# Patient Record
Sex: MALE | Race: WHITE | NOT HISPANIC OR LATINO | Employment: OTHER | URBAN - METROPOLITAN AREA
[De-identification: names, ages, dates, MRNs, and addresses within clinical notes are randomized per-mention and may not be internally consistent; named-entity substitution may affect disease eponyms.]

---

## 2017-01-09 ENCOUNTER — HOSPITAL ENCOUNTER (OUTPATIENT)
Dept: RESPIRATORY THERAPY | Facility: HOSPITAL | Age: 82
Discharge: HOME | End: 2017-01-09
Attending: INTERNAL MEDICINE | Admitting: INTERNAL MEDICINE

## 2017-03-28 ENCOUNTER — HOSPITAL ENCOUNTER (OUTPATIENT)
Dept: SLEEP MEDICINE | Facility: HOSPITAL | Age: 82
Discharge: HOME | End: 2017-03-28
Attending: INTERNAL MEDICINE | Admitting: INTERNAL MEDICINE

## 2017-07-27 ENCOUNTER — ON CAMPUS - OUTPATIENT (AMBULATORY)
Dept: URBAN - METROPOLITAN AREA HOSPITAL 2 | Facility: HOSPITAL | Age: 82
End: 2017-07-27
Payer: MEDICARE

## 2017-07-27 VITALS
HEART RATE: 62 BPM | DIASTOLIC BLOOD PRESSURE: 61 MMHG | SYSTOLIC BLOOD PRESSURE: 76 MMHG | RESPIRATION RATE: 16 BRPM | HEART RATE: 75 BPM | OXYGEN SATURATION: 98 % | SYSTOLIC BLOOD PRESSURE: 103 MMHG | SYSTOLIC BLOOD PRESSURE: 71 MMHG | DIASTOLIC BLOOD PRESSURE: 80 MMHG | TEMPERATURE: 97.4 F | OXYGEN SATURATION: 96 % | RESPIRATION RATE: 17 BRPM | DIASTOLIC BLOOD PRESSURE: 48 MMHG | SYSTOLIC BLOOD PRESSURE: 114 MMHG | WEIGHT: 130 LBS | HEART RATE: 67 BPM | SYSTOLIC BLOOD PRESSURE: 99 MMHG | HEART RATE: 76 BPM | HEIGHT: 67 IN | DIASTOLIC BLOOD PRESSURE: 39 MMHG | SYSTOLIC BLOOD PRESSURE: 98 MMHG | DIASTOLIC BLOOD PRESSURE: 117 MMHG | RESPIRATION RATE: 15 BRPM | DIASTOLIC BLOOD PRESSURE: 42 MMHG | DIASTOLIC BLOOD PRESSURE: 50 MMHG | SYSTOLIC BLOOD PRESSURE: 161 MMHG | HEART RATE: 60 BPM | OXYGEN SATURATION: 100 % | SYSTOLIC BLOOD PRESSURE: 118 MMHG | HEART RATE: 69 BPM | OXYGEN SATURATION: 99 % | DIASTOLIC BLOOD PRESSURE: 67 MMHG | HEART RATE: 72 BPM

## 2017-07-27 DIAGNOSIS — K57.30 DIVERTICULOSIS OF LARGE INTESTINE WITHOUT PERFORATION OR ABS: ICD-10-CM

## 2017-07-27 DIAGNOSIS — Z12.11 ENCOUNTER FOR SCREENING FOR MALIGNANT NEOPLASM OF COLON: ICD-10-CM

## 2017-07-27 PROCEDURE — G0121 COLON CA SCRN NOT HI RSK IND: HCPCS

## 2017-07-27 RX ADMIN — PROPOFOL: 10 INJECTION, EMULSION INTRAVENOUS at 08:47

## 2019-08-06 ENCOUNTER — OFFICE VISIT (OUTPATIENT)
Dept: CARDIOLOGY | Facility: CLINIC | Age: 84
End: 2019-08-06

## 2019-08-06 VITALS
DIASTOLIC BLOOD PRESSURE: 74 MMHG | HEART RATE: 64 BPM | BODY MASS INDEX: 20.08 KG/M2 | HEIGHT: 71 IN | OXYGEN SATURATION: 99 % | SYSTOLIC BLOOD PRESSURE: 132 MMHG | WEIGHT: 143.4 LBS

## 2019-08-06 DIAGNOSIS — I10 ESSENTIAL HYPERTENSION: ICD-10-CM

## 2019-08-06 DIAGNOSIS — I63.10 CEREBROVASCULAR ACCIDENT (CVA) DUE TO EMBOLISM OF PRECEREBRAL ARTERY (HCC): ICD-10-CM

## 2019-08-06 DIAGNOSIS — I25.10 CORONARY ARTERY DISEASE INVOLVING NATIVE CORONARY ARTERY OF NATIVE HEART WITHOUT ANGINA PECTORIS: Primary | ICD-10-CM

## 2019-08-06 DIAGNOSIS — E78.2 HYPERLIPIDEMIA, MIXED: ICD-10-CM

## 2019-08-06 DIAGNOSIS — I49.5 SICK SINUS SYNDROME (HCC): ICD-10-CM

## 2019-08-06 DIAGNOSIS — Z95.0 PRESENCE OF CARDIAC PACEMAKER: ICD-10-CM

## 2019-08-06 PROBLEM — I63.9 CEREBROVASCULAR ACCIDENT (CVA) (HCC): Status: ACTIVE | Noted: 2019-08-06

## 2019-08-06 PROCEDURE — 93000 ELECTROCARDIOGRAM COMPLETE: CPT | Performed by: INTERNAL MEDICINE

## 2019-08-06 PROCEDURE — 99213 OFFICE O/P EST LOW 20 MIN: CPT | Performed by: INTERNAL MEDICINE

## 2019-08-06 RX ORDER — DUTASTERIDE 0.5 MG/1
0.5 CAPSULE, LIQUID FILLED ORAL NIGHTLY
COMMUNITY
Start: 2019-05-23

## 2019-08-06 RX ORDER — PRAVASTATIN SODIUM 40 MG
40 TABLET ORAL WEEKLY
COMMUNITY
Start: 2012-10-11 | End: 2022-01-01 | Stop reason: HOSPADM

## 2019-08-06 RX ORDER — ATENOLOL 25 MG/1
TABLET ORAL DAILY
Refills: 3 | COMMUNITY
Start: 2019-07-29 | End: 2020-02-06 | Stop reason: SDUPTHER

## 2019-08-06 RX ORDER — LISINOPRIL AND HYDROCHLOROTHIAZIDE 25; 20 MG/1; MG/1
1 TABLET ORAL DAILY
Refills: 3 | COMMUNITY
Start: 2019-05-01 | End: 2019-08-06 | Stop reason: ALTCHOICE

## 2019-08-06 RX ORDER — AMLODIPINE BESYLATE 2.5 MG/1
2.5 TABLET ORAL
Refills: 3 | COMMUNITY
Start: 2019-07-29 | End: 2021-01-01

## 2019-08-06 NOTE — PROGRESS NOTES
Cardiology Office Visit Note      Referring physician: Lulu    Reason For Followup: 6 month    HPI:  Shon Syed is a 88 y.o. male presents FU history of advanced ischemic coronary disease as well as sick sinus syndrome with dual chamber PM implant 2/2009 and hypertension.      3/2016  stress myoview, echocardiogram and Holter Monitor which were unremarkable.   Echocardiogram showed preserved LV function with mild TR RVSP 52mm hg    Device interrogation formed 6 months ago with satisfactory device functioning and still 2 years of battery life estimated.  Mr. Syed has a home monitor device.  He presents back today with no specific cardiac complaints.  He denies chest pain, dyspnea, PND, orthopnea, palpitations, near syncope, lower extremity edema or feelings of his heart racing.    Instructed to bring medication bottles to OV.           Past Medical History:   Diagnosis Date   • Coronary artery disease    • Hyperlipidemia    • Hypertension    • Stroke (CMS/HCC)        Past Surgical History:   Procedure Laterality Date   • CORONARY ARTERY BYPASS GRAFT  1993   • INSERT / REPLACE / REMOVE PACEMAKER  2010    BS insertion         Current Outpatient Medications:   •  amLODIPine (NORVASC) 2.5 MG tablet, Take 2.5 mg by mouth Daily., Disp: , Rfl: 3  •  aspirin 81 MG tablet, Daily., Disp: , Rfl:   •  atenolol (TENORMIN) 25 MG tablet, Daily., Disp: , Rfl: 3  •  dutasteride (AVODART) 0.5 MG capsule, Daily., Disp: , Rfl:   •  Multiple Vitamins-Minerals (MULTI VITAMIN/MINERALS) tablet, Daily., Disp: , Rfl:   •  pravastatin (PRAVACHOL) 40 MG tablet, Daily., Disp: , Rfl:     Social History     Socioeconomic History   • Marital status:      Spouse name: Not on file   • Number of children: Not on file   • Years of education: Not on file   • Highest education level: Not on file   Tobacco Use   • Smoking status: Former Smoker       History reviewed. No pertinent family history.      Review of Systems   General: denies  "fever, chills, anorexia, weight loss  Eyes: denies blurring, diplopia  Ear/Nose/Throat: denies ear pain, nosebleeds, hoarseness  Cardiovascular: See HPI  Respiratory: denies excessive sputum, hemoptysis, wheezing  Gastrointestinal: denies nausea, vomiting, change in bowel habits, abdominal pain  Genitourinary: denies dysuria and hematuria  Musculoskeletal: denies back pain, joint pain, joint swelling, muscle cramps, weakness  Skin: denies rashes, itching, suspicious lesions  Neurologic: denies focal neuro deficits  Psychiatric: denies depression, anxiety  Endocrine: denies cold intolerance, heat intolerance  Hematologic/Lymphatic: denies abnormal bruising, bleeding  Allergic/Immunologic: denies urticaria or persistent infections      Objective     Visit Vitals  /74   Pulse 64   Ht 180.3 cm (71\")   Wt 65 kg (143 lb 6.4 oz)   SpO2 99% Comment: room air   BMI 20.00 kg/m²           Physical Exam  General:     Obese, well developed,, in no acute distress.    Head:     normocephalic and atraumatic.    Eyes:    PERRL/EOM intact, conjunctiva and sclera clear with out nystagmus.    Neck:    no jvd or bruits  Chest Wall:    no deformities   Lungs:    clear bilaterally to auscultation with adequate global airflow   Heart:    non-displaced PMI; regular rate and rhythm, normal S1, S2 without murmurs, rubs, or gallops  Abdomen:  Soft, nontender without HSM  Msk:    no deformity; adequate R OM  Pulses:    pulses normal in all 4 extremities.    Extremities:    no clubbing, cyanosis, edema   Neurologic:    no focal sensory or motor deficits  Skin:    intact without lesions or rashes.    Psych:    alert and cooperative; normal mood and affect; normal attention span and concentration.            ECG 12 Lead  Date/Time: 8/6/2019 2:46 PM  Performed by: DEL Oleary MD  Authorized by: DEL Oleary MD   Comparison: compared with previous ECG from 2/5/2019  Similar to previous ECG  Comments: Abnormal EKG shows probably atrial " pacing with ventricular sensing.  There are inferolateral ST-T wave changes which are not change from previous tracing.  One isolated PAC is new              Assessment:   1. Coronary artery disease involving native coronary artery of native heart without angina pectoris  Multivessel CABG 1993; remains clinically stable and angina free    2. Sick sinus syndrome (CMS/HCC)  Satisfactory control with beta-blocker and permanent dual-chamber pacemaker    3. Presence of cardiac pacemaker  Satisfactory device site and functioning; originally implanted 2009-still has 2 years of battery life based on last interrogation 6 months ago    4. Essential hypertension  -Remains well-regulated current medications.    5. Hyperlipidemia, mixed  Maintained on statin therapy    6. Cerebrovascular accident (CVA) due to embolism of precerebral artery (CMS/HCC)  Minimal residual neuropathy with no recurrent signs or symptoms of TIA or stroke.  -Continue aspirin and statin therapy and will control blood pressure.        Plan:  Mr. Syed continue to be remarkable for advanced age and substantial medical problem list.  He is encouraged to continue with current medications as listed and reviewed today in detail.  Continue satisfactory functional activity.  Return to clinic 6 months or sooner if needed.  DEL Oleary MD  8/6/2019 2:46 PM

## 2020-02-06 ENCOUNTER — OFFICE VISIT (OUTPATIENT)
Dept: CARDIOLOGY | Facility: CLINIC | Age: 85
End: 2020-02-06

## 2020-02-06 VITALS
OXYGEN SATURATION: 99 % | HEART RATE: 82 BPM | SYSTOLIC BLOOD PRESSURE: 126 MMHG | BODY MASS INDEX: 23.76 KG/M2 | DIASTOLIC BLOOD PRESSURE: 70 MMHG | WEIGHT: 142.6 LBS | HEIGHT: 65 IN

## 2020-02-06 DIAGNOSIS — I10 ESSENTIAL HYPERTENSION: ICD-10-CM

## 2020-02-06 DIAGNOSIS — Z95.0 PRESENCE OF CARDIAC PACEMAKER: ICD-10-CM

## 2020-02-06 DIAGNOSIS — I63.10 CEREBROVASCULAR ACCIDENT (CVA) DUE TO EMBOLISM OF PRECEREBRAL ARTERY (HCC): ICD-10-CM

## 2020-02-06 DIAGNOSIS — E78.2 HYPERLIPIDEMIA, MIXED: ICD-10-CM

## 2020-02-06 DIAGNOSIS — I25.10 CORONARY ARTERY DISEASE INVOLVING NATIVE CORONARY ARTERY OF NATIVE HEART WITHOUT ANGINA PECTORIS: Primary | ICD-10-CM

## 2020-02-06 DIAGNOSIS — I49.5 SICK SINUS SYNDROME (HCC): ICD-10-CM

## 2020-02-06 PROCEDURE — 93000 ELECTROCARDIOGRAM COMPLETE: CPT | Performed by: INTERNAL MEDICINE

## 2020-02-06 PROCEDURE — 99213 OFFICE O/P EST LOW 20 MIN: CPT | Performed by: INTERNAL MEDICINE

## 2020-02-06 RX ORDER — ATENOLOL 25 MG/1
25 TABLET ORAL DAILY
Qty: 90 TABLET | Refills: 3 | Status: SHIPPED | OUTPATIENT
Start: 2020-02-06 | End: 2021-05-17

## 2020-02-06 RX ORDER — LISINOPRIL 2.5 MG/1
TABLET ORAL
COMMUNITY
Start: 2020-01-30 | End: 2020-02-06 | Stop reason: DRUGHIGH

## 2020-02-06 RX ORDER — LISINOPRIL AND HYDROCHLOROTHIAZIDE 12.5; 1 MG/1; MG/1
TABLET ORAL
COMMUNITY
End: 2020-02-06

## 2020-02-06 NOTE — PROGRESS NOTES
Cardiology Office Visit Note      Referring physician:  MD Lulu    Reason For Followup: 6 month/CAD/SSS    HPI:  Shon ZAYAS Kunal is a 89 y.o. male presents FU history of advanced ischemic coronary disease for which he underwent multivessel CABG in 1993.  His initial bypass operation was complicated by perioperative stroke for which he has recovered remarkably well.  In addition, he has sick sinus syndrome with dual chamber PM implant 2/2009 and hypertension.      3/2016  stress myoview, echocardiogram and Holter Monitor which were unremarkable.   Echocardiogram showed preserved LV function with mild TR RVSP 52mm hg    Reports remaining fairly functionally active for age; however,; denies regular exercise regimen.  Shon admits he is generally less active to the colder winter months.    He presents back today with no specific cardiac complaints.  He denies chest pain, dyspnea, PND, orthopnea, palpitations, near syncope, lower extremity edema or feelings of his heart racing.  His wife sees to it that he remains fairly compliant with medications listed and reviewed in detail.           Past Medical History:   Diagnosis Date   • Coronary artery disease    • Coronary artery disease involving native coronary artery of native heart 8/6/2019    Status post multivessel CABG 1993   • Essential hypertension 8/6/2019   • Hyperlipidemia    • Hyperlipidemia, mixed 8/6/2019   • Hypertension    • Presence of cardiac pacemaker 8/6/2019    S/P dual-chamber pacemaker implanted 2009   • Sick sinus syndrome (CMS/HCC) 8/6/2019   • Stroke (CMS/[facility])        Past Surgical History:   Procedure Laterality Date   • CORONARY ARTERY BYPASS GRAFT  1993   • INSERT / REPLACE / REMOVE PACEMAKER  2010    BS insertion         Current Outpatient Medications:   •  amLODIPine (NORVASC) 2.5 MG tablet, Take 2.5 mg by mouth Daily., Disp: , Rfl: 3  •  aspirin 81 MG tablet, Daily., Disp: , Rfl:   •  dutasteride (AVODART) 0.5 MG capsule, Daily., Disp: ,  "Rfl:   •  Multiple Vitamins-Minerals (MULTI VITAMIN/MINERALS) tablet, Daily., Disp: , Rfl:   •  pravastatin (PRAVACHOL) 40 MG tablet, Daily., Disp: , Rfl:   •  atenolol (TENORMIN) 25 MG tablet, Take 1 tablet by mouth Daily., Disp: 90 tablet, Rfl: 3  •  rivaroxaban (XARELTO) 15 MG tablet, Take 1 tablet by mouth., Disp: , Rfl:     Social History     Socioeconomic History   • Marital status:      Spouse name: Not on file   • Number of children: Not on file   • Years of education: Not on file   • Highest education level: Not on file   Tobacco Use   • Smoking status: Former Smoker       No family history on file.      Review of Systems   General: denies fever, chills, anorexia, weight loss  Eyes: denies blurring, diplopia  Ear/Nose/Throat: denies ear pain, nosebleeds, hoarseness; bilat hearing aides  Cardiovascular: See HPI  Respiratory: denies excessive sputum, hemoptysis, wheezing  Gastrointestinal: denies nausea, vomiting, change in bowel habits, abdominal pain  Genitourinary: denies dysuria and hematuria  Musculoskeletal: denies back pain, joint pain, joint swelling, muscle cramps, weakness  Skin: denies rashes, itching, suspicious lesions  Neurologic: denies focal neuro deficits  Psychiatric: denies depression, anxiety  Endocrine: denies cold intolerance, heat intolerance  Hematologic/Lymphatic: denies abnormal bruising, bleeding  Allergic/Immunologic: denies urticaria or persistent infections      Objective     Visit Vitals  /70   Pulse 82   Ht 165.1 cm (65\")   Wt 64.7 kg (142 lb 9.6 oz)   SpO2 99% Comment: room air   BMI 23.73 kg/m²           Physical Exam  General:    well developed,, in no acute distress.    Head:     normocephalic and atraumatic.    Eyes:    PERRL/EOM intact, conjunctiva and sclera clear with out nystagmus.    Neck:    no jvd or bruits  Chest Wall:    no deformities   Lungs:    clear bilaterally to auscultation with adequate global airflow   Heart:    non-displaced PMI; regular " rate and rhythm, normal S1, S2 without murmurs, rubs, or gallops  Abdomen:  Soft, nontender without HSM  Msk:    no deformity; adequate R OM  Pulses:    pulses normal in all 4 extremities.    Extremities:    no clubbing, cyanosis, edema   Neurologic:    no focal sensory or motor deficits  Skin:    intact without lesions or rashes.    Psych:    alert and cooperative; normal mood and affect; normal attention span and concentration.            ECG 12 Lead  Date/Time: 2/6/2020 11:00 AM  Performed by: DEL Oleary MD  Authorized by: DEL Oleary MD   Comparison: compared with previous ECG from 8/6/2019  Similar to previous ECG    Clinical impression: abnormal EKG  Comments: Sinus rhythm with demand atrial pacing.  LVH with secondary repolarization changes              Assessment:   1. Coronary artery disease involving native coronary artery of native heart without angina pectoris; status post multivessel CABG 1993  -Remains hemodynamically stable well compensated and angina free    2. Cerebrovascular accident (CVA) due to embolism of precerebral artery (CMS/HCC) remote stroke occurred perioperatively when he underwent bypass in 93  Very modest residual neuropathy    3. Essential hypertension  Generally well regulated on low-dose atenolol (12.5 mg daily) and very low-dose (virtually homeopathic) of lisinopril 2.5 mg daily    4. Hyperlipidemia, mixed  Maintained on statin therapy    5. Presence of cardiac pacemaker  Suspected device site and functioning with adequate battery status    6. Sick sinus syndrome (CMS/HCC)  Marginally well regulated on very low-dose atenolol with primary demand AV sequential pacing        Plan:  Patient would benefit from increased beta-blockade.  Consequently, I advised him to increase his atenolol to 1 full tablet or 25 mg p.o. daily and discontinue his very low nearly homeopathic dose of lisinopril at only 2.5 mg daily.  Shon will keep a home blood pressure and heart rate log and  notify our office if he does not stay within recommended guidelines.  Return to clinic 6 months or sooner if needed.    DEL Oleary MD  2/16/2020 12:10 AM    This report was generated using the Dragon voice recognition system.

## 2020-03-27 ENCOUNTER — HOSPITAL ENCOUNTER (EMERGENCY)
Facility: HOSPITAL | Age: 85
Discharge: HOME | End: 2020-03-27
Admitting: EMERGENCY MEDICINE

## 2020-03-27 VITALS
OXYGEN SATURATION: 97 % | WEIGHT: 139.33 LBS | HEIGHT: 69 IN | HEART RATE: 76 BPM | SYSTOLIC BLOOD PRESSURE: 173 MMHG | TEMPERATURE: 97.9 F | DIASTOLIC BLOOD PRESSURE: 77 MMHG | BODY MASS INDEX: 20.64 KG/M2 | RESPIRATION RATE: 19 BRPM

## 2020-03-27 DIAGNOSIS — T25.132A SUPERFICIAL BURN OF TOE OF LEFT FOOT, INITIAL ENCOUNTER: Primary | ICD-10-CM

## 2020-03-27 PROCEDURE — 99283 EMERGENCY DEPT VISIT LOW MDM: CPT

## 2020-03-27 RX ADMIN — SILVER SULFADIAZINE 1 APPLICATION: 10 CREAM TOPICAL at 14:14

## 2020-08-06 ENCOUNTER — OFFICE VISIT (OUTPATIENT)
Dept: CARDIOLOGY | Facility: CLINIC | Age: 85
End: 2020-08-06

## 2020-08-06 VITALS
DIASTOLIC BLOOD PRESSURE: 72 MMHG | HEART RATE: 69 BPM | HEIGHT: 69 IN | WEIGHT: 139 LBS | SYSTOLIC BLOOD PRESSURE: 126 MMHG | BODY MASS INDEX: 20.59 KG/M2

## 2020-08-06 DIAGNOSIS — I10 ESSENTIAL HYPERTENSION: ICD-10-CM

## 2020-08-06 DIAGNOSIS — I49.5 SICK SINUS SYNDROME (HCC): ICD-10-CM

## 2020-08-06 DIAGNOSIS — I25.10 CORONARY ARTERY DISEASE INVOLVING NATIVE CORONARY ARTERY OF NATIVE HEART WITHOUT ANGINA PECTORIS: Primary | ICD-10-CM

## 2020-08-06 PROCEDURE — 93000 ELECTROCARDIOGRAM COMPLETE: CPT | Performed by: INTERNAL MEDICINE

## 2020-08-06 PROCEDURE — 99213 OFFICE O/P EST LOW 20 MIN: CPT | Performed by: INTERNAL MEDICINE

## 2020-08-06 RX ORDER — LISINOPRIL 5 MG/1
5 TABLET ORAL DAILY
COMMUNITY
End: 2021-01-01 | Stop reason: ALTCHOICE

## 2020-08-06 NOTE — PROGRESS NOTES
Cardiology Office Visit Note      Referring physician: Antony Lawson MD    Reason For Followup: follow up      HPI:  Shon ZAYAS Kunal is a 89 y.o. male. Presents today for a follow up visit.  Has advanced ischemic coronary disease for which he underwent multivessel CABG in 1993.  His initial bypass operation was complicated by perioperative stroke.   In addition, he has sick sinus syndrome with dual chamber PM implant 2/2009 and hypertension.      3/2016  stress myoview, echocardiogram and Holter Monitor which were unremarkable.   Echocardiogram showed preserved LV function with mild TR RVSP 52mm hg    Reports remaining fairly functionally active for age; however,; denies regular exercise regimen.  Shon admits he is generally less active to the colder winter months.    He presents back today with no specific cardiac complaints.  He denies chest pain, dyspnea, PND, orthopnea, palpitations, near syncope, lower extremity edema or feelings of his heart racing.  His wife sees to it that he remains fairly compliant with medications listed and reviewed in detail.             Past Medical History:   Diagnosis Date   • Coronary artery disease    • Coronary artery disease involving native coronary artery of native heart 8/6/2019    Status post multivessel CABG 1993   • Essential hypertension 8/6/2019   • Hyperlipidemia    • Hyperlipidemia, mixed 8/6/2019   • Hypertension    • Presence of cardiac pacemaker 8/6/2019    S/P dual-chamber pacemaker implanted 2009   • Sick sinus syndrome (CMS/HCC) 8/6/2019   • Stroke (CMS/HCC)        Past Surgical History:   Procedure Laterality Date   • CORONARY ARTERY BYPASS GRAFT  1993   • INSERT / REPLACE / REMOVE PACEMAKER  2010    BS insertion         Current Outpatient Medications:   •  amLODIPine (NORVASC) 2.5 MG tablet, Take 2.5 mg by mouth Daily., Disp: , Rfl: 3  •  aspirin 81 MG tablet, Daily., Disp: , Rfl:   •  atenolol (TENORMIN) 25 MG tablet, Take 1 tablet by mouth Daily., Disp:  "90 tablet, Rfl: 3  •  dutasteride (AVODART) 0.5 MG capsule, Daily., Disp: , Rfl:   •  lisinopril (PRINIVIL,ZESTRIL) 5 MG tablet, Take 5 mg by mouth Daily., Disp: , Rfl:   •  Multiple Vitamins-Minerals (MULTI VITAMIN/MINERALS) tablet, Daily., Disp: , Rfl:   •  pravastatin (PRAVACHOL) 40 MG tablet, Daily., Disp: , Rfl:     Social History     Socioeconomic History   • Marital status:      Spouse name: Not on file   • Number of children: Not on file   • Years of education: Not on file   • Highest education level: Not on file   Tobacco Use   • Smoking status: Former Smoker   • Smokeless tobacco: Former User   Substance and Sexual Activity   • Alcohol use: Never     Frequency: Never   • Drug use: Never   • Sexual activity: Defer       Family History   Problem Relation Age of Onset   • Leukemia Mother    • Heart disease Father    • Heart attack Father 56         age at 56         Review of Systems   General: denies fever, chills, anorexia, weight loss  Eyes: denies blurring, diplopia  Ear/Nose/Throat: denies ear pain, nosebleeds, hoarseness  Cardiovascular: See HPI  Respiratory: denies excessive sputum, hemoptysis, wheezing  Gastrointestinal: denies nausea, vomiting, change in bowel habits, abdominal pain  Genitourinary: Nocturia 1 or 2 times per night  Musculoskeletal: Mild to moderate weightbearing arthralgias  Skin: denies rashes, itching, suspicious lesions  Neurologic: denies focal neuro deficits  Psychiatric: denies depression, anxiety  Endocrine: denies cold intolerance, heat intolerance  Hematologic/Lymphatic: denies abnormal bruising, bleeding  Allergic/Immunologic: denies urticaria or persistent infections      Objective     Visit Vitals  /72   Pulse 69   Ht 175.3 cm (69.02\")   Wt 63 kg (139 lb)   BMI 20.52 kg/m²           Physical Exam  General:     Obese, well developed,, in no acute distress.    Head:     normocephalic and atraumatic.    Eyes:    PERRL/EOM intact, conjunctiva and sclera clear " with out nystagmus.    Neck:    no jvd or bruits  Chest Wall:    no deformities   Lungs:    clear bilaterally to auscultation with adequate global airflow   Heart:    non-displaced PMI; regular rate and rhythm, normal S1, S2 without murmurs, rubs, or gallops  Abdomen:  Soft, nontender without HSM  Msk:    no deformity; adequate R OM  Pulses:    pulses normal in all 4 extremities.    Extremities:    no clubbing, cyanosis, edema   Neurologic:    no focal sensory or motor deficits  Skin:    intact without lesions or rashes.    Psych:    alert and cooperative; normal mood and affect; normal attention span and concentration.            ECG 12 Lead  Date/Time: 8/6/2020 8:10 AM  Performed by: DEL Oleary MD  Authorized by: DEL Oleary MD   Comparison: compared with previous ECG from 2/6/2020  Similar to previous ECG  Rhythm: paced  Ectopy: atrial premature contractions    Clinical impression: abnormal EKG  Comments: Dominant atrial paced rhythm with ventricular sensing and rare isolated premature atrial contractions.  No overall change in previous tracing              Assessment:   Problems Addressed this Visit        Cardiovascular and Mediastinum    Sick sinus syndrome (CMS/HCC)    Fairly well-regulated on current medical regimen plus AV sequential pacemaker        Essential hypertension    Well-regulated current medication listed reviewed in detail                Coronary artery disease involving native coronary artery of native heart - Primary    Remains clinically stable and angina free despite remote CABG (1993)                Plan:  Continue current medications as listed reviewed in detail  Return to clinic 6 months for device check and clinical follow-up.  Encouraged regular progressive physical activity though strongly cautioned in his risk for falls.    DEL Oleary MD  8/23/2020 21:38    This report was generated using the Dragon voice recognition system.

## 2021-01-01 ENCOUNTER — LAB (OUTPATIENT)
Dept: LAB | Facility: HOSPITAL | Age: 86
End: 2021-01-01

## 2021-01-01 ENCOUNTER — READMISSION MANAGEMENT (OUTPATIENT)
Dept: CALL CENTER | Facility: HOSPITAL | Age: 86
End: 2021-01-01

## 2021-01-01 ENCOUNTER — PREP FOR SURGERY (OUTPATIENT)
Dept: OTHER | Facility: HOSPITAL | Age: 86
End: 2021-01-01

## 2021-01-01 ENCOUNTER — OFFICE VISIT (OUTPATIENT)
Dept: CARDIOLOGY | Facility: CLINIC | Age: 86
End: 2021-01-01

## 2021-01-01 ENCOUNTER — TRANSCRIBE ORDERS (OUTPATIENT)
Dept: ADMINISTRATIVE | Facility: HOSPITAL | Age: 86
End: 2021-01-01

## 2021-01-01 ENCOUNTER — TRANSCRIBE ORDERS (OUTPATIENT)
Dept: LAB | Facility: HOSPITAL | Age: 86
End: 2021-01-01

## 2021-01-01 ENCOUNTER — ANESTHESIA EVENT (OUTPATIENT)
Dept: CARDIOLOGY | Facility: HOSPITAL | Age: 86
End: 2021-01-01

## 2021-01-01 VITALS
BODY MASS INDEX: 20.72 KG/M2 | HEIGHT: 67 IN | DIASTOLIC BLOOD PRESSURE: 70 MMHG | WEIGHT: 132 LBS | SYSTOLIC BLOOD PRESSURE: 122 MMHG | OXYGEN SATURATION: 98 % | HEART RATE: 77 BPM

## 2021-01-01 VITALS
SYSTOLIC BLOOD PRESSURE: 151 MMHG | WEIGHT: 139.5 LBS | DIASTOLIC BLOOD PRESSURE: 77 MMHG | HEART RATE: 61 BPM | OXYGEN SATURATION: 99 % | HEIGHT: 67 IN | BODY MASS INDEX: 21.9 KG/M2

## 2021-01-01 VITALS
HEART RATE: 80 BPM | HEIGHT: 67 IN | WEIGHT: 137.4 LBS | SYSTOLIC BLOOD PRESSURE: 130 MMHG | DIASTOLIC BLOOD PRESSURE: 82 MMHG | RESPIRATION RATE: 18 BRPM | BODY MASS INDEX: 21.56 KG/M2

## 2021-01-01 DIAGNOSIS — I50.21 ACUTE SYSTOLIC CHF (CONGESTIVE HEART FAILURE) (HCC): ICD-10-CM

## 2021-01-01 DIAGNOSIS — I10 ESSENTIAL HYPERTENSION: Primary | ICD-10-CM

## 2021-01-01 DIAGNOSIS — I25.10 CORONARY ARTERY DISEASE INVOLVING NATIVE CORONARY ARTERY OF NATIVE HEART WITHOUT ANGINA PECTORIS: Primary | ICD-10-CM

## 2021-01-01 DIAGNOSIS — N18.32 CHRONIC KIDNEY DISEASE (CKD) STAGE G3B/A1, MODERATELY DECREASED GLOMERULAR FILTRATION RATE (GFR) BETWEEN 30-44 ML/MIN/1.73 SQUARE METER AND ALBUMINURIA CREATININE RATIO LESS THAN 30 MG/G (CMS/H* (HCC): Primary | ICD-10-CM

## 2021-01-01 DIAGNOSIS — Z45.010 PACEMAKER BATTERY DEPLETION: ICD-10-CM

## 2021-01-01 DIAGNOSIS — I49.5 SICK SINUS SYNDROME (HCC): Primary | ICD-10-CM

## 2021-01-01 DIAGNOSIS — N18.30 MALIGNANT HYPERTENSIVE HEART AND CHRONIC KIDNEY DISEASE STAGE III (HCC): Primary | ICD-10-CM

## 2021-01-01 DIAGNOSIS — N18.30 MALIGNANT HYPERTENSIVE HEART AND CHRONIC KIDNEY DISEASE STAGE III (HCC): ICD-10-CM

## 2021-01-01 DIAGNOSIS — I49.5 SICK SINUS SYNDROME (HCC): ICD-10-CM

## 2021-01-01 DIAGNOSIS — I10 HYPERTENSION, ESSENTIAL: ICD-10-CM

## 2021-01-01 DIAGNOSIS — E78.2 HYPERLIPIDEMIA, MIXED: ICD-10-CM

## 2021-01-01 DIAGNOSIS — I13.10 MALIGNANT HYPERTENSIVE HEART AND CHRONIC KIDNEY DISEASE STAGE III (HCC): Primary | ICD-10-CM

## 2021-01-01 DIAGNOSIS — I49.5 SICK SINUS SYNDROME: ICD-10-CM

## 2021-01-01 DIAGNOSIS — I25.10 CORONARY ARTERY DISEASE INVOLVING NATIVE CORONARY ARTERY OF NATIVE HEART WITHOUT ANGINA PECTORIS: ICD-10-CM

## 2021-01-01 DIAGNOSIS — Z95.0 PRESENCE OF CARDIAC PACEMAKER: ICD-10-CM

## 2021-01-01 DIAGNOSIS — R06.09 DYSPNEA ON EXERTION: ICD-10-CM

## 2021-01-01 DIAGNOSIS — I13.10 MALIGNANT HYPERTENSIVE HEART AND CHRONIC KIDNEY DISEASE STAGE III (HCC): ICD-10-CM

## 2021-01-01 LAB
25(OH)D3 SERPL-MCNC: 64.9 NG/ML
ALBUMIN SERPL-MCNC: 4 G/DL (ref 3.5–5.2)
ALBUMIN SERPL-MCNC: 4.3 G/DL (ref 3.5–5.2)
ALBUMIN/GLOB SERPL: 1.6 G/DL
ALBUMIN/GLOB SERPL: 1.8 G/DL
ALP SERPL-CCNC: 64 U/L (ref 39–117)
ALP SERPL-CCNC: 93 U/L (ref 39–117)
ALT SERPL W P-5'-P-CCNC: 17 U/L (ref 1–41)
ALT SERPL W P-5'-P-CCNC: 25 U/L (ref 1–41)
ANION GAP SERPL CALCULATED.3IONS-SCNC: 3.3 MMOL/L (ref 5–15)
ANION GAP SERPL CALCULATED.3IONS-SCNC: 9 MMOL/L (ref 5–15)
ANION GAP SERPL CALCULATED.3IONS-SCNC: 9.7 MMOL/L (ref 5–15)
AST SERPL-CCNC: 20 U/L (ref 1–40)
AST SERPL-CCNC: 27 U/L (ref 1–40)
BASOPHILS # BLD AUTO: 0.05 10*3/MM3 (ref 0–0.2)
BASOPHILS # BLD AUTO: 0.05 10*3/MM3 (ref 0–0.2)
BASOPHILS NFR BLD AUTO: 0.8 % (ref 0–1.5)
BASOPHILS NFR BLD AUTO: 0.8 % (ref 0–1.5)
BILIRUB SERPL-MCNC: 0.4 MG/DL (ref 0–1.2)
BILIRUB SERPL-MCNC: 0.5 MG/DL (ref 0–1.2)
BILIRUB UR QL STRIP: NEGATIVE
BUN SERPL-MCNC: 29 MG/DL (ref 8–23)
BUN SERPL-MCNC: 38 MG/DL (ref 8–23)
BUN SERPL-MCNC: 42 MG/DL (ref 8–23)
BUN/CREAT SERPL: 21 (ref 7–25)
BUN/CREAT SERPL: 21.8 (ref 7–25)
BUN/CREAT SERPL: 29.2 (ref 7–25)
CALCIUM SPEC-SCNC: 8.8 MG/DL (ref 8.2–9.6)
CALCIUM SPEC-SCNC: 9.4 MG/DL (ref 8.2–9.6)
CALCIUM SPEC-SCNC: 9.5 MG/DL (ref 8.2–9.6)
CHLORIDE SERPL-SCNC: 103 MMOL/L (ref 98–107)
CHLORIDE SERPL-SCNC: 105 MMOL/L (ref 98–107)
CHLORIDE SERPL-SCNC: 108 MMOL/L (ref 98–107)
CK SERPL-CCNC: 99 U/L (ref 20–200)
CLARITY UR: CLEAR
CO2 SERPL-SCNC: 26.3 MMOL/L (ref 22–29)
CO2 SERPL-SCNC: 27.7 MMOL/L (ref 22–29)
CO2 SERPL-SCNC: 28 MMOL/L (ref 22–29)
COLOR UR: YELLOW
CREAT SERPL-MCNC: 1.3 MG/DL (ref 0.76–1.27)
CREAT SERPL-MCNC: 1.33 MG/DL (ref 0.76–1.27)
CREAT SERPL-MCNC: 2 MG/DL (ref 0.76–1.27)
DEPRECATED RDW RBC AUTO: 45.2 FL (ref 37–54)
DEPRECATED RDW RBC AUTO: 46.5 FL (ref 37–54)
EOSINOPHIL # BLD AUTO: 0.15 10*3/MM3 (ref 0–0.4)
EOSINOPHIL # BLD AUTO: 0.24 10*3/MM3 (ref 0–0.4)
EOSINOPHIL NFR BLD AUTO: 2.3 % (ref 0.3–6.2)
EOSINOPHIL NFR BLD AUTO: 3.7 % (ref 0.3–6.2)
ERYTHROCYTE [DISTWIDTH] IN BLOOD BY AUTOMATED COUNT: 12.5 % (ref 12.3–15.4)
ERYTHROCYTE [DISTWIDTH] IN BLOOD BY AUTOMATED COUNT: 12.6 % (ref 12.3–15.4)
GFR SERPL CREATININE-BSD FRML MDRD: 32 ML/MIN/1.73
GFR SERPL CREATININE-BSD FRML MDRD: 50 ML/MIN/1.73
GFR SERPL CREATININE-BSD FRML MDRD: 52 ML/MIN/1.73
GLOBULIN UR ELPH-MCNC: 2.4 GM/DL
GLOBULIN UR ELPH-MCNC: 2.5 GM/DL
GLUCOSE SERPL-MCNC: 103 MG/DL (ref 65–99)
GLUCOSE SERPL-MCNC: 86 MG/DL (ref 65–99)
GLUCOSE SERPL-MCNC: 90 MG/DL (ref 65–99)
GLUCOSE UR STRIP-MCNC: NEGATIVE MG/DL
HCT VFR BLD AUTO: 42.6 % (ref 37.5–51)
HCT VFR BLD AUTO: 42.6 % (ref 37.5–51)
HGB BLD-MCNC: 13.7 G/DL (ref 13–17.7)
HGB BLD-MCNC: 14.1 G/DL (ref 13–17.7)
HGB UR QL STRIP.AUTO: NEGATIVE
IMM GRANULOCYTES # BLD AUTO: 0.02 10*3/MM3 (ref 0–0.05)
IMM GRANULOCYTES # BLD AUTO: 0.02 10*3/MM3 (ref 0–0.05)
IMM GRANULOCYTES NFR BLD AUTO: 0.3 % (ref 0–0.5)
IMM GRANULOCYTES NFR BLD AUTO: 0.3 % (ref 0–0.5)
INR PPP: 1.01 (ref 0.93–1.1)
KETONES UR QL STRIP: NEGATIVE
LEUKOCYTE ESTERASE UR QL STRIP.AUTO: NEGATIVE
LYMPHOCYTES # BLD AUTO: 1.63 10*3/MM3 (ref 0.7–3.1)
LYMPHOCYTES # BLD AUTO: 1.81 10*3/MM3 (ref 0.7–3.1)
LYMPHOCYTES NFR BLD AUTO: 24.8 % (ref 19.6–45.3)
LYMPHOCYTES NFR BLD AUTO: 28.2 % (ref 19.6–45.3)
MAGNESIUM SERPL-MCNC: 2.3 MG/DL (ref 1.6–2.4)
MAGNESIUM SERPL-MCNC: 2.4 MG/DL (ref 1.7–2.3)
MCH RBC QN AUTO: 32.1 PG (ref 26.6–33)
MCH RBC QN AUTO: 32.4 PG (ref 26.6–33)
MCHC RBC AUTO-ENTMCNC: 32.2 G/DL (ref 31.5–35.7)
MCHC RBC AUTO-ENTMCNC: 33.1 G/DL (ref 31.5–35.7)
MCV RBC AUTO: 97.9 FL (ref 79–97)
MCV RBC AUTO: 99.8 FL (ref 79–97)
MONOCYTES # BLD AUTO: 0.75 10*3/MM3 (ref 0.1–0.9)
MONOCYTES # BLD AUTO: 1.04 10*3/MM3 (ref 0.1–0.9)
MONOCYTES NFR BLD AUTO: 11.7 % (ref 5–12)
MONOCYTES NFR BLD AUTO: 15.8 % (ref 5–12)
NEUTROPHILS NFR BLD AUTO: 3.54 10*3/MM3 (ref 1.7–7)
NEUTROPHILS NFR BLD AUTO: 3.69 10*3/MM3 (ref 1.7–7)
NEUTROPHILS NFR BLD AUTO: 55.3 % (ref 42.7–76)
NEUTROPHILS NFR BLD AUTO: 56 % (ref 42.7–76)
NITRITE UR QL STRIP: NEGATIVE
NRBC BLD AUTO-RTO: 0 /100 WBC (ref 0–0.2)
NRBC BLD AUTO-RTO: 0 /100 WBC (ref 0–0.2)
PH UR STRIP.AUTO: 6 [PH] (ref 5–8)
PHOSPHATE SERPL-MCNC: 3.4 MG/DL (ref 2.5–4.5)
PLATELET # BLD AUTO: 228 10*3/MM3 (ref 140–450)
PLATELET # BLD AUTO: 231 10*3/MM3 (ref 140–450)
PMV BLD AUTO: 10.8 FL (ref 6–12)
PMV BLD AUTO: 11.8 FL (ref 6–12)
POTASSIUM SERPL-SCNC: 4.4 MMOL/L (ref 3.5–5.2)
POTASSIUM SERPL-SCNC: 4.6 MMOL/L (ref 3.5–5.2)
POTASSIUM SERPL-SCNC: 5 MMOL/L (ref 3.5–5.2)
PROT SERPL-MCNC: 6.5 G/DL (ref 6–8.5)
PROT SERPL-MCNC: 6.7 G/DL (ref 6–8.5)
PROT UR QL STRIP: NEGATIVE
PROTHROMBIN TIME: 11.2 SECONDS (ref 9.6–11.7)
PTH-INTACT SERPL-MCNC: 43.5 PG/ML (ref 15–65)
RBC # BLD AUTO: 4.27 10*6/MM3 (ref 4.14–5.8)
RBC # BLD AUTO: 4.35 10*6/MM3 (ref 4.14–5.8)
SARS-COV-2 ORF1AB RESP QL NAA+PROBE: NOT DETECTED
SODIUM SERPL-SCNC: 139 MMOL/L (ref 136–145)
SODIUM SERPL-SCNC: 139 MMOL/L (ref 136–145)
SODIUM SERPL-SCNC: 142 MMOL/L (ref 136–145)
SP GR UR STRIP: 1.02 (ref 1–1.03)
URATE SERPL-MCNC: 8.6 MG/DL (ref 3.4–7)
UROBILINOGEN UR QL STRIP: NORMAL
WBC # BLD AUTO: 6.41 10*3/MM3 (ref 3.4–10.8)
WBC NRBC COR # BLD: 6.58 10*3/MM3 (ref 3.4–10.8)

## 2021-01-01 PROCEDURE — U0005 INFEC AGEN DETEC AMPLI PROBE: HCPCS

## 2021-01-01 PROCEDURE — 85025 COMPLETE CBC W/AUTO DIFF WBC: CPT

## 2021-01-01 PROCEDURE — 82550 ASSAY OF CK (CPK): CPT

## 2021-01-01 PROCEDURE — C9803 HOPD COVID-19 SPEC COLLECT: HCPCS

## 2021-01-01 PROCEDURE — 80048 BASIC METABOLIC PNL TOTAL CA: CPT

## 2021-01-01 PROCEDURE — 82306 VITAMIN D 25 HYDROXY: CPT

## 2021-01-01 PROCEDURE — 84550 ASSAY OF BLOOD/URIC ACID: CPT

## 2021-01-01 PROCEDURE — 83735 ASSAY OF MAGNESIUM: CPT

## 2021-01-01 PROCEDURE — 99204 OFFICE O/P NEW MOD 45 MIN: CPT | Performed by: INTERNAL MEDICINE

## 2021-01-01 PROCEDURE — 93000 ELECTROCARDIOGRAM COMPLETE: CPT | Performed by: INTERNAL MEDICINE

## 2021-01-01 PROCEDURE — 36415 COLL VENOUS BLD VENIPUNCTURE: CPT

## 2021-01-01 PROCEDURE — 80053 COMPREHEN METABOLIC PANEL: CPT

## 2021-01-01 PROCEDURE — 83970 ASSAY OF PARATHORMONE: CPT

## 2021-01-01 PROCEDURE — U0004 COV-19 TEST NON-CDC HGH THRU: HCPCS

## 2021-01-01 PROCEDURE — 99213 OFFICE O/P EST LOW 20 MIN: CPT | Performed by: INTERNAL MEDICINE

## 2021-01-01 PROCEDURE — 84100 ASSAY OF PHOSPHORUS: CPT

## 2021-01-01 PROCEDURE — 99214 OFFICE O/P EST MOD 30 MIN: CPT | Performed by: INTERNAL MEDICINE

## 2021-01-01 PROCEDURE — 81003 URINALYSIS AUTO W/O SCOPE: CPT

## 2021-01-01 PROCEDURE — 85610 PROTHROMBIN TIME: CPT

## 2021-01-01 RX ORDER — MULTIPLE VITAMINS W/ MINERALS TAB 9MG-400MCG
1 TAB ORAL DAILY
COMMUNITY
End: 2022-01-01 | Stop reason: HOSPADM

## 2021-01-01 RX ORDER — POTASSIUM CHLORIDE 750 MG/1
10 TABLET, FILM COATED, EXTENDED RELEASE ORAL 3 TIMES WEEKLY
Status: ON HOLD | COMMUNITY
Start: 2021-01-01 | End: 2022-01-01

## 2021-01-01 RX ORDER — ASCORBIC ACID 500 MG
1000 TABLET ORAL DAILY
Status: ON HOLD | COMMUNITY
End: 2022-01-01

## 2021-01-01 RX ORDER — ZINC OXIDE 13 %
1 CREAM (GRAM) TOPICAL DAILY
COMMUNITY
End: 2022-01-01 | Stop reason: HOSPADM

## 2021-01-01 RX ORDER — ACETAMINOPHEN 160 MG
2000 TABLET,DISINTEGRATING ORAL 3 TIMES WEEKLY
COMMUNITY
End: 2022-01-01 | Stop reason: HOSPADM

## 2021-01-01 RX ORDER — ALLOPURINOL 100 MG/1
300 TABLET ORAL DAILY
COMMUNITY
Start: 2021-01-01

## 2021-01-01 RX ORDER — SODIUM CHLORIDE 0.9 % (FLUSH) 0.9 %
3 SYRINGE (ML) INJECTION EVERY 12 HOURS SCHEDULED
Status: CANCELLED | OUTPATIENT
Start: 2021-01-01

## 2021-01-01 RX ORDER — SODIUM CHLORIDE 0.9 % (FLUSH) 0.9 %
3-10 SYRINGE (ML) INJECTION AS NEEDED
Status: CANCELLED | OUTPATIENT
Start: 2021-01-01

## 2021-02-23 ENCOUNTER — OFFICE VISIT (OUTPATIENT)
Dept: CARDIOLOGY | Facility: CLINIC | Age: 86
End: 2021-02-23

## 2021-02-23 ENCOUNTER — CLINICAL SUPPORT NO REQUIREMENTS (OUTPATIENT)
Dept: CARDIOLOGY | Facility: CLINIC | Age: 86
End: 2021-02-23

## 2021-02-23 VITALS
HEART RATE: 74 BPM | BODY MASS INDEX: 21.5 KG/M2 | DIASTOLIC BLOOD PRESSURE: 72 MMHG | HEIGHT: 67 IN | WEIGHT: 137 LBS | SYSTOLIC BLOOD PRESSURE: 128 MMHG | OXYGEN SATURATION: 100 %

## 2021-02-23 DIAGNOSIS — I10 ESSENTIAL HYPERTENSION: ICD-10-CM

## 2021-02-23 DIAGNOSIS — I49.5 SICK SINUS SYNDROME (HCC): Primary | ICD-10-CM

## 2021-02-23 DIAGNOSIS — I25.10 CORONARY ARTERY DISEASE INVOLVING NATIVE CORONARY ARTERY OF NATIVE HEART WITHOUT ANGINA PECTORIS: ICD-10-CM

## 2021-02-23 DIAGNOSIS — Z95.0 PRESENCE OF CARDIAC PACEMAKER: ICD-10-CM

## 2021-02-23 PROCEDURE — 99213 OFFICE O/P EST LOW 20 MIN: CPT | Performed by: INTERNAL MEDICINE

## 2021-02-23 PROCEDURE — 93000 ELECTROCARDIOGRAM COMPLETE: CPT | Performed by: INTERNAL MEDICINE

## 2021-05-17 ENCOUNTER — CLINICAL SUPPORT NO REQUIREMENTS (OUTPATIENT)
Dept: CARDIOLOGY | Facility: CLINIC | Age: 86
End: 2021-05-17

## 2021-05-17 ENCOUNTER — OFFICE VISIT (OUTPATIENT)
Dept: CARDIOLOGY | Facility: CLINIC | Age: 86
End: 2021-05-17

## 2021-05-17 VITALS
SYSTOLIC BLOOD PRESSURE: 120 MMHG | HEART RATE: 72 BPM | BODY MASS INDEX: 21.66 KG/M2 | HEIGHT: 67 IN | OXYGEN SATURATION: 98 % | WEIGHT: 138 LBS | DIASTOLIC BLOOD PRESSURE: 80 MMHG

## 2021-05-17 DIAGNOSIS — I49.5 SICK SINUS SYNDROME (HCC): Primary | ICD-10-CM

## 2021-05-17 DIAGNOSIS — Z95.0 PRESENCE OF CARDIAC PACEMAKER: ICD-10-CM

## 2021-05-17 DIAGNOSIS — I25.10 CORONARY ARTERY DISEASE INVOLVING NATIVE CORONARY ARTERY OF NATIVE HEART WITHOUT ANGINA PECTORIS: ICD-10-CM

## 2021-05-17 PROCEDURE — 93280 PM DEVICE PROGR EVAL DUAL: CPT | Performed by: NURSE PRACTITIONER

## 2021-05-17 PROCEDURE — 99214 OFFICE O/P EST MOD 30 MIN: CPT | Performed by: INTERNAL MEDICINE

## 2021-05-17 PROCEDURE — 93000 ELECTROCARDIOGRAM COMPLETE: CPT | Performed by: INTERNAL MEDICINE

## 2021-05-17 RX ORDER — PREDNISOLONE SODIUM PHOSPHATE
POWDER (GRAM) MISCELLANEOUS
COMMUNITY
Start: 2021-03-24 | End: 2021-05-17

## 2021-05-17 NOTE — PROGRESS NOTES
Cardiology Office Visit Note      Referring physician:  Antony Lawson MD    Reason For Followup: Shortness of air    HPI:  Shon ZAYAS Kunal is a 89 y.o. male who presents to the office with concerns of shortness of air. He has advanced ischemic coronary disease for which he underwent multivessel CABG in 1993.  His initial bypass operation was complicated by perioperative stroke. In addition, he has sick sinus syndrome with dual chamber PM implant 2/2009 and hypertension.      He reports shortness of air since March and he has no endurance to carry things very far- especially up stairs. He denies chest pain, dizziness or syncope edeama or palpitations.  Most of these episodes of SOA occur at rest and are transient, spontaneous events that are short-lived and spontaneously resolve.    On specific questioning, he denies any particular PND or orthopnea and has no significant lower extremity edema though both ankles appeared puffy on clinical exam today.    Shon denies any recent fever, productive cough or other constitutional complaints.  He and his wife Christine-who accompanies him on his office visit today, have both completed Covid vaccination.    His medications were reviewed during his visit.      Past Medical History:   Diagnosis Date   • Coronary artery disease    • Coronary artery disease involving native coronary artery of native heart 8/6/2019    Status post multivessel CABG 1993   • Essential hypertension 8/6/2019   • Hyperlipidemia    • Hyperlipidemia, mixed 8/6/2019   • Hypertension    • Presence of cardiac pacemaker 8/6/2019    S/P dual-chamber pacemaker implanted 2009   • Sick sinus syndrome (CMS/HCC) 8/6/2019   • Stroke (CMS/HCC)        Past Surgical History:   Procedure Laterality Date   • CORONARY ARTERY BYPASS GRAFT  1993   • INSERT / REPLACE / REMOVE PACEMAKER  2010    BS insertion         Current Outpatient Medications:   •  amLODIPine (NORVASC) 2.5 MG tablet, Take 2.5 mg by mouth Daily., Disp: ,  "Rfl: 3  •  aspirin 81 MG tablet, Daily., Disp: , Rfl:   •  dutasteride (AVODART) 0.5 MG capsule, Take 0.5 mg by mouth Daily., Disp: , Rfl:   •  lisinopril (PRINIVIL,ZESTRIL) 5 MG tablet, Take 5 mg by mouth Daily., Disp: , Rfl:   •  metoprolol tartrate (LOPRESSOR) 25 MG tablet, Take 25 mg by mouth 2 (Two) Times a Day., Disp: , Rfl:   •  pravastatin (PRAVACHOL) 40 MG tablet, Take 40 mg by mouth 1 (One) Time Per Week., Disp: , Rfl:     Social History     Socioeconomic History   • Marital status:      Spouse name: Not on file   • Number of children: Not on file   • Years of education: Not on file   • Highest education level: Not on file   Tobacco Use   • Smoking status: Former Smoker   • Smokeless tobacco: Never Used   Vaping Use   • Vaping Use: Never used   Substance and Sexual Activity   • Alcohol use: Never   • Drug use: Never   • Sexual activity: Defer       Family History   Problem Relation Age of Onset   • Leukemia Mother    • Heart disease Father    • Heart attack Father 56         age at 56         Review of Systems   General: denies fever, chills, anorexia, weight loss  Eyes: denies blurring, diplopia  Ear/Nose/Throat: denies ear pain, nosebleeds, hoarseness  Cardiovascular: See HPI  Respiratory: denies excessive sputum, hemoptysis, wheezing  Gastrointestinal: denies nausea, vomiting, change in bowel habits, abdominal pain  Genitourinary: Mild nocturia no hematuria or dysuria  Musculoskeletal: Minor weightbearing joint arthralgias though still fairly functionally active for his advanced age  Skin: denies rashes, itching, suspicious lesions  Neurologic: denies focal neuro deficits  Psychiatric: denies depression, anxiety  Endocrine: denies cold intolerance, heat intolerance  Hematologic/Lymphatic: denies abnormal bruising, bleeding  Allergic/Immunologic: denies urticaria or persistent infections      Objective     Visit Vitals  /80   Pulse 72   Ht 170.2 cm (67\")   Wt 62.6 kg (138 lb)   SpO2 98% "   BMI 21.61 kg/m²           Physical Exam  General:     Pleasant, well-nourished, well developed,, in no acute distress.    Head:     normocephalic and atraumatic.    Eyes:    PERRL/EOM intact, conjunctiva and sclera clear with out nystagmus.    Neck:    no jvd or bruits  Chest Wall:    no deformities aside from mild thoracic kyphosis  Lungs:    clear bilaterally to auscultation with adequate global airflow   Heart:    non-displaced PMI; regular rate and rhythm, normal S1, S2 without murmurs, rubs, or gallops  Abdomen:  Soft, nontender without HSM  Msk:    no deformity; adequate R OM  Pulses:    pulses normal in all 4 extremities.    Extremities:    no clubbing, cyanosis, edema   Neurologic:    no focal sensory or motor deficits  Skin:    intact without lesions or rashes.    Psych:    alert and cooperative; normal mood and affect; normal attention span and concentration.            ECG 12 Lead    Date/Time: 2021 10:18 AM  Performed by: DEL Oleary MD  Authorized by: DEL Oleary MD   Comparison: compared with previous ECG from 2021  Similar to previous ECG  Rhythm: paced  Other findings: left ventricular hypertrophy with strain  Pacin% capture  Clinical impression: abnormal EKG  Comments: Atrial paced, ventricular sensed rhythm.              Assessment:   Problems Addressed this Visit        Other    Sick sinus syndrome (CMS/HCC) - Primary    -Regulated beta-blocker and pacemaker        Presence of cardiac pacemaker  -Satisfactory device site and functioning.  -Interrogation today showed 2 brief NSVT episodes and 1 slightly longer episode of atrial flutter.      Coronary artery disease involving native coronary artery of native heart    Generally well-regulated on current medication listed reviewed in detail with no reports of angina pectoris at rest or with activities    New complaints of SOA/dyspnea-not related to exertional activities per se.          Diagnoses       Codes Comments     Sick sinus syndrome (CMS/HCC)    -  Primary ICD-10-CM: I49.5  ICD-9-CM: 427.81     Presence of cardiac pacemaker     ICD-10-CM: Z95.0  ICD-9-CM: V45.01     Coronary artery disease involving native coronary artery of native heart without angina pectoris     ICD-10-CM: I25.10  ICD-9-CM: 414.01             Plan:  Mr. Azevedo will be further evaluated with chest x-ray, echocardiogram and EMTALA to assist overnight SaO2.  Otherwise, continue current medication was reviewed in detail today.  Return to clinic 1 month for follow-up.    DEL Oleary MD  5/17/2021 16:12 EDT    This report was generated using the Dragon voice recognition system.

## 2021-05-20 NOTE — PROGRESS NOTES
DEVICE INTERROGATION:  IN OFFICE    DEVICE TYPE:   Dual-chamber pacemaker    :   Molecular Imprints    BATTERY:  Stable    TIME TO ELECTIVE REPLACEMENT INDICATORS:   1 year    CHARGE TIME:   Not applicable        LEAD DATA:       DEVICE REPROGRAMMED FOR TESTING      Atrial:   2.4 mV, 440 ohms, 0.6 V@0.4 ms    Ventricular:     6.8 mV, 440 ohms, 0.6 V@0.4 ms    LV:      Atrial pacing percentage: 38%    Ventricular pacing percentage: 3%      Arrhythmia Logbook Reviewed: No sustained A. fib.  Patient had 2 nonsustained ventricular tachycardia episodes that were short.  They were reviewed with Dr. Oleary        Summary:    Stable Device Function    No significant arhythmia burden.     Battery status is stable.    Reviewed with: Dr. Oleary      NEXT IN OFFICE DEVICE CHECK DUE: 6 months    REMOTE DEVICE INTERROGATIONS: Not applicable

## 2021-05-27 ENCOUNTER — HOSPITAL ENCOUNTER (OUTPATIENT)
Dept: GENERAL RADIOLOGY | Facility: HOSPITAL | Age: 86
Discharge: HOME | End: 2021-05-27

## 2021-05-27 ENCOUNTER — HOSPITAL ENCOUNTER (OUTPATIENT)
Dept: CARDIOLOGY | Facility: HOSPITAL | Age: 86
Discharge: HOME | End: 2021-05-27

## 2021-05-27 DIAGNOSIS — Z95.0 PRESENCE OF CARDIAC PACEMAKER: ICD-10-CM

## 2021-05-27 DIAGNOSIS — I49.5 SICK SINUS SYNDROME (HCC): ICD-10-CM

## 2021-05-27 DIAGNOSIS — I25.10 CORONARY ARTERY DISEASE INVOLVING NATIVE CORONARY ARTERY OF NATIVE HEART WITHOUT ANGINA PECTORIS: ICD-10-CM

## 2021-05-27 PROCEDURE — 93306 TTE W/DOPPLER COMPLETE: CPT | Performed by: INTERNAL MEDICINE

## 2021-05-27 PROCEDURE — 71046 X-RAY EXAM CHEST 2 VIEWS: CPT

## 2021-05-27 PROCEDURE — 93306 TTE W/DOPPLER COMPLETE: CPT

## 2021-05-29 ENCOUNTER — HOSPITAL ENCOUNTER (INPATIENT)
Facility: HOSPITAL | Age: 86
LOS: 2 days | Discharge: HOME | End: 2021-05-31
Attending: EMERGENCY MEDICINE | Admitting: FAMILY MEDICINE

## 2021-05-29 ENCOUNTER — APPOINTMENT (OUTPATIENT)
Dept: GENERAL RADIOLOGY | Facility: HOSPITAL | Age: 86
End: 2021-05-29

## 2021-05-29 DIAGNOSIS — Z95.0 PRESENCE OF CARDIAC PACEMAKER: ICD-10-CM

## 2021-05-29 DIAGNOSIS — R94.39 ABNORMAL NUCLEAR STRESS TEST: Primary | ICD-10-CM

## 2021-05-29 DIAGNOSIS — I50.9 ACUTE CONGESTIVE HEART FAILURE, UNSPECIFIED HEART FAILURE TYPE (HCC): ICD-10-CM

## 2021-05-29 DIAGNOSIS — I50.21 ACUTE SYSTOLIC CHF (CONGESTIVE HEART FAILURE) (HCC): ICD-10-CM

## 2021-05-29 DIAGNOSIS — I25.10 CORONARY ARTERY DISEASE INVOLVING NATIVE CORONARY ARTERY OF NATIVE HEART WITHOUT ANGINA PECTORIS: ICD-10-CM

## 2021-05-29 DIAGNOSIS — E78.2 HYPERLIPIDEMIA, MIXED: ICD-10-CM

## 2021-05-29 DIAGNOSIS — I10 ESSENTIAL HYPERTENSION: ICD-10-CM

## 2021-05-29 DIAGNOSIS — R06.00 DYSPNEA, UNSPECIFIED TYPE: ICD-10-CM

## 2021-05-29 LAB
ALBUMIN SERPL-MCNC: 4.2 G/DL (ref 3.5–5.2)
ALBUMIN/GLOB SERPL: 1.4 G/DL
ALP SERPL-CCNC: 91 U/L (ref 39–117)
ALT SERPL W P-5'-P-CCNC: 42 U/L (ref 1–41)
ANION GAP SERPL CALCULATED.3IONS-SCNC: 12 MMOL/L (ref 5–15)
AST SERPL-CCNC: 36 U/L (ref 1–40)
BASOPHILS # BLD AUTO: 0.1 10*3/MM3 (ref 0–0.2)
BASOPHILS NFR BLD AUTO: 0.6 % (ref 0–1.5)
BH CV ECHO MEAS - ACS: 1.5 CM
BH CV ECHO MEAS - AI DEC SLOPE: 259.8 CM/SEC^2
BH CV ECHO MEAS - AI DEC TIME: 1.5 SEC
BH CV ECHO MEAS - AI MAX PG: 57.1 MMHG
BH CV ECHO MEAS - AI MAX VEL: 377.8 CM/SEC
BH CV ECHO MEAS - AI P1/2T: 426 MSEC
BH CV ECHO MEAS - AO MAX PG (FULL): 15.5 MMHG
BH CV ECHO MEAS - AO MAX PG: 16.8 MMHG
BH CV ECHO MEAS - AO MEAN PG (FULL): 8.4 MMHG
BH CV ECHO MEAS - AO MEAN PG: 9 MMHG
BH CV ECHO MEAS - AO ROOT AREA (BSA CORRECTED): 2
BH CV ECHO MEAS - AO ROOT AREA: 9.5 CM^2
BH CV ECHO MEAS - AO ROOT DIAM: 3.5 CM
BH CV ECHO MEAS - AO V2 MAX: 205 CM/SEC
BH CV ECHO MEAS - AO V2 MEAN: 141.3 CM/SEC
BH CV ECHO MEAS - AO V2 VTI: 42.4 CM
BH CV ECHO MEAS - AVA(I,A): 1.3 CM^2
BH CV ECHO MEAS - AVA(I,D): 1.3 CM^2
BH CV ECHO MEAS - AVA(V,A): 1.1 CM^2
BH CV ECHO MEAS - AVA(V,D): 1.1 CM^2
BH CV ECHO MEAS - BSA(HAYCOCK): 1.7 M^2
BH CV ECHO MEAS - BSA: 1.7 M^2
BH CV ECHO MEAS - BZI_BMI: 21.5 KILOGRAMS/M^2
BH CV ECHO MEAS - BZI_METRIC_HEIGHT: 170.2 CM
BH CV ECHO MEAS - BZI_METRIC_WEIGHT: 62.1 KG
BH CV ECHO MEAS - EDV(CUBED): 260 ML
BH CV ECHO MEAS - EDV(MOD-SP4): 134.2 ML
BH CV ECHO MEAS - EDV(TEICH): 207.2 ML
BH CV ECHO MEAS - EF(CUBED): 48.1 %
BH CV ECHO MEAS - EF(MOD-BP): 40 %
BH CV ECHO MEAS - EF(MOD-SP4): 39.5 %
BH CV ECHO MEAS - EF(TEICH): 39.4 %
BH CV ECHO MEAS - ESV(CUBED): 135 ML
BH CV ECHO MEAS - ESV(MOD-SP4): 81.2 ML
BH CV ECHO MEAS - ESV(TEICH): 125.5 ML
BH CV ECHO MEAS - FS: 19.6 %
BH CV ECHO MEAS - IVS/LVPW: 0.99
BH CV ECHO MEAS - IVSD: 1.1 CM
BH CV ECHO MEAS - LA DIMENSION(2D): 4.2 CM
BH CV ECHO MEAS - LV DIASTOLIC VOL/BSA (35-75): 77.9 ML/M^2
BH CV ECHO MEAS - LV MASS(C)D: 323.5 GRAMS
BH CV ECHO MEAS - LV MASS(C)DI: 187.9 GRAMS/M^2
BH CV ECHO MEAS - LV MAX PG: 1.3 MMHG
BH CV ECHO MEAS - LV MEAN PG: 0.69 MMHG
BH CV ECHO MEAS - LV SYSTOLIC VOL/BSA (12-30): 47.1 ML/M^2
BH CV ECHO MEAS - LV V1 MAX: 57.3 CM/SEC
BH CV ECHO MEAS - LV V1 MEAN: 38.9 CM/SEC
BH CV ECHO MEAS - LV V1 VTI: 13.4 CM
BH CV ECHO MEAS - LVIDD: 6.4 CM
BH CV ECHO MEAS - LVIDS: 5.1 CM
BH CV ECHO MEAS - LVOT AREA: 4 CM^2
BH CV ECHO MEAS - LVOT DIAM: 2.3 CM
BH CV ECHO MEAS - LVPWD: 1.1 CM
BH CV ECHO MEAS - MR MAX PG: 118.4 MMHG
BH CV ECHO MEAS - MR MAX VEL: 544.1 CM/SEC
BH CV ECHO MEAS - MR MEAN PG: 72.3 MMHG
BH CV ECHO MEAS - MR MEAN VEL: 393.2 CM/SEC
BH CV ECHO MEAS - MR VTI: 188.7 CM
BH CV ECHO MEAS - MV A MAX VEL: 39.7 CM/SEC
BH CV ECHO MEAS - MV DEC SLOPE: 435.5 CM/SEC^2
BH CV ECHO MEAS - MV DEC TIME: 0.18 SEC
BH CV ECHO MEAS - MV E MAX VEL: 76.7 CM/SEC
BH CV ECHO MEAS - MV E/A: 1.9
BH CV ECHO MEAS - PA MAX PG (FULL): 0.67 MMHG
BH CV ECHO MEAS - PA MAX PG: 1.5 MMHG
BH CV ECHO MEAS - PA V2 MAX: 60.8 CM/SEC
BH CV ECHO MEAS - RAP SYSTOLE: 3 MMHG
BH CV ECHO MEAS - RV MAX PG: 0.83 MMHG
BH CV ECHO MEAS - RV MEAN PG: 0.41 MMHG
BH CV ECHO MEAS - RV V1 MAX: 45.6 CM/SEC
BH CV ECHO MEAS - RV V1 MEAN: 29.2 CM/SEC
BH CV ECHO MEAS - RV V1 VTI: 12.5 CM
BH CV ECHO MEAS - RVSP: 35 MMHG
BH CV ECHO MEAS - SI(AO): 232.7 ML/M^2
BH CV ECHO MEAS - SI(CUBED): 72.6 ML/M^2
BH CV ECHO MEAS - SI(LVOT): 31.3 ML/M^2
BH CV ECHO MEAS - SI(MOD-SP4): 30.8 ML/M^2
BH CV ECHO MEAS - SI(TEICH): 47.5 ML/M^2
BH CV ECHO MEAS - SV(AO): 400.6 ML
BH CV ECHO MEAS - SV(CUBED): 125 ML
BH CV ECHO MEAS - SV(LVOT): 53.8 ML
BH CV ECHO MEAS - SV(MOD-SP4): 53 ML
BH CV ECHO MEAS - SV(TEICH): 81.8 ML
BH CV ECHO MEAS - TR MAX VEL: 282 CM/SEC
BILIRUB SERPL-MCNC: 0.6 MG/DL (ref 0–1.2)
BUN SERPL-MCNC: 34 MG/DL (ref 8–23)
BUN/CREAT SERPL: 27.9 (ref 7–25)
CALCIUM SPEC-SCNC: 9.1 MG/DL (ref 8.2–9.6)
CHLORIDE SERPL-SCNC: 106 MMOL/L (ref 98–107)
CO2 SERPL-SCNC: 23 MMOL/L (ref 22–29)
CREAT SERPL-MCNC: 1.22 MG/DL (ref 0.76–1.27)
DEPRECATED RDW RBC AUTO: 45.9 FL (ref 37–54)
EOSINOPHIL # BLD AUTO: 0 10*3/MM3 (ref 0–0.4)
EOSINOPHIL NFR BLD AUTO: 0.5 % (ref 0.3–6.2)
ERYTHROCYTE [DISTWIDTH] IN BLOOD BY AUTOMATED COUNT: 13.6 % (ref 12.3–15.4)
GFR SERPL CREATININE-BSD FRML MDRD: 56 ML/MIN/1.73
GLOBULIN UR ELPH-MCNC: 2.9 GM/DL
GLUCOSE BLDC GLUCOMTR-MCNC: 88 MG/DL (ref 70–105)
GLUCOSE SERPL-MCNC: 113 MG/DL (ref 65–99)
HCT VFR BLD AUTO: 42.7 % (ref 37.5–51)
HGB BLD-MCNC: 14.5 G/DL (ref 13–17.7)
LYMPHOCYTES # BLD AUTO: 1.5 10*3/MM3 (ref 0.7–3.1)
LYMPHOCYTES NFR BLD AUTO: 16.5 % (ref 19.6–45.3)
MAGNESIUM SERPL-MCNC: 2.1 MG/DL (ref 1.6–2.4)
MCH RBC QN AUTO: 32.6 PG (ref 26.6–33)
MCHC RBC AUTO-ENTMCNC: 33.9 G/DL (ref 31.5–35.7)
MCV RBC AUTO: 96.2 FL (ref 79–97)
MONOCYTES # BLD AUTO: 1.1 10*3/MM3 (ref 0.1–0.9)
MONOCYTES NFR BLD AUTO: 12.1 % (ref 5–12)
NEUTROPHILS NFR BLD AUTO: 6.6 10*3/MM3 (ref 1.7–7)
NEUTROPHILS NFR BLD AUTO: 70.3 % (ref 42.7–76)
NRBC BLD AUTO-RTO: 0.2 /100 WBC (ref 0–0.2)
NT-PROBNP SERPL-MCNC: ABNORMAL PG/ML (ref 0–1800)
PLATELET # BLD AUTO: 213 10*3/MM3 (ref 140–450)
PMV BLD AUTO: 9.1 FL (ref 6–12)
POTASSIUM SERPL-SCNC: 4.5 MMOL/L (ref 3.5–5.2)
PROT SERPL-MCNC: 7.1 G/DL (ref 6–8.5)
QT INTERVAL: 391 MS
RBC # BLD AUTO: 4.43 10*6/MM3 (ref 4.14–5.8)
SARS-COV-2 RNA PNL SPEC NAA+PROBE: NORMAL
SODIUM SERPL-SCNC: 141 MMOL/L (ref 136–145)
TROPONIN T SERPL-MCNC: 0.01 NG/ML (ref 0–0.03)
TROPONIN T SERPL-MCNC: 0.02 NG/ML (ref 0–0.03)
TSH SERPL DL<=0.05 MIU/L-ACNC: 2.3 UIU/ML (ref 0.27–4.2)
WBC # BLD AUTO: 9.4 10*3/MM3 (ref 3.4–10.8)

## 2021-05-29 PROCEDURE — 82962 GLUCOSE BLOOD TEST: CPT

## 2021-05-29 PROCEDURE — 87635 SARS-COV-2 COVID-19 AMP PRB: CPT | Performed by: EMERGENCY MEDICINE

## 2021-05-29 PROCEDURE — 25010000002 FUROSEMIDE PER 20 MG: Performed by: EMERGENCY MEDICINE

## 2021-05-29 PROCEDURE — 84484 ASSAY OF TROPONIN QUANT: CPT | Performed by: EMERGENCY MEDICINE

## 2021-05-29 PROCEDURE — 84443 ASSAY THYROID STIM HORMONE: CPT | Performed by: EMERGENCY MEDICINE

## 2021-05-29 PROCEDURE — 99223 1ST HOSP IP/OBS HIGH 75: CPT | Performed by: INTERNAL MEDICINE

## 2021-05-29 PROCEDURE — 83880 ASSAY OF NATRIURETIC PEPTIDE: CPT | Performed by: EMERGENCY MEDICINE

## 2021-05-29 PROCEDURE — 93005 ELECTROCARDIOGRAM TRACING: CPT | Performed by: EMERGENCY MEDICINE

## 2021-05-29 PROCEDURE — 84484 ASSAY OF TROPONIN QUANT: CPT | Performed by: INTERNAL MEDICINE

## 2021-05-29 PROCEDURE — 83735 ASSAY OF MAGNESIUM: CPT | Performed by: EMERGENCY MEDICINE

## 2021-05-29 PROCEDURE — 80053 COMPREHEN METABOLIC PANEL: CPT | Performed by: EMERGENCY MEDICINE

## 2021-05-29 PROCEDURE — 99283 EMERGENCY DEPT VISIT LOW MDM: CPT

## 2021-05-29 PROCEDURE — 71045 X-RAY EXAM CHEST 1 VIEW: CPT

## 2021-05-29 PROCEDURE — 85025 COMPLETE CBC W/AUTO DIFF WBC: CPT | Performed by: EMERGENCY MEDICINE

## 2021-05-29 PROCEDURE — 85025 COMPLETE CBC W/AUTO DIFF WBC: CPT | Performed by: FAMILY MEDICINE

## 2021-05-29 RX ORDER — FINASTERIDE 5 MG/1
5 TABLET, FILM COATED ORAL DAILY
Status: DISCONTINUED | OUTPATIENT
Start: 2021-05-30 | End: 2021-05-31 | Stop reason: HOSPADM

## 2021-05-29 RX ORDER — AMLODIPINE BESYLATE 2.5 MG/1
2.5 TABLET ORAL
Status: DISCONTINUED | OUTPATIENT
Start: 2021-05-29 | End: 2021-05-31 | Stop reason: HOSPADM

## 2021-05-29 RX ORDER — SODIUM CHLORIDE 0.9 % (FLUSH) 0.9 %
10 SYRINGE (ML) INJECTION AS NEEDED
Status: DISCONTINUED | OUTPATIENT
Start: 2021-05-29 | End: 2021-05-31 | Stop reason: HOSPADM

## 2021-05-29 RX ORDER — ATORVASTATIN CALCIUM 10 MG/1
10 TABLET, FILM COATED ORAL NIGHTLY
Status: DISCONTINUED | OUTPATIENT
Start: 2021-05-29 | End: 2021-05-31 | Stop reason: HOSPADM

## 2021-05-29 RX ORDER — AMLODIPINE BESYLATE 2.5 MG/1
2.5 TABLET ORAL
Status: DISCONTINUED | OUTPATIENT
Start: 2021-05-29 | End: 2021-05-29

## 2021-05-29 RX ORDER — BUMETANIDE 0.25 MG/ML
1 INJECTION INTRAMUSCULAR; INTRAVENOUS EVERY 12 HOURS
Status: DISCONTINUED | OUTPATIENT
Start: 2021-05-29 | End: 2021-05-30

## 2021-05-29 RX ORDER — ASPIRIN 81 MG/1
81 TABLET ORAL DAILY
Status: DISCONTINUED | OUTPATIENT
Start: 2021-05-29 | End: 2021-05-31 | Stop reason: HOSPADM

## 2021-05-29 RX ORDER — FUROSEMIDE 10 MG/ML
40 INJECTION INTRAMUSCULAR; INTRAVENOUS ONCE
Status: COMPLETED | OUTPATIENT
Start: 2021-05-29 | End: 2021-05-29

## 2021-05-29 RX ORDER — LISINOPRIL 5 MG/1
5 TABLET ORAL DAILY
Status: DISCONTINUED | OUTPATIENT
Start: 2021-05-30 | End: 2021-05-30

## 2021-05-29 RX ORDER — ATORVASTATIN CALCIUM 10 MG/1
10 TABLET, FILM COATED ORAL DAILY
Status: DISCONTINUED | OUTPATIENT
Start: 2021-05-29 | End: 2021-05-29

## 2021-05-29 RX ADMIN — BUMETANIDE 1 MG: 0.25 INJECTION, SOLUTION INTRAMUSCULAR; INTRAVENOUS at 16:24

## 2021-05-29 RX ADMIN — Medication 10 ML: at 16:25

## 2021-05-29 RX ADMIN — METOPROLOL TARTRATE 25 MG: 25 TABLET, FILM COATED ORAL at 20:30

## 2021-05-29 RX ADMIN — AMLODIPINE BESYLATE 2.5 MG: 2.5 TABLET ORAL at 20:29

## 2021-05-29 RX ADMIN — FUROSEMIDE 40 MG: 10 INJECTION, SOLUTION INTRAMUSCULAR; INTRAVENOUS at 11:07

## 2021-05-30 ENCOUNTER — APPOINTMENT (OUTPATIENT)
Dept: NUCLEAR MEDICINE | Facility: HOSPITAL | Age: 86
End: 2021-05-30

## 2021-05-30 LAB
ANION GAP SERPL CALCULATED.3IONS-SCNC: 13 MMOL/L (ref 5–15)
BASOPHILS # BLD AUTO: 0 10*3/MM3 (ref 0–0.2)
BASOPHILS NFR BLD AUTO: 0.5 % (ref 0–1.5)
BH CV REST NUCLEAR ISOTOPE DOSE: 7.9 MCI
BH CV STRESS BP STAGE 1: NORMAL
BH CV STRESS COMMENTS STAGE 1: NORMAL
BH CV STRESS DOSE REGADENOSON STAGE 1: 0.4
BH CV STRESS DURATION MIN STAGE 1: 0
BH CV STRESS DURATION SEC STAGE 1: 10
BH CV STRESS HR STAGE 1: 89
BH CV STRESS NUCLEAR ISOTOPE DOSE: 21.9 MCI
BH CV STRESS PROTOCOL 1: NORMAL
BH CV STRESS RECOVERY BP: NORMAL MMHG
BH CV STRESS RECOVERY HR: 78 BPM
BH CV STRESS STAGE 1: 1
BUN SERPL-MCNC: 40 MG/DL (ref 8–23)
BUN/CREAT SERPL: 24.8 (ref 7–25)
CALCIUM SPEC-SCNC: 8.6 MG/DL (ref 8.2–9.6)
CHLORIDE SERPL-SCNC: 103 MMOL/L (ref 98–107)
CO2 SERPL-SCNC: 25 MMOL/L (ref 22–29)
CREAT SERPL-MCNC: 1.61 MG/DL (ref 0.76–1.27)
DEPRECATED RDW RBC AUTO: 45.1 FL (ref 37–54)
EOSINOPHIL # BLD AUTO: 0.2 10*3/MM3 (ref 0–0.4)
EOSINOPHIL NFR BLD AUTO: 2.3 % (ref 0.3–6.2)
ERYTHROCYTE [DISTWIDTH] IN BLOOD BY AUTOMATED COUNT: 13.5 % (ref 12.3–15.4)
GFR SERPL CREATININE-BSD FRML MDRD: 41 ML/MIN/1.73
GLUCOSE SERPL-MCNC: 179 MG/DL (ref 65–99)
HCT VFR BLD AUTO: 40.2 % (ref 37.5–51)
HGB BLD-MCNC: 13.4 G/DL (ref 13–17.7)
LYMPHOCYTES # BLD AUTO: 1.4 10*3/MM3 (ref 0.7–3.1)
LYMPHOCYTES NFR BLD AUTO: 20 % (ref 19.6–45.3)
MAXIMAL PREDICTED HEART RATE: 130 BPM
MCH RBC QN AUTO: 32.4 PG (ref 26.6–33)
MCHC RBC AUTO-ENTMCNC: 33.4 G/DL (ref 31.5–35.7)
MCV RBC AUTO: 97.1 FL (ref 79–97)
MONOCYTES # BLD AUTO: 1.1 10*3/MM3 (ref 0.1–0.9)
MONOCYTES NFR BLD AUTO: 15.1 % (ref 5–12)
NEUTROPHILS NFR BLD AUTO: 4.5 10*3/MM3 (ref 1.7–7)
NEUTROPHILS NFR BLD AUTO: 62.1 % (ref 42.7–76)
NRBC BLD AUTO-RTO: 0.1 /100 WBC (ref 0–0.2)
PERCENT MAX PREDICTED HR: 68.46 %
PLATELET # BLD AUTO: 198 10*3/MM3 (ref 140–450)
PMV BLD AUTO: 9.4 FL (ref 6–12)
POTASSIUM SERPL-SCNC: 4.1 MMOL/L (ref 3.5–5.2)
RBC # BLD AUTO: 4.14 10*6/MM3 (ref 4.14–5.8)
SODIUM SERPL-SCNC: 141 MMOL/L (ref 136–145)
STRESS BASELINE BP: NORMAL MMHG
STRESS BASELINE HR: 65 BPM
STRESS PERCENT HR: 81 %
STRESS POST PEAK BP: NORMAL MMHG
STRESS POST PEAK HR: 89 BPM
STRESS TARGET HR: 111 BPM
TROPONIN T SERPL-MCNC: <0.01 NG/ML (ref 0–0.03)
TROPONIN T SERPL-MCNC: <0.01 NG/ML (ref 0–0.03)
WBC # BLD AUTO: 7.2 10*3/MM3 (ref 3.4–10.8)
WHOLE BLOOD HOLD SPECIMEN: NORMAL

## 2021-05-30 PROCEDURE — 0 TECHNETIUM SESTAMIBI: Performed by: FAMILY MEDICINE

## 2021-05-30 PROCEDURE — 99232 SBSQ HOSP IP/OBS MODERATE 35: CPT | Performed by: INTERNAL MEDICINE

## 2021-05-30 PROCEDURE — A9500 TC99M SESTAMIBI: HCPCS | Performed by: FAMILY MEDICINE

## 2021-05-30 PROCEDURE — 84484 ASSAY OF TROPONIN QUANT: CPT | Performed by: INTERNAL MEDICINE

## 2021-05-30 PROCEDURE — 93017 CV STRESS TEST TRACING ONLY: CPT

## 2021-05-30 PROCEDURE — 93018 CV STRESS TEST I&R ONLY: CPT | Performed by: INTERNAL MEDICINE

## 2021-05-30 PROCEDURE — 25010000002 REGADENOSON 0.4 MG/5ML SOLUTION: Performed by: FAMILY MEDICINE

## 2021-05-30 PROCEDURE — 78452 HT MUSCLE IMAGE SPECT MULT: CPT

## 2021-05-30 PROCEDURE — 80048 BASIC METABOLIC PNL TOTAL CA: CPT | Performed by: FAMILY MEDICINE

## 2021-05-30 PROCEDURE — 78452 HT MUSCLE IMAGE SPECT MULT: CPT | Performed by: INTERNAL MEDICINE

## 2021-05-30 PROCEDURE — 93016 CV STRESS TEST SUPVJ ONLY: CPT | Performed by: NURSE PRACTITIONER

## 2021-05-30 RX ORDER — ACETYLCYSTEINE 200 MG/ML
1200 SOLUTION ORAL; RESPIRATORY (INHALATION) EVERY 12 HOURS SCHEDULED
Status: DISCONTINUED | OUTPATIENT
Start: 2021-05-30 | End: 2021-05-31

## 2021-05-30 RX ADMIN — FINASTERIDE 5 MG: 5 TABLET, FILM COATED ORAL at 11:07

## 2021-05-30 RX ADMIN — ACETYLCYSTEINE 1200 MG: 200 SOLUTION ORAL; RESPIRATORY (INHALATION) at 21:15

## 2021-05-30 RX ADMIN — ATORVASTATIN CALCIUM 10 MG: 10 TABLET, FILM COATED ORAL at 21:14

## 2021-05-30 RX ADMIN — LISINOPRIL 5 MG: 5 TABLET ORAL at 11:06

## 2021-05-30 RX ADMIN — BUMETANIDE 1 MG: 0.25 INJECTION, SOLUTION INTRAMUSCULAR; INTRAVENOUS at 04:41

## 2021-05-30 RX ADMIN — REGADENOSON 0.4 MG: 0.08 INJECTION, SOLUTION INTRAVENOUS at 09:50

## 2021-05-30 RX ADMIN — AMLODIPINE BESYLATE 2.5 MG: 2.5 TABLET ORAL at 21:00

## 2021-05-30 RX ADMIN — METOPROLOL TARTRATE 25 MG: 25 TABLET, FILM COATED ORAL at 11:05

## 2021-05-30 RX ADMIN — TECHNETIUM TC 99M SESTAMIBI 1 DOSE: 1 INJECTION INTRAVENOUS at 07:36

## 2021-05-30 RX ADMIN — METOPROLOL TARTRATE 25 MG: 25 TABLET, FILM COATED ORAL at 21:14

## 2021-05-30 RX ADMIN — ASPIRIN 81 MG: 81 TABLET, COATED ORAL at 11:06

## 2021-05-30 RX ADMIN — TECHNETIUM TC 99M SESTAMIBI 1 DOSE: 1 INJECTION INTRAVENOUS at 09:50

## 2021-05-31 ENCOUNTER — APPOINTMENT (OUTPATIENT)
Dept: ULTRASOUND IMAGING | Facility: HOSPITAL | Age: 86
End: 2021-05-31

## 2021-05-31 VITALS
RESPIRATION RATE: 15 BRPM | HEART RATE: 78 BPM | WEIGHT: 130.29 LBS | TEMPERATURE: 97.7 F | SYSTOLIC BLOOD PRESSURE: 138 MMHG | BODY MASS INDEX: 19.75 KG/M2 | DIASTOLIC BLOOD PRESSURE: 86 MMHG | OXYGEN SATURATION: 97 % | HEIGHT: 68 IN

## 2021-05-31 PROBLEM — I25.10 CORONARY ARTERY DISEASE INVOLVING NATIVE CORONARY ARTERY OF NATIVE HEART WITHOUT ANGINA PECTORIS: Status: ACTIVE | Noted: 2021-05-29

## 2021-05-31 PROBLEM — R94.39 ABNORMAL NUCLEAR STRESS TEST: Status: ACTIVE | Noted: 2021-05-29

## 2021-05-31 PROBLEM — I50.21 ACUTE SYSTOLIC CHF (CONGESTIVE HEART FAILURE): Status: ACTIVE | Noted: 2021-05-29

## 2021-05-31 LAB
ANION GAP SERPL CALCULATED.3IONS-SCNC: 11 MMOL/L (ref 5–15)
BUN SERPL-MCNC: 45 MG/DL (ref 8–23)
BUN/CREAT SERPL: 32.8 (ref 7–25)
CALCIUM SPEC-SCNC: 8.5 MG/DL (ref 8.2–9.6)
CHLORIDE SERPL-SCNC: 102 MMOL/L (ref 98–107)
CO2 SERPL-SCNC: 27 MMOL/L (ref 22–29)
CREAT SERPL-MCNC: 1.37 MG/DL (ref 0.76–1.27)
GFR SERPL CREATININE-BSD FRML MDRD: 49 ML/MIN/1.73
GLUCOSE SERPL-MCNC: 149 MG/DL (ref 65–99)
POTASSIUM SERPL-SCNC: 4.1 MMOL/L (ref 3.5–5.2)
SODIUM SERPL-SCNC: 140 MMOL/L (ref 136–145)

## 2021-05-31 PROCEDURE — 76775 US EXAM ABDO BACK WALL LIM: CPT

## 2021-05-31 PROCEDURE — 99232 SBSQ HOSP IP/OBS MODERATE 35: CPT | Performed by: INTERNAL MEDICINE

## 2021-05-31 RX ORDER — FUROSEMIDE 40 MG/1
40 TABLET ORAL DAILY
Status: DISCONTINUED | OUTPATIENT
Start: 2021-05-31 | End: 2021-05-31 | Stop reason: HOSPADM

## 2021-05-31 RX ORDER — FUROSEMIDE 40 MG/1
40 TABLET ORAL DAILY
Qty: 30 TABLET | Refills: 3 | Status: ON HOLD | OUTPATIENT
Start: 2021-06-01 | End: 2022-01-01

## 2021-05-31 RX ORDER — SODIUM CHLORIDE 9 MG/ML
100 INJECTION, SOLUTION INTRAVENOUS CONTINUOUS
Status: DISCONTINUED | OUTPATIENT
Start: 2021-05-31 | End: 2021-05-31 | Stop reason: HOSPADM

## 2021-05-31 RX ADMIN — Medication 10 ML: at 09:40

## 2021-05-31 RX ADMIN — METOPROLOL TARTRATE 25 MG: 25 TABLET, FILM COATED ORAL at 09:40

## 2021-05-31 RX ADMIN — FUROSEMIDE 40 MG: 40 TABLET ORAL at 12:24

## 2021-05-31 RX ADMIN — FINASTERIDE 5 MG: 5 TABLET, FILM COATED ORAL at 09:43

## 2021-05-31 RX ADMIN — Medication 1200 MG: at 09:40

## 2021-05-31 RX ADMIN — ASPIRIN 81 MG: 81 TABLET, COATED ORAL at 09:40

## 2021-06-01 ENCOUNTER — TELEPHONE (OUTPATIENT)
Dept: CARDIOLOGY | Facility: CLINIC | Age: 86
End: 2021-06-01

## 2021-06-01 ENCOUNTER — READMISSION MANAGEMENT (OUTPATIENT)
Dept: CALL CENTER | Facility: HOSPITAL | Age: 86
End: 2021-06-01

## 2021-06-01 NOTE — TELEPHONE ENCOUNTER
Patient called and stated that he was admitted over the weekend and Dr. Middleton saw him while in the hospital he was told he needed a heart cath and before they get that scheduled they wanted to make sure that Dr. Oleary was on board with that.

## 2021-06-01 NOTE — OUTREACH NOTE
Prep Survey      Responses   [facility]   Is LACE score < 7 ?  No   Emergency Room discharge w/ pulse ox?  No   Eligibility  Readm Mgmt   Discharge diagnosis  Acute exacerbation of chronic systolic congestive heart failure   Does the patient have one of the following disease processes/diagnoses(primary or secondary)?  CHF   Does the patient have Home health ordered?  No   Is there a DME ordered?  No   Prep survey completed?  Yes          Tere Trevino RN

## 2021-06-07 NOTE — PROGRESS NOTES
Cardiology Office Visit Note      Referring physician:  Antony Lawson MD    Reason For Followup: Hospital follow up    HPI:  Shon Syed is a 89 y.o. male who returns to the office for a hospital follow up. He was admitted to [facility] on 5/29/2021 for CHF, dyspnea and HTN. A stress test done on 5/30/2021 revealed Severe LV dysfunction. LVEF of 34%. He has a known history of advanced ischemic coronary disease for which he underwent multivessel CABG in 1993. His initial bypass operation was complicated by perioperative stroke. In addition, he has sick sinus syndrome with dual chamber PM implant 2/2009 and hypertension.      Today, he is complaining of some shortness of air and yesterday was having some pressure in his chest. He is still feeling tired, but denies Edema, dizziness or palpitations.     His medications were reviewed during his visit today. Bottles were provided for reconciliation      Past Medical History:   Diagnosis Date   • Atrial flutter (CMS/HCC)    • CHF (congestive heart failure) (CMS/[facility])    • Coronary artery disease    • Coronary artery disease involving native coronary artery of native heart 8/6/2019    Status post multivessel CABG 1993   • Diabetes mellitus (CMS/[facility])    • Essential hypertension 8/6/2019   • HFrEF (heart failure with reduced ejection fraction) (CMS/[facility])    • Hyperlipidemia    • Hyperlipidemia, mixed 8/6/2019   • Hypertension    • Presence of cardiac pacemaker 8/6/2019    S/P dual-chamber pacemaker implanted 2009   • Sick sinus syndrome (CMS/HCC) 8/6/2019   • Stroke (CMS/[facility])        Past Surgical History:   Procedure Laterality Date   • CORONARY ARTERY BYPASS GRAFT  1993   • INSERT / REPLACE / REMOVE PACEMAKER  2010 BS insertion   • VENA CAVA FILTER PLACEMENT  04/2016         Current Outpatient Medications:   •  amLODIPine (NORVASC) 2.5 MG tablet, Take 2.5 mg by mouth every night at bedtime., Disp: , Rfl: 3  •  dutasteride (AVODART) 0.5 MG capsule, Take 0.5 mg by mouth  Daily., Disp: , Rfl:   •  furosemide (LASIX) 40 MG tablet, Take 1 tablet by mouth Daily for 30 days., Disp: 30 tablet, Rfl: 3  •  lisinopril (PRINIVIL,ZESTRIL) 5 MG tablet, Take 5 mg by mouth Daily., Disp: , Rfl:   •  metoprolol tartrate (LOPRESSOR) 25 MG tablet, Take 25 mg by mouth 2 (Two) Times a Day., Disp: , Rfl:   •  pravastatin (PRAVACHOL) 40 MG tablet, Take 40 mg by mouth 1 (One) Time Per Week. , Disp: , Rfl:   •  aspirin 81 MG tablet, Take 81 mg by mouth every night at bedtime., Disp: , Rfl:     Social History     Socioeconomic History   • Marital status:      Spouse name: Not on file   • Number of children: Not on file   • Years of education: Not on file   • Highest education level: Not on file   Tobacco Use   • Smoking status: Former Smoker     Quit date: 1970     Years since quittin.1   • Smokeless tobacco: Never Used   Vaping Use   • Vaping Use: Never used   Substance and Sexual Activity   • Alcohol use: Never   • Drug use: Never   • Sexual activity: Defer       Family History   Problem Relation Age of Onset   • Leukemia Mother    • Heart disease Father    • Heart attack Father 56         age at 56   • Asthma Paternal Aunt          Review of Systems   General: denies fever, chills, anorexia, weight loss  Eyes: denies blurring, diplopia  Ear/Nose/Throat: denies ear pain, nosebleeds, hoarseness  Cardiovascular: See HPI  Respiratory: denies excessive sputum, hemoptysis, wheezing  Gastrointestinal: denies nausea, vomiting, change in bowel habits, abdominal pain  Genitourinary: Reports obstructive uropathy symptoms and occasional nocturia  Musculoskeletal: Generalized arthralgias mostly involving lumbar spine and hips  Skin: denies rashes, itching, suspicious lesions  Neurologic: denies focal neuro deficits  Psychiatric: denies depression, anxiety  Endocrine: denies cold intolerance, heat intolerance  Hematologic/Lymphatic: denies abnormal bruising, bleeding  Allergic/Immunologic:  "denies urticaria or persistent infections      Objective     Visit Vitals  /72   Pulse 68   Ht 170.2 cm (67\")   Wt 59 kg (130 lb)   SpO2 97%   BMI 20.36 kg/m²           Physical Exam  General:      Elderly, mildly chronically ill and declining, though fairly well developed,, in no acute distress.    Head:     normocephalic and atraumatic.    Eyes:    PERRL/EOM intact, conjunctiva and sclera clear with out nystagmus.    Neck:    no jvd or bruits  Chest Wall:    no deformities   Lungs:    clear bilaterally to auscultation with adequate global airflow   Heart:    non-displaced PMI; regular rate and rhythm, normal S1, S2 with grade 2/6 systolic ejection murmur  Abdomen:  Soft, nontender without HSM  Msk:    no deformity; adequate R OM  Pulses:    pulses normal in all 4 extremities.    Extremities:    no clubbing, cyanosis, with 1-2+ lower leg edema   Neurologic:    no focal sensory or motor deficits  Skin:    intact without lesions or rashes.    Psych:    alert and cooperative; normal mood and affect; normal attention span and concentration.            ECG 12 Lead    Date/Time: 6/8/2021 9:44 AM  Performed by: DEL Oleary MD  Authorized by: DEL Oleary MD   Comparison: compared with previous ECG from 5/17/2021  Similar to previous ECG  Rhythm: sinus rhythm  Ectopy: atrial premature contractions  Other findings: non-specific ST-T wave changes and left ventricular hypertrophy with strain    Clinical impression: abnormal EKG  Comments: Predominant atrial paced rhythm with ventricular sensing              Assessment:   Problems Addressed this Visit        Other    Essential hypertension  -Well-regulated on current medications      Coronary artery disease involving native coronary artery of native heart - Primary  -Known advanced CAD requiring multivessel CABG 1993; now having some breakthrough angina pectoris episodes of mostly stable, effort related      Acute systolic CHF (congestive heart failure) " (CMS/[facility])  -Does not appear to have clinical decompensation signs or symptoms on today's evaluation      Diagnoses       Codes Comments    Coronary artery disease involving native coronary artery of native heart without angina pectoris    -  Primary ICD-10-CM: I25.10  ICD-9-CM: 414.01     Acute systolic CHF (congestive heart failure) (CMS/[facility])     ICD-10-CM: I50.21  ICD-9-CM: 428.21, 428.0     Essential hypertension     ICD-10-CM: I10  ICD-9-CM: 401.9             Plan:  In view of Mr. Syed's advanced age and comorbidities, the patient and his wife would prefer a more conservative medical management approach of his ischemic coronary symptoms at this time.  I am adding isosorbide mononitrate 30 mg 1 p.o. nightly to his current medical regimen.  He is also advised to see urology for obstructive uropathy complaints and rare to occasional isolated reports of hematuria.  Return to clinic 1 month to reassess cardiac status, and notify us if he has further unstable angina pectoris symptoms occurring at rest or awakening from sleep or not amenable to sublingual nitroglycerin therapy.    DEL Oleary MD  6/27/2021 20:28 EDT    This report was generated using the Dragon voice recognition system.

## 2021-06-08 ENCOUNTER — OFFICE VISIT (OUTPATIENT)
Dept: CARDIOLOGY | Facility: CLINIC | Age: 86
End: 2021-06-08

## 2021-06-08 VITALS
WEIGHT: 130 LBS | HEIGHT: 67 IN | SYSTOLIC BLOOD PRESSURE: 148 MMHG | HEART RATE: 68 BPM | BODY MASS INDEX: 20.4 KG/M2 | OXYGEN SATURATION: 97 % | DIASTOLIC BLOOD PRESSURE: 72 MMHG

## 2021-06-08 DIAGNOSIS — I10 ESSENTIAL HYPERTENSION: ICD-10-CM

## 2021-06-08 DIAGNOSIS — I25.10 CORONARY ARTERY DISEASE INVOLVING NATIVE CORONARY ARTERY OF NATIVE HEART WITHOUT ANGINA PECTORIS: Primary | ICD-10-CM

## 2021-06-08 DIAGNOSIS — I50.21 ACUTE SYSTOLIC CHF (CONGESTIVE HEART FAILURE) (HCC): ICD-10-CM

## 2021-06-08 PROCEDURE — 93000 ELECTROCARDIOGRAM COMPLETE: CPT | Performed by: INTERNAL MEDICINE

## 2021-06-08 PROCEDURE — 99214 OFFICE O/P EST MOD 30 MIN: CPT | Performed by: INTERNAL MEDICINE

## 2021-06-10 ENCOUNTER — READMISSION MANAGEMENT (OUTPATIENT)
Dept: CALL CENTER | Facility: HOSPITAL | Age: 86
End: 2021-06-10

## 2021-06-10 NOTE — OUTREACH NOTE
CHF Week 2 Survey      Responses   [facility] patient discharged from?  [facility]   Does the patient have one of the following disease processes/diagnoses(primary or secondary)?  CHF   Week 2 attempt successful?  No   Unsuccessful attempts  Attempt 1          Kay Polanco RN

## 2021-06-15 NOTE — OUTREACH NOTE
CHF Week 2 Survey      Responses   [facility] patient discharged from?  [facility]   Does the patient have one of the following disease processes/diagnoses(primary or secondary)?  CHF   Week 2 attempt successful?  No   Unsuccessful attempts  Attempt 2          Berkley Sharma RN

## 2021-06-24 NOTE — OUTREACH NOTE
CHF Week 3 Survey      Responses   [facility] [place] patient discharged from?  [facility]   Does the patient have one of the following disease processes/diagnoses(primary or secondary)?  CHF   Week 3 attempt successful?  No   Unsuccessful attempts  Attempt 2          Bernadette Armando RN

## 2021-07-07 NOTE — PROGRESS NOTES
Cardiology Office Visit Note      Referring physician:  Antony Lawson MD    Reason For Followup: Chest pain, SOA and Medication changes    HPI:  Shon ZAYAS Kunal is a 89 y.o. male who returns to the office for a recheck of his chest pain and shortness of air. During his visit last month, I added isosorbide mononitrate 30 mg 1 p.o. nightly to his current medical regiment. He is feeling pretty good, shortness of air seems to be better and he denies chest pain, dizziness or syncope    His medical history includes a known history of advanced ischemic coronary disease for which he underwent multivessel CABG in 1993. His initial bypass operation was complicated by perioperative stroke. In addition, he has sick sinus syndrome with dual chamber PM implant 2/2009 and hypertension.  A stress test done on 5/30/2021 revealed Severe LV dysfunction. LVEF of 34%.    Shon is accompanied by his wife, Christine per usual who concurs with patient history.  He denies other constitutional symptoms and has completed Covid vaccination.    His medication were reviewed during his visit.    Past Medical History:   Diagnosis Date   • Atrial flutter (CMS/[facility])    • CHF (congestive heart failure) (CMS/[facility])    • Coronary artery disease    • Coronary artery disease involving native coronary artery of native heart 8/6/2019    Status post multivessel CABG 1993   • Diabetes mellitus (CMS/[facility])    • Essential hypertension 8/6/2019   • HFrEF (heart failure with reduced ejection fraction) (CMS/[facility])    • Hyperlipidemia    • Hyperlipidemia, mixed 8/6/2019   • Hypertension    • Presence of cardiac pacemaker 8/6/2019    S/P dual-chamber pacemaker implanted 2009   • Sick sinus syndrome (CMS/HCC) 8/6/2019   • Stroke (CMS/[facility])        Past Surgical History:   Procedure Laterality Date   • CORONARY ARTERY BYPASS GRAFT  1993   • CYSTOSCOPY     • INSERT / REPLACE / REMOVE PACEMAKER  2010    BS insertion   • VENA CAVA FILTER PLACEMENT  04/2016         Current  Outpatient Medications:   •  amLODIPine (NORVASC) 2.5 MG tablet, Take 2.5 mg by mouth every night at bedtime., Disp: , Rfl: 3  •  aspirin 81 MG tablet, Take 81 mg by mouth every night at bedtime., Disp: , Rfl:   •  dutasteride (AVODART) 0.5 MG capsule, Take 0.5 mg by mouth Daily., Disp: , Rfl:   •  furosemide (LASIX) 40 MG tablet, Take 1 tablet by mouth Daily for 30 days., Disp: 30 tablet, Rfl: 3  •  metoprolol tartrate (LOPRESSOR) 25 MG tablet, Take 25 mg by mouth 2 (Two) Times a Day., Disp: , Rfl:   •  potassium chloride 10 MEQ CR tablet, Take 10 mEq by mouth Daily. with food, Disp: , Rfl:   •  pravastatin (PRAVACHOL) 40 MG tablet, Take 40 mg by mouth 1 (One) Time Per Week. , Disp: , Rfl:     Social History     Socioeconomic History   • Marital status:      Spouse name: Not on file   • Number of children: Not on file   • Years of education: Not on file   • Highest education level: Not on file   Tobacco Use   • Smoking status: Former Smoker     Quit date: 1970     Years since quittin.1   • Smokeless tobacco: Never Used   Vaping Use   • Vaping Use: Never used   Substance and Sexual Activity   • Alcohol use: Never   • Drug use: Never   • Sexual activity: Defer       Family History   Problem Relation Age of Onset   • Leukemia Mother    • Heart disease Father    • Heart attack Father 56         age at 56   • Asthma Paternal Aunt          Review of Systems   General: denies fever, chills, anorexia, weight loss  Eyes: denies blurring, diplopia  Ear/Nose/Throat: denies ear pain, nosebleeds, hoarseness, though has moderately severe sensorineural hearing loss and has bilateral hearing aids  Cardiovascular: See HPI  Respiratory: denies excessive sputum, hemoptysis, wheezing  Gastrointestinal: denies nausea, vomiting, change in bowel habits, abdominal pain  Genitourinary: Modest nocturia no significant hematuria dysuria  Musculoskeletal: Modest generalized arthralgias, yet remains functionally  "active for age  Skin: denies rashes, itching, suspicious lesions  Neurologic: denies focal neuro deficits  Psychiatric: denies depression, anxiety  Endocrine: denies cold intolerance, heat intolerance  Hematologic/Lymphatic: denies abnormal bruising, bleeding  Allergic/Immunologic: denies urticaria or persistent infections      Objective     Visit Vitals  /70   Pulse 77   Ht 170.2 cm (67\")   Wt 59.9 kg (132 lb)   SpO2 98%   BMI 20.67 kg/m²           Physical Exam  General:     Very pleasant elderly, well developed,, in no acute distress.    Head:     normocephalic and atraumatic.    Has bilateral hearing aids  Eyes:    PERRL/EOM intact, conjunctiva and sclera clear with out nystagmus.    Neck:    no jvd or bruits  Chest Wall:    no deformities   Lungs:    clear bilaterally to auscultation with adequate global airflow   Heart:    non-displaced PMI; regular rate and rhythm, normal S1, S2 without murmurs, rubs, or gallops  Abdomen:  Soft, nontender without HSM  Msk:    no deformity; adequate R OM  Pulses:    pulses normal in all 4 extremities.    Extremities:    no clubbing, cyanosis, edema   Neurologic:    no focal sensory or motor deficits  Skin:    intact without lesions or rashes.    Psych:    alert and cooperative; normal mood and affect; normal attention span and concentration.            ECG 12 Lead    Date/Time: 7/9/2021 10:47 AM  Performed by: DEL Oleary MD  Authorized by: DEL Oleary MD   Comparison: compared with previous ECG from 6/8/2021  Similar to previous ECG  Rhythm: sinus rhythm  Ectopy: atrial premature contractions  Rate: normal  T inversion: V4, V5 and V6  QRS axis: left    Clinical impression: abnormal EKG              Assessment:   Problems Addressed this Visit        Other    Essential hypertension - Primary  -Currently well-regulated on medication listed reviewed in detail today      Dyspnea-improved as are chest pain episodes with addition of isosorbide mononitrate      Acute " systolic CHF (congestive heart failure) (CMS/[facility])  -Currently hemodynamically stable well compensated with no CHF signs or symptoms on current medications      Diagnoses       Codes Comments    Essential hypertension    -  Primary ICD-10-CM: I10  ICD-9-CM: 401.9     Acute systolic CHF (congestive heart failure) (CMS/HCC)     ICD-10-CM: I50.21  ICD-9-CM: 428.21, 428.0     Dyspnea on exertion     ICD-10-CM: R06.00  ICD-9-CM: 786.09             Plan:  Continue current medications as listed reviewed in detail as patient.  He medically stable well compensated.  Shon is encouraged to remain functionally active is feasible though strongly cautioned against risk for falling.  Return to clinic 6 months or sooner if needed.    DEL Oleary MD  7/9/2021 17:44 EDT    This report was generated using the Dragon voice recognition system.

## 2021-11-24 NOTE — PROGRESS NOTES
Cardiology clinic note  Chris Romero MD, PhD  Subjective:     Encounter Date:11/24/2021      Patient ID: Shon ZAYAS Kunal is a 89 y.o. male.    Chief Complaint:  Chief Complaint   Patient presents with   • Follow-up     New patient encounter  HPI:  History of Present Illness  I the pleasure to see this 89-year-old gentleman today as a new patient.  He has cardiac history of coronary artery disease, hypertension hyperlipidemia, sick sinus syndrome with presence of a pacemaker, most recently had a stress test in May of this year with findings of predominantly fixed inferior and septal defect consistent with MI with mild shaila-infarct ischemia, EF was 34% at that time, apical akinesis and severe global hypokinesis was documented.  His EKG from that time revealed sinus rhythm with PACs but repolarization abnormalities in the inferolateral leads.  2D echo revealed an EF of 35 to 40% with severe left atrial enlargement, valvular function abnormalities with mild aortic and moderate to severe mitral regurgitation seen with no effusions or masses.  With fixed defects without significant reversibility he was managed medically at this time.  Likely with ischemic cardiomyopathy, multivessel bypass 1993 complicated by perioperative stroke, he does not have an ICD in place as this is a dual-chamber pacemaker specifically.  Device interrogation performed by me today demonstrates only 6 months of battery life remaining.  We did discuss device exchange which would be needed over the next 6 months and given his EF is 30 to 35%, 34% by gated imaging and biplane that we would likely upgrade him to ICD at that time with additional lead placement at the time of generator change.  He is amenable for this approach.    Return to clinic in 3 months, referral to electrophysiology for scheduling of device exchange and ICD upgrade given EF 30 to 35%, high functioning 89-year-old no reason he would not live longer than 2 to 3 years, he has  stable ischemic heart disease chronically and no reversibility on stress testing recently performed      Review of systems otherwise negative x14 point review of systems except as mentioned above    Historical data copied forward from previous encounters in EMR is unchanged    Assessment plan per my encounter  Ischemic cardiomyopathy  Advanced CAD  Angina free    Device end-of-life  Upgrade to ICD when able, refer to electrophysiology    Continue aspirin beta-blocker statin    No heart failure signs or symptoms actually, continue loop diuretic for chronic ischemic cardiomyopathy and chronic systolic heart failure well compensated today    Back to clinic in 3 months    Chris Romero MD, PhD    The following portions of the patient's history were reviewed and updated as appropriate: allergies, current medications, past family history, past medical history, past social history, past surgical history and problem list.    Problem List:  Patient Active Problem List   Diagnosis   • Sick sinus syndrome (HCC)   • Presence of cardiac pacemaker   • Essential hypertension   • Hyperlipidemia, mixed   • Coronary artery disease involving native coronary artery of native heart   • Cerebrovascular accident (CVA) ([facility])   • Puckering of macula, left eye   • Presbyopia   • Combined form of senile cataract   • Type 2 or unspecified type diabetes mellitus   • Vitreous degeneration   • Dyspnea   • Abnormal nuclear stress test   • Coronary artery disease involving native coronary artery of native heart without angina pectoris   • Acute systolic CHF (congestive heart failure) ([facility])       Past Medical History:  Past Medical History:   Diagnosis Date   • Atrial flutter ([facility])    • CHF (congestive heart failure) ([facility])    • Coronary artery disease    • Coronary artery disease involving native coronary artery of native heart 8/6/2019    Status post multivessel CABG 1993   • Diabetes mellitus ([facility])    • Essential hypertension 8/6/2019   • HFrEF  "(heart failure with reduced ejection fraction) ([facility])    • Hyperlipidemia    • Hyperlipidemia, mixed 2019   • Hypertension    • Presence of cardiac pacemaker 2019    S/P dual-chamber pacemaker implanted    • Sick sinus syndrome (HCC) 2019   • Stroke ([facility])        Past Surgical History:  Past Surgical History:   Procedure Laterality Date   • CARDIAC CATHETERIZATION     • CORONARY ARTERY BYPASS GRAFT     • CYSTOSCOPY     • INSERT / REPLACE / REMOVE PACEMAKER      BS insertion   • VENA CAVA FILTER PLACEMENT  2016       Social History:  Social History     Socioeconomic History   • Marital status:    Tobacco Use   • Smoking status: Former Smoker     Quit date: 1970     Years since quittin.5   • Smokeless tobacco: Never Used   Vaping Use   • Vaping Use: Never used   Substance and Sexual Activity   • Alcohol use: Never   • Drug use: Never   • Sexual activity: Defer       Allergies:  Allergies   Allergen Reactions   • Penicillin G Rash   • Vancomycin Rash       Immunizations:    There is no immunization history on file for this patient.    ROS:  ROS       Objective:         /82 (BP Location: Left arm, Patient Position: Sitting)   Pulse 80   Resp 18   Ht 170.2 cm (67\")   Wt 62.3 kg (137 lb 6.4 oz)   BMI 21.52 kg/m²     Physical Exam    In-Office Procedure(s):  Procedures    ASCVD RIsk Score::  The ASCVD Risk score ([place] BECCA Jr., et al., 2013) failed to calculate for the following reasons:    The 2013 ASCVD risk score is only valid for ages 40 to 79    The patient has a prior MI or stroke diagnosis    Recent Radiology:  Imaging Results (Most Recent)     None          Lab Review:   Lab on 2021   Component Date Value   • Creatine Kinase 2021 99    • Glucose 2021 86    • BUN 2021 38*   • Creatinine 2021 1.30*   • Sodium 2021 142    • Potassium 2021 4.4    • Chloride 2021 105    • CO2 2021 28.0    • Calcium 2021 9.4  "   • Total Protein 07/14/2021 6.5    • Albumin 07/14/2021 4.00    • ALT (SGPT) 07/14/2021 17    • AST (SGOT) 07/14/2021 20    • Alkaline Phosphatase 07/14/2021 64    • Total Bilirubin 07/14/2021 0.4    • eGFR Non African Amer 07/14/2021 52*   • Globulin 07/14/2021 2.5    • A/G Ratio 07/14/2021 1.6    • BUN/Creatinine Ratio 07/14/2021 29.2*   • Anion Gap 07/14/2021 9.0    • Magnesium 07/14/2021 2.3    • PTH, Intact 07/14/2021 43.5    • Uric Acid 07/14/2021 8.6*   • 25 Hydroxy, Vitamin D 07/14/2021 64.9    • Color, UA 07/14/2021 Yellow    • Appearance, UA 07/14/2021 Clear    • pH, UA 07/14/2021 6.0    • Specific Gravity, UA 07/14/2021 1.020    • Glucose, UA 07/14/2021 Negative    • Ketones, UA 07/14/2021 Negative    • Bilirubin, UA 07/14/2021 Negative    • Blood, UA 07/14/2021 Negative    • Protein, UA 07/14/2021 Negative    • Leuk Esterase, UA 07/14/2021 Negative    • Nitrite, UA 07/14/2021 Negative    • Urobilinogen, UA 07/14/2021 0.2 E.U./dL    • Phosphorus 07/14/2021 3.4    • WBC 07/14/2021 6.41    • RBC 07/14/2021 4.27    • Hemoglobin 07/14/2021 13.7    • Hematocrit 07/14/2021 42.6    • MCV 07/14/2021 99.8*   • MCH 07/14/2021 32.1    • MCHC 07/14/2021 32.2    • RDW 07/14/2021 12.5    • RDW-SD 07/14/2021 46.5    • MPV 07/14/2021 10.8    • Platelets 07/14/2021 228    • Neutrophil % 07/14/2021 55.3    • Lymphocyte % 07/14/2021 28.2    • Monocyte % 07/14/2021 11.7    • Eosinophil % 07/14/2021 3.7    • Basophil % 07/14/2021 0.8    • Immature Grans % 07/14/2021 0.3    • Neutrophils, Absolute 07/14/2021 3.54    • Lymphocytes, Absolute 07/14/2021 1.81    • Monocytes, Absolute 07/14/2021 0.75    • Eosinophils, Absolute 07/14/2021 0.24    • Basophils, Absolute 07/14/2021 0.05    • Immature Grans, Absolute 07/14/2021 0.02    • nRBC 07/14/2021 0.0    Lab on 06/24/2021   Component Date Value   • Glucose 06/24/2021 103*   • BUN 06/24/2021 42*   • Creatinine 06/24/2021 2.00*   • Sodium 06/24/2021 139    • Potassium 06/24/2021  4.6    • Chloride 06/24/2021 103    • CO2 06/24/2021 26.3    • Calcium 06/24/2021 8.8    • eGFR Non  Amer 06/24/2021 32*   • BUN/Creatinine Ratio 06/24/2021 21.0    • Anion Gap 06/24/2021 9.7    Admission on 05/29/2021, Discharged on 05/31/2021   Component Date Value   • QT Interval 05/29/2021 391    • Glucose 05/29/2021 113*   • BUN 05/29/2021 34*   • Creatinine 05/29/2021 1.22    • Sodium 05/29/2021 141    • Potassium 05/29/2021 4.5    • Chloride 05/29/2021 106    • CO2 05/29/2021 23.0    • Calcium 05/29/2021 9.1    • Total Protein 05/29/2021 7.1    • Albumin 05/29/2021 4.20    • ALT (SGPT) 05/29/2021 42*   • AST (SGOT) 05/29/2021 36    • Alkaline Phosphatase 05/29/2021 91    • Total Bilirubin 05/29/2021 0.6    • eGFR Non African Amer 05/29/2021 56*   • Globulin 05/29/2021 2.9    • A/G Ratio 05/29/2021 1.4    • BUN/Creatinine Ratio 05/29/2021 27.9*   • Anion Gap 05/29/2021 12.0    • proBNP 05/29/2021 17,645.0*   • Troponin T 05/29/2021 0.010    • TSH 05/29/2021 2.300    • Magnesium 05/29/2021 2.1    • WBC 05/29/2021 9.40    • RBC 05/29/2021 4.43    • Hemoglobin 05/29/2021 14.5    • Hematocrit 05/29/2021 42.7    • MCV 05/29/2021 96.2    • MCH 05/29/2021 32.6    • MCHC 05/29/2021 33.9    • RDW 05/29/2021 13.6    • RDW-SD 05/29/2021 45.9    • MPV 05/29/2021 9.1    • Platelets 05/29/2021 213    • Neutrophil % 05/29/2021 70.3    • Lymphocyte % 05/29/2021 16.5*   • Monocyte % 05/29/2021 12.1*   • Eosinophil % 05/29/2021 0.5    • Basophil % 05/29/2021 0.6    • Neutrophils, Absolute 05/29/2021 6.60    • Lymphocytes, Absolute 05/29/2021 1.50    • Monocytes, Absolute 05/29/2021 1.10*   • Eosinophils, Absolute 05/29/2021 0.00    • Basophils, Absolute 05/29/2021 0.10    • nRBC 05/29/2021 0.2    • COVID19 05/29/2021 Presumptive Negative    • Glucose 05/29/2021 88    • Troponin T 05/29/2021 0.017    • Target HR (85%) 05/30/2021 111    • Max. Pred. HR (100%) 05/30/2021 130    •  CV STRESS PROTOCOL 1 05/30/2021  Pharmacologic    • Stage 1 05/30/2021 1    • HR Stage 1 05/30/2021 89    • BP Stage 1 05/30/2021 114/50    • Duration Min Stage 1 05/30/2021 0    • Duration Sec Stage 1 05/30/2021 10    • Stress Dose Regadenoson * 05/30/2021 0.4    • Stress Comments Stage 1 05/30/2021 10 sec bolus injection    • Baseline HR 05/30/2021 65    • Baseline BP 05/30/2021 161/63    • Peak HR 05/30/2021 89    • Percent Max Pred HR 05/30/2021 68.46    • Percent Target HR 05/30/2021 81    • Peak BP 05/30/2021 114/50    • Recovery HR 05/30/2021 78    • Recovery BP 05/30/2021 118/52    • BH CV REST NUCLEAR ISOTO* 05/30/2021 7.9    • BH CV STRESS NUCLEAR ISO* 05/30/2021 21.9    • Glucose 05/29/2021 179*   • BUN 05/29/2021 40*   • Creatinine 05/29/2021 1.61*   • Sodium 05/29/2021 141    • Potassium 05/29/2021 4.1    • Chloride 05/29/2021 103    • CO2 05/29/2021 25.0    • Calcium 05/29/2021 8.6    • eGFR Non African Amer 05/29/2021 41*   • BUN/Creatinine Ratio 05/29/2021 24.8    • Anion Gap 05/29/2021 13.0    • Troponin T 05/29/2021 <0.010    • WBC 05/29/2021 7.20    • RBC 05/29/2021 4.14    • Hemoglobin 05/29/2021 13.4    • Hematocrit 05/29/2021 40.2    • MCV 05/29/2021 97.1*   • MCH 05/29/2021 32.4    • MCHC 05/29/2021 33.4    • RDW 05/29/2021 13.5    • RDW-SD 05/29/2021 45.1    • MPV 05/29/2021 9.4    • Platelets 05/29/2021 198    • Neutrophil % 05/29/2021 62.1    • Lymphocyte % 05/29/2021 20.0    • Monocyte % 05/29/2021 15.1*   • Eosinophil % 05/29/2021 2.3    • Basophil % 05/29/2021 0.5    • Neutrophils, Absolute 05/29/2021 4.50    • Lymphocytes, Absolute 05/29/2021 1.40    • Monocytes, Absolute 05/29/2021 1.10*   • Eosinophils, Absolute 05/29/2021 0.20    • Basophils, Absolute 05/29/2021 0.00    • nRBC 05/29/2021 0.1    • Troponin T 05/30/2021 <0.010    • Extra Tube 05/30/2021 hold for add-on    • Glucose 05/30/2021 149*   • BUN 05/30/2021 45*   • Creatinine 05/30/2021 1.37*   • Sodium 05/30/2021 140    • Potassium 05/30/2021 4.1    •  Chloride 05/30/2021 102    • CO2 05/30/2021 27.0    • Calcium 05/30/2021 8.5    • eGFR Non  Amer 05/30/2021 49*   • BUN/Creatinine Ratio 05/30/2021 32.8*   • Anion Gap 05/30/2021 11.0    Hospital Outpatient Visit on 05/27/2021   Component Date Value   • BSA 05/27/2021 1.7    • IVSd 05/27/2021 1.1    • LVIDd 05/27/2021 6.4    • LVIDs 05/27/2021 5.1    • LVPWd 05/27/2021 1.1    • IVS/LVPW 05/27/2021 0.99    • FS 05/27/2021 19.6    • EDV(Teich) 05/27/2021 207.2    • ESV(Teich) 05/27/2021 125.5    • EF(Teich) 05/27/2021 39.4    • EDV(cubed) 05/27/2021 260.0    • ESV(cubed) 05/27/2021 135.0    • EF(cubed) 05/27/2021 48.1    • LV mass(C)d 05/27/2021 323.5    • LV mass(C)dI 05/27/2021 187.9    • SV(Teich) 05/27/2021 81.8    • SI(Teich) 05/27/2021 47.5    • SV(cubed) 05/27/2021 125.0    • SI(cubed) 05/27/2021 72.6    • Ao root diam 05/27/2021 3.5    • Ao root area 05/27/2021 9.5    • ACS 05/27/2021 1.5    • LVOT diam 05/27/2021 2.3    • LVOT area 05/27/2021 4.0    • EDV(MOD-sp4) 05/27/2021 134.2    • ESV(MOD-sp4) 05/27/2021 81.2    • EF(MOD-sp4) 05/27/2021 39.5    • SV(MOD-sp4) 05/27/2021 53.0    • SI(MOD-sp4) 05/27/2021 30.8    • Ao root area (BSA correc* 05/27/2021 2.0    • LV West Vol (BSA correct* 05/27/2021 77.9    • LV Sys Vol (BSA correcte* 05/27/2021 47.1    • MV E max rosalia 05/27/2021 76.7    • MV A max rosalia 05/27/2021 39.7    • MV E/A 05/27/2021 1.9    • MV dec slope 05/27/2021 435.5    • MV dec time 05/27/2021 0.18    • Ao pk rosalia 05/27/2021 205.0    • Ao max PG 05/27/2021 16.8    • Ao max PG (full) 05/27/2021 15.5    • Ao V2 mean 05/27/2021 141.3    • Ao mean PG 05/27/2021 9.0    • Ao mean PG (full) 05/27/2021 8.4    • Ao V2 VTI 05/27/2021 42.4    • WHITNEY(I,A) 05/27/2021 1.3    • WHITNEY(I,D) 05/27/2021 1.3    • WHITNEY(V,A) 05/27/2021 1.1    • WHITNEY(V,D) 05/27/2021 1.1    • AI max rosalia 05/27/2021 377.8    • AI max PG 05/27/2021 57.1    • AI dec slope 05/27/2021 259.8    • AI dec time 05/27/2021 1.5    • AI P1/2t  05/27/2021 426.0    • LV V1 max PG 05/27/2021 1.3    • LV V1 mean PG 05/27/2021 0.69    • LV V1 max 05/27/2021 57.3    • LV V1 mean 05/27/2021 38.9    • LV V1 VTI 05/27/2021 13.4    • MR max rosalia 05/27/2021 544.1    • MR max PG 05/27/2021 118.4    • MR mean rosalia 05/27/2021 393.2    • MR mean PG 05/27/2021 72.3    • MR VTI 05/27/2021 188.7    • SV(Ao) 05/27/2021 400.6    • SI(Ao) 05/27/2021 232.7    • SV(LVOT) 05/27/2021 53.8    • SI(LVOT) 05/27/2021 31.3    • PA V2 max 05/27/2021 60.8    • PA max PG 05/27/2021 1.5    • PA max PG (full) 05/27/2021 0.67    • RV V1 max PG 05/27/2021 0.83    • RV V1 mean PG 05/27/2021 0.41    • RV V1 max 05/27/2021 45.6    • RV V1 mean 05/27/2021 29.2    • RV V1 VTI 05/27/2021 12.5    • TR max rosalia 05/27/2021 282.0    • RVSP(TR) 05/27/2021 35.0    • RAP systole 05/27/2021 3.0    •  CV ECHO ANGELES - BZI_BMI 05/27/2021 21.5    •  CV ECHO ANGELES - BSA(HA* 05/27/2021 1.7    •  CV ECHO ANGELES - BZI_ME* 05/27/2021 62.1    •  CV ECHO ANGELES - BZI_ME* 05/27/2021 170.2    • EF(MOD-bp) 05/27/2021 40.0    • LA dimension(2D) 05/27/2021 4.2                 Assessment:         No diagnosis found.       Plan:            Level of Care:                 Chris Romero MD  11/24/21  .

## 2021-12-21 NOTE — PROGRESS NOTES
HP      Name: Shon Syed ADMIT: (Not on file)   : 1930  PCP: Antony Lawson MD    MRN: [id number] LOS: 0 days   AGE/SEX: 89 y.o. male  ROOM: Room/bed info not found     Chief Complaint   Patient presents with   • Consult     Sick Sinus syndrome       Subjective         History of present illness  Shon Syed is a 89-year-old male patient who has history of coronary artery disease, ischemic cardiomyopathy, multivessel bypass in  complicated by perioperative stroke, hypertension, paroxysmal atrial fibrillation, dyslipidemia, sick sinus syndrome with a dual-chamber pacemaker, cardiomyopathy with ejection fraction of 35%, is here because pacemaker battery is nearing ROBI and also to consider upgrade to an ICD due to his low ejection fraction.  Patient denies any active chest pain or shortness of breath, no lower extremity edema, he seems to be fairly active with no significant limitations.    Past Medical History:   Diagnosis Date   • Atrial flutter ([facility])    • CHF (congestive heart failure) ([facility])    • Coronary artery disease    • Coronary artery disease involving native coronary artery of native heart 2019    Status post multivessel CABG    • Diabetes mellitus ([facility])    • Essential hypertension 2019   • HFrEF (heart failure with reduced ejection fraction) ([facility])    • Hyperlipidemia    • Hyperlipidemia, mixed 2019   • Hypertension    • Presence of cardiac pacemaker 2019    S/P dual-chamber pacemaker implanted    • Sick sinus syndrome (HCC) 2019   • Stroke ([facility])      Past Surgical History:   Procedure Laterality Date   • CARDIAC CATHETERIZATION     • CORONARY ARTERY BYPASS GRAFT     • CYSTOSCOPY     • INSERT / REPLACE / REMOVE PACEMAKER      BS insertion   • VENA CAVA FILTER PLACEMENT  2016     Family History   Problem Relation Age of Onset   • Leukemia Mother    • Heart disease Father    • Heart attack Father 56         age at 56   • Asthma Paternal Aunt       Social History     Tobacco Use   • Smoking status: Former Smoker     Quit date: 1970     Years since quittin.6   • Smokeless tobacco: Never Used   Vaping Use   • Vaping Use: Never used   Substance Use Topics   • Alcohol use: Never   • Drug use: Never     (Not in a hospital admission)    Allergies:  Penicillin g and Vancomycin    Review of systems    Constitutional: Negative.    Respiratory and cardiovascular: As detailed in HPI section.  Gastrointestinal: Negative for constipation, nausea and vomiting negative for abdominal distention, abdominal pain and diarrhea.   Genitourinary: Negative for difficulty urinating and flank pain.   Musculoskeletal: Negative for arthralgias, joint swelling and myalgias.   Skin: Negative for color change, rash and wound.   Neurological: Negative for dizziness, syncope, weakness and headaches.   Hematological: Negative for adenopathy.   Psychiatric/Behavioral: Negative for confusion.   All other systems reviewed and are negative.    Physical Exam  VITALS REVIEWED    General:      well developed, in no acute distress.    Head:      normocephalic and atraumatic.    Eyes:      PERRL/EOM intact, conjunctiva and sclera clear with out nystagmus.    Neck:      no masses, thyromegaly,  trachea central with normal respiratory effort and PMI displaced laterally  Lungs:      Clear to auscultation bilaterally  Heart:       Regular rate and rhythm  Msk:      no deformity or scoliosis noted of thoracic or lumbar spine.    Pulses:      pulses normal in all 4 extremities.    Extremities:       No lower extremity edema  Neurologic:      no focal deficits.   alert oriented x3  Skin:      intact without lesions or rashes.    Psych:      alert and cooperative; normal mood and affect; normal attention span and concentration.      Result Review :                Pertinent cardiac workup    1. Device interrogation 2021, nearing [place].  Episodes of atrial tachycardia/atrial  fibrillation noted.  2. Stress test on 5/30/2021 ejection fraction 35%.  Fixed defects noted.        ECG 12 Lead    Date/Time: 12/21/2021 1:17 PM  Performed by: Minh Kendrick MD  Authorized by: Minh Kendrick MD   Comparison: compared with previous ECG   Comparison to previous ECG: Previous EKG had shown sinus with PACs.  Rhythm: atrial fibrillation  Rate: normal  BPM: 92  Conduction: conduction normal  ST Segments: ST segments normal  QRS axis: normal  Other findings: non-specific ST-T wave changes  Pacing: ventricular pacingComments: Atrial fibrillation with intermittent ventricular pacing.                Assessment and Plan      Shon ZAYAS Kunal is a 89-year-old male patient who has coronary artery disease, cardiomyopathy, sick sinus syndrome, atrial fibrillation, possibly persistent at this time, is here for consideration of ICD upgrade.  He does have a dual-chamber pacemaker and he is not pacemaker dependent.  The battery is nearing ROBI.  The patient could potentially be a candidate for an ICD upgrade.  He is however 89 years old and at this age life expectancy is an unknown.  The other main concern I have is that his skin is very thin and even the pacemaker is barely covered and I am concerned that if we perform an ICD upgrade the incision may not heal properly which would be very dangerous because it can cause device erosion and ultimately infection.  Furthermore device interrogations did not reveal any ventricular tachycardia.  At this time I think the patient will be best served with a simple pacemaker generator change out which is a far less risky procedure.  Patient seems to be agreeable with this so we will go ahead and schedule it for him for January 7.        Diagnoses and all orders for this visit:    1. Coronary artery disease involving native coronary artery of native heart without angina pectoris (Primary)  Overview:  Status post multivessel CABG 1993      2. Sick sinus syndrome  ([facility])    3. Presence of cardiac pacemaker  Overview:  S/P dual-chamber pacemaker implanted 2009      4. Pacemaker battery depletion         No follow-ups on file.  Patient was given instructions and counseling regarding his condition or for health maintenance advice. Please see specific information pulled into the AVS if appropriate.

## 2022-01-01 ENCOUNTER — APPOINTMENT (OUTPATIENT)
Dept: GENERAL RADIOLOGY | Facility: HOSPITAL | Age: 87
End: 2022-01-01

## 2022-01-01 ENCOUNTER — HOME CARE VISIT (OUTPATIENT)
Dept: HOME HEALTH SERVICES | Facility: HOME HEALTHCARE | Age: 87
End: 2022-01-01

## 2022-01-01 ENCOUNTER — APPOINTMENT (OUTPATIENT)
Dept: CARDIOLOGY | Facility: HOSPITAL | Age: 87
End: 2022-01-01

## 2022-01-01 ENCOUNTER — READMISSION MANAGEMENT (OUTPATIENT)
Dept: CALL CENTER | Facility: HOSPITAL | Age: 87
End: 2022-01-01

## 2022-01-01 ENCOUNTER — HOSPITAL ENCOUNTER (INPATIENT)
Facility: HOSPITAL | Age: 87
LOS: 14 days | Discharge: HOME HEALTH | End: 2022-05-18
Attending: EMERGENCY MEDICINE | Admitting: INTERNAL MEDICINE

## 2022-01-01 ENCOUNTER — APPOINTMENT (OUTPATIENT)
Dept: ULTRASOUND IMAGING | Facility: HOSPITAL | Age: 87
End: 2022-01-01

## 2022-01-01 ENCOUNTER — CLINICAL SUPPORT NO REQUIREMENTS (OUTPATIENT)
Dept: CARDIOLOGY | Facility: CLINIC | Age: 87
End: 2022-01-01

## 2022-01-01 ENCOUNTER — ANESTHESIA (OUTPATIENT)
Dept: CARDIOLOGY | Facility: HOSPITAL | Age: 87
End: 2022-01-01

## 2022-01-01 ENCOUNTER — APPOINTMENT (OUTPATIENT)
Dept: CT IMAGING | Facility: HOSPITAL | Age: 87
End: 2022-01-01

## 2022-01-01 ENCOUNTER — OFFICE VISIT (OUTPATIENT)
Dept: CARDIOLOGY | Facility: CLINIC | Age: 87
End: 2022-01-01

## 2022-01-01 ENCOUNTER — HOME HEALTH ADMISSION (OUTPATIENT)
Dept: HOME HEALTH SERVICES | Facility: HOME HEALTHCARE | Age: 87
End: 2022-01-01

## 2022-01-01 ENCOUNTER — HOSPITAL ENCOUNTER (INPATIENT)
Facility: HOSPITAL | Age: 87
LOS: 2 days | Discharge: HOME | End: 2022-03-26
Attending: EMERGENCY MEDICINE | Admitting: FAMILY MEDICINE

## 2022-01-01 ENCOUNTER — HOSPITAL ENCOUNTER (OUTPATIENT)
Facility: HOSPITAL | Age: 87
Setting detail: OBSERVATION
Discharge: HOME | End: 2022-05-29
Attending: EMERGENCY MEDICINE | Admitting: INTERNAL MEDICINE

## 2022-01-01 ENCOUNTER — LAB (OUTPATIENT)
Dept: LAB | Facility: HOSPITAL | Age: 87
End: 2022-01-01

## 2022-01-01 ENCOUNTER — HOSPITAL ENCOUNTER (OUTPATIENT)
Facility: HOSPITAL | Age: 87
Setting detail: HOSPITAL OUTPATIENT SURGERY
Discharge: HOME | End: 2022-01-17
Attending: INTERNAL MEDICINE | Admitting: INTERNAL MEDICINE

## 2022-01-01 VITALS
WEIGHT: 112.2 LBS | BODY MASS INDEX: 17 KG/M2 | HEART RATE: 88 BPM | SYSTOLIC BLOOD PRESSURE: 108 MMHG | DIASTOLIC BLOOD PRESSURE: 76 MMHG | HEIGHT: 68 IN | RESPIRATION RATE: 18 BRPM

## 2022-01-01 VITALS
BODY MASS INDEX: 16.73 KG/M2 | WEIGHT: 110 LBS | HEART RATE: 61 BPM | DIASTOLIC BLOOD PRESSURE: 56 MMHG | TEMPERATURE: 98.4 F | OXYGEN SATURATION: 93 % | SYSTOLIC BLOOD PRESSURE: 112 MMHG | RESPIRATION RATE: 17 BRPM

## 2022-01-01 VITALS
RESPIRATION RATE: 28 BRPM | DIASTOLIC BLOOD PRESSURE: 51 MMHG | TEMPERATURE: 97.7 F | SYSTOLIC BLOOD PRESSURE: 129 MMHG | HEIGHT: 68 IN | OXYGEN SATURATION: 99 % | BODY MASS INDEX: 16.67 KG/M2 | HEART RATE: 87 BPM | WEIGHT: 110 LBS

## 2022-01-01 VITALS
BODY MASS INDEX: 16.49 KG/M2 | WEIGHT: 108.44 LBS | DIASTOLIC BLOOD PRESSURE: 60 MMHG | RESPIRATION RATE: 20 BRPM | SYSTOLIC BLOOD PRESSURE: 110 MMHG | TEMPERATURE: 97.5 F | HEART RATE: 80 BPM | OXYGEN SATURATION: 96 %

## 2022-01-01 VITALS
SYSTOLIC BLOOD PRESSURE: 131 MMHG | WEIGHT: 129 LBS | HEART RATE: 93 BPM | DIASTOLIC BLOOD PRESSURE: 50 MMHG | HEIGHT: 66 IN | BODY MASS INDEX: 20.73 KG/M2 | OXYGEN SATURATION: 99 % | RESPIRATION RATE: 18 BRPM | TEMPERATURE: 98 F

## 2022-01-01 VITALS
OXYGEN SATURATION: 100 % | TEMPERATURE: 97 F | HEART RATE: 72 BPM | BODY MASS INDEX: 21.93 KG/M2 | WEIGHT: 131.61 LBS | RESPIRATION RATE: 18 BRPM | DIASTOLIC BLOOD PRESSURE: 55 MMHG | SYSTOLIC BLOOD PRESSURE: 141 MMHG | HEIGHT: 65 IN

## 2022-01-01 VITALS
HEART RATE: 80 BPM | SYSTOLIC BLOOD PRESSURE: 110 MMHG | TEMPERATURE: 97.6 F | WEIGHT: 109.5 LBS | OXYGEN SATURATION: 95 % | RESPIRATION RATE: 20 BRPM | HEIGHT: 68 IN | BODY MASS INDEX: 16.6 KG/M2 | DIASTOLIC BLOOD PRESSURE: 52 MMHG

## 2022-01-01 VITALS
BODY MASS INDEX: 17.17 KG/M2 | WEIGHT: 109.4 LBS | SYSTOLIC BLOOD PRESSURE: 135 MMHG | HEIGHT: 67 IN | OXYGEN SATURATION: 100 % | DIASTOLIC BLOOD PRESSURE: 75 MMHG | RESPIRATION RATE: 16 BRPM | TEMPERATURE: 97.4 F | HEART RATE: 76 BPM

## 2022-01-01 VITALS
HEART RATE: 66 BPM | OXYGEN SATURATION: 95 % | TEMPERATURE: 97 F | HEIGHT: 68 IN | WEIGHT: 109 LBS | BODY MASS INDEX: 16.52 KG/M2 | DIASTOLIC BLOOD PRESSURE: 64 MMHG | SYSTOLIC BLOOD PRESSURE: 118 MMHG | RESPIRATION RATE: 18 BRPM

## 2022-01-01 DIAGNOSIS — M79.89 SWELLING OF BOTH LOWER EXTREMITIES: ICD-10-CM

## 2022-01-01 DIAGNOSIS — N17.9 AKI (ACUTE KIDNEY INJURY): ICD-10-CM

## 2022-01-01 DIAGNOSIS — R06.00 DYSPNEA, UNSPECIFIED TYPE: Primary | ICD-10-CM

## 2022-01-01 DIAGNOSIS — I49.5 SICK SINUS SYNDROME: Primary | ICD-10-CM

## 2022-01-01 DIAGNOSIS — J81.0 ACUTE PULMONARY EDEMA: ICD-10-CM

## 2022-01-01 DIAGNOSIS — R79.89 ELEVATED LFTS: ICD-10-CM

## 2022-01-01 DIAGNOSIS — Z95.0 PRESENCE OF CARDIAC PACEMAKER: ICD-10-CM

## 2022-01-01 DIAGNOSIS — E83.41 HYPERMAGNESEMIA: ICD-10-CM

## 2022-01-01 DIAGNOSIS — I50.9 ACUTE ON CHRONIC CONGESTIVE HEART FAILURE, UNSPECIFIED HEART FAILURE TYPE: ICD-10-CM

## 2022-01-01 DIAGNOSIS — R06.09 DYSPNEA ON EXERTION: ICD-10-CM

## 2022-01-01 DIAGNOSIS — I50.21 ACUTE SYSTOLIC CHF (CONGESTIVE HEART FAILURE): ICD-10-CM

## 2022-01-01 DIAGNOSIS — Z01.818 PRE-OP EXAM: Primary | ICD-10-CM

## 2022-01-01 DIAGNOSIS — R53.1 WEAKNESS: ICD-10-CM

## 2022-01-01 DIAGNOSIS — I50.23 ACUTE ON CHRONIC SYSTOLIC HEART FAILURE: ICD-10-CM

## 2022-01-01 DIAGNOSIS — I49.5 SICK SINUS SYNDROME: ICD-10-CM

## 2022-01-01 DIAGNOSIS — R06.09 EXERTIONAL DYSPNEA: Primary | ICD-10-CM

## 2022-01-01 DIAGNOSIS — I10 ESSENTIAL HYPERTENSION: ICD-10-CM

## 2022-01-01 LAB
A PHAGOCYTOPH IGG TITR SER IF: NEGATIVE {TITER}
A PHAGOCYTOPH IGM TITR SER IF: NEGATIVE {TITER}
ALBUMIN SERPL-MCNC: 3.2 G/DL (ref 3.5–5.2)
ALBUMIN SERPL-MCNC: 3.4 G/DL (ref 3.5–5.2)
ALBUMIN SERPL-MCNC: 3.4 G/DL (ref 3.5–5.2)
ALBUMIN SERPL-MCNC: 3.5 G/DL (ref 3.5–5.2)
ALBUMIN SERPL-MCNC: 3.6 G/DL (ref 3.5–5.2)
ALBUMIN SERPL-MCNC: 3.6 G/DL (ref 3.5–5.2)
ALBUMIN SERPL-MCNC: 3.7 G/DL (ref 3.5–5.2)
ALBUMIN SERPL-MCNC: 3.8 G/DL (ref 3.5–5.2)
ALBUMIN SERPL-MCNC: 4.2 G/DL (ref 3.5–5.2)
ALBUMIN/GLOB SERPL: 1.1 G/DL
ALBUMIN/GLOB SERPL: 1.1 G/DL
ALBUMIN/GLOB SERPL: 1.2 G/DL
ALBUMIN/GLOB SERPL: 1.3 G/DL
ALBUMIN/GLOB SERPL: 1.4 G/DL
ALBUMIN/GLOB SERPL: 1.5 G/DL
ALP SERPL-CCNC: 120 U/L (ref 39–117)
ALP SERPL-CCNC: 223 U/L (ref 39–117)
ALP SERPL-CCNC: 230 U/L (ref 39–117)
ALP SERPL-CCNC: 284 U/L (ref 39–117)
ALP SERPL-CCNC: 285 U/L (ref 39–117)
ALP SERPL-CCNC: 309 U/L (ref 39–117)
ALP SERPL-CCNC: 321 U/L (ref 39–117)
ALP SERPL-CCNC: 340 U/L (ref 39–117)
ALP SERPL-CCNC: 407 U/L (ref 39–117)
ALPHA-FETOPROTEIN: 1.82 NG/ML (ref 0–8.3)
ALT SERPL W P-5'-P-CCNC: 308 U/L (ref 1–41)
ALT SERPL W P-5'-P-CCNC: 386 U/L (ref 1–41)
ALT SERPL W P-5'-P-CCNC: 471 U/L (ref 1–41)
ALT SERPL W P-5'-P-CCNC: 513 U/L (ref 1–41)
ALT SERPL W P-5'-P-CCNC: 521 U/L (ref 1–41)
ALT SERPL W P-5'-P-CCNC: 614 U/L (ref 1–41)
ALT SERPL W P-5'-P-CCNC: 616 U/L (ref 1–41)
ALT SERPL W P-5'-P-CCNC: 756 U/L (ref 1–41)
ALT SERPL W P-5'-P-CCNC: 968 U/L (ref 1–41)
ANA SER QL: NEGATIVE
ANION GAP SERPL CALCULATED.3IONS-SCNC: 13 MMOL/L (ref 5–15)
ANION GAP SERPL CALCULATED.3IONS-SCNC: 14 MMOL/L (ref 5–15)
ANION GAP SERPL CALCULATED.3IONS-SCNC: 15 MMOL/L (ref 5–15)
ANION GAP SERPL CALCULATED.3IONS-SCNC: 16 MMOL/L (ref 5–15)
ANION GAP SERPL CALCULATED.3IONS-SCNC: 17 MMOL/L (ref 5–15)
ANION GAP SERPL CALCULATED.3IONS-SCNC: 17 MMOL/L (ref 5–15)
ANION GAP SERPL CALCULATED.3IONS-SCNC: 18 MMOL/L (ref 5–15)
ANION GAP SERPL CALCULATED.3IONS-SCNC: 20 MMOL/L (ref 5–15)
ANION GAP SERPL CALCULATED.3IONS-SCNC: 20 MMOL/L (ref 5–15)
ANION GAP SERPL CALCULATED.3IONS-SCNC: 21 MMOL/L (ref 5–15)
ANION GAP SERPL CALCULATED.3IONS-SCNC: 23 MMOL/L (ref 5–15)
ANION GAP SERPL CALCULATED.3IONS-SCNC: 23 MMOL/L (ref 5–15)
APTT PPP: 25.9 SECONDS (ref 61–76.5)
APTT PPP: 26.2 SECONDS (ref 61–76.5)
ARTERIAL PATENCY WRIST A: POSITIVE
AST SERPL-CCNC: 128 U/L (ref 1–40)
AST SERPL-CCNC: 214 U/L (ref 1–40)
AST SERPL-CCNC: 269 U/L (ref 1–40)
AST SERPL-CCNC: 276 U/L (ref 1–40)
AST SERPL-CCNC: 347 U/L (ref 1–40)
AST SERPL-CCNC: 353 U/L (ref 1–40)
AST SERPL-CCNC: 377 U/L (ref 1–40)
AST SERPL-CCNC: 459 U/L (ref 1–40)
AST SERPL-CCNC: 81 U/L (ref 1–40)
ATMOSPHERIC PRESS: ABNORMAL MM[HG]
B BURGDOR IGG SER QL: NEGATIVE
B PARAPERT DNA SPEC QL NAA+PROBE: NOT DETECTED
B PERT DNA SPEC QL NAA+PROBE: NOT DETECTED
BACTERIA SPEC AEROBE CULT: NORMAL
BACTERIA SPEC AEROBE CULT: NORMAL
BACTERIA UR QL AUTO: ABNORMAL /HPF
BASE EXCESS BLDA CALC-SCNC: -3.6 MMOL/L (ref 0–3)
BASOPHILS # BLD AUTO: 0 10*3/MM3 (ref 0–0.2)
BASOPHILS # BLD AUTO: 0.1 10*3/MM3 (ref 0–0.2)
BASOPHILS NFR BLD AUTO: 0.1 % (ref 0–1.5)
BASOPHILS NFR BLD AUTO: 0.2 % (ref 0–1.5)
BASOPHILS NFR BLD AUTO: 0.3 % (ref 0–1.5)
BASOPHILS NFR BLD AUTO: 0.4 % (ref 0–1.5)
BASOPHILS NFR BLD AUTO: 0.5 % (ref 0–1.5)
BASOPHILS NFR BLD AUTO: 0.6 % (ref 0–1.5)
BASOPHILS NFR BLD AUTO: 0.7 % (ref 0–1.5)
BASOPHILS NFR BLD AUTO: 0.7 % (ref 0–1.5)
BASOPHILS NFR BLD AUTO: 0.8 % (ref 0–1.5)
BASOPHILS NFR BLD AUTO: 0.8 % (ref 0–1.5)
BASOPHILS NFR BLD AUTO: 0.9 % (ref 0–1.5)
BASOPHILS NFR BLD AUTO: 1 % (ref 0–1.5)
BDY SITE: ABNORMAL
BH CV ECHO MEAS - ACS: 1.27 CM
BH CV ECHO MEAS - AI P1/2T: 283.1 MSEC
BH CV ECHO MEAS - AO MAX PG: 21.5 MMHG
BH CV ECHO MEAS - AO MAX PG: 27.9 MMHG
BH CV ECHO MEAS - AO MEAN PG: 11.4 MMHG
BH CV ECHO MEAS - AO MEAN PG: 16.9 MMHG
BH CV ECHO MEAS - AO ROOT DIAM: 2.6 CM
BH CV ECHO MEAS - AO V2 MAX: 232.1 CM/SEC
BH CV ECHO MEAS - AO V2 MAX: 264.1 CM/SEC
BH CV ECHO MEAS - AO V2 VTI: 41 CM
BH CV ECHO MEAS - AO V2 VTI: 55.9 CM
BH CV ECHO MEAS - AVA(I,D): 0.84 CM2
BH CV ECHO MEAS - AVA(I,D): 0.85 CM2
BH CV ECHO MEAS - EDV(CUBED): 63.8 ML
BH CV ECHO MEAS - EDV(CUBED): 96.5 ML
BH CV ECHO MEAS - EDV(MOD-SP4): 110.9 ML
BH CV ECHO MEAS - EDV(MOD-SP4): 129.8 ML
BH CV ECHO MEAS - EF(MOD-BP): 26 %
BH CV ECHO MEAS - EF(MOD-BP): 37 %
BH CV ECHO MEAS - EF(MOD-SP4): 25.9 %
BH CV ECHO MEAS - EF(MOD-SP4): 37.4 %
BH CV ECHO MEAS - ESV(CUBED): 54.4 ML
BH CV ECHO MEAS - ESV(CUBED): 58.7 ML
BH CV ECHO MEAS - ESV(MOD-SP4): 81.2 ML
BH CV ECHO MEAS - ESV(MOD-SP4): 82.2 ML
BH CV ECHO MEAS - FS: 15.3 %
BH CV ECHO MEAS - FS: 5.1 %
BH CV ECHO MEAS - IVS/LVPW: 1.02 CM
BH CV ECHO MEAS - IVS/LVPW: 1.09 CM
BH CV ECHO MEAS - IVSD: 1.31 CM
BH CV ECHO MEAS - IVSD: 1.48 CM
BH CV ECHO MEAS - LA DIMENSION(2D): 6 CM
BH CV ECHO MEAS - LA DIMENSION(2D): 6.6 CM
BH CV ECHO MEAS - LV DIASTOLIC VOL/BSA (35-75): 66 CM2
BH CV ECHO MEAS - LV DIASTOLIC VOL/BSA (35-75): 78.2 CM2
BH CV ECHO MEAS - LV MASS(C)D: 217.6 GRAMS
BH CV ECHO MEAS - LV MASS(C)D: 223.2 GRAMS
BH CV ECHO MEAS - LV MAX PG: 0.83 MMHG
BH CV ECHO MEAS - LV MAX PG: 3.6 MMHG
BH CV ECHO MEAS - LV MEAN PG: 0.45 MMHG
BH CV ECHO MEAS - LV MEAN PG: 2.38 MMHG
BH CV ECHO MEAS - LV SYSTOLIC VOL/BSA (12-30): 48.9 CM2
BH CV ECHO MEAS - LV SYSTOLIC VOL/BSA (12-30): 48.9 CM2
BH CV ECHO MEAS - LV V1 MAX: 45.6 CM/SEC
BH CV ECHO MEAS - LV V1 MAX: 94.3 CM/SEC
BH CV ECHO MEAS - LV V1 VTI: 11.3 CM
BH CV ECHO MEAS - LV V1 VTI: 20.5 CM
BH CV ECHO MEAS - LVIDD: 4 CM
BH CV ECHO MEAS - LVIDD: 4.6 CM
BH CV ECHO MEAS - LVIDS: 3.8 CM
BH CV ECHO MEAS - LVIDS: 3.9 CM
BH CV ECHO MEAS - LVOT AREA: 2.32 CM2
BH CV ECHO MEAS - LVOT AREA: 3 CM2
BH CV ECHO MEAS - LVOT DIAM: 1.72 CM
BH CV ECHO MEAS - LVOT DIAM: 1.97 CM
BH CV ECHO MEAS - LVPWD: 1.2 CM
BH CV ECHO MEAS - LVPWD: 1.45 CM
BH CV ECHO MEAS - MR MAX PG: 109.2 MMHG
BH CV ECHO MEAS - MR MAX PG: 124.4 MMHG
BH CV ECHO MEAS - MR MAX VEL: 520.2 CM/SEC
BH CV ECHO MEAS - MR MAX VEL: 557.6 CM/SEC
BH CV ECHO MEAS - MR MEAN PG: 86.3 MMHG
BH CV ECHO MEAS - MR MEAN VEL: 446.5 CM/SEC
BH CV ECHO MEAS - MR VTI: 178.4 CM
BH CV ECHO MEAS - MV MAX PG: 4.1 MMHG
BH CV ECHO MEAS - MV MAX PG: 4.7 MMHG
BH CV ECHO MEAS - MV MEAN PG: 1.17 MMHG
BH CV ECHO MEAS - MV MEAN PG: 1.99 MMHG
BH CV ECHO MEAS - MV V2 VTI: 24.5 CM
BH CV ECHO MEAS - MV V2 VTI: 44.7 CM
BH CV ECHO MEAS - MVA(VTI): 0.77 CM2
BH CV ECHO MEAS - MVA(VTI): 1.94 CM2
BH CV ECHO MEAS - PA ACC TIME: 0.05 SEC
BH CV ECHO MEAS - PA PR(ACCEL): 58.4 MMHG
BH CV ECHO MEAS - PA V2 MAX: 68.3 CM/SEC
BH CV ECHO MEAS - PA V2 MAX: 83.7 CM/SEC
BH CV ECHO MEAS - PI END-D VEL: 136.1 CM/SEC
BH CV ECHO MEAS - RAP SYSTOLE: 15 MMHG
BH CV ECHO MEAS - RAP SYSTOLE: 3 MMHG
BH CV ECHO MEAS - RV MAX PG: 0.36 MMHG
BH CV ECHO MEAS - RV V1 MAX: 30 CM/SEC
BH CV ECHO MEAS - RV V1 VTI: 3.2 CM
BH CV ECHO MEAS - RVDD: 4.8 CM
BH CV ECHO MEAS - RVSP: 58.3 MMHG
BH CV ECHO MEAS - RVSP: 72.6 MMHG
BH CV ECHO MEAS - SI(MOD-SP4): 17.1 ML/M2
BH CV ECHO MEAS - SI(MOD-SP4): 29.3 ML/M2
BH CV ECHO MEAS - SV(LVOT): 34.3 ML
BH CV ECHO MEAS - SV(LVOT): 47.5 ML
BH CV ECHO MEAS - SV(MOD-SP4): 28.7 ML
BH CV ECHO MEAS - SV(MOD-SP4): 48.6 ML
BH CV ECHO MEAS - TR MAX PG: 43.3 MMHG
BH CV ECHO MEAS - TR MAX PG: 69.6 MMHG
BH CV ECHO MEAS - TR MAX VEL: 328.4 CM/SEC
BH CV ECHO MEAS - TR MAX VEL: 414.5 CM/SEC
BH CV XLRA MEAS LEFT DIST CCA EDV: -17.2 CM/SEC
BH CV XLRA MEAS LEFT DIST CCA PSV: -108 CM/SEC
BH CV XLRA MEAS LEFT DIST ICA EDV: -23 CM/SEC
BH CV XLRA MEAS LEFT DIST ICA PSV: -128 CM/SEC
BH CV XLRA MEAS LEFT ICA/CCA RATIO: 1.81
BH CV XLRA MEAS LEFT PROX CCA EDV: -7.1 CM/SEC
BH CV XLRA MEAS LEFT PROX CCA PSV: -102 CM/SEC
BH CV XLRA MEAS LEFT PROX ECA PSV: -247 CM/SEC
BH CV XLRA MEAS LEFT PROX ICA EDV: -24.7 CM/SEC
BH CV XLRA MEAS LEFT PROX ICA PSV: -196 CM/SEC
BH CV XLRA MEAS LEFT PROX SCLA PSV: 191 CM/SEC
BH CV XLRA MEAS LEFT VERTEBRAL A PSV: 79.6 CM/SEC
BH CV XLRA MEAS RIGHT DIST CCA EDV: 6.2 CM/SEC
BH CV XLRA MEAS RIGHT DIST CCA PSV: 84.5 CM/SEC
BH CV XLRA MEAS RIGHT DIST ICA EDV: -18 CM/SEC
BH CV XLRA MEAS RIGHT DIST ICA PSV: -129 CM/SEC
BH CV XLRA MEAS RIGHT ICA/CCA RATIO: 3.4
BH CV XLRA MEAS RIGHT PROX CCA EDV: -5 CM/SEC
BH CV XLRA MEAS RIGHT PROX CCA PSV: -89.5 CM/SEC
BH CV XLRA MEAS RIGHT PROX ECA PSV: -178 CM/SEC
BH CV XLRA MEAS RIGHT PROX ICA EDV: -46.6 CM/SEC
BH CV XLRA MEAS RIGHT PROX ICA PSV: -306 CM/SEC
BH CV XLRA MEAS RIGHT PROX SCLA PSV: 237 CM/SEC
BILIRUB CONJ SERPL-MCNC: 0.4 MG/DL (ref 0–0.3)
BILIRUB SERPL-MCNC: 0.7 MG/DL (ref 0–1.2)
BILIRUB SERPL-MCNC: 0.8 MG/DL (ref 0–1.2)
BILIRUB SERPL-MCNC: 1.2 MG/DL (ref 0–1.2)
BILIRUB SERPL-MCNC: 1.2 MG/DL (ref 0–1.2)
BILIRUB SERPL-MCNC: 1.3 MG/DL (ref 0–1.2)
BILIRUB SERPL-MCNC: 1.6 MG/DL (ref 0–1.2)
BILIRUB SERPL-MCNC: 1.7 MG/DL (ref 0–1.2)
BILIRUB UR QL STRIP: NEGATIVE
BILIRUB UR QL STRIP: NEGATIVE
BRUCELLA IGG SER QL IA: NEGATIVE
BRUCELLA IGM SER QL IA: NEGATIVE
BUN SERPL-MCNC: 100 MG/DL (ref 8–23)
BUN SERPL-MCNC: 117 MG/DL (ref 8–23)
BUN SERPL-MCNC: 117 MG/DL (ref 8–23)
BUN SERPL-MCNC: 120 MG/DL (ref 8–23)
BUN SERPL-MCNC: 122 MG/DL (ref 8–23)
BUN SERPL-MCNC: 123 MG/DL (ref 8–23)
BUN SERPL-MCNC: 34 MG/DL (ref 8–23)
BUN SERPL-MCNC: 35 MG/DL (ref 8–23)
BUN SERPL-MCNC: 36 MG/DL (ref 8–23)
BUN SERPL-MCNC: 56 MG/DL (ref 8–23)
BUN SERPL-MCNC: 58 MG/DL (ref 8–23)
BUN SERPL-MCNC: 61 MG/DL (ref 8–23)
BUN SERPL-MCNC: 61 MG/DL (ref 8–23)
BUN SERPL-MCNC: 62 MG/DL (ref 8–23)
BUN SERPL-MCNC: 63 MG/DL (ref 8–23)
BUN SERPL-MCNC: 63 MG/DL (ref 8–23)
BUN SERPL-MCNC: 64 MG/DL (ref 8–23)
BUN SERPL-MCNC: 75 MG/DL (ref 8–23)
BUN SERPL-MCNC: 76 MG/DL (ref 8–23)
BUN SERPL-MCNC: 78 MG/DL (ref 8–23)
BUN SERPL-MCNC: 85 MG/DL (ref 8–23)
BUN SERPL-MCNC: 91 MG/DL (ref 8–23)
BUN SERPL-MCNC: 91 MG/DL (ref 8–23)
BUN SERPL-MCNC: 99 MG/DL (ref 8–23)
BUN/CREAT SERPL: 23.1 (ref 7–25)
BUN/CREAT SERPL: 24 (ref 7–25)
BUN/CREAT SERPL: 26.7 (ref 7–25)
BUN/CREAT SERPL: 35.6 (ref 7–25)
BUN/CREAT SERPL: 36.4 (ref 7–25)
BUN/CREAT SERPL: 36.8 (ref 7–25)
BUN/CREAT SERPL: 37.3 (ref 7–25)
BUN/CREAT SERPL: 37.7 (ref 7–25)
BUN/CREAT SERPL: 38.6 (ref 7–25)
BUN/CREAT SERPL: 38.9 (ref 7–25)
BUN/CREAT SERPL: 39.1 (ref 7–25)
BUN/CREAT SERPL: 39.5 (ref 7–25)
BUN/CREAT SERPL: 40.2 (ref 7–25)
BUN/CREAT SERPL: 40.5 (ref 7–25)
BUN/CREAT SERPL: 41.3 (ref 7–25)
BUN/CREAT SERPL: 42.1 (ref 7–25)
BUN/CREAT SERPL: 42.4 (ref 7–25)
BUN/CREAT SERPL: 47.2 (ref 7–25)
BUN/CREAT SERPL: 48.5 (ref 7–25)
BUN/CREAT SERPL: 52.8 (ref 7–25)
BUN/CREAT SERPL: 52.9 (ref 7–25)
BUN/CREAT SERPL: 53.9 (ref 7–25)
BUN/CREAT SERPL: 54.8 (ref 7–25)
BUN/CREAT SERPL: 56.5 (ref 7–25)
C PNEUM DNA NPH QL NAA+NON-PROBE: NOT DETECTED
CA-I SERPL ISE-MCNC: 1.15 MMOL/L (ref 1.2–1.3)
CALCIUM SPEC-SCNC: 8.5 MG/DL (ref 8.2–9.6)
CALCIUM SPEC-SCNC: 8.6 MG/DL (ref 8.2–9.6)
CALCIUM SPEC-SCNC: 8.6 MG/DL (ref 8.2–9.6)
CALCIUM SPEC-SCNC: 8.7 MG/DL (ref 8.2–9.6)
CALCIUM SPEC-SCNC: 8.8 MG/DL (ref 8.2–9.6)
CALCIUM SPEC-SCNC: 9 MG/DL (ref 8.2–9.6)
CALCIUM SPEC-SCNC: 9 MG/DL (ref 8.2–9.6)
CALCIUM SPEC-SCNC: 9.1 MG/DL (ref 8.2–9.6)
CALCIUM SPEC-SCNC: 9.2 MG/DL (ref 8.2–9.6)
CALCIUM SPEC-SCNC: 9.3 MG/DL (ref 8.2–9.6)
CALCIUM SPEC-SCNC: 9.4 MG/DL (ref 8.2–9.6)
CALCIUM SPEC-SCNC: 9.5 MG/DL (ref 8.2–9.6)
CALCIUM SPEC-SCNC: 9.5 MG/DL (ref 8.2–9.6)
CALCIUM SPEC-SCNC: 9.6 MG/DL (ref 8.2–9.6)
CALCIUM SPEC-SCNC: 9.7 MG/DL (ref 8.2–9.6)
CEA SERPL-MCNC: 9.22 NG/ML
CHLORIDE SERPL-SCNC: 100 MMOL/L (ref 98–107)
CHLORIDE SERPL-SCNC: 100 MMOL/L (ref 98–107)
CHLORIDE SERPL-SCNC: 102 MMOL/L (ref 98–107)
CHLORIDE SERPL-SCNC: 103 MMOL/L (ref 98–107)
CHLORIDE SERPL-SCNC: 104 MMOL/L (ref 98–107)
CHLORIDE SERPL-SCNC: 105 MMOL/L (ref 98–107)
CHLORIDE SERPL-SCNC: 90 MMOL/L (ref 98–107)
CHLORIDE SERPL-SCNC: 92 MMOL/L (ref 98–107)
CHLORIDE SERPL-SCNC: 93 MMOL/L (ref 98–107)
CHLORIDE SERPL-SCNC: 94 MMOL/L (ref 98–107)
CHLORIDE SERPL-SCNC: 94 MMOL/L (ref 98–107)
CHLORIDE SERPL-SCNC: 95 MMOL/L (ref 98–107)
CHLORIDE SERPL-SCNC: 95 MMOL/L (ref 98–107)
CHLORIDE SERPL-SCNC: 96 MMOL/L (ref 98–107)
CHLORIDE SERPL-SCNC: 97 MMOL/L (ref 98–107)
CHLORIDE SERPL-SCNC: 97 MMOL/L (ref 98–107)
CHLORIDE SERPL-SCNC: 98 MMOL/L (ref 98–107)
CHLORIDE SERPL-SCNC: 98 MMOL/L (ref 98–107)
CHLORIDE SERPL-SCNC: 99 MMOL/L (ref 98–107)
CK SERPL-CCNC: 95 U/L (ref 20–200)
CLARITY UR: CLEAR
CLARITY UR: CLEAR
CO2 BLDA-SCNC: 17 MMOL/L (ref 22–29)
CO2 SERPL-SCNC: 16 MMOL/L (ref 22–29)
CO2 SERPL-SCNC: 16 MMOL/L (ref 22–29)
CO2 SERPL-SCNC: 18 MMOL/L (ref 22–29)
CO2 SERPL-SCNC: 20 MMOL/L (ref 22–29)
CO2 SERPL-SCNC: 20 MMOL/L (ref 22–29)
CO2 SERPL-SCNC: 21 MMOL/L (ref 22–29)
CO2 SERPL-SCNC: 24 MMOL/L (ref 22–29)
CO2 SERPL-SCNC: 25 MMOL/L (ref 22–29)
CO2 SERPL-SCNC: 26 MMOL/L (ref 22–29)
CO2 SERPL-SCNC: 26 MMOL/L (ref 22–29)
CO2 SERPL-SCNC: 27 MMOL/L (ref 22–29)
CO2 SERPL-SCNC: 28 MMOL/L (ref 22–29)
CO2 SERPL-SCNC: 30 MMOL/L (ref 22–29)
CO2 SERPL-SCNC: 30 MMOL/L (ref 22–29)
CO2 SERPL-SCNC: 31 MMOL/L (ref 22–29)
COLOR UR: ABNORMAL
COLOR UR: YELLOW
CREAT BLDA-MCNC: 1.4 MG/DL (ref 0.6–1.3)
CREAT SERPL-MCNC: 1.35 MG/DL (ref 0.76–1.27)
CREAT SERPL-MCNC: 1.44 MG/DL (ref 0.76–1.27)
CREAT SERPL-MCNC: 1.46 MG/DL (ref 0.76–1.27)
CREAT SERPL-MCNC: 1.47 MG/DL (ref 0.76–1.27)
CREAT SERPL-MCNC: 1.49 MG/DL (ref 0.76–1.27)
CREAT SERPL-MCNC: 1.54 MG/DL (ref 0.76–1.27)
CREAT SERPL-MCNC: 1.61 MG/DL (ref 0.76–1.27)
CREAT SERPL-MCNC: 1.62 MG/DL (ref 0.76–1.27)
CREAT SERPL-MCNC: 1.66 MG/DL (ref 0.76–1.27)
CREAT SERPL-MCNC: 1.74 MG/DL (ref 0.76–1.27)
CREAT SERPL-MCNC: 1.77 MG/DL (ref 0.76–1.27)
CREAT SERPL-MCNC: 1.8 MG/DL (ref 0.76–1.27)
CREAT SERPL-MCNC: 1.84 MG/DL (ref 0.76–1.27)
CREAT SERPL-MCNC: 1.85 MG/DL (ref 0.76–1.27)
CREAT SERPL-MCNC: 1.94 MG/DL (ref 0.76–1.27)
CREAT SERPL-MCNC: 2.04 MG/DL (ref 0.76–1.27)
CREAT SERPL-MCNC: 2.07 MG/DL (ref 0.76–1.27)
CREAT SERPL-MCNC: 2.16 MG/DL (ref 0.76–1.27)
CREAT SERPL-MCNC: 2.19 MG/DL (ref 0.76–1.27)
CREAT SERPL-MCNC: 2.21 MG/DL (ref 0.76–1.27)
CREAT SERPL-MCNC: 2.28 MG/DL (ref 0.76–1.27)
CREAT SERPL-MCNC: 2.31 MG/DL (ref 0.76–1.27)
CREAT SERPL-MCNC: 2.36 MG/DL (ref 0.76–1.27)
CREAT SERPL-MCNC: 2.53 MG/DL (ref 0.76–1.27)
D DIMER PPP FEU-MCNC: 2.08 MG/L (FEU) (ref 0–0.59)
D DIMER PPP FEU-MCNC: 2.89 MG/L (FEU) (ref 0–0.59)
DEPRECATED RDW RBC AUTO: 45.5 FL (ref 37–54)
DEPRECATED RDW RBC AUTO: 46.8 FL (ref 37–54)
DEPRECATED RDW RBC AUTO: 48.1 FL (ref 37–54)
DEPRECATED RDW RBC AUTO: 48.6 FL (ref 37–54)
DEPRECATED RDW RBC AUTO: 49 FL (ref 37–54)
DEPRECATED RDW RBC AUTO: 49.4 FL (ref 37–54)
DEPRECATED RDW RBC AUTO: 49.4 FL (ref 37–54)
DEPRECATED RDW RBC AUTO: 49.9 FL (ref 37–54)
DEPRECATED RDW RBC AUTO: 50.8 FL (ref 37–54)
DEPRECATED RDW RBC AUTO: 50.8 FL (ref 37–54)
DEPRECATED RDW RBC AUTO: 51.6 FL (ref 37–54)
DEPRECATED RDW RBC AUTO: 52.1 FL (ref 37–54)
DEPRECATED RDW RBC AUTO: 52.1 FL (ref 37–54)
DEPRECATED RDW RBC AUTO: 52.5 FL (ref 37–54)
DEPRECATED RDW RBC AUTO: 52.9 FL (ref 37–54)
DEPRECATED RDW RBC AUTO: 54.3 FL (ref 37–54)
DEPRECATED RDW RBC AUTO: 54.3 FL (ref 37–54)
DEPRECATED RDW RBC AUTO: 54.7 FL (ref 37–54)
DEPRECATED RDW RBC AUTO: 56.4 FL (ref 37–54)
DEPRECATED RDW RBC AUTO: 59.5 FL (ref 37–54)
DIGOXIN SERPL-MCNC: 0.8 NG/ML (ref 0.6–1.2)
DIGOXIN SERPL-MCNC: 0.9 NG/ML (ref 0.6–1.2)
DIGOXIN SERPL-MCNC: 1.1 NG/ML (ref 0.6–1.2)
DIGOXIN SERPL-MCNC: 1.2 NG/ML (ref 0.6–1.2)
DIGOXIN SERPL-MCNC: 1.3 NG/ML (ref 0.6–1.2)
E CHAFFEENSIS IGG TITR SER IF: NEGATIVE {TITER}
E CHAFFEENSIS IGM TITR SER IF: NEGATIVE {TITER}
EGFRCR SERPLBLD CKD-EPI 2021: 23.3 ML/MIN/1.73
EGFRCR SERPLBLD CKD-EPI 2021: 25.4 ML/MIN/1.73
EGFRCR SERPLBLD CKD-EPI 2021: 26 ML/MIN/1.73
EGFRCR SERPLBLD CKD-EPI 2021: 26.4 ML/MIN/1.73
EGFRCR SERPLBLD CKD-EPI 2021: 27.4 ML/MIN/1.73
EGFRCR SERPLBLD CKD-EPI 2021: 27.7 ML/MIN/1.73
EGFRCR SERPLBLD CKD-EPI 2021: 28.2 ML/MIN/1.73
EGFRCR SERPLBLD CKD-EPI 2021: 29.7 ML/MIN/1.73
EGFRCR SERPLBLD CKD-EPI 2021: 30.2 ML/MIN/1.73
EGFRCR SERPLBLD CKD-EPI 2021: 32.1 ML/MIN/1.73
EGFRCR SERPLBLD CKD-EPI 2021: 34 ML/MIN/1.73
EGFRCR SERPLBLD CKD-EPI 2021: 34.2 ML/MIN/1.73
EGFRCR SERPLBLD CKD-EPI 2021: 35.1 ML/MIN/1.73
EGFRCR SERPLBLD CKD-EPI 2021: 35.8 ML/MIN/1.73
EGFRCR SERPLBLD CKD-EPI 2021: 36.6 ML/MIN/1.73
EGFRCR SERPLBLD CKD-EPI 2021: 38.7 ML/MIN/1.73
EGFRCR SERPLBLD CKD-EPI 2021: 39.8 ML/MIN/1.73
EGFRCR SERPLBLD CKD-EPI 2021: 40.1 ML/MIN/1.73
EGFRCR SERPLBLD CKD-EPI 2021: 42.3 ML/MIN/1.73
EGFRCR SERPLBLD CKD-EPI 2021: 44 ML/MIN/1.73
EGFRCR SERPLBLD CKD-EPI 2021: 44.8 ML/MIN/1.73
EGFRCR SERPLBLD CKD-EPI 2021: 45.1 ML/MIN/1.73
EGFRCR SERPLBLD CKD-EPI 2021: 45.9 ML/MIN/1.73
EGFRCR SERPLBLD CKD-EPI 2021: 47.4 ML/MIN/1.73
EGFRCR SERPLBLD CKD-EPI 2021: 49.6 ML/MIN/1.73
EOSINOPHIL # BLD AUTO: 0 10*3/MM3 (ref 0–0.4)
EOSINOPHIL # BLD AUTO: 0.1 10*3/MM3 (ref 0–0.4)
EOSINOPHIL # BLD AUTO: 0.2 10*3/MM3 (ref 0–0.4)
EOSINOPHIL # BLD AUTO: 0.3 10*3/MM3 (ref 0–0.4)
EOSINOPHIL # BLD AUTO: 0.3 10*3/MM3 (ref 0–0.4)
EOSINOPHIL NFR BLD AUTO: 0.1 % (ref 0.3–6.2)
EOSINOPHIL NFR BLD AUTO: 0.3 % (ref 0.3–6.2)
EOSINOPHIL NFR BLD AUTO: 0.4 % (ref 0.3–6.2)
EOSINOPHIL NFR BLD AUTO: 0.4 % (ref 0.3–6.2)
EOSINOPHIL NFR BLD AUTO: 0.5 % (ref 0.3–6.2)
EOSINOPHIL NFR BLD AUTO: 0.6 % (ref 0.3–6.2)
EOSINOPHIL NFR BLD AUTO: 0.8 % (ref 0.3–6.2)
EOSINOPHIL NFR BLD AUTO: 0.8 % (ref 0.3–6.2)
EOSINOPHIL NFR BLD AUTO: 1 % (ref 0.3–6.2)
EOSINOPHIL NFR BLD AUTO: 1.1 % (ref 0.3–6.2)
EOSINOPHIL NFR BLD AUTO: 1.2 % (ref 0.3–6.2)
EOSINOPHIL NFR BLD AUTO: 1.3 % (ref 0.3–6.2)
EOSINOPHIL NFR BLD AUTO: 1.6 % (ref 0.3–6.2)
EOSINOPHIL NFR BLD AUTO: 1.6 % (ref 0.3–6.2)
EOSINOPHIL NFR BLD AUTO: 2.1 % (ref 0.3–6.2)
EOSINOPHIL NFR BLD AUTO: 2.2 % (ref 0.3–6.2)
EOSINOPHIL NFR BLD AUTO: 2.4 % (ref 0.3–6.2)
EOSINOPHIL NFR BLD AUTO: 2.4 % (ref 0.3–6.2)
EOSINOPHIL NFR BLD AUTO: 2.5 % (ref 0.3–6.2)
EOSINOPHIL NFR BLD AUTO: 2.8 % (ref 0.3–6.2)
ERYTHROCYTE [DISTWIDTH] IN BLOOD BY AUTOMATED COUNT: 13.7 % (ref 12.3–15.4)
ERYTHROCYTE [DISTWIDTH] IN BLOOD BY AUTOMATED COUNT: 13.8 % (ref 12.3–15.4)
ERYTHROCYTE [DISTWIDTH] IN BLOOD BY AUTOMATED COUNT: 14.1 % (ref 12.3–15.4)
ERYTHROCYTE [DISTWIDTH] IN BLOOD BY AUTOMATED COUNT: 14.2 % (ref 12.3–15.4)
ERYTHROCYTE [DISTWIDTH] IN BLOOD BY AUTOMATED COUNT: 14.3 % (ref 12.3–15.4)
ERYTHROCYTE [DISTWIDTH] IN BLOOD BY AUTOMATED COUNT: 14.3 % (ref 12.3–15.4)
ERYTHROCYTE [DISTWIDTH] IN BLOOD BY AUTOMATED COUNT: 14.5 % (ref 12.3–15.4)
ERYTHROCYTE [DISTWIDTH] IN BLOOD BY AUTOMATED COUNT: 14.6 % (ref 12.3–15.4)
ERYTHROCYTE [DISTWIDTH] IN BLOOD BY AUTOMATED COUNT: 14.6 % (ref 12.3–15.4)
ERYTHROCYTE [DISTWIDTH] IN BLOOD BY AUTOMATED COUNT: 14.7 % (ref 12.3–15.4)
ERYTHROCYTE [DISTWIDTH] IN BLOOD BY AUTOMATED COUNT: 14.8 % (ref 12.3–15.4)
ERYTHROCYTE [DISTWIDTH] IN BLOOD BY AUTOMATED COUNT: 14.9 % (ref 12.3–15.4)
ERYTHROCYTE [DISTWIDTH] IN BLOOD BY AUTOMATED COUNT: 15 % (ref 12.3–15.4)
ERYTHROCYTE [DISTWIDTH] IN BLOOD BY AUTOMATED COUNT: 15.1 % (ref 12.3–15.4)
ERYTHROCYTE [DISTWIDTH] IN BLOOD BY AUTOMATED COUNT: 15.2 % (ref 12.3–15.4)
ERYTHROCYTE [DISTWIDTH] IN BLOOD BY AUTOMATED COUNT: 15.2 % (ref 12.3–15.4)
ERYTHROCYTE [DISTWIDTH] IN BLOOD BY AUTOMATED COUNT: 15.4 % (ref 12.3–15.4)
ERYTHROCYTE [DISTWIDTH] IN BLOOD BY AUTOMATED COUNT: 15.5 % (ref 12.3–15.4)
ERYTHROCYTE [DISTWIDTH] IN BLOOD BY AUTOMATED COUNT: 15.9 % (ref 12.3–15.4)
ERYTHROCYTE [DISTWIDTH] IN BLOOD BY AUTOMATED COUNT: 16.3 % (ref 12.3–15.4)
FLUAV SUBTYP SPEC NAA+PROBE: NOT DETECTED
FLUBV RNA ISLT QL NAA+PROBE: NOT DETECTED
GGT SERPL-CCNC: 142 U/L (ref 8–61)
GLOBULIN UR ELPH-MCNC: 2.4 GM/DL
GLOBULIN UR ELPH-MCNC: 2.6 GM/DL
GLOBULIN UR ELPH-MCNC: 2.7 GM/DL
GLOBULIN UR ELPH-MCNC: 2.9 GM/DL
GLOBULIN UR ELPH-MCNC: 2.9 GM/DL
GLOBULIN UR ELPH-MCNC: 3.1 GM/DL
GLOBULIN UR ELPH-MCNC: 3.2 GM/DL
GLUCOSE BLDC GLUCOMTR-MCNC: 103 MG/DL (ref 70–105)
GLUCOSE BLDC GLUCOMTR-MCNC: 104 MG/DL (ref 70–105)
GLUCOSE BLDC GLUCOMTR-MCNC: 104 MG/DL (ref 70–105)
GLUCOSE BLDC GLUCOMTR-MCNC: 105 MG/DL (ref 70–105)
GLUCOSE BLDC GLUCOMTR-MCNC: 107 MG/DL (ref 70–105)
GLUCOSE BLDC GLUCOMTR-MCNC: 108 MG/DL (ref 70–105)
GLUCOSE BLDC GLUCOMTR-MCNC: 108 MG/DL (ref 70–105)
GLUCOSE BLDC GLUCOMTR-MCNC: 110 MG/DL (ref 70–105)
GLUCOSE BLDC GLUCOMTR-MCNC: 116 MG/DL (ref 70–105)
GLUCOSE BLDC GLUCOMTR-MCNC: 118 MG/DL (ref 70–105)
GLUCOSE BLDC GLUCOMTR-MCNC: 119 MG/DL (ref 70–105)
GLUCOSE BLDC GLUCOMTR-MCNC: 119 MG/DL (ref 70–105)
GLUCOSE BLDC GLUCOMTR-MCNC: 124 MG/DL (ref 70–105)
GLUCOSE BLDC GLUCOMTR-MCNC: 125 MG/DL (ref 70–105)
GLUCOSE BLDC GLUCOMTR-MCNC: 126 MG/DL (ref 70–105)
GLUCOSE BLDC GLUCOMTR-MCNC: 128 MG/DL (ref 70–105)
GLUCOSE BLDC GLUCOMTR-MCNC: 130 MG/DL (ref 70–105)
GLUCOSE BLDC GLUCOMTR-MCNC: 131 MG/DL (ref 70–105)
GLUCOSE BLDC GLUCOMTR-MCNC: 133 MG/DL (ref 70–105)
GLUCOSE BLDC GLUCOMTR-MCNC: 136 MG/DL (ref 70–105)
GLUCOSE BLDC GLUCOMTR-MCNC: 137 MG/DL (ref 70–105)
GLUCOSE BLDC GLUCOMTR-MCNC: 143 MG/DL (ref 70–105)
GLUCOSE BLDC GLUCOMTR-MCNC: 145 MG/DL (ref 70–105)
GLUCOSE BLDC GLUCOMTR-MCNC: 146 MG/DL (ref 70–105)
GLUCOSE BLDC GLUCOMTR-MCNC: 147 MG/DL (ref 70–105)
GLUCOSE BLDC GLUCOMTR-MCNC: 148 MG/DL (ref 70–105)
GLUCOSE BLDC GLUCOMTR-MCNC: 149 MG/DL (ref 70–105)
GLUCOSE BLDC GLUCOMTR-MCNC: 151 MG/DL (ref 70–105)
GLUCOSE BLDC GLUCOMTR-MCNC: 151 MG/DL (ref 70–105)
GLUCOSE BLDC GLUCOMTR-MCNC: 152 MG/DL (ref 70–105)
GLUCOSE BLDC GLUCOMTR-MCNC: 153 MG/DL (ref 70–105)
GLUCOSE BLDC GLUCOMTR-MCNC: 153 MG/DL (ref 70–105)
GLUCOSE BLDC GLUCOMTR-MCNC: 156 MG/DL (ref 70–105)
GLUCOSE BLDC GLUCOMTR-MCNC: 160 MG/DL (ref 70–105)
GLUCOSE BLDC GLUCOMTR-MCNC: 163 MG/DL (ref 70–105)
GLUCOSE BLDC GLUCOMTR-MCNC: 165 MG/DL (ref 70–105)
GLUCOSE BLDC GLUCOMTR-MCNC: 166 MG/DL (ref 70–105)
GLUCOSE BLDC GLUCOMTR-MCNC: 168 MG/DL (ref 70–105)
GLUCOSE BLDC GLUCOMTR-MCNC: 169 MG/DL (ref 70–105)
GLUCOSE BLDC GLUCOMTR-MCNC: 171 MG/DL (ref 70–105)
GLUCOSE BLDC GLUCOMTR-MCNC: 174 MG/DL (ref 70–105)
GLUCOSE BLDC GLUCOMTR-MCNC: 181 MG/DL (ref 70–105)
GLUCOSE BLDC GLUCOMTR-MCNC: 212 MG/DL (ref 70–105)
GLUCOSE BLDC GLUCOMTR-MCNC: 215 MG/DL (ref 70–105)
GLUCOSE BLDC GLUCOMTR-MCNC: 217 MG/DL (ref 70–105)
GLUCOSE BLDC GLUCOMTR-MCNC: 78 MG/DL (ref 70–105)
GLUCOSE BLDC GLUCOMTR-MCNC: 79 MG/DL (ref 70–105)
GLUCOSE BLDC GLUCOMTR-MCNC: 83 MG/DL (ref 70–105)
GLUCOSE BLDC GLUCOMTR-MCNC: 88 MG/DL (ref 70–105)
GLUCOSE BLDC GLUCOMTR-MCNC: 89 MG/DL (ref 70–105)
GLUCOSE BLDC GLUCOMTR-MCNC: 90 MG/DL (ref 70–105)
GLUCOSE BLDC GLUCOMTR-MCNC: 92 MG/DL (ref 70–105)
GLUCOSE BLDC GLUCOMTR-MCNC: 96 MG/DL (ref 70–105)
GLUCOSE BLDC GLUCOMTR-MCNC: 99 MG/DL (ref 70–105)
GLUCOSE SERPL-MCNC: 106 MG/DL (ref 65–99)
GLUCOSE SERPL-MCNC: 106 MG/DL (ref 65–99)
GLUCOSE SERPL-MCNC: 111 MG/DL (ref 65–99)
GLUCOSE SERPL-MCNC: 114 MG/DL (ref 65–99)
GLUCOSE SERPL-MCNC: 114 MG/DL (ref 65–99)
GLUCOSE SERPL-MCNC: 116 MG/DL (ref 65–99)
GLUCOSE SERPL-MCNC: 116 MG/DL (ref 65–99)
GLUCOSE SERPL-MCNC: 117 MG/DL (ref 65–99)
GLUCOSE SERPL-MCNC: 118 MG/DL (ref 65–99)
GLUCOSE SERPL-MCNC: 118 MG/DL (ref 65–99)
GLUCOSE SERPL-MCNC: 120 MG/DL (ref 65–99)
GLUCOSE SERPL-MCNC: 122 MG/DL (ref 65–99)
GLUCOSE SERPL-MCNC: 122 MG/DL (ref 65–99)
GLUCOSE SERPL-MCNC: 125 MG/DL (ref 65–99)
GLUCOSE SERPL-MCNC: 125 MG/DL (ref 65–99)
GLUCOSE SERPL-MCNC: 129 MG/DL (ref 65–99)
GLUCOSE SERPL-MCNC: 141 MG/DL (ref 65–99)
GLUCOSE SERPL-MCNC: 152 MG/DL (ref 65–99)
GLUCOSE SERPL-MCNC: 156 MG/DL (ref 65–99)
GLUCOSE SERPL-MCNC: 161 MG/DL (ref 65–99)
GLUCOSE SERPL-MCNC: 162 MG/DL (ref 65–99)
GLUCOSE SERPL-MCNC: 183 MG/DL (ref 65–99)
GLUCOSE SERPL-MCNC: 94 MG/DL (ref 65–99)
GLUCOSE SERPL-MCNC: 99 MG/DL (ref 65–99)
GLUCOSE UR STRIP-MCNC: NEGATIVE MG/DL
GLUCOSE UR STRIP-MCNC: NEGATIVE MG/DL
HADV DNA SPEC NAA+PROBE: NOT DETECTED
HAV IGM SERPL QL IA: NORMAL
HBV CORE IGM SERPL QL IA: NORMAL
HBV SURFACE AG SERPL QL IA: NORMAL
HCO3 BLDA-SCNC: 16.4 MMOL/L (ref 21–28)
HCOV 229E RNA SPEC QL NAA+PROBE: NOT DETECTED
HCOV HKU1 RNA SPEC QL NAA+PROBE: NOT DETECTED
HCOV NL63 RNA SPEC QL NAA+PROBE: NOT DETECTED
HCOV OC43 RNA SPEC QL NAA+PROBE: NOT DETECTED
HCT VFR BLD AUTO: 38.7 % (ref 37.5–51)
HCT VFR BLD AUTO: 39.6 % (ref 37.5–51)
HCT VFR BLD AUTO: 39.9 % (ref 37.5–51)
HCT VFR BLD AUTO: 40.1 % (ref 37.5–51)
HCT VFR BLD AUTO: 40.6 % (ref 37.5–51)
HCT VFR BLD AUTO: 40.6 % (ref 37.5–51)
HCT VFR BLD AUTO: 41.2 % (ref 37.5–51)
HCT VFR BLD AUTO: 41.4 % (ref 37.5–51)
HCT VFR BLD AUTO: 41.6 % (ref 37.5–51)
HCT VFR BLD AUTO: 42.4 % (ref 37.5–51)
HCT VFR BLD AUTO: 43.2 % (ref 37.5–51)
HCT VFR BLD AUTO: 43.2 % (ref 37.5–51)
HCT VFR BLD AUTO: 43.4 % (ref 37.5–51)
HCT VFR BLD AUTO: 43.6 % (ref 37.5–51)
HCT VFR BLD AUTO: 44.1 % (ref 37.5–51)
HCT VFR BLD AUTO: 45.5 % (ref 37.5–51)
HCT VFR BLD AUTO: 45.9 % (ref 37.5–51)
HCT VFR BLD AUTO: 46.4 % (ref 37.5–51)
HCT VFR BLD AUTO: 47 % (ref 37.5–51)
HCT VFR BLD AUTO: 47.1 % (ref 37.5–51)
HCT VFR BLD AUTO: 47.3 % (ref 37.5–51)
HCT VFR BLD AUTO: 47.7 % (ref 37.5–51)
HCV AB SER DONR QL: NORMAL
HEMODILUTION: NO
HGB BLD-MCNC: 13.1 G/DL (ref 13–17.7)
HGB BLD-MCNC: 13.2 G/DL (ref 13–17.7)
HGB BLD-MCNC: 13.3 G/DL (ref 13–17.7)
HGB BLD-MCNC: 13.4 G/DL (ref 13–17.7)
HGB BLD-MCNC: 13.5 G/DL (ref 13–17.7)
HGB BLD-MCNC: 13.6 G/DL (ref 13–17.7)
HGB BLD-MCNC: 13.8 G/DL (ref 13–17.7)
HGB BLD-MCNC: 13.8 G/DL (ref 13–17.7)
HGB BLD-MCNC: 13.9 G/DL (ref 13–17.7)
HGB BLD-MCNC: 13.9 G/DL (ref 13–17.7)
HGB BLD-MCNC: 14 G/DL (ref 13–17.7)
HGB BLD-MCNC: 14 G/DL (ref 13–17.7)
HGB BLD-MCNC: 14.3 G/DL (ref 13–17.7)
HGB BLD-MCNC: 14.4 G/DL (ref 13–17.7)
HGB BLD-MCNC: 14.4 G/DL (ref 13–17.7)
HGB BLD-MCNC: 14.7 G/DL (ref 13–17.7)
HGB BLD-MCNC: 14.8 G/DL (ref 13–17.7)
HGB BLD-MCNC: 15 G/DL (ref 13–17.7)
HGB BLD-MCNC: 15 G/DL (ref 13–17.7)
HGB BLD-MCNC: 15.2 G/DL (ref 13–17.7)
HGB UR QL STRIP.AUTO: NEGATIVE
HGB UR QL STRIP.AUTO: NEGATIVE
HMPV RNA NPH QL NAA+NON-PROBE: NOT DETECTED
HOLD SPECIMEN: NORMAL
HPIV1 RNA ISLT QL NAA+PROBE: NOT DETECTED
HPIV2 RNA SPEC QL NAA+PROBE: NOT DETECTED
HPIV3 RNA NPH QL NAA+PROBE: NOT DETECTED
HPIV4 P GENE NPH QL NAA+PROBE: NOT DETECTED
HYALINE CASTS UR QL AUTO: ABNORMAL /LPF
INHALED O2 CONCENTRATION: 100 %
INR PPP: 1.04 (ref 0.93–1.1)
INR PPP: 1.28 (ref 0.93–1.1)
INR PPP: 1.31 (ref 0.93–1.1)
KETONES UR QL STRIP: ABNORMAL
KETONES UR QL STRIP: NEGATIVE
LDH SERPL-CCNC: 552 U/L (ref 135–225)
LEUKOCYTE ESTERASE UR QL STRIP.AUTO: NEGATIVE
LEUKOCYTE ESTERASE UR QL STRIP.AUTO: NEGATIVE
LIPASE SERPL-CCNC: 129 U/L (ref 13–60)
LYMPHOCYTES # BLD AUTO: 1.1 10*3/MM3 (ref 0.7–3.1)
LYMPHOCYTES # BLD AUTO: 1.1 10*3/MM3 (ref 0.7–3.1)
LYMPHOCYTES # BLD AUTO: 1.2 10*3/MM3 (ref 0.7–3.1)
LYMPHOCYTES # BLD AUTO: 1.2 10*3/MM3 (ref 0.7–3.1)
LYMPHOCYTES # BLD AUTO: 1.4 10*3/MM3 (ref 0.7–3.1)
LYMPHOCYTES # BLD AUTO: 1.5 10*3/MM3 (ref 0.7–3.1)
LYMPHOCYTES # BLD AUTO: 1.6 10*3/MM3 (ref 0.7–3.1)
LYMPHOCYTES # BLD AUTO: 1.7 10*3/MM3 (ref 0.7–3.1)
LYMPHOCYTES # BLD AUTO: 1.7 10*3/MM3 (ref 0.7–3.1)
LYMPHOCYTES # BLD AUTO: 1.8 10*3/MM3 (ref 0.7–3.1)
LYMPHOCYTES # BLD AUTO: 1.8 10*3/MM3 (ref 0.7–3.1)
LYMPHOCYTES # BLD AUTO: 1.9 10*3/MM3 (ref 0.7–3.1)
LYMPHOCYTES # BLD AUTO: 2 10*3/MM3 (ref 0.7–3.1)
LYMPHOCYTES NFR BLD AUTO: 11.6 % (ref 19.6–45.3)
LYMPHOCYTES NFR BLD AUTO: 12.1 % (ref 19.6–45.3)
LYMPHOCYTES NFR BLD AUTO: 12.7 % (ref 19.6–45.3)
LYMPHOCYTES NFR BLD AUTO: 13.7 % (ref 19.6–45.3)
LYMPHOCYTES NFR BLD AUTO: 14.3 % (ref 19.6–45.3)
LYMPHOCYTES NFR BLD AUTO: 14.4 % (ref 19.6–45.3)
LYMPHOCYTES NFR BLD AUTO: 14.5 % (ref 19.6–45.3)
LYMPHOCYTES NFR BLD AUTO: 14.8 % (ref 19.6–45.3)
LYMPHOCYTES NFR BLD AUTO: 15.1 % (ref 19.6–45.3)
LYMPHOCYTES NFR BLD AUTO: 15.9 % (ref 19.6–45.3)
LYMPHOCYTES NFR BLD AUTO: 16.1 % (ref 19.6–45.3)
LYMPHOCYTES NFR BLD AUTO: 16.3 % (ref 19.6–45.3)
LYMPHOCYTES NFR BLD AUTO: 16.6 % (ref 19.6–45.3)
LYMPHOCYTES NFR BLD AUTO: 17.8 % (ref 19.6–45.3)
LYMPHOCYTES NFR BLD AUTO: 18.2 % (ref 19.6–45.3)
LYMPHOCYTES NFR BLD AUTO: 18.7 % (ref 19.6–45.3)
LYMPHOCYTES NFR BLD AUTO: 18.8 % (ref 19.6–45.3)
LYMPHOCYTES NFR BLD AUTO: 18.9 % (ref 19.6–45.3)
LYMPHOCYTES NFR BLD AUTO: 19.8 % (ref 19.6–45.3)
LYMPHOCYTES NFR BLD AUTO: 20 % (ref 19.6–45.3)
LYMPHOCYTES NFR BLD AUTO: 20.1 % (ref 19.6–45.3)
LYMPHOCYTES NFR BLD AUTO: 20.9 % (ref 19.6–45.3)
M PNEUMO IGG SER IA-ACNC: NOT DETECTED
MAGNESIUM SERPL-MCNC: 2 MG/DL (ref 1.7–2.3)
MAGNESIUM SERPL-MCNC: 2.3 MG/DL (ref 1.7–2.3)
MAGNESIUM SERPL-MCNC: 2.4 MG/DL (ref 1.7–2.3)
MAGNESIUM SERPL-MCNC: 2.4 MG/DL (ref 1.7–2.3)
MAGNESIUM SERPL-MCNC: 2.5 MG/DL (ref 1.7–2.3)
MAGNESIUM SERPL-MCNC: 2.6 MG/DL (ref 1.7–2.3)
MAGNESIUM SERPL-MCNC: 2.7 MG/DL (ref 1.7–2.3)
MAGNESIUM SERPL-MCNC: 2.8 MG/DL (ref 1.7–2.3)
MAGNESIUM SERPL-MCNC: 3 MG/DL (ref 1.7–2.3)
MAXIMAL PREDICTED HEART RATE: 129 BPM
MCH RBC QN AUTO: 31.9 PG (ref 26.6–33)
MCH RBC QN AUTO: 32.1 PG (ref 26.6–33)
MCH RBC QN AUTO: 32.2 PG (ref 26.6–33)
MCH RBC QN AUTO: 32.2 PG (ref 26.6–33)
MCH RBC QN AUTO: 32.3 PG (ref 26.6–33)
MCH RBC QN AUTO: 32.4 PG (ref 26.6–33)
MCH RBC QN AUTO: 32.4 PG (ref 26.6–33)
MCH RBC QN AUTO: 32.5 PG (ref 26.6–33)
MCH RBC QN AUTO: 32.5 PG (ref 26.6–33)
MCH RBC QN AUTO: 32.8 PG (ref 26.6–33)
MCH RBC QN AUTO: 32.9 PG (ref 26.6–33)
MCH RBC QN AUTO: 32.9 PG (ref 26.6–33)
MCH RBC QN AUTO: 33 PG (ref 26.6–33)
MCH RBC QN AUTO: 33.1 PG (ref 26.6–33)
MCH RBC QN AUTO: 33.3 PG (ref 26.6–33)
MCH RBC QN AUTO: 33.4 PG (ref 26.6–33)
MCH RBC QN AUTO: 33.5 PG (ref 26.6–33)
MCH RBC QN AUTO: 33.8 PG (ref 26.6–33)
MCH RBC QN AUTO: 34.2 PG (ref 26.6–33)
MCHC RBC AUTO-ENTMCNC: 31.8 G/DL (ref 31.5–35.7)
MCHC RBC AUTO-ENTMCNC: 31.8 G/DL (ref 31.5–35.7)
MCHC RBC AUTO-ENTMCNC: 31.9 G/DL (ref 31.5–35.7)
MCHC RBC AUTO-ENTMCNC: 32.1 G/DL (ref 31.5–35.7)
MCHC RBC AUTO-ENTMCNC: 32.2 G/DL (ref 31.5–35.7)
MCHC RBC AUTO-ENTMCNC: 32.3 G/DL (ref 31.5–35.7)
MCHC RBC AUTO-ENTMCNC: 32.3 G/DL (ref 31.5–35.7)
MCHC RBC AUTO-ENTMCNC: 32.4 G/DL (ref 31.5–35.7)
MCHC RBC AUTO-ENTMCNC: 32.5 G/DL (ref 31.5–35.7)
MCHC RBC AUTO-ENTMCNC: 32.5 G/DL (ref 31.5–35.7)
MCHC RBC AUTO-ENTMCNC: 32.6 G/DL (ref 31.5–35.7)
MCHC RBC AUTO-ENTMCNC: 32.9 G/DL (ref 31.5–35.7)
MCHC RBC AUTO-ENTMCNC: 33.1 G/DL (ref 31.5–35.7)
MCHC RBC AUTO-ENTMCNC: 33.1 G/DL (ref 31.5–35.7)
MCHC RBC AUTO-ENTMCNC: 33.4 G/DL (ref 31.5–35.7)
MCHC RBC AUTO-ENTMCNC: 33.7 G/DL (ref 31.5–35.7)
MCHC RBC AUTO-ENTMCNC: 33.7 G/DL (ref 31.5–35.7)
MCHC RBC AUTO-ENTMCNC: 34.1 G/DL (ref 31.5–35.7)
MCHC RBC AUTO-ENTMCNC: 34.2 G/DL (ref 31.5–35.7)
MCHC RBC AUTO-ENTMCNC: 35.3 G/DL (ref 31.5–35.7)
MCV RBC AUTO: 100.1 FL (ref 79–97)
MCV RBC AUTO: 100.2 FL (ref 79–97)
MCV RBC AUTO: 100.3 FL (ref 79–97)
MCV RBC AUTO: 100.5 FL (ref 79–97)
MCV RBC AUTO: 100.6 FL (ref 79–97)
MCV RBC AUTO: 100.8 FL (ref 79–97)
MCV RBC AUTO: 101.1 FL (ref 79–97)
MCV RBC AUTO: 102.1 FL (ref 79–97)
MCV RBC AUTO: 102.2 FL (ref 79–97)
MCV RBC AUTO: 102.3 FL (ref 79–97)
MCV RBC AUTO: 104 FL (ref 79–97)
MCV RBC AUTO: 96.8 FL (ref 79–97)
MCV RBC AUTO: 98.1 FL (ref 79–97)
MCV RBC AUTO: 98.3 FL (ref 79–97)
MCV RBC AUTO: 98.7 FL (ref 79–97)
MCV RBC AUTO: 98.9 FL (ref 79–97)
MCV RBC AUTO: 99.2 FL (ref 79–97)
MCV RBC AUTO: 99.3 FL (ref 79–97)
MCV RBC AUTO: 99.3 FL (ref 79–97)
MCV RBC AUTO: 99.6 FL (ref 79–97)
MCV RBC AUTO: 99.7 FL (ref 79–97)
MCV RBC AUTO: 99.9 FL (ref 79–97)
MITOCHONDRIA M2 IGG SER-ACNC: <20 UNITS (ref 0–20)
MODALITY: ABNORMAL
MONOCYTES # BLD AUTO: 0.8 10*3/MM3 (ref 0.1–0.9)
MONOCYTES # BLD AUTO: 0.9 10*3/MM3 (ref 0.1–0.9)
MONOCYTES # BLD AUTO: 1 10*3/MM3 (ref 0.1–0.9)
MONOCYTES # BLD AUTO: 1 10*3/MM3 (ref 0.1–0.9)
MONOCYTES # BLD AUTO: 1.1 10*3/MM3 (ref 0.1–0.9)
MONOCYTES # BLD AUTO: 1.2 10*3/MM3 (ref 0.1–0.9)
MONOCYTES # BLD AUTO: 1.3 10*3/MM3 (ref 0.1–0.9)
MONOCYTES # BLD AUTO: 1.4 10*3/MM3 (ref 0.1–0.9)
MONOCYTES # BLD AUTO: 1.4 10*3/MM3 (ref 0.1–0.9)
MONOCYTES # BLD AUTO: 1.5 10*3/MM3 (ref 0.1–0.9)
MONOCYTES # BLD AUTO: 1.5 10*3/MM3 (ref 0.1–0.9)
MONOCYTES # BLD AUTO: 1.6 10*3/MM3 (ref 0.1–0.9)
MONOCYTES # BLD AUTO: 1.6 10*3/MM3 (ref 0.1–0.9)
MONOCYTES NFR BLD AUTO: 11 % (ref 5–12)
MONOCYTES NFR BLD AUTO: 11.1 % (ref 5–12)
MONOCYTES NFR BLD AUTO: 11.2 % (ref 5–12)
MONOCYTES NFR BLD AUTO: 11.2 % (ref 5–12)
MONOCYTES NFR BLD AUTO: 11.3 % (ref 5–12)
MONOCYTES NFR BLD AUTO: 11.8 % (ref 5–12)
MONOCYTES NFR BLD AUTO: 12 % (ref 5–12)
MONOCYTES NFR BLD AUTO: 12.3 % (ref 5–12)
MONOCYTES NFR BLD AUTO: 12.4 % (ref 5–12)
MONOCYTES NFR BLD AUTO: 12.9 % (ref 5–12)
MONOCYTES NFR BLD AUTO: 13.1 % (ref 5–12)
MONOCYTES NFR BLD AUTO: 13.2 % (ref 5–12)
MONOCYTES NFR BLD AUTO: 13.5 % (ref 5–12)
MONOCYTES NFR BLD AUTO: 13.6 % (ref 5–12)
MONOCYTES NFR BLD AUTO: 13.8 % (ref 5–12)
MONOCYTES NFR BLD AUTO: 13.8 % (ref 5–12)
MONOCYTES NFR BLD AUTO: 13.9 % (ref 5–12)
MONOCYTES NFR BLD AUTO: 13.9 % (ref 5–12)
MONOCYTES NFR BLD AUTO: 14.8 % (ref 5–12)
MONOCYTES NFR BLD AUTO: 15 % (ref 5–12)
MONOCYTES NFR BLD AUTO: 15.3 % (ref 5–12)
MONOCYTES NFR BLD AUTO: 16.5 % (ref 5–12)
NEUTROPHILS NFR BLD AUTO: 4.8 10*3/MM3 (ref 1.7–7)
NEUTROPHILS NFR BLD AUTO: 4.9 10*3/MM3 (ref 1.7–7)
NEUTROPHILS NFR BLD AUTO: 4.9 10*3/MM3 (ref 1.7–7)
NEUTROPHILS NFR BLD AUTO: 5.3 10*3/MM3 (ref 1.7–7)
NEUTROPHILS NFR BLD AUTO: 5.5 10*3/MM3 (ref 1.7–7)
NEUTROPHILS NFR BLD AUTO: 5.6 10*3/MM3 (ref 1.7–7)
NEUTROPHILS NFR BLD AUTO: 5.6 10*3/MM3 (ref 1.7–7)
NEUTROPHILS NFR BLD AUTO: 5.9 10*3/MM3 (ref 1.7–7)
NEUTROPHILS NFR BLD AUTO: 6 10*3/MM3 (ref 1.7–7)
NEUTROPHILS NFR BLD AUTO: 6.1 10*3/MM3 (ref 1.7–7)
NEUTROPHILS NFR BLD AUTO: 6.6 10*3/MM3 (ref 1.7–7)
NEUTROPHILS NFR BLD AUTO: 6.6 10*3/MM3 (ref 1.7–7)
NEUTROPHILS NFR BLD AUTO: 6.7 10*3/MM3 (ref 1.7–7)
NEUTROPHILS NFR BLD AUTO: 6.8 10*3/MM3 (ref 1.7–7)
NEUTROPHILS NFR BLD AUTO: 60 % (ref 42.7–76)
NEUTROPHILS NFR BLD AUTO: 63.3 % (ref 42.7–76)
NEUTROPHILS NFR BLD AUTO: 64.3 % (ref 42.7–76)
NEUTROPHILS NFR BLD AUTO: 65.4 % (ref 42.7–76)
NEUTROPHILS NFR BLD AUTO: 65.7 % (ref 42.7–76)
NEUTROPHILS NFR BLD AUTO: 66 % (ref 42.7–76)
NEUTROPHILS NFR BLD AUTO: 66.7 % (ref 42.7–76)
NEUTROPHILS NFR BLD AUTO: 66.7 % (ref 42.7–76)
NEUTROPHILS NFR BLD AUTO: 66.8 % (ref 42.7–76)
NEUTROPHILS NFR BLD AUTO: 67.5 % (ref 42.7–76)
NEUTROPHILS NFR BLD AUTO: 68.2 % (ref 42.7–76)
NEUTROPHILS NFR BLD AUTO: 68.7 % (ref 42.7–76)
NEUTROPHILS NFR BLD AUTO: 69.8 % (ref 42.7–76)
NEUTROPHILS NFR BLD AUTO: 69.8 % (ref 42.7–76)
NEUTROPHILS NFR BLD AUTO: 7.1 10*3/MM3 (ref 1.7–7)
NEUTROPHILS NFR BLD AUTO: 7.3 10*3/MM3 (ref 1.7–7)
NEUTROPHILS NFR BLD AUTO: 71.2 % (ref 42.7–76)
NEUTROPHILS NFR BLD AUTO: 71.2 % (ref 42.7–76)
NEUTROPHILS NFR BLD AUTO: 72.2 % (ref 42.7–76)
NEUTROPHILS NFR BLD AUTO: 72.7 % (ref 42.7–76)
NEUTROPHILS NFR BLD AUTO: 73.7 % (ref 42.7–76)
NEUTROPHILS NFR BLD AUTO: 73.9 % (ref 42.7–76)
NEUTROPHILS NFR BLD AUTO: 74.5 % (ref 42.7–76)
NEUTROPHILS NFR BLD AUTO: 75.9 % (ref 42.7–76)
NEUTROPHILS NFR BLD AUTO: 8 10*3/MM3 (ref 1.7–7)
NEUTROPHILS NFR BLD AUTO: 8.2 10*3/MM3 (ref 1.7–7)
NEUTROPHILS NFR BLD AUTO: 8.8 10*3/MM3 (ref 1.7–7)
NEUTROPHILS NFR BLD AUTO: 9 10*3/MM3 (ref 1.7–7)
NITRITE UR QL STRIP: NEGATIVE
NITRITE UR QL STRIP: NEGATIVE
NRBC BLD AUTO-RTO: 0 /100 WBC (ref 0–0.2)
NRBC BLD AUTO-RTO: 0.1 /100 WBC (ref 0–0.2)
NRBC BLD AUTO-RTO: 0.2 /100 WBC (ref 0–0.2)
NRBC BLD AUTO-RTO: 0.3 /100 WBC (ref 0–0.2)
NT-PROBNP SERPL-MCNC: ABNORMAL PG/ML (ref 0–1800)
PCO2 BLDA: 19.3 MM HG (ref 35–48)
PH BLDA: 7.54 PH UNITS (ref 7.35–7.45)
PH UR STRIP.AUTO: <=5 [PH] (ref 5–8)
PH UR STRIP.AUTO: <=5 [PH] (ref 5–8)
PHOSPHATE SERPL-MCNC: 3.2 MG/DL (ref 2.5–4.5)
PHOSPHATE SERPL-MCNC: 3.2 MG/DL (ref 2.5–4.5)
PHOSPHATE SERPL-MCNC: 5 MG/DL (ref 2.5–4.5)
PHOSPHATE SERPL-MCNC: 5 MG/DL (ref 2.5–4.5)
PHOSPHATE SERPL-MCNC: 5.1 MG/DL (ref 2.5–4.5)
PHOSPHATE SERPL-MCNC: 5.6 MG/DL (ref 2.5–4.5)
PHOSPHATE SERPL-MCNC: 5.7 MG/DL (ref 2.5–4.5)
PLATELET # BLD AUTO: 175 10*3/MM3 (ref 140–450)
PLATELET # BLD AUTO: 179 10*3/MM3 (ref 140–450)
PLATELET # BLD AUTO: 184 10*3/MM3 (ref 140–450)
PLATELET # BLD AUTO: 192 10*3/MM3 (ref 140–450)
PLATELET # BLD AUTO: 193 10*3/MM3 (ref 140–450)
PLATELET # BLD AUTO: 194 10*3/MM3 (ref 140–450)
PLATELET # BLD AUTO: 198 10*3/MM3 (ref 140–450)
PLATELET # BLD AUTO: 204 10*3/MM3 (ref 140–450)
PLATELET # BLD AUTO: 204 10*3/MM3 (ref 140–450)
PLATELET # BLD AUTO: 208 10*3/MM3 (ref 140–450)
PLATELET # BLD AUTO: 212 10*3/MM3 (ref 140–450)
PLATELET # BLD AUTO: 214 10*3/MM3 (ref 140–450)
PLATELET # BLD AUTO: 220 10*3/MM3 (ref 140–450)
PLATELET # BLD AUTO: 220 10*3/MM3 (ref 140–450)
PLATELET # BLD AUTO: 222 10*3/MM3 (ref 140–450)
PLATELET # BLD AUTO: 227 10*3/MM3 (ref 140–450)
PLATELET # BLD AUTO: 227 10*3/MM3 (ref 140–450)
PLATELET # BLD AUTO: 228 10*3/MM3 (ref 140–450)
PLATELET # BLD AUTO: 232 10*3/MM3 (ref 140–450)
PLATELET # BLD AUTO: 237 10*3/MM3 (ref 140–450)
PMV BLD AUTO: 10.1 FL (ref 6–12)
PMV BLD AUTO: 10.2 FL (ref 6–12)
PMV BLD AUTO: 10.3 FL (ref 6–12)
PMV BLD AUTO: 10.4 FL (ref 6–12)
PMV BLD AUTO: 10.5 FL (ref 6–12)
PMV BLD AUTO: 10.5 FL (ref 6–12)
PMV BLD AUTO: 10.7 FL (ref 6–12)
PMV BLD AUTO: 10.9 FL (ref 6–12)
PMV BLD AUTO: 10.9 FL (ref 6–12)
PMV BLD AUTO: 8.9 FL (ref 6–12)
PMV BLD AUTO: 9.2 FL (ref 6–12)
PMV BLD AUTO: 9.4 FL (ref 6–12)
PMV BLD AUTO: 9.5 FL (ref 6–12)
PMV BLD AUTO: 9.7 FL (ref 6–12)
PMV BLD AUTO: 9.8 FL (ref 6–12)
PMV BLD AUTO: 9.9 FL (ref 6–12)
PMV BLD AUTO: 9.9 FL (ref 6–12)
PO2 BLDA: 328.6 MM HG (ref 83–108)
POTASSIUM SERPL-SCNC: 3.2 MMOL/L (ref 3.5–5.2)
POTASSIUM SERPL-SCNC: 3.4 MMOL/L (ref 3.5–5.2)
POTASSIUM SERPL-SCNC: 3.5 MMOL/L (ref 3.5–5.2)
POTASSIUM SERPL-SCNC: 3.5 MMOL/L (ref 3.5–5.2)
POTASSIUM SERPL-SCNC: 3.7 MMOL/L (ref 3.5–5.2)
POTASSIUM SERPL-SCNC: 3.9 MMOL/L (ref 3.5–5.2)
POTASSIUM SERPL-SCNC: 4 MMOL/L (ref 3.5–5.2)
POTASSIUM SERPL-SCNC: 4 MMOL/L (ref 3.5–5.2)
POTASSIUM SERPL-SCNC: 4.1 MMOL/L (ref 3.5–5.2)
POTASSIUM SERPL-SCNC: 4.2 MMOL/L (ref 3.5–5.2)
POTASSIUM SERPL-SCNC: 4.3 MMOL/L (ref 3.5–5.2)
POTASSIUM SERPL-SCNC: 4.4 MMOL/L (ref 3.5–5.2)
POTASSIUM SERPL-SCNC: 4.5 MMOL/L (ref 3.5–5.2)
POTASSIUM SERPL-SCNC: 4.6 MMOL/L (ref 3.5–5.2)
POTASSIUM SERPL-SCNC: 4.8 MMOL/L (ref 3.5–5.2)
POTASSIUM SERPL-SCNC: 4.9 MMOL/L (ref 3.5–5.2)
POTASSIUM SERPL-SCNC: 4.9 MMOL/L (ref 3.5–5.2)
POTASSIUM SERPL-SCNC: 5.1 MMOL/L (ref 3.5–5.2)
POTASSIUM SERPL-SCNC: 5.3 MMOL/L (ref 3.5–5.2)
POTASSIUM SERPL-SCNC: 5.4 MMOL/L (ref 3.5–5.2)
PROCALCITONIN SERPL-MCNC: 0.05 NG/ML (ref 0–0.25)
PROT SERPL-MCNC: 5.8 G/DL (ref 6–8.5)
PROT SERPL-MCNC: 5.9 G/DL (ref 6–8.5)
PROT SERPL-MCNC: 6.4 G/DL (ref 6–8.5)
PROT SERPL-MCNC: 6.5 G/DL (ref 6–8.5)
PROT SERPL-MCNC: 6.5 G/DL (ref 6–8.5)
PROT SERPL-MCNC: 6.6 G/DL (ref 6–8.5)
PROT SERPL-MCNC: 6.6 G/DL (ref 6–8.5)
PROT SERPL-MCNC: 6.9 G/DL (ref 6–8.5)
PROT SERPL-MCNC: 7.4 G/DL (ref 6–8.5)
PROT UR QL STRIP: ABNORMAL
PROT UR QL STRIP: ABNORMAL
PROTHROMBIN TIME: 11.5 SECONDS (ref 9.6–11.7)
PROTHROMBIN TIME: 13 SECONDS (ref 9.6–11.7)
PROTHROMBIN TIME: 13.3 SECONDS (ref 9.6–11.7)
QT INTERVAL: 387 MS
QT INTERVAL: 397 MS
QT INTERVAL: 416 MS
RBC # BLD AUTO: 3.95 10*6/MM3 (ref 4.14–5.8)
RBC # BLD AUTO: 3.97 10*6/MM3 (ref 4.14–5.8)
RBC # BLD AUTO: 4.04 10*6/MM3 (ref 4.14–5.8)
RBC # BLD AUTO: 4.06 10*6/MM3 (ref 4.14–5.8)
RBC # BLD AUTO: 4.09 10*6/MM3 (ref 4.14–5.8)
RBC # BLD AUTO: 4.11 10*6/MM3 (ref 4.14–5.8)
RBC # BLD AUTO: 4.13 10*6/MM3 (ref 4.14–5.8)
RBC # BLD AUTO: 4.15 10*6/MM3 (ref 4.14–5.8)
RBC # BLD AUTO: 4.16 10*6/MM3 (ref 4.14–5.8)
RBC # BLD AUTO: 4.21 10*6/MM3 (ref 4.14–5.8)
RBC # BLD AUTO: 4.31 10*6/MM3 (ref 4.14–5.8)
RBC # BLD AUTO: 4.36 10*6/MM3 (ref 4.14–5.8)
RBC # BLD AUTO: 4.37 10*6/MM3 (ref 4.14–5.8)
RBC # BLD AUTO: 4.39 10*6/MM3 (ref 4.14–5.8)
RBC # BLD AUTO: 4.4 10*6/MM3 (ref 4.14–5.8)
RBC # BLD AUTO: 4.5 10*6/MM3 (ref 4.14–5.8)
RBC # BLD AUTO: 4.53 10*6/MM3 (ref 4.14–5.8)
RBC # BLD AUTO: 4.55 10*6/MM3 (ref 4.14–5.8)
RBC # BLD AUTO: 4.56 10*6/MM3 (ref 4.14–5.8)
RBC # BLD AUTO: 4.67 10*6/MM3 (ref 4.14–5.8)
RBC # BLD AUTO: 4.71 10*6/MM3 (ref 4.14–5.8)
RBC # BLD AUTO: 4.71 10*6/MM3 (ref 4.14–5.8)
RBC # UR STRIP: ABNORMAL /HPF
REF LAB TEST METHOD: ABNORMAL
RHINOVIRUS RNA SPEC NAA+PROBE: NOT DETECTED
RICK SF IGG TITR SER IF: NORMAL {TITER}
RICK SF IGM TITR SER IF: NORMAL {TITER}
RICK TYPHUS IGG TITR SER IF: NORMAL {TITER}
RICK TYPHUS IGM TITR SER IF: NORMAL {TITER}
RIGHT ARM BP: NORMAL MMHG
RSV RNA NPH QL NAA+NON-PROBE: NOT DETECTED
SALICYLATES SERPL-MCNC: 1.1 MG/DL
SAO2 % BLDCOA: 100 % (ref 94–98)
SARS-COV-2 ORF1AB RESP QL NAA+PROBE: NOT DETECTED
SARS-COV-2 RNA NPH QL NAA+NON-PROBE: NOT DETECTED
SARS-COV-2 RNA PNL SPEC NAA+PROBE: NOT DETECTED
SARS-COV-2 RNA PNL SPEC NAA+PROBE: NOT DETECTED
SARS-COV-2 RNA RESP QL NAA+PROBE: NOT DETECTED
SMA IGG SER-ACNC: 6 UNITS (ref 0–19)
SODIUM SERPL-SCNC: 131 MMOL/L (ref 136–145)
SODIUM SERPL-SCNC: 132 MMOL/L (ref 136–145)
SODIUM SERPL-SCNC: 133 MMOL/L (ref 136–145)
SODIUM SERPL-SCNC: 134 MMOL/L (ref 136–145)
SODIUM SERPL-SCNC: 135 MMOL/L (ref 136–145)
SODIUM SERPL-SCNC: 136 MMOL/L (ref 136–145)
SODIUM SERPL-SCNC: 136 MMOL/L (ref 136–145)
SODIUM SERPL-SCNC: 137 MMOL/L (ref 136–145)
SODIUM SERPL-SCNC: 139 MMOL/L (ref 136–145)
SODIUM SERPL-SCNC: 140 MMOL/L (ref 136–145)
SODIUM SERPL-SCNC: 141 MMOL/L (ref 136–145)
SODIUM SERPL-SCNC: 141 MMOL/L (ref 136–145)
SODIUM SERPL-SCNC: 142 MMOL/L (ref 136–145)
SODIUM SERPL-SCNC: 143 MMOL/L (ref 136–145)
SP GR UR STRIP: 1.01 (ref 1–1.03)
SP GR UR STRIP: 1.02 (ref 1–1.03)
SQUAMOUS #/AREA URNS HPF: ABNORMAL /HPF
STRESS TARGET HR: 110 BPM
TROPONIN T SERPL-MCNC: 0.02 NG/ML (ref 0–0.03)
TROPONIN T SERPL-MCNC: 0.03 NG/ML (ref 0–0.03)
TROPONIN T SERPL-MCNC: 0.12 NG/ML (ref 0–0.03)
TROPONIN T SERPL-MCNC: <0.01 NG/ML (ref 0–0.03)
TROPONIN T SERPL-MCNC: <0.01 NG/ML (ref 0–0.03)
TSH SERPL DL<=0.05 MIU/L-ACNC: 2.6 UIU/ML (ref 0.27–4.2)
UROBILINOGEN UR QL STRIP: ABNORMAL
UROBILINOGEN UR QL STRIP: ABNORMAL
WBC # UR STRIP: ABNORMAL /HPF
WBC NRBC COR # BLD: 10 10*3/MM3 (ref 3.4–10.8)
WBC NRBC COR # BLD: 10.1 10*3/MM3 (ref 3.4–10.8)
WBC NRBC COR # BLD: 10.1 10*3/MM3 (ref 3.4–10.8)
WBC NRBC COR # BLD: 10.2 10*3/MM3 (ref 3.4–10.8)
WBC NRBC COR # BLD: 10.4 10*3/MM3 (ref 3.4–10.8)
WBC NRBC COR # BLD: 10.6 10*3/MM3 (ref 3.4–10.8)
WBC NRBC COR # BLD: 10.7 10*3/MM3 (ref 3.4–10.8)
WBC NRBC COR # BLD: 11.1 10*3/MM3 (ref 3.4–10.8)
WBC NRBC COR # BLD: 11.9 10*3/MM3 (ref 3.4–10.8)
WBC NRBC COR # BLD: 12.4 10*3/MM3 (ref 3.4–10.8)
WBC NRBC COR # BLD: 7.2 10*3/MM3 (ref 3.4–10.8)
WBC NRBC COR # BLD: 7.4 10*3/MM3 (ref 3.4–10.8)
WBC NRBC COR # BLD: 7.7 10*3/MM3 (ref 3.4–10.8)
WBC NRBC COR # BLD: 7.7 10*3/MM3 (ref 3.4–10.8)
WBC NRBC COR # BLD: 8.3 10*3/MM3 (ref 3.4–10.8)
WBC NRBC COR # BLD: 8.5 10*3/MM3 (ref 3.4–10.8)
WBC NRBC COR # BLD: 8.7 10*3/MM3 (ref 3.4–10.8)
WBC NRBC COR # BLD: 9.1 10*3/MM3 (ref 3.4–10.8)
WBC NRBC COR # BLD: 9.3 10*3/MM3 (ref 3.4–10.8)
WBC NRBC COR # BLD: 9.6 10*3/MM3 (ref 3.4–10.8)

## 2022-01-01 PROCEDURE — 80053 COMPREHEN METABOLIC PANEL: CPT | Performed by: INTERNAL MEDICINE

## 2022-01-01 PROCEDURE — G0299 HHS/HOSPICE OF RN EA 15 MIN: HCPCS

## 2022-01-01 PROCEDURE — 94660 CPAP INITIATION&MGMT: CPT

## 2022-01-01 PROCEDURE — 80053 COMPREHEN METABOLIC PANEL: CPT | Performed by: NURSE PRACTITIONER

## 2022-01-01 PROCEDURE — 93280 PM DEVICE PROGR EVAL DUAL: CPT | Performed by: NURSE PRACTITIONER

## 2022-01-01 PROCEDURE — 83735 ASSAY OF MAGNESIUM: CPT | Performed by: INTERNAL MEDICINE

## 2022-01-01 PROCEDURE — 86038 ANTINUCLEAR ANTIBODIES: CPT | Performed by: NURSE PRACTITIONER

## 2022-01-01 PROCEDURE — 80162 ASSAY OF DIGOXIN TOTAL: CPT | Performed by: INTERNAL MEDICINE

## 2022-01-01 PROCEDURE — 82803 BLOOD GASES ANY COMBINATION: CPT

## 2022-01-01 PROCEDURE — 82550 ASSAY OF CK (CPK): CPT | Performed by: INTERNAL MEDICINE

## 2022-01-01 PROCEDURE — 94640 AIRWAY INHALATION TREATMENT: CPT

## 2022-01-01 PROCEDURE — U0004 COV-19 TEST NON-CDC HGH THRU: HCPCS

## 2022-01-01 PROCEDURE — 83880 ASSAY OF NATRIURETIC PEPTIDE: CPT | Performed by: NURSE PRACTITIONER

## 2022-01-01 PROCEDURE — 25010000002 CALCIUM GLUCONATE-NACL 1-0.675 GM/50ML-% SOLUTION: Performed by: INTERNAL MEDICINE

## 2022-01-01 PROCEDURE — 25010000002 ENOXAPARIN PER 10 MG: Performed by: FAMILY MEDICINE

## 2022-01-01 PROCEDURE — 82962 GLUCOSE BLOOD TEST: CPT

## 2022-01-01 PROCEDURE — 93005 ELECTROCARDIOGRAM TRACING: CPT | Performed by: PHYSICIAN ASSISTANT

## 2022-01-01 PROCEDURE — 85025 COMPLETE CBC W/AUTO DIFF WBC: CPT | Performed by: FAMILY MEDICINE

## 2022-01-01 PROCEDURE — 81003 URINALYSIS AUTO W/O SCOPE: CPT | Performed by: NURSE PRACTITIONER

## 2022-01-01 PROCEDURE — 84484 ASSAY OF TROPONIN QUANT: CPT | Performed by: EMERGENCY MEDICINE

## 2022-01-01 PROCEDURE — G0378 HOSPITAL OBSERVATION PER HR: HCPCS

## 2022-01-01 PROCEDURE — 99233 SBSQ HOSP IP/OBS HIGH 50: CPT | Performed by: INTERNAL MEDICINE

## 2022-01-01 PROCEDURE — 99222 1ST HOSP IP/OBS MODERATE 55: CPT | Performed by: INTERNAL MEDICINE

## 2022-01-01 PROCEDURE — 25010000002 HEPARIN (PORCINE) PER 1000 UNITS: Performed by: FAMILY MEDICINE

## 2022-01-01 PROCEDURE — 84484 ASSAY OF TROPONIN QUANT: CPT | Performed by: PHYSICIAN ASSISTANT

## 2022-01-01 PROCEDURE — 84484 ASSAY OF TROPONIN QUANT: CPT | Performed by: NURSE PRACTITIONER

## 2022-01-01 PROCEDURE — 86015 ACTIN ANTIBODY EACH: CPT | Performed by: NURSE PRACTITIONER

## 2022-01-01 PROCEDURE — 80048 BASIC METABOLIC PNL TOTAL CA: CPT | Performed by: FAMILY MEDICINE

## 2022-01-01 PROCEDURE — 85025 COMPLETE CBC W/AUTO DIFF WBC: CPT | Performed by: INTERNAL MEDICINE

## 2022-01-01 PROCEDURE — 25010000002 DOBUTAMINE PER 250 MG: Performed by: INTERNAL MEDICINE

## 2022-01-01 PROCEDURE — 92610 EVALUATE SWALLOWING FUNCTION: CPT

## 2022-01-01 PROCEDURE — 25010000002 PROPOFOL 1000 MG/100ML EMULSION: Performed by: ANESTHESIOLOGIST ASSISTANT

## 2022-01-01 PROCEDURE — 94799 UNLISTED PULMONARY SVC/PX: CPT

## 2022-01-01 PROCEDURE — 86757 RICKETTSIA ANTIBODY: CPT | Performed by: NURSE PRACTITIONER

## 2022-01-01 PROCEDURE — 80069 RENAL FUNCTION PANEL: CPT | Performed by: INTERNAL MEDICINE

## 2022-01-01 PROCEDURE — 85379 FIBRIN DEGRADATION QUANT: CPT | Performed by: NURSE PRACTITIONER

## 2022-01-01 PROCEDURE — 84100 ASSAY OF PHOSPHORUS: CPT | Performed by: INTERNAL MEDICINE

## 2022-01-01 PROCEDURE — 36415 COLL VENOUS BLD VENIPUNCTURE: CPT | Performed by: NURSE PRACTITIONER

## 2022-01-01 PROCEDURE — 93306 TTE W/DOPPLER COMPLETE: CPT | Performed by: INTERNAL MEDICINE

## 2022-01-01 PROCEDURE — 80048 BASIC METABOLIC PNL TOTAL CA: CPT | Performed by: INTERNAL MEDICINE

## 2022-01-01 PROCEDURE — 25010000002 FUROSEMIDE PER 20 MG: Performed by: INTERNAL MEDICINE

## 2022-01-01 PROCEDURE — 84443 ASSAY THYROID STIM HORMONE: CPT | Performed by: INTERNAL MEDICINE

## 2022-01-01 PROCEDURE — 96375 TX/PRO/DX INJ NEW DRUG ADDON: CPT

## 2022-01-01 PROCEDURE — 93010 ELECTROCARDIOGRAM REPORT: CPT | Performed by: INTERNAL MEDICINE

## 2022-01-01 PROCEDURE — 83735 ASSAY OF MAGNESIUM: CPT | Performed by: PHYSICIAN ASSISTANT

## 2022-01-01 PROCEDURE — 71045 X-RAY EXAM CHEST 1 VIEW: CPT

## 2022-01-01 PROCEDURE — 82565 ASSAY OF CREATININE: CPT

## 2022-01-01 PROCEDURE — 25010000002 ONDANSETRON PER 1 MG: Performed by: NURSE PRACTITIONER

## 2022-01-01 PROCEDURE — 93005 ELECTROCARDIOGRAM TRACING: CPT

## 2022-01-01 PROCEDURE — 94618 PULMONARY STRESS TESTING: CPT

## 2022-01-01 PROCEDURE — 82105 ALPHA-FETOPROTEIN SERUM: CPT | Performed by: NURSE PRACTITIONER

## 2022-01-01 PROCEDURE — 25010000002 LORAZEPAM PER 2 MG: Performed by: INTERNAL MEDICINE

## 2022-01-01 PROCEDURE — 25010000002 ENOXAPARIN PER 10 MG: Performed by: INTERNAL MEDICINE

## 2022-01-01 PROCEDURE — 99214 OFFICE O/P EST MOD 30 MIN: CPT | Performed by: INTERNAL MEDICINE

## 2022-01-01 PROCEDURE — 85025 COMPLETE CBC W/AUTO DIFF WBC: CPT | Performed by: NURSE PRACTITIONER

## 2022-01-01 PROCEDURE — 84145 PROCALCITONIN (PCT): CPT | Performed by: FAMILY MEDICINE

## 2022-01-01 PROCEDURE — 85730 THROMBOPLASTIN TIME PARTIAL: CPT | Performed by: PHYSICIAN ASSISTANT

## 2022-01-01 PROCEDURE — 85610 PROTHROMBIN TIME: CPT | Performed by: NURSE PRACTITIONER

## 2022-01-01 PROCEDURE — 97165 OT EVAL LOW COMPLEX 30 MIN: CPT

## 2022-01-01 PROCEDURE — 94761 N-INVAS EAR/PLS OXIMETRY MLT: CPT

## 2022-01-01 PROCEDURE — 94664 DEMO&/EVAL PT USE INHALER: CPT

## 2022-01-01 PROCEDURE — 86666 EHRLICHIA ANTIBODY: CPT | Performed by: NURSE PRACTITIONER

## 2022-01-01 PROCEDURE — 76705 ECHO EXAM OF ABDOMEN: CPT

## 2022-01-01 PROCEDURE — 36415 COLL VENOUS BLD VENIPUNCTURE: CPT

## 2022-01-01 PROCEDURE — C1751 CATH, INF, PER/CENT/MIDLINE: HCPCS

## 2022-01-01 PROCEDURE — U0003 INFECTIOUS AGENT DETECTION BY NUCLEIC ACID (DNA OR RNA); SEVERE ACUTE RESPIRATORY SYNDROME CORONAVIRUS 2 (SARS-COV-2) (CORONAVIRUS DISEASE [COVID-19]), AMPLIFIED PROBE TECHNIQUE, MAKING USE OF HIGH THROUGHPUT TECHNOLOGIES AS DESCRIBED BY CMS-2020-01-R: HCPCS | Performed by: EMERGENCY MEDICINE

## 2022-01-01 PROCEDURE — 93880 EXTRACRANIAL BILAT STUDY: CPT

## 2022-01-01 PROCEDURE — U0005 INFEC AGEN DETEC AMPLI PROBE: HCPCS | Performed by: EMERGENCY MEDICINE

## 2022-01-01 PROCEDURE — G0151 HHCP-SERV OF PT,EA 15 MIN: HCPCS

## 2022-01-01 PROCEDURE — 85025 COMPLETE CBC W/AUTO DIFF WBC: CPT | Performed by: PHYSICIAN ASSISTANT

## 2022-01-01 PROCEDURE — 83880 ASSAY OF NATRIURETIC PEPTIDE: CPT | Performed by: INTERNAL MEDICINE

## 2022-01-01 PROCEDURE — 99284 EMERGENCY DEPT VISIT MOD MDM: CPT

## 2022-01-01 PROCEDURE — 36410 VNPNXR 3YR/> PHY/QHP DX/THER: CPT

## 2022-01-01 PROCEDURE — 33228 REMV&REPLC PM GEN DUAL LEAD: CPT | Performed by: INTERNAL MEDICINE

## 2022-01-01 PROCEDURE — 82378 CARCINOEMBRYONIC ANTIGEN: CPT | Performed by: NURSE PRACTITIONER

## 2022-01-01 PROCEDURE — 36600 WITHDRAWAL OF ARTERIAL BLOOD: CPT

## 2022-01-01 PROCEDURE — 0 IOPAMIDOL PER 1 ML: Performed by: EMERGENCY MEDICINE

## 2022-01-01 PROCEDURE — 83615 LACTATE (LD) (LDH) ENZYME: CPT | Performed by: NURSE PRACTITIONER

## 2022-01-01 PROCEDURE — 96376 TX/PRO/DX INJ SAME DRUG ADON: CPT

## 2022-01-01 PROCEDURE — 74176 CT ABD & PELVIS W/O CONTRAST: CPT

## 2022-01-01 PROCEDURE — 92611 MOTION FLUOROSCOPY/SWALLOW: CPT

## 2022-01-01 PROCEDURE — 87635 SARS-COV-2 COVID-19 AMP PRB: CPT | Performed by: NURSE PRACTITIONER

## 2022-01-01 PROCEDURE — 80074 ACUTE HEPATITIS PANEL: CPT | Performed by: FAMILY MEDICINE

## 2022-01-01 PROCEDURE — 80162 ASSAY OF DIGOXIN TOTAL: CPT | Performed by: PHYSICIAN ASSISTANT

## 2022-01-01 PROCEDURE — 97162 PT EVAL MOD COMPLEX 30 MIN: CPT

## 2022-01-01 PROCEDURE — 36415 COLL VENOUS BLD VENIPUNCTURE: CPT | Performed by: INTERNAL MEDICINE

## 2022-01-01 PROCEDURE — 83690 ASSAY OF LIPASE: CPT | Performed by: PHYSICIAN ASSISTANT

## 2022-01-01 PROCEDURE — 71275 CT ANGIOGRAPHY CHEST: CPT

## 2022-01-01 PROCEDURE — C1785 PMKR, DUAL, RATE-RESP: HCPCS | Performed by: INTERNAL MEDICINE

## 2022-01-01 PROCEDURE — 74230 X-RAY XM SWLNG FUNCJ C+: CPT

## 2022-01-01 PROCEDURE — 25010000002 ENOXAPARIN PER 10 MG: Performed by: NURSE PRACTITIONER

## 2022-01-01 PROCEDURE — 25010000002 PROPOFOL 200 MG/20ML EMULSION: Performed by: ANESTHESIOLOGIST ASSISTANT

## 2022-01-01 PROCEDURE — 83735 ASSAY OF MAGNESIUM: CPT | Performed by: FAMILY MEDICINE

## 2022-01-01 PROCEDURE — 87040 BLOOD CULTURE FOR BACTERIA: CPT | Performed by: NURSE PRACTITIONER

## 2022-01-01 PROCEDURE — 92526 ORAL FUNCTION THERAPY: CPT

## 2022-01-01 PROCEDURE — 99225 PR SBSQ OBSERVATION CARE/DAY 25 MINUTES: CPT | Performed by: NURSE PRACTITIONER

## 2022-01-01 PROCEDURE — 85610 PROTHROMBIN TIME: CPT | Performed by: PHYSICIAN ASSISTANT

## 2022-01-01 PROCEDURE — 5A09357 ASSISTANCE WITH RESPIRATORY VENTILATION, LESS THAN 24 CONSECUTIVE HOURS, CONTINUOUS POSITIVE AIRWAY PRESSURE: ICD-10-PCS | Performed by: FAMILY MEDICINE

## 2022-01-01 PROCEDURE — 99285 EMERGENCY DEPT VISIT HI MDM: CPT

## 2022-01-01 PROCEDURE — 85730 THROMBOPLASTIN TIME PARTIAL: CPT | Performed by: NURSE PRACTITIONER

## 2022-01-01 PROCEDURE — 80053 COMPREHEN METABOLIC PANEL: CPT | Performed by: PHYSICIAN ASSISTANT

## 2022-01-01 PROCEDURE — 85379 FIBRIN DEGRADATION QUANT: CPT | Performed by: FAMILY MEDICINE

## 2022-01-01 PROCEDURE — 83735 ASSAY OF MAGNESIUM: CPT | Performed by: NURSE PRACTITIONER

## 2022-01-01 PROCEDURE — 84132 ASSAY OF SERUM POTASSIUM: CPT | Performed by: FAMILY MEDICINE

## 2022-01-01 PROCEDURE — 93280 PM DEVICE PROGR EVAL DUAL: CPT | Performed by: INTERNAL MEDICINE

## 2022-01-01 PROCEDURE — 83880 ASSAY OF NATRIURETIC PEPTIDE: CPT | Performed by: PHYSICIAN ASSISTANT

## 2022-01-01 PROCEDURE — 99232 SBSQ HOSP IP/OBS MODERATE 35: CPT | Performed by: INTERNAL MEDICINE

## 2022-01-01 PROCEDURE — 85025 COMPLETE CBC W/AUTO DIFF WBC: CPT | Performed by: EMERGENCY MEDICINE

## 2022-01-01 PROCEDURE — 84484 ASSAY OF TROPONIN QUANT: CPT | Performed by: INTERNAL MEDICINE

## 2022-01-01 PROCEDURE — 0202U NFCT DS 22 TRGT SARS-COV-2: CPT | Performed by: NURSE PRACTITIONER

## 2022-01-01 PROCEDURE — 25010000002 DIGOXIN PER 500 MCG: Performed by: INTERNAL MEDICINE

## 2022-01-01 PROCEDURE — 80048 BASIC METABOLIC PNL TOTAL CA: CPT | Performed by: EMERGENCY MEDICINE

## 2022-01-01 PROCEDURE — 80179 DRUG ASSAY SALICYLATE: CPT | Performed by: INTERNAL MEDICINE

## 2022-01-01 PROCEDURE — 93005 ELECTROCARDIOGRAM TRACING: CPT | Performed by: EMERGENCY MEDICINE

## 2022-01-01 PROCEDURE — 25010000002 MORPHINE PER 10 MG: Performed by: NURSE PRACTITIONER

## 2022-01-01 PROCEDURE — C9803 HOPD COVID-19 SPEC COLLECT: HCPCS

## 2022-01-01 PROCEDURE — 82977 ASSAY OF GGT: CPT | Performed by: NURSE PRACTITIONER

## 2022-01-01 PROCEDURE — 86381 MITOCHONDRIAL ANTIBODY EACH: CPT | Performed by: NURSE PRACTITIONER

## 2022-01-01 PROCEDURE — 25010000002 FUROSEMIDE PER 20 MG: Performed by: NURSE PRACTITIONER

## 2022-01-01 PROCEDURE — 86618 LYME DISEASE ANTIBODY: CPT | Performed by: NURSE PRACTITIONER

## 2022-01-01 PROCEDURE — U0005 INFEC AGEN DETEC AMPLI PROBE: HCPCS

## 2022-01-01 PROCEDURE — 93306 TTE W/DOPPLER COMPLETE: CPT

## 2022-01-01 PROCEDURE — 0 LIDOCAINE 1 % SOLUTION: Performed by: INTERNAL MEDICINE

## 2022-01-01 PROCEDURE — 99232 SBSQ HOSP IP/OBS MODERATE 35: CPT | Performed by: NURSE PRACTITIONER

## 2022-01-01 PROCEDURE — 71250 CT THORAX DX C-: CPT

## 2022-01-01 PROCEDURE — 83880 ASSAY OF NATRIURETIC PEPTIDE: CPT | Performed by: EMERGENCY MEDICINE

## 2022-01-01 PROCEDURE — 86622 BRUCELLA ANTIBODY: CPT | Performed by: NURSE PRACTITIONER

## 2022-01-01 PROCEDURE — 82330 ASSAY OF CALCIUM: CPT | Performed by: INTERNAL MEDICINE

## 2022-01-01 PROCEDURE — 82248 BILIRUBIN DIRECT: CPT | Performed by: FAMILY MEDICINE

## 2022-01-01 PROCEDURE — 80048 BASIC METABOLIC PNL TOTAL CA: CPT | Performed by: NURSE PRACTITIONER

## 2022-01-01 PROCEDURE — 96374 THER/PROPH/DIAG INJ IV PUSH: CPT

## 2022-01-01 PROCEDURE — 81001 URINALYSIS AUTO W/SCOPE: CPT | Performed by: PHYSICIAN ASSISTANT

## 2022-01-01 DEVICE — PACEMAKER
Type: IMPLANTABLE DEVICE | Status: FUNCTIONAL
Brand: ACCOLADE™ MRI DR

## 2022-01-01 RX ORDER — SPIRONOLACTONE 25 MG/1
12.5 TABLET ORAL DAILY
Qty: 45 TABLET | Refills: 1 | Status: SHIPPED | OUTPATIENT
Start: 2022-01-01 | End: 2022-01-01 | Stop reason: HOSPADM

## 2022-01-01 RX ORDER — POTASSIUM CHLORIDE 20 MEQ/1
40 TABLET, EXTENDED RELEASE ORAL ONCE
Status: COMPLETED | OUTPATIENT
Start: 2022-01-01 | End: 2022-01-01

## 2022-01-01 RX ORDER — ACETAZOLAMIDE 250 MG/1
250 TABLET ORAL ONCE
Status: COMPLETED | OUTPATIENT
Start: 2022-01-01 | End: 2022-01-01

## 2022-01-01 RX ORDER — SODIUM CHLORIDE 0.9 % (FLUSH) 0.9 %
10 SYRINGE (ML) INJECTION EVERY 12 HOURS SCHEDULED
Status: DISCONTINUED | OUTPATIENT
Start: 2022-01-01 | End: 2022-01-01 | Stop reason: HOSPADM

## 2022-01-01 RX ORDER — MIDODRINE HYDROCHLORIDE 5 MG/1
5 TABLET ORAL
Status: DISCONTINUED | OUTPATIENT
Start: 2022-01-01 | End: 2022-01-01

## 2022-01-01 RX ORDER — FUROSEMIDE 10 MG/ML
40 INJECTION INTRAMUSCULAR; INTRAVENOUS 3 TIMES DAILY
Status: DISCONTINUED | OUTPATIENT
Start: 2022-01-01 | End: 2022-01-01

## 2022-01-01 RX ORDER — AMIODARONE HYDROCHLORIDE 200 MG/1
300 TABLET ORAL EVERY 12 HOURS SCHEDULED
Status: DISCONTINUED | OUTPATIENT
Start: 2022-01-01 | End: 2022-01-01

## 2022-01-01 RX ORDER — SODIUM BICARBONATE 650 MG/1
1300 TABLET ORAL 3 TIMES DAILY
Status: DISCONTINUED | OUTPATIENT
Start: 2022-01-01 | End: 2022-01-01 | Stop reason: HOSPADM

## 2022-01-01 RX ORDER — MIDODRINE HYDROCHLORIDE 5 MG/1
5 TABLET ORAL
Qty: 60 TABLET | Refills: 3 | Status: SHIPPED | OUTPATIENT
Start: 2022-01-01

## 2022-01-01 RX ORDER — SODIUM CHLORIDE 0.9 % (FLUSH) 0.9 %
3-10 SYRINGE (ML) INJECTION AS NEEDED
Status: DISCONTINUED | OUTPATIENT
Start: 2022-01-01 | End: 2022-01-01 | Stop reason: HOSPADM

## 2022-01-01 RX ORDER — ALLOPURINOL 100 MG/1
100 TABLET ORAL DAILY
Status: DISCONTINUED | OUTPATIENT
Start: 2022-01-01 | End: 2022-01-01 | Stop reason: HOSPADM

## 2022-01-01 RX ORDER — FUROSEMIDE 40 MG/1
40 TABLET ORAL EVERY MORNING
Status: DISCONTINUED | OUTPATIENT
Start: 2022-01-01 | End: 2022-01-01

## 2022-01-01 RX ORDER — PROPOFOL 10 MG/ML
INJECTION, EMULSION INTRAVENOUS AS NEEDED
Status: DISCONTINUED | OUTPATIENT
Start: 2022-01-01 | End: 2022-01-01 | Stop reason: SURG

## 2022-01-01 RX ORDER — ENOXAPARIN SODIUM 100 MG/ML
1 INJECTION SUBCUTANEOUS EVERY 24 HOURS
Status: DISCONTINUED | OUTPATIENT
Start: 2022-01-01 | End: 2022-01-01

## 2022-01-01 RX ORDER — SODIUM CHLORIDE 9 MG/ML
9 INJECTION, SOLUTION INTRAVENOUS CONTINUOUS PRN
Status: DISCONTINUED | OUTPATIENT
Start: 2022-01-01 | End: 2022-01-01 | Stop reason: HOSPADM

## 2022-01-01 RX ORDER — DIGOXIN 125 MCG
125 TABLET ORAL
Status: DISCONTINUED | OUTPATIENT
Start: 2022-01-01 | End: 2022-01-01 | Stop reason: HOSPADM

## 2022-01-01 RX ORDER — FUROSEMIDE 10 MG/ML
20 INJECTION INTRAMUSCULAR; INTRAVENOUS
Status: DISCONTINUED | OUTPATIENT
Start: 2022-01-01 | End: 2022-01-01 | Stop reason: HOSPADM

## 2022-01-01 RX ORDER — MORPHINE SULFATE 2 MG/ML
2 INJECTION, SOLUTION INTRAMUSCULAR; INTRAVENOUS ONCE
Status: COMPLETED | OUTPATIENT
Start: 2022-01-01 | End: 2022-01-01

## 2022-01-01 RX ORDER — DOXYCYCLINE 100 MG/1
100 TABLET ORAL EVERY 12 HOURS SCHEDULED
Status: DISCONTINUED | OUTPATIENT
Start: 2022-01-01 | End: 2022-01-01

## 2022-01-01 RX ORDER — MAGNESIUM SULFATE HEPTAHYDRATE 40 MG/ML
2 INJECTION, SOLUTION INTRAVENOUS ONCE
Status: DISCONTINUED | OUTPATIENT
Start: 2022-01-01 | End: 2022-01-01 | Stop reason: HOSPADM

## 2022-01-01 RX ORDER — DIGOXIN 0.25 MG/ML
125 INJECTION INTRAMUSCULAR; INTRAVENOUS ONCE
Status: COMPLETED | OUTPATIENT
Start: 2022-01-01 | End: 2022-01-01

## 2022-01-01 RX ORDER — NITROGLYCERIN 0.4 MG/1
0.4 TABLET SUBLINGUAL
Status: DISCONTINUED | OUTPATIENT
Start: 2022-01-01 | End: 2022-01-01 | Stop reason: HOSPADM

## 2022-01-01 RX ORDER — DOXYCYCLINE HYCLATE 100 MG
100 TABLET ORAL 2 TIMES DAILY
Qty: 10 TABLET | Refills: 0 | Status: SHIPPED | OUTPATIENT
Start: 2022-01-01 | End: 2022-01-01

## 2022-01-01 RX ORDER — HYDRALAZINE HYDROCHLORIDE 20 MG/ML
10 INJECTION INTRAMUSCULAR; INTRAVENOUS EVERY 6 HOURS PRN
Status: DISCONTINUED | OUTPATIENT
Start: 2022-01-01 | End: 2022-01-01 | Stop reason: HOSPADM

## 2022-01-01 RX ORDER — FAMOTIDINE 20 MG/1
40 TABLET, FILM COATED ORAL DAILY
Status: DISCONTINUED | OUTPATIENT
Start: 2022-01-01 | End: 2022-01-01

## 2022-01-01 RX ORDER — ASPIRIN 81 MG/1
81 TABLET ORAL DAILY
Status: DISCONTINUED | OUTPATIENT
Start: 2022-01-01 | End: 2022-01-01 | Stop reason: HOSPADM

## 2022-01-01 RX ORDER — SODIUM CHLORIDE 0.9 % (FLUSH) 0.9 %
3 SYRINGE (ML) INJECTION EVERY 12 HOURS SCHEDULED
Status: DISCONTINUED | OUTPATIENT
Start: 2022-01-01 | End: 2022-01-01 | Stop reason: HOSPADM

## 2022-01-01 RX ORDER — SODIUM BICARBONATE 650 MG/1
650 TABLET ORAL DAILY
Status: DISCONTINUED | OUTPATIENT
Start: 2022-01-01 | End: 2022-01-01

## 2022-01-01 RX ORDER — BUDESONIDE AND FORMOTEROL FUMARATE DIHYDRATE 160; 4.5 UG/1; UG/1
2 AEROSOL RESPIRATORY (INHALATION)
Status: DISCONTINUED | OUTPATIENT
Start: 2022-01-01 | End: 2022-01-01 | Stop reason: HOSPADM

## 2022-01-01 RX ORDER — POTASSIUM CHLORIDE 1.5 G/1.77G
40 POWDER, FOR SOLUTION ORAL AS NEEDED
Status: DISCONTINUED | OUTPATIENT
Start: 2022-01-01 | End: 2022-01-01 | Stop reason: HOSPADM

## 2022-01-01 RX ORDER — CLINDAMYCIN PHOSPHATE 900 MG/50ML
900 INJECTION, SOLUTION INTRAVENOUS
Status: COMPLETED | OUTPATIENT
Start: 2022-01-01 | End: 2022-01-01

## 2022-01-01 RX ORDER — GUAIFENESIN 600 MG/1
600 TABLET, EXTENDED RELEASE ORAL EVERY 12 HOURS SCHEDULED
Status: DISCONTINUED | OUTPATIENT
Start: 2022-01-01 | End: 2022-01-01 | Stop reason: HOSPADM

## 2022-01-01 RX ORDER — ONDANSETRON 2 MG/ML
4 INJECTION INTRAMUSCULAR; INTRAVENOUS EVERY 6 HOURS PRN
Status: DISCONTINUED | OUTPATIENT
Start: 2022-01-01 | End: 2022-01-01 | Stop reason: HOSPADM

## 2022-01-01 RX ORDER — ACETAMINOPHEN 325 MG/1
650 TABLET ORAL EVERY 4 HOURS PRN
Start: 2022-01-01

## 2022-01-01 RX ORDER — DIGOXIN 0.25 MG/ML
500 INJECTION INTRAMUSCULAR; INTRAVENOUS ONCE
Status: COMPLETED | OUTPATIENT
Start: 2022-01-01 | End: 2022-01-01

## 2022-01-01 RX ORDER — BUMETANIDE 1 MG/1
1 TABLET ORAL 2 TIMES DAILY
Qty: 60 TABLET | Refills: 3 | Status: SHIPPED | OUTPATIENT
Start: 2022-01-01 | End: 2022-01-01 | Stop reason: HOSPADM

## 2022-01-01 RX ORDER — BUMETANIDE 2 MG/1
2 TABLET ORAL 2 TIMES DAILY
Qty: 60 TABLET | Refills: 1 | Status: SHIPPED | OUTPATIENT
Start: 2022-01-01

## 2022-01-01 RX ORDER — HEPARIN SODIUM 5000 [USP'U]/ML
5000 INJECTION, SOLUTION INTRAVENOUS; SUBCUTANEOUS EVERY 12 HOURS SCHEDULED
Status: DISCONTINUED | OUTPATIENT
Start: 2022-01-01 | End: 2022-01-01 | Stop reason: HOSPADM

## 2022-01-01 RX ORDER — FINASTERIDE 5 MG/1
5 TABLET, FILM COATED ORAL DAILY
Status: DISCONTINUED | OUTPATIENT
Start: 2022-01-01 | End: 2022-01-01 | Stop reason: HOSPADM

## 2022-01-01 RX ORDER — PANTOPRAZOLE SODIUM 40 MG/1
40 TABLET, DELAYED RELEASE ORAL
Status: DISCONTINUED | OUTPATIENT
Start: 2022-01-01 | End: 2022-01-01 | Stop reason: HOSPADM

## 2022-01-01 RX ORDER — BUMETANIDE 0.25 MG/ML
2 INJECTION INTRAMUSCULAR; INTRAVENOUS EVERY 12 HOURS
Status: DISCONTINUED | OUTPATIENT
Start: 2022-01-01 | End: 2022-01-01

## 2022-01-01 RX ORDER — SODIUM CHLORIDE 0.9 % (FLUSH) 0.9 %
10 SYRINGE (ML) INJECTION AS NEEDED
Status: DISCONTINUED | OUTPATIENT
Start: 2022-01-01 | End: 2022-01-01 | Stop reason: HOSPADM

## 2022-01-01 RX ORDER — FUROSEMIDE 10 MG/ML
20 INJECTION INTRAMUSCULAR; INTRAVENOUS ONCE
Status: DISCONTINUED | OUTPATIENT
Start: 2022-01-01 | End: 2022-01-01

## 2022-01-01 RX ORDER — DIGOXIN 0.25 MG/ML
125 INJECTION INTRAMUSCULAR; INTRAVENOUS ONCE
Status: DISCONTINUED | OUTPATIENT
Start: 2022-01-01 | End: 2022-01-01

## 2022-01-01 RX ORDER — FUROSEMIDE 10 MG/ML
40 INJECTION INTRAMUSCULAR; INTRAVENOUS ONCE
Status: COMPLETED | OUTPATIENT
Start: 2022-01-01 | End: 2022-01-01

## 2022-01-01 RX ORDER — POTASSIUM CHLORIDE 20 MEQ/1
40 TABLET, EXTENDED RELEASE ORAL AS NEEDED
Status: DISCONTINUED | OUTPATIENT
Start: 2022-01-01 | End: 2022-01-01 | Stop reason: HOSPADM

## 2022-01-01 RX ORDER — CHOLECALCIFEROL (VITAMIN D3) 125 MCG
5 CAPSULE ORAL NIGHTLY
Status: DISCONTINUED | OUTPATIENT
Start: 2022-01-01 | End: 2022-01-01

## 2022-01-01 RX ORDER — POTASSIUM CHLORIDE 20 MEQ/1
40 TABLET, EXTENDED RELEASE ORAL ONCE
Status: DISCONTINUED | OUTPATIENT
Start: 2022-01-01 | End: 2022-01-01

## 2022-01-01 RX ORDER — FUROSEMIDE 20 MG/1
20 TABLET ORAL EVERY EVENING
Status: DISCONTINUED | OUTPATIENT
Start: 2022-01-01 | End: 2022-01-01

## 2022-01-01 RX ORDER — FUROSEMIDE 10 MG/ML
80 INJECTION INTRAMUSCULAR; INTRAVENOUS ONCE
Status: COMPLETED | OUTPATIENT
Start: 2022-01-01 | End: 2022-01-01

## 2022-01-01 RX ORDER — ONDANSETRON 4 MG/1
4 TABLET, FILM COATED ORAL EVERY 6 HOURS PRN
Status: DISCONTINUED | OUTPATIENT
Start: 2022-01-01 | End: 2022-01-01 | Stop reason: HOSPADM

## 2022-01-01 RX ORDER — ALLOPURINOL 100 MG/1
100 TABLET ORAL DAILY
Status: DISCONTINUED | OUTPATIENT
Start: 2022-01-01 | End: 2022-01-01

## 2022-01-01 RX ORDER — MIDODRINE HYDROCHLORIDE 2.5 MG/1
2.5 TABLET ORAL
Status: DISCONTINUED | OUTPATIENT
Start: 2022-01-01 | End: 2022-01-01

## 2022-01-01 RX ORDER — ACETAMINOPHEN 325 MG/1
650 TABLET ORAL EVERY 4 HOURS PRN
Status: DISCONTINUED | OUTPATIENT
Start: 2022-01-01 | End: 2022-01-01 | Stop reason: HOSPADM

## 2022-01-01 RX ORDER — SODIUM BICARBONATE 650 MG/1
1300 TABLET ORAL 3 TIMES DAILY
Status: DISCONTINUED | OUTPATIENT
Start: 2022-01-01 | End: 2022-01-01

## 2022-01-01 RX ORDER — POTASSIUM CHLORIDE 20 MEQ/1
20 TABLET, EXTENDED RELEASE ORAL 2 TIMES DAILY WITH MEALS
Status: DISCONTINUED | OUTPATIENT
Start: 2022-01-01 | End: 2022-01-01

## 2022-01-01 RX ORDER — BUDESONIDE AND FORMOTEROL FUMARATE DIHYDRATE 160; 4.5 UG/1; UG/1
2 AEROSOL RESPIRATORY (INHALATION)
Qty: 1 EACH | Refills: 12 | Status: SHIPPED | OUTPATIENT
Start: 2022-01-01

## 2022-01-01 RX ORDER — BUMETANIDE 1 MG/1
2 TABLET ORAL 2 TIMES DAILY
Status: DISCONTINUED | OUTPATIENT
Start: 2022-01-01 | End: 2022-01-01 | Stop reason: HOSPADM

## 2022-01-01 RX ORDER — PANTOPRAZOLE SODIUM 40 MG/1
40 TABLET, DELAYED RELEASE ORAL
Qty: 30 TABLET | Refills: 1 | Status: SHIPPED | OUTPATIENT
Start: 2022-01-01

## 2022-01-01 RX ORDER — METOPROLOL SUCCINATE 25 MG/1
12.5 TABLET, EXTENDED RELEASE ORAL
Status: DISCONTINUED | OUTPATIENT
Start: 2022-01-01 | End: 2022-01-01

## 2022-01-01 RX ORDER — OXYCODONE HYDROCHLORIDE 5 MG/1
5 TABLET ORAL EVERY 4 HOURS PRN
Status: DISPENSED | OUTPATIENT
Start: 2022-01-01 | End: 2022-01-01

## 2022-01-01 RX ORDER — ATORVASTATIN CALCIUM 10 MG/1
10 TABLET, FILM COATED ORAL DAILY
Status: DISCONTINUED | OUTPATIENT
Start: 2022-01-01 | End: 2022-01-01

## 2022-01-01 RX ORDER — LORAZEPAM 2 MG/ML
1 INJECTION INTRAMUSCULAR ONCE
Status: COMPLETED | OUTPATIENT
Start: 2022-01-01 | End: 2022-01-01

## 2022-01-01 RX ORDER — SODIUM BICARBONATE 650 MG/1
1300 TABLET ORAL 3 TIMES DAILY
Qty: 120 TABLET | Refills: 1 | Status: SHIPPED | OUTPATIENT
Start: 2022-01-01

## 2022-01-01 RX ORDER — POTASSIUM CHLORIDE 20 MEQ/1
40 TABLET, EXTENDED RELEASE ORAL ONCE
Status: DISCONTINUED | OUTPATIENT
Start: 2022-01-01 | End: 2022-01-01 | Stop reason: SDUPTHER

## 2022-01-01 RX ORDER — ASPIRIN 81 MG/1
81 TABLET, CHEWABLE ORAL DAILY
Status: DISCONTINUED | OUTPATIENT
Start: 2022-01-01 | End: 2022-01-01 | Stop reason: HOSPADM

## 2022-01-01 RX ORDER — DIGOXIN 125 MCG
125 TABLET ORAL
Qty: 15 TABLET | Refills: 3 | Status: SHIPPED | OUTPATIENT
Start: 2022-01-01

## 2022-01-01 RX ORDER — POLYETHYLENE GLYCOL 3350 17 G/17G
17 POWDER, FOR SOLUTION ORAL 2 TIMES DAILY
Qty: 60 EACH | Refills: 1 | Status: SHIPPED | OUTPATIENT
Start: 2022-01-01

## 2022-01-01 RX ORDER — LIDOCAINE HYDROCHLORIDE 10 MG/ML
0.5 INJECTION, SOLUTION EPIDURAL; INFILTRATION; INTRACAUDAL; PERINEURAL ONCE AS NEEDED
Status: DISCONTINUED | OUTPATIENT
Start: 2022-01-01 | End: 2022-01-01 | Stop reason: HOSPADM

## 2022-01-01 RX ORDER — FUROSEMIDE 40 MG/1
40 TABLET ORAL 2 TIMES DAILY
Qty: 180 TABLET | Refills: 1 | Status: SHIPPED | OUTPATIENT
Start: 2022-01-01 | End: 2022-01-01 | Stop reason: HOSPADM

## 2022-01-01 RX ORDER — GUAIFENESIN 600 MG/1
1200 TABLET, EXTENDED RELEASE ORAL 2 TIMES DAILY
Status: DISCONTINUED | OUTPATIENT
Start: 2022-01-01 | End: 2022-01-01 | Stop reason: HOSPADM

## 2022-01-01 RX ORDER — BUMETANIDE 0.25 MG/ML
0.5 INJECTION INTRAMUSCULAR; INTRAVENOUS ONCE
Status: COMPLETED | OUTPATIENT
Start: 2022-01-01 | End: 2022-01-01

## 2022-01-01 RX ORDER — FUROSEMIDE 40 MG/1
40 TABLET ORAL DAILY
Status: ON HOLD | COMMUNITY
End: 2022-01-01 | Stop reason: SDUPTHER

## 2022-01-01 RX ORDER — DOBUTAMINE HYDROCHLORIDE 100 MG/100ML
2.5 INJECTION INTRAVENOUS
Status: DISCONTINUED | OUTPATIENT
Start: 2022-01-01 | End: 2022-01-01 | Stop reason: HOSPADM

## 2022-01-01 RX ORDER — FUROSEMIDE 40 MG/1
40 TABLET ORAL
Status: DISCONTINUED | OUTPATIENT
Start: 2022-01-01 | End: 2022-01-01

## 2022-01-01 RX ORDER — SODIUM BICARBONATE 650 MG/1
650 TABLET ORAL 2 TIMES DAILY
Status: DISCONTINUED | OUTPATIENT
Start: 2022-01-01 | End: 2022-01-01

## 2022-01-01 RX ORDER — ENOXAPARIN SODIUM 100 MG/ML
30 INJECTION SUBCUTANEOUS DAILY
Status: DISCONTINUED | OUTPATIENT
Start: 2022-01-01 | End: 2022-01-01

## 2022-01-01 RX ORDER — ALLOPURINOL 300 MG/1
300 TABLET ORAL DAILY
Status: DISCONTINUED | OUTPATIENT
Start: 2022-01-01 | End: 2022-01-01 | Stop reason: HOSPADM

## 2022-01-01 RX ORDER — MIDODRINE HYDROCHLORIDE 5 MG/1
5 TABLET ORAL
Status: DISCONTINUED | OUTPATIENT
Start: 2022-01-01 | End: 2022-01-01 | Stop reason: HOSPADM

## 2022-01-01 RX ORDER — LIDOCAINE HYDROCHLORIDE 10 MG/ML
INJECTION, SOLUTION INFILTRATION; PERINEURAL AS NEEDED
Status: DISCONTINUED | OUTPATIENT
Start: 2022-01-01 | End: 2022-01-01 | Stop reason: HOSPADM

## 2022-01-01 RX ORDER — IPRATROPIUM BROMIDE AND ALBUTEROL SULFATE 2.5; .5 MG/3ML; MG/3ML
3 SOLUTION RESPIRATORY (INHALATION) EVERY 4 HOURS PRN
Status: DISCONTINUED | OUTPATIENT
Start: 2022-01-01 | End: 2022-01-01 | Stop reason: HOSPADM

## 2022-01-01 RX ORDER — CALCIUM GLUCONATE 20 MG/ML
1 INJECTION, SOLUTION INTRAVENOUS ONCE
Status: COMPLETED | OUTPATIENT
Start: 2022-01-01 | End: 2022-01-01

## 2022-01-01 RX ORDER — ACETAMINOPHEN 325 MG/1
650 TABLET ORAL EVERY 4 HOURS PRN
Status: DISCONTINUED | OUTPATIENT
Start: 2022-01-01 | End: 2022-01-01

## 2022-01-01 RX ORDER — ATORVASTATIN CALCIUM 10 MG/1
10 TABLET, FILM COATED ORAL NIGHTLY
Status: DISCONTINUED | OUTPATIENT
Start: 2022-01-01 | End: 2022-01-01 | Stop reason: HOSPADM

## 2022-01-01 RX ORDER — NITROGLYCERIN 20 MG/100ML
10-50 INJECTION INTRAVENOUS
Status: DISCONTINUED | OUTPATIENT
Start: 2022-01-01 | End: 2022-01-01

## 2022-01-01 RX ORDER — POLYETHYLENE GLYCOL 3350 17 G/17G
17 POWDER, FOR SOLUTION ORAL 2 TIMES DAILY
Status: DISCONTINUED | OUTPATIENT
Start: 2022-01-01 | End: 2022-01-01 | Stop reason: HOSPADM

## 2022-01-01 RX ORDER — SPIRONOLACTONE 25 MG/1
12.5 TABLET ORAL DAILY
Status: DISCONTINUED | OUTPATIENT
Start: 2022-01-01 | End: 2022-01-01 | Stop reason: HOSPADM

## 2022-01-01 RX ORDER — PROPOFOL 10 MG/ML
INJECTION, EMULSION INTRAVENOUS CONTINUOUS PRN
Status: DISCONTINUED | OUTPATIENT
Start: 2022-01-01 | End: 2022-01-01 | Stop reason: SURG

## 2022-01-01 RX ORDER — BUMETANIDE 1 MG/1
1 TABLET ORAL 2 TIMES DAILY
Status: DISCONTINUED | OUTPATIENT
Start: 2022-01-01 | End: 2022-01-01 | Stop reason: HOSPADM

## 2022-01-01 RX ORDER — FUROSEMIDE 20 MG/1
20 TABLET ORAL
Status: DISCONTINUED | OUTPATIENT
Start: 2022-01-01 | End: 2022-01-01

## 2022-01-01 RX ORDER — POTASSIUM CHLORIDE 20 MEQ/1
20 TABLET, EXTENDED RELEASE ORAL DAILY
Status: DISCONTINUED | OUTPATIENT
Start: 2022-01-01 | End: 2022-01-01

## 2022-01-01 RX ADMIN — FUROSEMIDE 20 MG/HR: 10 INJECTION INTRAMUSCULAR; INTRAVENOUS at 00:41

## 2022-01-01 RX ADMIN — POTASSIUM CHLORIDE 40 MEQ: 1500 TABLET, EXTENDED RELEASE ORAL at 17:19

## 2022-01-01 RX ADMIN — ALLOPURINOL 100 MG: 100 TABLET ORAL at 08:16

## 2022-01-01 RX ADMIN — BUMETANIDE 2 MG: 0.25 INJECTION INTRAMUSCULAR; INTRAVENOUS at 11:05

## 2022-01-01 RX ADMIN — FUROSEMIDE 20 MG/HR: 10 INJECTION INTRAMUSCULAR; INTRAVENOUS at 09:16

## 2022-01-01 RX ADMIN — METOPROLOL TARTRATE 12.5 MG: 25 TABLET, FILM COATED ORAL at 21:45

## 2022-01-01 RX ADMIN — CARBIDOPA AND LEVODOPA 2.5 MG: 50; 200 TABLET, EXTENDED RELEASE ORAL at 08:20

## 2022-01-01 RX ADMIN — OXYCODONE 5 MG: 5 TABLET ORAL at 11:08

## 2022-01-01 RX ADMIN — DOXYCYCLINE 100 MG: 100 INJECTION, POWDER, LYOPHILIZED, FOR SOLUTION INTRAVENOUS at 12:43

## 2022-01-01 RX ADMIN — Medication 3 ML: at 20:11

## 2022-01-01 RX ADMIN — METOPROLOL TARTRATE 25 MG: 25 TABLET, FILM COATED ORAL at 17:56

## 2022-01-01 RX ADMIN — FUROSEMIDE 40 MG: 40 TABLET ORAL at 08:20

## 2022-01-01 RX ADMIN — DOBUTAMINE IN DEXTROSE 1.5 MCG/KG/MIN: 100 INJECTION, SOLUTION INTRAVENOUS at 12:54

## 2022-01-01 RX ADMIN — GUAIFENESIN 1200 MG: 600 TABLET, EXTENDED RELEASE ORAL at 08:51

## 2022-01-01 RX ADMIN — ACETAMINOPHEN 650 MG: 325 TABLET ORAL at 15:10

## 2022-01-01 RX ADMIN — POTASSIUM CHLORIDE 20 MEQ: 1500 TABLET, EXTENDED RELEASE ORAL at 08:06

## 2022-01-01 RX ADMIN — FUROSEMIDE 20 MG: 10 INJECTION, SOLUTION INTRAMUSCULAR; INTRAVENOUS at 08:43

## 2022-01-01 RX ADMIN — FUROSEMIDE 40 MG: 10 INJECTION, SOLUTION INTRAMUSCULAR; INTRAVENOUS at 22:55

## 2022-01-01 RX ADMIN — FUROSEMIDE 20 MG/HR: 10 INJECTION INTRAMUSCULAR; INTRAVENOUS at 07:09

## 2022-01-01 RX ADMIN — FINASTERIDE 5 MG: 5 TABLET, FILM COATED ORAL at 09:16

## 2022-01-01 RX ADMIN — FUROSEMIDE 80 MG: 10 INJECTION, SOLUTION INTRAMUSCULAR; INTRAVENOUS at 11:03

## 2022-01-01 RX ADMIN — FINASTERIDE 5 MG: 5 TABLET, FILM COATED ORAL at 09:22

## 2022-01-01 RX ADMIN — ENOXAPARIN SODIUM 30 MG: 30 INJECTION SUBCUTANEOUS at 17:55

## 2022-01-01 RX ADMIN — MIDODRINE HYDROCHLORIDE 5 MG: 5 TABLET ORAL at 17:30

## 2022-01-01 RX ADMIN — FUROSEMIDE 40 MG: 10 INJECTION, SOLUTION INTRAMUSCULAR; INTRAVENOUS at 20:11

## 2022-01-01 RX ADMIN — Medication 3 ML: at 08:01

## 2022-01-01 RX ADMIN — METOPROLOL TARTRATE 12.5 MG: 25 TABLET, FILM COATED ORAL at 20:35

## 2022-01-01 RX ADMIN — ALLOPURINOL 300 MG: 300 TABLET ORAL at 14:12

## 2022-01-01 RX ADMIN — FUROSEMIDE 20 MG/HR: 10 INJECTION INTRAMUSCULAR; INTRAVENOUS at 17:21

## 2022-01-01 RX ADMIN — Medication 3 ML: at 21:47

## 2022-01-01 RX ADMIN — ENOXAPARIN SODIUM 60 MG: 60 INJECTION SUBCUTANEOUS at 17:01

## 2022-01-01 RX ADMIN — SODIUM BICARBONATE 650 MG TABLET 1300 MG: at 16:32

## 2022-01-01 RX ADMIN — Medication 3 ML: at 21:07

## 2022-01-01 RX ADMIN — DOBUTAMINE IN DEXTROSE 2.5 MCG/KG/MIN: 100 INJECTION, SOLUTION INTRAVENOUS at 03:26

## 2022-01-01 RX ADMIN — FINASTERIDE 5 MG: 5 TABLET, FILM COATED ORAL at 08:16

## 2022-01-01 RX ADMIN — CARBIDOPA AND LEVODOPA 2.5 MG: 50; 200 TABLET, EXTENDED RELEASE ORAL at 11:08

## 2022-01-01 RX ADMIN — PANTOPRAZOLE SODIUM 40 MG: 40 TABLET, DELAYED RELEASE ORAL at 08:45

## 2022-01-01 RX ADMIN — MIDODRINE HYDROCHLORIDE 5 MG: 5 TABLET ORAL at 08:51

## 2022-01-01 RX ADMIN — SODIUM BICARBONATE 50 MEQ: 84 INJECTION, SOLUTION INTRAVENOUS at 14:49

## 2022-01-01 RX ADMIN — SODIUM BICARBONATE 650 MG TABLET 650 MG: at 13:17

## 2022-01-01 RX ADMIN — MORPHINE SULFATE 2 MG: 2 INJECTION, SOLUTION INTRAMUSCULAR; INTRAVENOUS at 10:52

## 2022-01-01 RX ADMIN — POTASSIUM CHLORIDE 40 MEQ: 1500 TABLET, EXTENDED RELEASE ORAL at 19:38

## 2022-01-01 RX ADMIN — PROPOFOL 30 MG: 10 INJECTION, EMULSION INTRAVENOUS at 16:38

## 2022-01-01 RX ADMIN — GUAIFENESIN 600 MG: 600 TABLET, EXTENDED RELEASE ORAL at 22:01

## 2022-01-01 RX ADMIN — ASPIRIN 81 MG: 81 TABLET, CHEWABLE ORAL at 09:22

## 2022-01-01 RX ADMIN — CARBIDOPA AND LEVODOPA 2.5 MG: 50; 200 TABLET, EXTENDED RELEASE ORAL at 17:01

## 2022-01-01 RX ADMIN — MIDODRINE HYDROCHLORIDE 5 MG: 5 TABLET ORAL at 09:13

## 2022-01-01 RX ADMIN — SODIUM BICARBONATE 650 MG TABLET 1300 MG: at 09:16

## 2022-01-01 RX ADMIN — PROPOFOL 75 MCG/KG/MIN: 10 INJECTION, EMULSION INTRAVENOUS at 16:39

## 2022-01-01 RX ADMIN — METOPROLOL TARTRATE 25 MG: 25 TABLET, FILM COATED ORAL at 21:20

## 2022-01-01 RX ADMIN — ALLOPURINOL 100 MG: 100 TABLET ORAL at 08:21

## 2022-01-01 RX ADMIN — MIDODRINE HYDROCHLORIDE 5 MG: 5 TABLET ORAL at 09:02

## 2022-01-01 RX ADMIN — Medication 3 ML: at 09:36

## 2022-01-01 RX ADMIN — GUAIFENESIN 600 MG: 600 TABLET, EXTENDED RELEASE ORAL at 09:35

## 2022-01-01 RX ADMIN — ALLOPURINOL 100 MG: 100 TABLET ORAL at 08:47

## 2022-01-01 RX ADMIN — GUAIFENESIN 600 MG: 600 TABLET, EXTENDED RELEASE ORAL at 20:17

## 2022-01-01 RX ADMIN — Medication 3 ML: at 09:03

## 2022-01-01 RX ADMIN — HEPARIN SODIUM 5000 UNITS: 5000 INJECTION INTRAVENOUS; SUBCUTANEOUS at 21:06

## 2022-01-01 RX ADMIN — FINASTERIDE 5 MG: 5 TABLET, FILM COATED ORAL at 08:29

## 2022-01-01 RX ADMIN — GUAIFENESIN 600 MG: 600 TABLET, EXTENDED RELEASE ORAL at 21:50

## 2022-01-01 RX ADMIN — ENOXAPARIN SODIUM 30 MG: 30 INJECTION SUBCUTANEOUS at 17:56

## 2022-01-01 RX ADMIN — ONDANSETRON 4 MG: 2 INJECTION INTRAMUSCULAR; INTRAVENOUS at 23:35

## 2022-01-01 RX ADMIN — ASPIRIN 81 MG: 81 TABLET, COATED ORAL at 08:48

## 2022-01-01 RX ADMIN — BUMETANIDE 2 MG: 0.25 INJECTION INTRAMUSCULAR; INTRAVENOUS at 21:08

## 2022-01-01 RX ADMIN — BUMETANIDE 2 MG: 0.25 INJECTION INTRAMUSCULAR; INTRAVENOUS at 08:45

## 2022-01-01 RX ADMIN — Medication 3 ML: at 09:19

## 2022-01-01 RX ADMIN — FUROSEMIDE 20 MG/HR: 10 INJECTION INTRAMUSCULAR; INTRAVENOUS at 01:09

## 2022-01-01 RX ADMIN — DIGOXIN 125 MCG: 125 TABLET ORAL at 14:12

## 2022-01-01 RX ADMIN — AMIODARONE HYDROCHLORIDE 300 MG: 200 TABLET ORAL at 20:18

## 2022-01-01 RX ADMIN — HEPARIN SODIUM 5000 UNITS: 5000 INJECTION INTRAVENOUS; SUBCUTANEOUS at 20:17

## 2022-01-01 RX ADMIN — SODIUM BICARBONATE 650 MG TABLET 1300 MG: at 20:17

## 2022-01-01 RX ADMIN — POTASSIUM CHLORIDE 40 MEQ: 20 TABLET, EXTENDED RELEASE ORAL at 08:21

## 2022-01-01 RX ADMIN — FUROSEMIDE 20 MG/HR: 10 INJECTION INTRAMUSCULAR; INTRAVENOUS at 13:41

## 2022-01-01 RX ADMIN — ALLOPURINOL 100 MG: 100 TABLET ORAL at 08:43

## 2022-01-01 RX ADMIN — METOPROLOL TARTRATE 25 MG: 25 TABLET, FILM COATED ORAL at 08:21

## 2022-01-01 RX ADMIN — BUDESONIDE AND FORMOTEROL FUMARATE DIHYDRATE 2 PUFF: 160; 4.5 AEROSOL RESPIRATORY (INHALATION) at 07:35

## 2022-01-01 RX ADMIN — DOBUTAMINE IN DEXTROSE 2.5 MCG/KG/MIN: 100 INJECTION, SOLUTION INTRAVENOUS at 18:09

## 2022-01-01 RX ADMIN — ALLOPURINOL 100 MG: 100 TABLET ORAL at 08:01

## 2022-01-01 RX ADMIN — MIDODRINE HYDROCHLORIDE 5 MG: 5 TABLET ORAL at 18:26

## 2022-01-01 RX ADMIN — BUDESONIDE AND FORMOTEROL FUMARATE DIHYDRATE 2 PUFF: 160; 4.5 AEROSOL RESPIRATORY (INHALATION) at 18:58

## 2022-01-01 RX ADMIN — BUDESONIDE AND FORMOTEROL FUMARATE DIHYDRATE 2 PUFF: 160; 4.5 AEROSOL RESPIRATORY (INHALATION) at 19:59

## 2022-01-01 RX ADMIN — CARBIDOPA AND LEVODOPA 2.5 MG: 50; 200 TABLET, EXTENDED RELEASE ORAL at 16:45

## 2022-01-01 RX ADMIN — MIDODRINE HYDROCHLORIDE 5 MG: 5 TABLET ORAL at 09:16

## 2022-01-01 RX ADMIN — GUAIFENESIN 600 MG: 600 TABLET, EXTENDED RELEASE ORAL at 09:13

## 2022-01-01 RX ADMIN — IPRATROPIUM BROMIDE AND ALBUTEROL SULFATE 3 ML: .5; 3 SOLUTION RESPIRATORY (INHALATION) at 13:30

## 2022-01-01 RX ADMIN — GUAIFENESIN 600 MG: 600 TABLET, EXTENDED RELEASE ORAL at 08:15

## 2022-01-01 RX ADMIN — BUDESONIDE AND FORMOTEROL FUMARATE DIHYDRATE 2 PUFF: 160; 4.5 AEROSOL RESPIRATORY (INHALATION) at 19:47

## 2022-01-01 RX ADMIN — BARIUM SULFATE 1 TEASPOON(S): 0.6 CREAM ORAL at 14:05

## 2022-01-01 RX ADMIN — FUROSEMIDE 20 MG/HR: 10 INJECTION INTRAMUSCULAR; INTRAVENOUS at 03:35

## 2022-01-01 RX ADMIN — ATORVASTATIN CALCIUM 10 MG: 10 TABLET, FILM COATED ORAL at 08:47

## 2022-01-01 RX ADMIN — Medication 5 MG: at 20:15

## 2022-01-01 RX ADMIN — ASPIRIN 81 MG: 81 TABLET, COATED ORAL at 10:00

## 2022-01-01 RX ADMIN — SODIUM BICARBONATE 50 MEQ: 84 INJECTION, SOLUTION INTRAVENOUS at 07:20

## 2022-01-01 RX ADMIN — CARBIDOPA AND LEVODOPA 2.5 MG: 50; 200 TABLET, EXTENDED RELEASE ORAL at 11:39

## 2022-01-01 RX ADMIN — FINASTERIDE 5 MG: 5 TABLET, FILM COATED ORAL at 08:21

## 2022-01-01 RX ADMIN — GUAIFENESIN 600 MG: 600 TABLET, EXTENDED RELEASE ORAL at 20:15

## 2022-01-01 RX ADMIN — GUAIFENESIN 600 MG: 600 TABLET, EXTENDED RELEASE ORAL at 09:02

## 2022-01-01 RX ADMIN — LORAZEPAM 1 MG: 2 INJECTION INTRAMUSCULAR; INTRAVENOUS at 14:49

## 2022-01-01 RX ADMIN — HEPARIN SODIUM 5000 UNITS: 5000 INJECTION INTRAVENOUS; SUBCUTANEOUS at 22:04

## 2022-01-01 RX ADMIN — Medication 10 ML: at 09:36

## 2022-01-01 RX ADMIN — Medication 3 ML: at 09:31

## 2022-01-01 RX ADMIN — DOBUTAMINE IN DEXTROSE 2.5 MCG/KG/MIN: 100 INJECTION, SOLUTION INTRAVENOUS at 22:45

## 2022-01-01 RX ADMIN — MIDODRINE HYDROCHLORIDE 5 MG: 5 TABLET ORAL at 17:01

## 2022-01-01 RX ADMIN — Medication 10 ML: at 22:00

## 2022-01-01 RX ADMIN — BUDESONIDE AND FORMOTEROL FUMARATE DIHYDRATE 2 PUFF: 160; 4.5 AEROSOL RESPIRATORY (INHALATION) at 20:26

## 2022-01-01 RX ADMIN — SODIUM BICARBONATE 650 MG TABLET 1300 MG: at 09:19

## 2022-01-01 RX ADMIN — ASPIRIN 81 MG: 81 TABLET, COATED ORAL at 08:15

## 2022-01-01 RX ADMIN — Medication 10 ML: at 09:03

## 2022-01-01 RX ADMIN — SODIUM BICARBONATE 650 MG TABLET 650 MG: at 21:45

## 2022-01-01 RX ADMIN — FINASTERIDE 5 MG: 5 TABLET, FILM COATED ORAL at 09:01

## 2022-01-01 RX ADMIN — SODIUM BICARBONATE 650 MG TABLET 1300 MG: at 16:05

## 2022-01-01 RX ADMIN — BUMETANIDE 1 MG: 1 TABLET ORAL at 21:29

## 2022-01-01 RX ADMIN — SPIRONOLACTONE 12.5 MG: 25 TABLET ORAL at 08:43

## 2022-01-01 RX ADMIN — FINASTERIDE 5 MG: 5 TABLET, FILM COATED ORAL at 09:29

## 2022-01-01 RX ADMIN — GUAIFENESIN 600 MG: 600 TABLET, EXTENDED RELEASE ORAL at 09:29

## 2022-01-01 RX ADMIN — IOPAMIDOL 100 ML: 755 INJECTION, SOLUTION INTRAVENOUS at 10:56

## 2022-01-01 RX ADMIN — BUMETANIDE 1 MG: 1 TABLET ORAL at 20:53

## 2022-01-01 RX ADMIN — SODIUM BICARBONATE 650 MG TABLET 1300 MG: at 20:43

## 2022-01-01 RX ADMIN — SODIUM BICARBONATE 650 MG TABLET 1300 MG: at 18:37

## 2022-01-01 RX ADMIN — DOBUTAMINE IN DEXTROSE 5 MCG/KG/MIN: 100 INJECTION, SOLUTION INTRAVENOUS at 09:29

## 2022-01-01 RX ADMIN — POTASSIUM CHLORIDE 40 MEQ: 1500 TABLET, EXTENDED RELEASE ORAL at 11:00

## 2022-01-01 RX ADMIN — CARBIDOPA AND LEVODOPA 2.5 MG: 50; 200 TABLET, EXTENDED RELEASE ORAL at 08:15

## 2022-01-01 RX ADMIN — MIDODRINE HYDROCHLORIDE 5 MG: 5 TABLET ORAL at 09:29

## 2022-01-01 RX ADMIN — HEPARIN SODIUM 5000 UNITS: 5000 INJECTION INTRAVENOUS; SUBCUTANEOUS at 09:29

## 2022-01-01 RX ADMIN — ENOXAPARIN SODIUM 60 MG: 60 INJECTION SUBCUTANEOUS at 16:04

## 2022-01-01 RX ADMIN — FUROSEMIDE 40 MG: 40 TABLET ORAL at 08:15

## 2022-01-01 RX ADMIN — ASPIRIN 81 MG: 81 TABLET, COATED ORAL at 09:35

## 2022-01-01 RX ADMIN — BUDESONIDE AND FORMOTEROL FUMARATE DIHYDRATE 2 PUFF: 160; 4.5 AEROSOL RESPIRATORY (INHALATION) at 10:44

## 2022-01-01 RX ADMIN — BARIUM SULFATE 50 ML: 400 SUSPENSION ORAL at 12:43

## 2022-01-01 RX ADMIN — ATORVASTATIN CALCIUM 10 MG: 10 TABLET, FILM COATED ORAL at 22:11

## 2022-01-01 RX ADMIN — GUAIFENESIN 600 MG: 600 TABLET, EXTENDED RELEASE ORAL at 21:07

## 2022-01-01 RX ADMIN — Medication 3 ML: at 08:28

## 2022-01-01 RX ADMIN — POTASSIUM CHLORIDE 20 MEQ: 1500 TABLET, EXTENDED RELEASE ORAL at 08:20

## 2022-01-01 RX ADMIN — Medication 3 ML: at 08:51

## 2022-01-01 RX ADMIN — MIDODRINE HYDROCHLORIDE 5 MG: 5 TABLET ORAL at 18:37

## 2022-01-01 RX ADMIN — FUROSEMIDE 20 MG/HR: 10 INJECTION INTRAMUSCULAR; INTRAVENOUS at 05:53

## 2022-01-01 RX ADMIN — Medication 5 MG: at 20:19

## 2022-01-01 RX ADMIN — DIGOXIN 500 MCG: 0.25 INJECTION INTRAMUSCULAR; INTRAVENOUS at 09:31

## 2022-01-01 RX ADMIN — Medication 3 ML: at 20:18

## 2022-01-01 RX ADMIN — GUAIFENESIN 1200 MG: 600 TABLET, EXTENDED RELEASE ORAL at 09:22

## 2022-01-01 RX ADMIN — ASPIRIN 81 MG: 81 TABLET, COATED ORAL at 08:01

## 2022-01-01 RX ADMIN — ASPIRIN 81 MG: 81 TABLET, COATED ORAL at 09:19

## 2022-01-01 RX ADMIN — FUROSEMIDE 20 MG: 10 INJECTION, SOLUTION INTRAMUSCULAR; INTRAVENOUS at 17:56

## 2022-01-01 RX ADMIN — HEPARIN SODIUM 5000 UNITS: 5000 INJECTION INTRAVENOUS; SUBCUTANEOUS at 20:53

## 2022-01-01 RX ADMIN — METOPROLOL TARTRATE 12.5 MG: 25 TABLET, FILM COATED ORAL at 08:16

## 2022-01-01 RX ADMIN — POLYETHYLENE GLYCOL 3350 17 G: 17 POWDER, FOR SOLUTION ORAL at 09:22

## 2022-01-01 RX ADMIN — FUROSEMIDE 40 MG: 40 TABLET ORAL at 13:04

## 2022-01-01 RX ADMIN — GUAIFENESIN 600 MG: 600 TABLET, EXTENDED RELEASE ORAL at 09:01

## 2022-01-01 RX ADMIN — SODIUM BICARBONATE 650 MG TABLET 1300 MG: at 21:08

## 2022-01-01 RX ADMIN — POTASSIUM CHLORIDE 40 MEQ: 1500 TABLET, EXTENDED RELEASE ORAL at 21:07

## 2022-01-01 RX ADMIN — ACETAZOLAMIDE 250 MG: 250 TABLET ORAL at 13:01

## 2022-01-01 RX ADMIN — CARBIDOPA AND LEVODOPA 2.5 MG: 50; 200 TABLET, EXTENDED RELEASE ORAL at 16:44

## 2022-01-01 RX ADMIN — CARBIDOPA AND LEVODOPA 2.5 MG: 50; 200 TABLET, EXTENDED RELEASE ORAL at 07:20

## 2022-01-01 RX ADMIN — FUROSEMIDE 40 MG: 40 TABLET ORAL at 06:19

## 2022-01-01 RX ADMIN — DOBUTAMINE IN DEXTROSE 3.75 MCG/KG/MIN: 100 INJECTION, SOLUTION INTRAVENOUS at 17:13

## 2022-01-01 RX ADMIN — BUDESONIDE AND FORMOTEROL FUMARATE DIHYDRATE 2 PUFF: 160; 4.5 AEROSOL RESPIRATORY (INHALATION) at 06:52

## 2022-01-01 RX ADMIN — MIDODRINE HYDROCHLORIDE 5 MG: 5 TABLET ORAL at 11:00

## 2022-01-01 RX ADMIN — MIDODRINE HYDROCHLORIDE 5 MG: 5 TABLET ORAL at 09:15

## 2022-01-01 RX ADMIN — FUROSEMIDE 20 MG: 20 TABLET ORAL at 17:22

## 2022-01-01 RX ADMIN — CLINDAMYCIN PHOSPHATE 900 MG: 900 INJECTION, SOLUTION INTRAVENOUS at 16:24

## 2022-01-01 RX ADMIN — ATORVASTATIN CALCIUM 10 MG: 10 TABLET, FILM COATED ORAL at 08:02

## 2022-01-01 RX ADMIN — FINASTERIDE 5 MG: 5 TABLET, FILM COATED ORAL at 09:19

## 2022-01-01 RX ADMIN — ASPIRIN 81 MG: 81 TABLET, COATED ORAL at 08:43

## 2022-01-01 RX ADMIN — HEPARIN SODIUM 5000 UNITS: 5000 INJECTION INTRAVENOUS; SUBCUTANEOUS at 09:03

## 2022-01-01 RX ADMIN — BUMETANIDE 1 MG: 1 TABLET ORAL at 22:02

## 2022-01-01 RX ADMIN — GUAIFENESIN 600 MG: 600 TABLET, EXTENDED RELEASE ORAL at 08:29

## 2022-01-01 RX ADMIN — ENOXAPARIN SODIUM 30 MG: 30 INJECTION SUBCUTANEOUS at 20:11

## 2022-01-01 RX ADMIN — FUROSEMIDE 20 MG/HR: 10 INJECTION INTRAMUSCULAR; INTRAVENOUS at 10:59

## 2022-01-01 RX ADMIN — ASPIRIN 81 MG: 81 TABLET, COATED ORAL at 08:20

## 2022-01-01 RX ADMIN — GUAIFENESIN 600 MG: 600 TABLET, EXTENDED RELEASE ORAL at 20:53

## 2022-01-01 RX ADMIN — CARBIDOPA AND LEVODOPA 2.5 MG: 50; 200 TABLET, EXTENDED RELEASE ORAL at 08:29

## 2022-01-01 RX ADMIN — GUAIFENESIN 1200 MG: 600 TABLET, EXTENDED RELEASE ORAL at 21:07

## 2022-01-01 RX ADMIN — POTASSIUM CHLORIDE 20 MEQ: 1500 TABLET, EXTENDED RELEASE ORAL at 08:16

## 2022-01-01 RX ADMIN — Medication 3 ML: at 20:36

## 2022-01-01 RX ADMIN — Medication 3 ML: at 20:20

## 2022-01-01 RX ADMIN — MIDODRINE HYDROCHLORIDE 5 MG: 5 TABLET ORAL at 17:20

## 2022-01-01 RX ADMIN — Medication 3 ML: at 09:23

## 2022-01-01 RX ADMIN — Medication 5 MG: at 21:45

## 2022-01-01 RX ADMIN — METOPROLOL TARTRATE 25 MG: 25 TABLET, FILM COATED ORAL at 08:43

## 2022-01-01 RX ADMIN — METOPROLOL TARTRATE 12.5 MG: 25 TABLET, FILM COATED ORAL at 08:06

## 2022-01-01 RX ADMIN — HEPARIN SODIUM 5000 UNITS: 5000 INJECTION INTRAVENOUS; SUBCUTANEOUS at 09:01

## 2022-01-01 RX ADMIN — ASPIRIN 81 MG: 81 TABLET, COATED ORAL at 09:02

## 2022-01-01 RX ADMIN — ATORVASTATIN CALCIUM 10 MG: 10 TABLET, FILM COATED ORAL at 08:21

## 2022-01-01 RX ADMIN — FUROSEMIDE 20 MG: 20 TABLET ORAL at 16:45

## 2022-01-01 RX ADMIN — CARBIDOPA AND LEVODOPA 2.5 MG: 50; 200 TABLET, EXTENDED RELEASE ORAL at 11:23

## 2022-01-01 RX ADMIN — BUMETANIDE 1 MG: 1 TABLET ORAL at 09:13

## 2022-01-01 RX ADMIN — ASPIRIN 81 MG: 81 TABLET, COATED ORAL at 12:01

## 2022-01-01 RX ADMIN — FUROSEMIDE 40 MG: 10 INJECTION, SOLUTION INTRAMUSCULAR; INTRAVENOUS at 12:28

## 2022-01-01 RX ADMIN — SODIUM BICARBONATE 650 MG TABLET 1300 MG: at 16:30

## 2022-01-01 RX ADMIN — POTASSIUM CHLORIDE 40 MEQ: 20 TABLET, EXTENDED RELEASE ORAL at 12:01

## 2022-01-01 RX ADMIN — HEPARIN SODIUM 5000 UNITS: 5000 INJECTION INTRAVENOUS; SUBCUTANEOUS at 21:29

## 2022-01-01 RX ADMIN — GUAIFENESIN 600 MG: 600 TABLET, EXTENDED RELEASE ORAL at 21:29

## 2022-01-01 RX ADMIN — CARBIDOPA AND LEVODOPA 2.5 MG: 50; 200 TABLET, EXTENDED RELEASE ORAL at 08:06

## 2022-01-01 RX ADMIN — BUMETANIDE 1 MG: 1 TABLET ORAL at 09:02

## 2022-01-01 RX ADMIN — MIDODRINE HYDROCHLORIDE 5 MG: 5 TABLET ORAL at 09:22

## 2022-01-01 RX ADMIN — BARIUM SULFATE 700 MG: 700 TABLET ORAL at 12:43

## 2022-01-01 RX ADMIN — Medication 3 ML: at 21:51

## 2022-01-01 RX ADMIN — METOPROLOL TARTRATE 25 MG: 25 TABLET, FILM COATED ORAL at 08:01

## 2022-01-01 RX ADMIN — Medication 3 ML: at 20:15

## 2022-01-01 RX ADMIN — ASPIRIN 81 MG: 81 TABLET, COATED ORAL at 09:15

## 2022-01-01 RX ADMIN — METOPROLOL TARTRATE 25 MG: 25 TABLET, FILM COATED ORAL at 08:47

## 2022-01-01 RX ADMIN — GUAIFENESIN 600 MG: 600 TABLET, EXTENDED RELEASE ORAL at 10:00

## 2022-01-01 RX ADMIN — AMIODARONE HYDROCHLORIDE 300 MG: 200 TABLET ORAL at 09:30

## 2022-01-01 RX ADMIN — Medication 10 ML: at 22:35

## 2022-01-01 RX ADMIN — SODIUM BICARBONATE 650 MG TABLET 1300 MG: at 09:22

## 2022-01-01 RX ADMIN — SODIUM BICARBONATE 650 MG TABLET 1300 MG: at 17:01

## 2022-01-01 RX ADMIN — GUAIFENESIN 600 MG: 600 TABLET, EXTENDED RELEASE ORAL at 08:06

## 2022-01-01 RX ADMIN — FINASTERIDE 5 MG: 5 TABLET, FILM COATED ORAL at 09:13

## 2022-01-01 RX ADMIN — ASPIRIN 81 MG: 81 TABLET, COATED ORAL at 09:13

## 2022-01-01 RX ADMIN — MIDODRINE HYDROCHLORIDE 5 MG: 5 TABLET ORAL at 16:35

## 2022-01-01 RX ADMIN — SODIUM BICARBONATE 650 MG TABLET 1300 MG: at 18:27

## 2022-01-01 RX ADMIN — ENOXAPARIN SODIUM 60 MG: 60 INJECTION SUBCUTANEOUS at 16:32

## 2022-01-01 RX ADMIN — Medication 3 ML: at 22:00

## 2022-01-01 RX ADMIN — METOPROLOL TARTRATE 12.5 MG: 25 TABLET, FILM COATED ORAL at 20:20

## 2022-01-01 RX ADMIN — CARBIDOPA AND LEVODOPA 2.5 MG: 50; 200 TABLET, EXTENDED RELEASE ORAL at 16:32

## 2022-01-01 RX ADMIN — CARBIDOPA AND LEVODOPA 2.5 MG: 50; 200 TABLET, EXTENDED RELEASE ORAL at 16:43

## 2022-01-01 RX ADMIN — ALLOPURINOL 300 MG: 300 TABLET ORAL at 08:51

## 2022-01-01 RX ADMIN — Medication 3 ML: at 21:48

## 2022-01-01 RX ADMIN — DOBUTAMINE IN DEXTROSE 2.5 MCG/KG/MIN: 100 INJECTION, SOLUTION INTRAVENOUS at 21:46

## 2022-01-01 RX ADMIN — POTASSIUM CHLORIDE 20 MEQ: 1500 TABLET, EXTENDED RELEASE ORAL at 08:01

## 2022-01-01 RX ADMIN — BUDESONIDE AND FORMOTEROL FUMARATE DIHYDRATE 2 PUFF: 160; 4.5 AEROSOL RESPIRATORY (INHALATION) at 07:05

## 2022-01-01 RX ADMIN — FINASTERIDE 5 MG: 5 TABLET, FILM COATED ORAL at 08:01

## 2022-01-01 RX ADMIN — FINASTERIDE 5 MG: 5 TABLET, FILM COATED ORAL at 09:02

## 2022-01-01 RX ADMIN — FUROSEMIDE 20 MG/HR: 10 INJECTION INTRAMUSCULAR; INTRAVENOUS at 13:01

## 2022-01-01 RX ADMIN — POTASSIUM CHLORIDE 20 MEQ: 1500 TABLET, EXTENDED RELEASE ORAL at 17:56

## 2022-01-01 RX ADMIN — GUAIFENESIN 600 MG: 600 TABLET, EXTENDED RELEASE ORAL at 11:35

## 2022-01-01 RX ADMIN — FUROSEMIDE 40 MG: 10 INJECTION INTRAMUSCULAR; INTRAVENOUS at 12:30

## 2022-01-01 RX ADMIN — ASPIRIN 81 MG: 81 TABLET, COATED ORAL at 09:16

## 2022-01-01 RX ADMIN — Medication 3 ML: at 08:21

## 2022-01-01 RX ADMIN — ASPIRIN 81 MG: 81 TABLET, CHEWABLE ORAL at 14:12

## 2022-01-01 RX ADMIN — HEPARIN SODIUM 5000 UNITS: 5000 INJECTION INTRAVENOUS; SUBCUTANEOUS at 09:34

## 2022-01-01 RX ADMIN — Medication 3 ML: at 11:05

## 2022-01-01 RX ADMIN — SODIUM BICARBONATE 650 MG TABLET 1300 MG: at 10:00

## 2022-01-01 RX ADMIN — ENOXAPARIN SODIUM 30 MG: 30 INJECTION SUBCUTANEOUS at 17:59

## 2022-01-01 RX ADMIN — METOPROLOL TARTRATE 25 MG: 25 TABLET, FILM COATED ORAL at 20:11

## 2022-01-01 RX ADMIN — ENOXAPARIN SODIUM 30 MG: 30 INJECTION SUBCUTANEOUS at 16:43

## 2022-01-01 RX ADMIN — PROPOFOL 20 MG: 10 INJECTION, EMULSION INTRAVENOUS at 16:40

## 2022-01-01 RX ADMIN — FUROSEMIDE 20 MG/HR: 10 INJECTION INTRAMUSCULAR; INTRAVENOUS at 19:38

## 2022-01-01 RX ADMIN — FINASTERIDE 5 MG: 5 TABLET, FILM COATED ORAL at 10:00

## 2022-01-01 RX ADMIN — METOPROLOL TARTRATE 25 MG: 25 TABLET, FILM COATED ORAL at 22:11

## 2022-01-01 RX ADMIN — ALLOPURINOL 100 MG: 100 TABLET ORAL at 08:07

## 2022-01-01 RX ADMIN — BUMETANIDE 2 MG: 0.25 INJECTION INTRAMUSCULAR; INTRAVENOUS at 09:22

## 2022-01-01 RX ADMIN — ACETAMINOPHEN 650 MG: 325 TABLET ORAL at 14:12

## 2022-01-01 RX ADMIN — MIDODRINE HYDROCHLORIDE 5 MG: 5 TABLET ORAL at 16:30

## 2022-01-01 RX ADMIN — Medication 10 ML: at 08:43

## 2022-01-01 RX ADMIN — DOBUTAMINE IN DEXTROSE 2.5 MCG/KG/MIN: 100 INJECTION, SOLUTION INTRAVENOUS at 13:00

## 2022-01-01 RX ADMIN — POLYETHYLENE GLYCOL 3350 17 G: 17 POWDER, FOR SOLUTION ORAL at 08:51

## 2022-01-01 RX ADMIN — CARBIDOPA AND LEVODOPA 2.5 MG: 50; 200 TABLET, EXTENDED RELEASE ORAL at 09:19

## 2022-01-01 RX ADMIN — Medication 10 ML: at 09:14

## 2022-01-01 RX ADMIN — SODIUM BICARBONATE 650 MG TABLET 1300 MG: at 09:15

## 2022-01-01 RX ADMIN — MIDODRINE HYDROCHLORIDE 5 MG: 5 TABLET ORAL at 16:45

## 2022-01-01 RX ADMIN — FINASTERIDE 5 MG: 5 TABLET, FILM COATED ORAL at 08:51

## 2022-01-01 RX ADMIN — BUMETANIDE 1 MG: 1 TABLET ORAL at 09:35

## 2022-01-01 RX ADMIN — FUROSEMIDE 20 MG: 10 INJECTION, SOLUTION INTRAMUSCULAR; INTRAVENOUS at 12:01

## 2022-01-01 RX ADMIN — SODIUM BICARBONATE 650 MG TABLET 1300 MG: at 21:50

## 2022-01-01 RX ADMIN — FINASTERIDE 5 MG: 5 TABLET, FILM COATED ORAL at 09:34

## 2022-01-01 RX ADMIN — SODIUM CHLORIDE 250 ML: 9 INJECTION, SOLUTION INTRAVENOUS at 11:02

## 2022-01-01 RX ADMIN — ASPIRIN 81 MG: 81 TABLET, COATED ORAL at 09:01

## 2022-01-01 RX ADMIN — SODIUM BICARBONATE 650 MG TABLET 650 MG: at 08:06

## 2022-01-01 RX ADMIN — GUAIFENESIN 600 MG: 600 TABLET, EXTENDED RELEASE ORAL at 20:11

## 2022-01-01 RX ADMIN — MIDODRINE HYDROCHLORIDE 5 MG: 5 TABLET ORAL at 18:25

## 2022-01-01 RX ADMIN — POTASSIUM CHLORIDE 40 MEQ: 1500 TABLET, EXTENDED RELEASE ORAL at 12:51

## 2022-01-01 RX ADMIN — FUROSEMIDE 40 MG: 40 TABLET ORAL at 17:56

## 2022-01-01 RX ADMIN — SODIUM BICARBONATE 650 MG TABLET 1300 MG: at 08:51

## 2022-01-01 RX ADMIN — Medication 3 ML: at 08:15

## 2022-01-01 RX ADMIN — HEPARIN SODIUM 5000 UNITS: 5000 INJECTION INTRAVENOUS; SUBCUTANEOUS at 09:16

## 2022-01-01 RX ADMIN — Medication 10 ML: at 08:21

## 2022-01-01 RX ADMIN — SODIUM BICARBONATE 650 MG TABLET 1300 MG: at 09:01

## 2022-01-01 RX ADMIN — GUAIFENESIN 600 MG: 600 TABLET, EXTENDED RELEASE ORAL at 20:19

## 2022-01-01 RX ADMIN — Medication 3 ML: at 20:19

## 2022-01-01 RX ADMIN — Medication 3 ML: at 20:53

## 2022-01-01 RX ADMIN — ACETAMINOPHEN 650 MG: 325 TABLET ORAL at 06:19

## 2022-01-01 RX ADMIN — POTASSIUM CHLORIDE 20 MEQ: 1500 TABLET, EXTENDED RELEASE ORAL at 17:09

## 2022-01-01 RX ADMIN — CALCIUM GLUCONATE 1 G: 20 INJECTION, SOLUTION INTRAVENOUS at 08:06

## 2022-01-01 RX ADMIN — METOPROLOL TARTRATE 12.5 MG: 25 TABLET, FILM COATED ORAL at 20:15

## 2022-01-01 RX ADMIN — MIDODRINE HYDROCHLORIDE 5 MG: 5 TABLET ORAL at 17:28

## 2022-01-01 RX ADMIN — ASPIRIN 81 MG: 81 TABLET, COATED ORAL at 08:29

## 2022-01-01 RX ADMIN — Medication 3 ML: at 08:06

## 2022-01-01 RX ADMIN — MIDODRINE HYDROCHLORIDE 5 MG: 5 TABLET ORAL at 12:51

## 2022-01-01 RX ADMIN — NITROGLYCERIN 10 MCG/MIN: 20 INJECTION INTRAVENOUS at 10:28

## 2022-01-01 RX ADMIN — ASPIRIN 81 MG: 81 TABLET, CHEWABLE ORAL at 08:51

## 2022-01-01 RX ADMIN — FUROSEMIDE 20 MG/HR: 10 INJECTION INTRAMUSCULAR; INTRAVENOUS at 20:27

## 2022-01-01 RX ADMIN — Medication 3 ML: at 22:34

## 2022-01-01 RX ADMIN — DIGOXIN 125 MCG: 0.25 INJECTION INTRAMUSCULAR; INTRAVENOUS at 16:04

## 2022-01-01 RX ADMIN — SODIUM BICARBONATE 650 MG TABLET 1300 MG: at 17:30

## 2022-01-01 RX ADMIN — SODIUM BICARBONATE 650 MG TABLET 1300 MG: at 08:29

## 2022-01-01 RX ADMIN — GUAIFENESIN 600 MG: 600 TABLET, EXTENDED RELEASE ORAL at 08:21

## 2022-01-01 RX ADMIN — Medication 3 ML: at 09:02

## 2022-01-01 RX ADMIN — FINASTERIDE 5 MG: 5 TABLET, FILM COATED ORAL at 08:07

## 2022-01-01 RX ADMIN — ACETAMINOPHEN 650 MG: 325 TABLET ORAL at 15:36

## 2022-01-01 RX ADMIN — FUROSEMIDE 40 MG: 40 TABLET ORAL at 08:01

## 2022-01-01 RX ADMIN — BARIUM SULFATE 50 ML: 400 SUSPENSION ORAL at 12:42

## 2022-01-01 RX ADMIN — GUAIFENESIN 600 MG: 600 TABLET, EXTENDED RELEASE ORAL at 20:43

## 2022-01-01 RX ADMIN — Medication 3 ML: at 09:14

## 2022-01-01 RX ADMIN — GUAIFENESIN 600 MG: 600 TABLET, EXTENDED RELEASE ORAL at 09:15

## 2022-01-01 RX ADMIN — OXYCODONE 5 MG: 5 TABLET ORAL at 21:30

## 2022-01-01 RX ADMIN — DOBUTAMINE IN DEXTROSE 2.5 MCG/KG/MIN: 100 INJECTION, SOLUTION INTRAVENOUS at 13:33

## 2022-01-01 RX ADMIN — CARBIDOPA AND LEVODOPA 2.5 MG: 50; 200 TABLET, EXTENDED RELEASE ORAL at 11:27

## 2022-01-01 RX ADMIN — MIDODRINE HYDROCHLORIDE 5 MG: 5 TABLET ORAL at 09:01

## 2022-01-01 RX ADMIN — Medication 3 ML: at 12:51

## 2022-01-01 RX ADMIN — SODIUM CHLORIDE 9 ML/HR: 9 INJECTION, SOLUTION INTRAVENOUS at 16:24

## 2022-01-01 RX ADMIN — FUROSEMIDE 40 MG: 40 TABLET ORAL at 06:29

## 2022-01-01 RX ADMIN — ENOXAPARIN SODIUM 60 MG: 60 INJECTION SUBCUTANEOUS at 16:44

## 2022-01-01 RX ADMIN — BUMETANIDE 0.5 MG: 0.25 INJECTION INTRAMUSCULAR; INTRAVENOUS at 07:20

## 2022-01-01 RX ADMIN — CARBIDOPA AND LEVODOPA 2.5 MG: 50; 200 TABLET, EXTENDED RELEASE ORAL at 17:22

## 2022-01-01 RX ADMIN — SODIUM BICARBONATE 650 MG TABLET 1300 MG: at 20:11

## 2022-01-01 RX ADMIN — DIGOXIN 125 MCG: 125 TABLET ORAL at 11:52

## 2022-01-01 RX ADMIN — SODIUM BICARBONATE 650 MG TABLET 650 MG: at 08:15

## 2022-01-01 RX ADMIN — ASPIRIN 81 MG: 81 TABLET, COATED ORAL at 08:06

## 2022-01-01 RX ADMIN — CARBIDOPA AND LEVODOPA 2.5 MG: 50; 200 TABLET, EXTENDED RELEASE ORAL at 11:10

## 2022-01-01 RX ADMIN — DIGOXIN 125 MCG: 125 TABLET ORAL at 17:30

## 2022-01-01 RX ADMIN — ENOXAPARIN SODIUM 60 MG: 60 INJECTION SUBCUTANEOUS at 16:45

## 2022-01-01 RX ADMIN — FUROSEMIDE 40 MG: 10 INJECTION, SOLUTION INTRAMUSCULAR; INTRAVENOUS at 08:29

## 2022-01-01 RX ADMIN — FUROSEMIDE 20 MG/HR: 10 INJECTION INTRAMUSCULAR; INTRAVENOUS at 01:28

## 2022-01-01 RX ADMIN — Medication 5 MG: at 21:50

## 2022-01-01 RX ADMIN — GUAIFENESIN 600 MG: 600 TABLET, EXTENDED RELEASE ORAL at 09:16

## 2022-01-01 RX ADMIN — HEPARIN SODIUM 5000 UNITS: 5000 INJECTION INTRAVENOUS; SUBCUTANEOUS at 20:49

## 2022-01-01 RX ADMIN — BUMETANIDE 1 MG: 1 TABLET ORAL at 13:40

## 2022-01-01 RX ADMIN — MIDODRINE HYDROCHLORIDE 5 MG: 5 TABLET ORAL at 11:52

## 2022-01-01 RX ADMIN — METOPROLOL SUCCINATE 12.5 MG: 25 TABLET, EXTENDED RELEASE ORAL at 11:14

## 2022-01-01 RX ADMIN — SPIRONOLACTONE 12.5 MG: 25 TABLET ORAL at 12:01

## 2022-01-01 RX ADMIN — ENOXAPARIN SODIUM 60 MG: 60 INJECTION SUBCUTANEOUS at 17:22

## 2022-01-01 RX ADMIN — ASPIRIN 81 MG: 81 TABLET, COATED ORAL at 09:29

## 2022-01-01 RX ADMIN — GUAIFENESIN 600 MG: 600 TABLET, EXTENDED RELEASE ORAL at 09:19

## 2022-01-01 RX ADMIN — Medication 3 ML: at 21:30

## 2022-01-01 RX ADMIN — GUAIFENESIN 600 MG: 600 TABLET, EXTENDED RELEASE ORAL at 21:45

## 2022-01-01 RX ADMIN — SODIUM BICARBONATE 650 MG TABLET 1300 MG: at 21:07

## 2022-01-01 RX ADMIN — PANTOPRAZOLE SODIUM 40 MG: 40 TABLET, DELAYED RELEASE ORAL at 05:40

## 2022-01-01 RX ADMIN — FINASTERIDE 5 MG: 5 TABLET, FILM COATED ORAL at 09:15

## 2022-01-01 RX ADMIN — Medication 3 ML: at 10:00

## 2022-01-01 RX ADMIN — DIGOXIN 125 MCG: 125 TABLET ORAL at 11:54

## 2022-01-01 RX ADMIN — ALLOPURINOL 300 MG: 300 TABLET ORAL at 09:22

## 2022-01-01 RX ADMIN — FINASTERIDE 5 MG: 5 TABLET, FILM COATED ORAL at 14:12

## 2022-01-01 RX ADMIN — PANTOPRAZOLE SODIUM 40 MG: 40 TABLET, DELAYED RELEASE ORAL at 09:25

## 2022-01-01 RX ADMIN — METOPROLOL TARTRATE 12.5 MG: 25 TABLET, FILM COATED ORAL at 08:20

## 2022-01-01 RX ADMIN — ALLOPURINOL 100 MG: 100 TABLET ORAL at 10:00

## 2022-01-01 RX ADMIN — HEPARIN SODIUM 5000 UNITS: 5000 INJECTION INTRAVENOUS; SUBCUTANEOUS at 09:12

## 2022-01-01 RX ADMIN — FINASTERIDE 5 MG: 5 TABLET, FILM COATED ORAL at 08:48

## 2022-01-01 RX ADMIN — FUROSEMIDE 20 MG/HR: 10 INJECTION INTRAMUSCULAR; INTRAVENOUS at 18:51

## 2022-01-01 RX ADMIN — ACETAMINOPHEN 650 MG: 325 TABLET ORAL at 20:15

## 2022-01-17 NOTE — DISCHARGE INSTRUCTIONS
Pacemaker DC Instructions    After Procedure:    Keep dressing clean and dry for 3 days.  May shower on Thursday and remove dressing  Follow up with Dr Romero in 2 weeks and with Hilda  For wound check in 2 weeks    1) Avoid shoulder motion for the first twenty-four (24) hours.  2) It takes about 3 weeks for the pacemaker leads to anchor in the heart in order to maintain good placement.  Therefore; DO NOT:    a. Raise your arm on the operative side above your head for 2 weeks  b. Perform strenuous upper body work using the arm on the operative side for 2 weeks.    3) Wear arm sling, if ordered by physician, to help decrease the use of arm on operative side.    After Discharge:    1) Carry your pacemaker identification card in your wallet or purse  2) We recommend you wear a MEDIC-ALERT bracelet or necklace to alert medical personnel  3) A topical skin adhesive may be used to close the incision.  DO NOT rub, scratch, or pick at the wound.  Keep wound dry.  Avoid topical ointments.  Protect the wound from prolonged exposure to sunlight.  If steri strips are used, they should be left in place until seen by physician.  4) No driving until after your follow-up appointment with the Cardiologist.     **Please be sure to read the pacemaker booklet given to you at time of discharge**

## 2022-01-17 NOTE — ANESTHESIA PREPROCEDURE EVALUATION
Anesthesia Evaluation     NPO Solid Status: > 8 hours  NPO Liquid Status: > 8 hours           Airway   Mallampati: I  TM distance: >3 FB  Neck ROM: full  No difficulty expected  Dental - normal exam     Pulmonary - normal exam   (+) shortness of breath,   Cardiovascular - normal exam    (+) hypertension, CAD, CABG, dysrhythmias Bradycardia, CHF ,     ROS comment: CONCLUSION:  1.  Lexiscan Cardiolite with abnormal perfusion with predominantly fixed inferior and septal defect consistent with MI with shiala-infarct ischemia  2.  Severe LV dysfunction.  LVEF of 34%.         Neuro/Psych  (+) CVA,     GI/Hepatic/Renal/Endo    (+)   renal disease CRI, diabetes mellitus,     Musculoskeletal     Abdominal  - normal exam    Bowel sounds: normal.   Substance History      OB/GYN          Other          Other Comment: LVE with moderate global left ventricular hypokinesis, LVEF estimated at 35 to 40%.  Normal RV size  Severe left atrial enlargement seen.  Aortic valve, mitral valve, tricuspid valve appears structurally normal, mild aortic, moderate to severe mitral regurgitation seen.  No pericardial effusion seen.  Proximal aorta appears normal in size.                  Anesthesia Plan    ASA 4     MAC     intravenous induction     Anesthetic plan, all risks, benefits, and alternatives have been provided, discussed and informed consent has been obtained with: patient.    Plan discussed with CRNA and CAA.

## 2022-01-18 NOTE — ANESTHESIA POSTPROCEDURE EVALUATION
Patient: Shon ZAYAS Kunal    Procedure Summary     Date: 01/17/22 Room / Location: [facility] / [facility] IN[facility]ASIVE [facility]ATION    Anesthesia Start: 1632 Anesthesia Stop: 1735    Procedure: Gen change-Pyatt Pyatt aware (N/A ) Diagnosis:       Sick sinus syndrome (HCC)      (Sick sinus syndrome)    Providers: Minh Kendrick MD Provider: Yaron Delgadillo MD    Anesthesia Type: MAC ASA Status: 4          Anesthesia Type: MAC    Vitals  Vitals Value Taken Time   /52 01/17/22 1735   Temp 97 °F (36.1 °C) 01/17/22 1735   Pulse 77 01/17/22 1735   Resp 20 01/17/22 1735   SpO2 100 % 01/17/22 1735           Post Anesthesia Care and Evaluation    Patient location during evaluation: PACU  Patient participation: complete - patient participated  Level of consciousness: awake  Pain scale: See nurse's notes for pain score.  Pain management: adequate  Airway patency: patent  Anesthetic complications: No anesthetic complications  PONV Status: none  Cardiovascular status: acceptable  Respiratory status: acceptable  Hydration status: acceptable    Comments: Patient seen and examined postoperatively; vital signs stable; SpO2 greater than or equal to 90%; cardiopulmonary status stable; nausea/vomiting adequately controlled; pain adequately controlled; no apparent anesthesia complications; patient discharged from anesthesia care when discharge criteria were met

## 2022-01-31 NOTE — PROGRESS NOTES
Patient came to office today s/p pacemaker replacement, removed dressing and steri strips with no problem. Incision well approximated, no redness, swelling or drainage noted. Discussed incision care.  Patient has a follow up with Dr Romero in May, plan to keep this appt.

## 2022-03-24 PROBLEM — R06.09 EXERTIONAL DYSPNEA: Status: ACTIVE | Noted: 2022-01-01

## 2022-03-26 PROBLEM — I65.23 CAROTID STENOSIS, NON-SYMPTOMATIC, BILATERAL: Status: ACTIVE | Noted: 2022-01-01

## 2022-03-26 PROBLEM — J81.0 ACUTE PULMONARY EDEMA (HCC): Status: ACTIVE | Noted: 2022-01-01

## 2022-03-27 NOTE — OUTREACH NOTE
Prep Survey    Flowsheet Row Responses   [facility]? [facility]   Is LACE score < 7 ? No   Emergency Room discharge w/ pulse ox? No   Eligibility Readm Mgmt   Discharge diagnosis Exertional dyspnea, Acute pulmonary edema, CHF   Does the patient have one of the following disease processes/diagnoses(primary or secondary)? CHF   Does the patient have Home health ordered? No   Is there a DME ordered? No   Prep survey completed? Yes          SARA EVANS - Registered Nurse

## 2022-03-30 NOTE — OUTREACH NOTE
CHF Week 1 Survey    Flowsheet Row Responses   [facility] patient discharged from? [facility]   Does the patient have one of the following disease processes/diagnoses(primary or secondary)? CHF   CHF Week 1 attempt successful? Yes   Call start time 1129   Call end time 1133   Discharge diagnosis Exertional dyspnea, Acute pulmonary edema, CHF   Is patient permission given to speak with other caregiver? Yes   Person spoke with today (if not patient) and relationship wife Keisha Duval reviewed with patient/caregiver? Yes   Is the patient having any side effects they believe may be caused by any medication additions or changes? No   Does the patient have all medications ordered at discharge? Yes   Is the patient taking all medications as directed (includes completed medication regime)? Yes   Does the patient have a primary care provider?  Yes   Does the patient have an appointment with their PCP within 7 days of discharge? Greater than 7 days   Comments regarding PCP Has a followup on 4/12/2022   What is preventing the patient from scheduling follow up appointments within 7 days of discharge? Haven't had time   Nursing Interventions Educated patient on importance of making appointment, Advised patient to make appointment   Has the patient kept scheduled appointments due by today? N/A   Psychosocial issues? No   Did the patient receive a copy of their discharge instructions? Yes   Nursing interventions Reviewed instructions with patient   What is the patient's perception of their health status since discharge? Improving   Nursing interventions Nurse provided patient education   Is the patient weighing daily? Yes   Does the patient have scales? Yes   Daily weight interventions Education provided on importance of daily weight   Is the patient able to teach back Heart Failure diet management? Yes   Is the patient able to teach back Heart Failure Zones? Yes   Is the patient able to teach back signs and symptoms of worsening  condition? (i.e. weight gain, shortness of air, etc.) Yes   If the patient is a current smoker, are they able to teach back resources for cessation? Not a smoker   Is the patient/caregiver able to teach back the hierarchy of who to call/visit for symptoms/problems? PCP, Specialist, Home health nurse, Urgent Care, ED, 911 Yes    CHF Week 1 call completed? Yes   Wrap up additional comments Wife reports he is doing well. He is getting stronger. He is weighing daily.           COLLEEN GREWAL - Registered Nurse

## 2022-04-07 NOTE — OUTREACH NOTE
CHF Week 2 Survey    Flowsheet Row Responses   [facility] patient discharged from? [facility]   Does the patient have one of the following disease processes/diagnoses(primary or secondary)? CHF   Week 2 attempt successful? Yes   Call start time 1125   Call end time 1133   Discharge diagnosis Exertional dyspnea, Acute pulmonary edema, CHF   Person spoke with today (if not patient) and relationship wife Keisha and patient   Meds reviewed with patient/caregiver? Yes   Is the patient having any side effects they believe may be caused by any medication additions or changes? No   Does the patient have all medications ordered at discharge? Yes   Is the patient taking all medications as directed (includes completed medication regime)? Yes   Does the patient have an appointment with their PCP within 7 days of discharge? Greater than 7 days   Comments regarding PCP Has a followup on 4/12/2022   What is preventing the patient from scheduling follow up appointments within 7 days of discharge? Earlier appointment not available   Has the patient kept scheduled appointments due by today? N/A   Pulse Ox monitoring Intermittent   O2 Sat: education provided Sat levels, Monitoring frequency, When to seek care   Psychosocial issues? No   What is the patient's perception of their health status since discharge? Improving   Is the patient weighing daily? Yes   Does the patient have scales? Yes   Daily weight interventions Education provided on importance of daily weight   Is the patient able to teach back Heart Failure diet management? Yes   Is the patient able to teach back Heart Failure Zones? Yes   Is the patient able to teach back signs and symptoms of worsening condition? (i.e. weight gain, shortness of air, etc.) Yes   If the patient is a current smoker, are they able to teach back resources for cessation? Not a smoker   Is the patient/caregiver able to teach back the hierarchy of who to call/visit for symptoms/problems? PCP,  Specialist, Home health nurse, Urgent Care, ED, 911 Yes   Additional teach back comments  CC number   CHF Week 2 call completed? Yes   Wrap up additional comments Wife reports he is improving but is still losing weight, appetite decreased. Blood sugar this a.m. 190 after breakfast. Scheduled to see doctor next week for review of BS and f/u from hospital.          DENYS KANG - Registered Nurse

## 2022-04-15 NOTE — OUTREACH NOTE
CHF Week 3 Survey    Flowsheet Row Responses   [facility] patient discharged from? [facility]   Does the patient have one of the following disease processes/diagnoses(primary or secondary)? CHF   Week 3 attempt successful? Yes   Call start time 1639   Call end time 1643   Discharge diagnosis Exertional dyspnea, Acute pulmonary edema, CHF   Person spoke with today (if not patient) and relationship wife Keisha and patient   Meds reviewed with patient/caregiver? Yes   Is the patient having any side effects they believe may be caused by any medication additions or changes? No   Does the patient have all medications ordered at discharge? Yes   Is the patient taking all medications as directed (includes completed medication regime)? Yes   Does the patient have a primary care provider?  Yes   Does the patient have an appointment with their PCP within 7 days of discharge? Yes   Has the patient kept scheduled appointments due by today? Yes   Psychosocial issues? No   Did the patient receive a copy of their discharge instructions? Yes   Nursing interventions Reviewed instructions with patient   What is the patient's perception of their health status since discharge? Improving   Nursing interventions Nurse provided patient education   Is the patient weighing daily? Yes   Does the patient have scales? Yes   Daily weight interventions Education provided on importance of daily weight   Is the patient able to teach back Heart Failure diet management? Yes   Is the patient able to teach back Heart Failure Zones? Yes   Is the patient able to teach back signs and symptoms of worsening condition? (i.e. weight gain, shortness of air, etc.) Yes   If the patient is a current smoker, are they able to teach back resources for cessation? Not a smoker   Is the patient/caregiver able to teach back the hierarchy of who to call/visit for symptoms/problems? PCP, Specialist, Home health nurse, Urgent Care, ED, 911 Yes   CHF Week 3 call completed?  Yes          COLLEEN W - Registered Nurse

## 2022-04-27 NOTE — PROGRESS NOTES
Remote device transmission reviewed.    Patient noted to have an episode of paroxysmal atrial flutter lasting 2 hours and 30 minutes on March 30.  Ventricular rates were in the 90s.    Patient is not currently on anticoagulation.    Will review with primary cardiologist

## 2022-04-29 NOTE — PROGRESS NOTES
Dr walden would like patient to be on aspirin 81mg which he already is and will discuss at follow up appt.

## 2022-05-03 PROBLEM — R06.00 DYSPNEA, UNSPECIFIED TYPE: Status: ACTIVE | Noted: 2022-01-01

## 2022-05-03 NOTE — H&P
Patient Care Team:  Antony Lawson MD as PCP - General  CarlosTam torres MD as Consulting Physician (Nephrology)  Minh Kendrick MD as Consulting Physician (Cardiology)  Chris Romero MD as Consulting Physician (Cardiology)    Chief complaint Shortness of breath    Subjective     Patient is a 89 y.o. male with valvular heart disease and severe right ventricular dysfunction who presents with progressive shortness of breath and orthopnea for 2 weeks, worse over the last 2 days.  He was hospitalized 6 weeks ago with similar symptoms.  Diuretics were increased during that hospitalization.  He initially felt well for the first few weeks but has noticed worsening shortness of breath recently.  He denies any chest pain, leg edema dizziness, palpitations or near syncope.    Review of Systems   Constitutional: Positive for activity change, appetite change and fatigue. Negative for chills, diaphoresis, fever and unexpected weight change.   HENT: Negative for facial swelling.    Respiratory: Positive for shortness of breath. Negative for cough, wheezing and stridor.    Cardiovascular: Negative for chest pain, palpitations and leg swelling.   Gastrointestinal: Negative for abdominal pain, constipation, diarrhea, nausea and vomiting.   Genitourinary: Negative for dysuria and hematuria.   Musculoskeletal: Negative for arthralgias and back pain.   Skin: Negative for rash and wound.   Neurological: Negative for dizziness, tremors, weakness, light-headedness and headaches.   Psychiatric/Behavioral: Negative for confusion.          History  Past Medical History:   Diagnosis Date   • Atrial flutter (HCC)    • CHF (congestive heart failure) (HCC)    • Chronic kidney disease    • Coronary artery disease    • Coronary artery disease involving native coronary artery of native heart 8/6/2019    Status post multivessel CABG 1993   • Diabetes mellitus (HCC)    • Essential hypertension 8/6/2019   • HFrEF (heart failure  with reduced ejection fraction) (HCC)    • Hyperlipidemia    • Hyperlipidemia, mixed 2019   • Hypertension    • Presence of cardiac pacemaker 2019    S/P dual-chamber pacemaker implanted    • Sick sinus syndrome (HCC) 2019   • Stroke (HCC)      Past Surgical History:   Procedure Laterality Date   • CARDIAC CATHETERIZATION     • CARDIAC ELECTROPHYSIOLOGY PROCEDURE N/A 2022    Procedure: Gen change-Malcom Malcom aware;  Surgeon: Minh Kendrick MD;  Location: [facility] CATH INVASIVE LOCATION;  Service: Cardiology;  Laterality: N/A;   • CORONARY ARTERY BYPASS GRAFT     • CYSTOSCOPY     • HERNIA REPAIR     • INSERT / REPLACE / REMOVE PACEMAKER      BS insertion   • PACEMAKER REPLACEMENT  2022    [facility]   • VENA CAVA FILTER PLACEMENT  2016     Family History   Problem Relation Age of Onset   • Leukemia Mother    • Heart disease Father    • Heart attack Father 56         age at 56   • Asthma Paternal Aunt      Social History     Tobacco Use   • Smoking status: Former Smoker     Quit date: 1970     Years since quittin.9   • Smokeless tobacco: Never Used   Vaping Use   • Vaping Use: Never used   Substance Use Topics   • Alcohol use: Never   • Drug use: Never     Medications Prior to Admission   Medication Sig Dispense Refill Last Dose   • allopurinol (ZYLOPRIM) 100 MG tablet Take 100 mg by mouth Daily.      • ascorbic acid (VITAMIN C) 1000 MG tablet Take 1,000 mg by mouth Daily.      • aspirin 81 MG tablet Take 81 mg by mouth every night at bedtime.      • Cholecalciferol (Vitamin D3) 50 MCG ( UT) capsule Take 2,000 Units by mouth 3 (Three) Times a Week. Monday, Wednesday and Friday      • dutasteride (AVODART) 0.5 MG capsule Take 0.5 mg by mouth Every Night.      • furosemide (LASIX) 40 MG tablet Take 1 tablet by mouth 2 (Two) Times a Day. 180 tablet 1    • metoprolol tartrate (LOPRESSOR) 25 MG tablet Take 25 mg by mouth 2 (Two) Times a Day.      • multivitamin  with minerals tablet tablet Take 1 tablet by mouth Daily.      • pravastatin (PRAVACHOL) 40 MG tablet Take 40 mg by mouth 1 (One) Time Per Week. Monday      • Probiotic Product (Probiotic Daily) capsule Take 1 capsule by mouth Daily.      • spironolactone (ALDACTONE) 25 MG tablet Take 0.5 tablets by mouth Daily. 45 tablet 1      Allergies:  Penicillin g and Vancomycin    Objective     Vital Signs  Temp:  [96.5 °F (35.8 °C)-97.7 °F (36.5 °C)] 97.7 °F (36.5 °C)  Heart Rate:  [64-81] 64  Resp:  [15-24] 16  BP: (120-149)/(52-75) 136/75     Physical Exam:      General Appearance:    Alert, cooperative, mild conversational dyspnea   Head:    Normocephalic, without obvious abnormality, atraumatic   Eyes:            Lids and lashes normal, conjunctivae and sclerae normal, no   icterus, no pallor, corneas clear, PERRLA   Ears:    Ears appear intact with no abnormalities noted   Throat:   No oral lesions, no thrush, oral mucosa moist   Neck:   No adenopathy, supple, trachea midline, no thyromegaly, no   carotid bruit, no JVD   Lungs:    Bibasilar crackles    Heart:    Regular rhythm and normal rate, 2/6 systolic high-pitched murmur   Chest Wall:    No abnormalities observed   Abdomen:     Normal bowel sounds, no masses, no organomegaly, soft        non-tender, non-distended, no guarding, no rebound                tenderness   Extremities:   Moves all extremities well, no edema, no cyanosis, no             redness   Pulses:   Pulses palpable and equal bilaterally   Skin:   No bleeding, bruising or rash   Lymph nodes:   No palpable adenopathy   Neurologic:   Cranial nerves 2 - 12 grossly intact, sensation intact, DTR       present and equal bilaterally       Results Review:     Imaging Results (Last 24 Hours)     Procedure Component Value Units Date/Time    XR Chest 1 View [[id number]] Collected: 05/03/22 1229     Updated: 05/03/22 1234    Narrative:      DATE OF EXAM:  5/3/2022 12:07 PM     PROCEDURE:  XR CHEST 1 VW-      INDICATIONS:  Shortness of breath. Cough for 4 months.     COMPARISON:  Chest radiographs 3/24/2022, 5/29/2021, and 5/27/2021. CT chest  3/24/2022     TECHNIQUE:   Single radiographic AP view of the chest was obtained.     FINDINGS:  Lordotic positioning. Overlying artifacts. Stable sternotomy wires,  postoperative changes from CABG, left chest wall cardiac pacer device.  Stable elevation of the right hemidiaphragm with improved aeration of  both lungs. Mild linear right infrahilar and left basilar segmental  atelectasis and/or scarring with mild interstitial thickening in the  lung bases. No focal lung consolidation. No pneumothorax. Stable  cardiomegaly, likely accentuated by technique. Calcified atherosclerotic  disease in the thoracic aorta. Chronic changes in both shoulders. No  acute osseous normality is identified.        Impression:      Stable cardiomegaly with improved aeration of both lungs. Multifocal  bibasilar subsegmental atelectasis and/or scarring with mild  interstitial thickening in the lung bases that could represent mild  pulmonary vascular congestion/edema, chronic lung disease, or less  likely atypical pneumonia.     Electronically Signed By-Lance Mendes MD On:5/3/2022 12:32 PM  This report was finalized on [id number] by  Lance Mendes MD.           Lab Results (last 24 hours)     Procedure Component Value Units Date/Time    COVID PRE-OP / PRE-PROCEDURE SCREENING ORDER (NO ISOLATION) - Swab, Nasopharynx [[id number]]  (Normal) Collected: 05/03/22 1310    Specimen: Swab from Nasopharynx Updated: 05/03/22 1355    Narrative:      The following orders were created for panel order COVID PRE-OP / PRE-PROCEDURE SCREENING ORDER (NO ISOLATION) - Swab, Nasopharynx.  Procedure                               Abnormality         Status                     ---------                               -----------         ------                     COVID-19,CEPHEID/ENRIQUE,CO...[[id number]]  Normal               Final result                 Please view results for these tests on the individual orders.    COVID-19,CEPHEID/ENRIQUE,COR/[facility]/PAD/[facility] IN-HOUSE(OR EMERGENT/ADD-ON),NP SWAB IN TRANSPORT MEDIA 3-4 HR TAT, RT-PCR - Swab, Nasopharynx [[id number]]  (Normal) Collected: 05/03/22 1310    Specimen: Swab from Nasopharynx Updated: 05/03/22 1355     COVID19 Not Detected    Narrative:      Fact sheet for providers: https://www.fda.gov/media/302000/download     Fact sheet for patients: https://www.fda.gov/media/259479/download  Fact sheet for providers: https://www.fda.gov/media/878285/download     Fact sheet for patients: https://www.fda.gov/media/604168/download    BNP [[id number]]  (Abnormal) Collected: 05/03/22 1156    Specimen: Blood Updated: 05/03/22 1233     proBNP 42,275.0 pg/mL     Narrative:      Among patients with dyspnea, NT-proBNP is highly sensitive for the detection of acute congestive heart failure. In addition NT-proBNP of <300 pg/ml effectively rules out acute congestive heart failure with 99% negative predictive value.    Results may be falsely decreased if patient taking Biotin.      Basic Metabolic Panel [[id number]]  (Abnormal) Collected: 05/03/22 1156    Specimen: Blood Updated: 05/03/22 1224     Glucose 125 mg/dL      BUN 62 mg/dL      Creatinine 1.66 mg/dL      Sodium 137 mmol/L      Potassium 4.1 mmol/L      Comment: Slight hemolysis detected by analyzer. Results may be affected.        Chloride 97 mmol/L      CO2 25.0 mmol/L      Calcium 9.7 mg/dL      BUN/Creatinine Ratio 37.3     Anion Gap 15.0 mmol/L      eGFR 38.7 mL/min/1.73      Comment: National Kidney Foundation and American Society of Nephrology (ASN) Task Force recommended calculation based on the Chronic Kidney Disease Epidemiology Collaboration (CKD-EPI) equation refit without adjustment for race.       Narrative:      GFR Normal >60  Chronic Kidney Disease <60  Kidney Failure <15      Troponin [[id number]]  (Normal) Collected: 05/03/22 1156     Specimen: Blood Updated: 05/03/22 1224     Troponin T 0.027 ng/mL     Narrative:      Troponin T Reference Range:  <= 0.03 ng/mL-   Negative for AMI  >0.03 ng/mL-     Abnormal for myocardial necrosis.  Clinicians would have to utilize clinical acumen, EKG, Troponin and serial changes to determine if it is an Acute Myocardial Infarction or myocardial injury due to an underlying chronic condition.       Results may be falsely decreased if patient taking Biotin.      CBC & Differential [[id number]]  (Abnormal) Collected: 05/03/22 1156    Specimen: Blood Updated: 05/03/22 1202    Narrative:      The following orders were created for panel order CBC & Differential.  Procedure                               Abnormality         Status                     ---------                               -----------         ------                     CBC Auto Differential[[id number]]        Abnormal            Final result                 Please view results for these tests on the individual orders.    CBC Auto Differential [[id number]]  (Abnormal) Collected: 05/03/22 1156    Specimen: Blood Updated: 05/03/22 1202     WBC 8.50 10*3/mm3      RBC 4.36 10*6/mm3      Hemoglobin 14.4 g/dL      Hematocrit 43.2 %      MCV 98.9 fL      MCH 33.1 pg      MCHC 33.4 g/dL      RDW 14.1 %      RDW-SD 48.6 fl      MPV 9.9 fL      Platelets 222 10*3/mm3      Neutrophil % 65.4 %      Lymphocyte % 18.9 %      Monocyte % 13.9 %      Eosinophil % 1.0 %      Basophil % 0.8 %      Neutrophils, Absolute 5.50 10*3/mm3      Lymphocytes, Absolute 1.60 10*3/mm3      Monocytes, Absolute 1.20 10*3/mm3      Eosinophils, Absolute 0.10 10*3/mm3      Basophils, Absolute 0.10 10*3/mm3      nRBC 0.0 /100 WBC            I reviewed the patient's new clinical results.    Assessment/Plan       Dyspnea, unspecified type    Exertional dyspnea [R06.00]   Acute pulmonary edema (HCC) [J81.0]   Carotid stenosis, non-symptomatic, bilateral [I65.23]   Sick sinus syndrome (HCC)  [I49.5]   Presence of cardiac pacemaker [Z95.0]   Essential hypertension [I10]   Hyperlipidemia, mixed [E78.2]   Coronary artery disease involving native coronary artery of native heart [I25.10]   CKD stage 3B    We will proceed with diuresis tonight.  Echocardiogram from March 2022 was reviewed.  Monitor renal function and electrolytes closely with diuresis.  I have asked his primary cardiologist, Dr. Romero, to consult.    I discussed the patient's findings and my recommendations with patient.     Kristen Crowell MD  05/03/22  18:07 EDT

## 2022-05-03 NOTE — ED PROVIDER NOTES
Subjective   Patient is a 89-year-old male complaint increasing shortness of breath over the past 1 week.  He states that shortness of breath is worse on exertion as well as when he lies flat for several hours.  He denies cough fever chest pain or other associated complaints.  The shortness of breath is moderate lasting hours at a time.          Review of Systems   Constitutional: Negative for diaphoresis, fatigue and fever.   HENT: Negative for congestion.    Eyes: Negative for visual disturbance.   Respiratory: Positive for shortness of breath. Negative for cough and chest tightness.    Cardiovascular: Negative for chest pain and palpitations.   Gastrointestinal: Negative for abdominal pain, diarrhea and vomiting.   Endocrine: Negative for polyuria.   Genitourinary: Negative for dysuria.   Musculoskeletal: Negative for back pain and neck pain.   Neurological: Negative for dizziness, weakness and headaches.   Psychiatric/Behavioral: Negative for confusion.   A complete review of systems was obtained and is otherwise negative    Past Medical History:   Diagnosis Date   • Atrial flutter ([facility])    • CHF (congestive heart failure) ([facility])    • Chronic kidney disease    • Coronary artery disease    • Coronary artery disease involving native coronary artery of native heart 8/6/2019    Status post multivessel CABG 1993   • Diabetes mellitus ([facility])    • Essential hypertension 8/6/2019   • HFrEF (heart failure with reduced ejection fraction) ([facility])    • Hyperlipidemia    • Hyperlipidemia, mixed 8/6/2019   • Hypertension    • Presence of cardiac pacemaker 8/6/2019    S/P dual-chamber pacemaker implanted 2009   • Sick sinus syndrome ([facility]) 8/6/2019   • Stroke ([facility])        Allergies   Allergen Reactions   • Penicillin G Rash   • Vancomycin Rash       Past Surgical History:   Procedure Laterality Date   • CARDIAC CATHETERIZATION     • CARDIAC ELECTROPHYSIOLOGY PROCEDURE N/A 1/17/2022    Procedure: [facility] aware;   Surgeon: Minh Kendrick MD;  Location: [facility] CATH INVASIVE LOCATION;  Service: Cardiology;  Laterality: N/A;   • CORONARY ARTERY BYPASS GRAFT     • CYSTOSCOPY     • HERNIA REPAIR     • INSERT / REPLACE / REMOVE PACEMAKER      BS insertion   • PACEMAKER REPLACEMENT  2022    [facility]   • VENA CAVA FILTER PLACEMENT  2016       Family History   Problem Relation Age of Onset   • Leukemia Mother    • Heart disease Father    • Heart attack Father 56         age at 56   • Asthma Paternal Aunt        Social History     Socioeconomic History   • Marital status:    Tobacco Use   • Smoking status: Former Smoker     Quit date: 1970     Years since quittin.9   • Smokeless tobacco: Never Used   Vaping Use   • Vaping Use: Never used   Substance and Sexual Activity   • Alcohol use: Never   • Drug use: Never   • Sexual activity: Defer           Objective   Physical Exam  Neurologic exam is nonfocal.  HEENT exam shows TMs to be clear.  Oropharynx is clear moist.  Sclera is nonicteric.  Neck has no adenopathy JVD or bruits.  Lungs are significant rales at the base.  Heart has a regular rate rhythm without murmur rub or gallop.  Chest is nontender.  Abdomen is soft nontender.  Patient has normal bowel sounds without rebound or guarding.  Back has no CVA tenderness.  Extremity exam has no cyanosis or edema.  Procedures       My EKG interpretation shows normal sinus rhythm at a rate of 77 with no acute ST change.  This is unchanged from previous tracing on 3/24/2022.    ED Course      Results for orders placed or performed during the hospital encounter of 22   Basic Metabolic Panel    Specimen: Blood   Result Value Ref Range    Glucose 125 (H) 65 - 99 mg/dL    BUN 62 (H) 8 - 23 mg/dL    Creatinine 1.66 (H) 0.76 - 1.27 mg/dL    Sodium 137 136 - 145 mmol/L    Potassium 4.1 3.5 - 5.2 mmol/L    Chloride 97 (L) 98 - 107 mmol/L    CO2 25.0 22.0 - 29.0 mmol/L    Calcium 9.7 (H) 8.2 - 9.6 mg/dL     BUN/Creatinine Ratio 37.3 (H) 7.0 - 25.0    Anion Gap 15.0 5.0 - 15.0 mmol/L    eGFR 38.7 (L) >60.0 mL/min/1.73   BNP    Specimen: Blood   Result Value Ref Range    proBNP 42,275.0 (H) 0.0 - 1,800.0 pg/mL   Troponin    Specimen: Blood   Result Value Ref Range    Troponin T 0.027 0.000 - 0.030 ng/mL   CBC Auto Differential    Specimen: Blood   Result Value Ref Range    WBC 8.50 3.40 - 10.80 10*3/mm3    RBC 4.36 4.14 - 5.80 10*6/mm3    Hemoglobin 14.4 13.0 - 17.7 g/dL    Hematocrit 43.2 37.5 - 51.0 %    MCV 98.9 (H) 79.0 - 97.0 fL    MCH 33.1 (H) 26.6 - 33.0 pg    MCHC 33.4 31.5 - 35.7 g/dL    RDW 14.1 12.3 - 15.4 %    RDW-SD 48.6 37.0 - 54.0 fl    MPV 9.9 6.0 - 12.0 fL    Platelets 222 140 - 450 10*3/mm3    Neutrophil % 65.4 42.7 - 76.0 %    Lymphocyte % 18.9 (L) 19.6 - 45.3 %    Monocyte % 13.9 (H) 5.0 - 12.0 %    Eosinophil % 1.0 0.3 - 6.2 %    Basophil % 0.8 0.0 - 1.5 %    Neutrophils, Absolute 5.50 1.70 - 7.00 10*3/mm3    Lymphocytes, Absolute 1.60 0.70 - 3.10 10*3/mm3    Monocytes, Absolute 1.20 (H) 0.10 - 0.90 10*3/mm3    Eosinophils, Absolute 0.10 0.00 - 0.40 10*3/mm3    Basophils, Absolute 0.10 0.00 - 0.20 10*3/mm3    nRBC 0.0 0.0 - 0.2 /100 WBC   ECG 12 Lead   Result Value Ref Range    QT Interval 416 ms     XR Chest 1 View    Result Date: 5/3/2022  Stable cardiomegaly with improved aeration of both lungs. Multifocal bibasilar subsegmental atelectasis and/or scarring with mild interstitial thickening in the lung bases that could represent mild pulmonary vascular congestion/edema, chronic lung disease, or less likely atypical pneumonia.  Electronically Signed By-Lance Mendes MD On:5/3/2022 12:32 PM This report was finalized on [id number] by  Lance Mendes MD.                                               MDM  Number of Diagnoses or Management Options  Diagnosis management comments: My x-ray interpretation shows no cardiomegaly fusion or infiltrate.  Patient has no evidence of acute coronary syndrome  based on EKG and troponin.  Patient's BNP is great elevated greater than 40,000.  When I reviewed the patient's old chart it is noted the patient's BNP generally runs between 10 and 15,000.  His renal insufficiency is worse than usual today as well.  There is no evidence of electrolyte abnormality.  There is no evidence acute infectious process.  Patient will be admitted.  Plan to treat will be further diuresis and cardiac evaluation.  I did speak to the on-call family physician.       Amount and/or Complexity of Data Reviewed  Clinical lab tests: reviewed  Tests in the radiology section of CPT®: reviewed  Tests in the medicine section of CPT®: reviewed    Risk of Complications, Morbidity, and/or Mortality  Presenting problems: high  Diagnostic procedures: high  Management options: high    Patient Progress  Patient progress: stable      Final diagnoses:   Dyspnea, unspecified type   Acute on chronic congestive heart failure, unspecified heart failure type (HCC)       ED Disposition  ED Disposition     ED Disposition   Decision to Admit    Condition   --    Comment   --             No follow-up provider specified.       Medication List      No changes were made to your prescriptions during this visit.          Earl Paul MD  05/03/22 3558

## 2022-05-04 NOTE — PLAN OF CARE
Goal Outcome Evaluation:        Patient is AOX4. Patient is is up in chair with son at bedside. Patient was complaining of SOB and hot flashes when walking. I did educate on asking for assistance and assessed oxygenations. Patient is cooperative and pleasant. No new nursing concerns.

## 2022-05-04 NOTE — PROGRESS NOTES
LOS: 1 day   Patient Care Team:  Antony Lawson MD as PCP - General  Tam Gonzalez MD as Consulting Physician (Nephrology)  Minh Kendrick MD as Consulting Physician (Cardiology)  Chris Romero MD as Consulting Physician (Cardiology)    Subjective:  Better overall with less shortness of breath    Objective:   Afebrile      Review of Systems:   Review of Systems   Constitutional: Positive for activity change.   Respiratory: Positive for shortness of breath.    Neurological: Positive for weakness.           Vital Signs  Temp:  [96.5 °F (35.8 °C)-98.4 °F (36.9 °C)] 97.3 °F (36.3 °C)  Heart Rate:  [60-81] 73  Resp:  [15-24] 17  BP: (118-152)/(52-76) 118/61    Physical Exam:  Physical Exam  Vitals and nursing note reviewed.   Cardiovascular:      Rate and Rhythm: Normal rate.      Heart sounds: Normal heart sounds.   Pulmonary:      Breath sounds: Normal breath sounds.   Skin:     General: Skin is warm.          Radiology:  XR Chest 1 View    Result Date: 5/3/2022  Stable cardiomegaly with improved aeration of both lungs. Multifocal bibasilar subsegmental atelectasis and/or scarring with mild interstitial thickening in the lung bases that could represent mild pulmonary vascular congestion/edema, chronic lung disease, or less likely atypical pneumonia.  Electronically Signed By-Lance Mendes MD On:5/3/2022 12:32 PM This report was finalized on [id number] by  Lance Mendes MD.         Results Review:     I reviewed the patient's new clinical results.  I reviewed the patient's new imaging results and agree with the interpretation.    Medication Review:   Scheduled Meds:allopurinol, 100 mg, Oral, Daily  aspirin, 81 mg, Oral, Daily  atorvastatin, 10 mg, Oral, Daily  enoxaparin, 30 mg, Subcutaneous, Daily  finasteride, 5 mg, Oral, Daily  metoprolol tartrate, 25 mg, Oral, BID  sodium chloride, 3 mL, Intravenous, Q12H      Continuous Infusions:   PRN Meds:.•  acetaminophen  •  nitroglycerin  •   ondansetron  •  [COMPLETED] Insert peripheral IV **AND** sodium chloride  •  sodium chloride    Labs:    CBC    Results from last 7 days   Lab Units 05/04/22  0538 05/03/22  1156   WBC 10*3/mm3 7.70 8.50   HEMOGLOBIN g/dL 13.1 14.4   PLATELETS 10*3/mm3 204 222     BMP   Results from last 7 days   Lab Units 05/04/22  0538 05/03/22  1156   SODIUM mmol/L 139 137   POTASSIUM mmol/L 3.5 4.1   CHLORIDE mmol/L 100 97*   CO2 mmol/L 24.0 25.0   BUN mg/dL 56* 62*   CREATININE mg/dL 1.54* 1.66*   GLUCOSE mg/dL 118* 125*     Cr Clearance Estimated Creatinine Clearance: 24.3 mL/min (A) (by C-G formula based on SCr of 1.54 mg/dL (H)).  Coag     HbA1C No results found for: HGBA1C  Blood Glucose   Glucose   Date/Time Value Ref Range Status   05/04/2022 0704 130 (H) 70 - 105 mg/dL Final     Comment:     Serial Number: [id number]:  [id number]     Infection     CMP   Results from last 7 days   Lab Units 05/04/22  0538 05/03/22  1156   SODIUM mmol/L 139 137   POTASSIUM mmol/L 3.5 4.1   CHLORIDE mmol/L 100 97*   CO2 mmol/L 24.0 25.0   BUN mg/dL 56* 62*   CREATININE mg/dL 1.54* 1.66*   GLUCOSE mg/dL 118* 125*     UA      Radiology(recent) XR Chest 1 View    Result Date: 5/3/2022  Stable cardiomegaly with improved aeration of both lungs. Multifocal bibasilar subsegmental atelectasis and/or scarring with mild interstitial thickening in the lung bases that could represent mild pulmonary vascular congestion/edema, chronic lung disease, or less likely atypical pneumonia.  Electronically Signed By-Lance Mendes MD On:5/3/2022 12:32 PM This report was finalized on [id number] by  Lance Mendes MD.     Assessment:  Acute shortness of breath  Acute pulmonary edema  History of sick sinus syndrome  Acute exacerbation of chronic systolic congestive heart failure  History of pacemaker placement with replacement of generator 1/22  Atherosclerotic heart disease of native coronary arteries with angina pectoris  History of coronary artery bypass  grafting in 1993  HFrEF  Chronic atrial fibrillation, persistent intermittent  Hypercoagulable state secondary to atrial fibrillation  Cerebrovascular disease with history of CVA  History of carotid occlusive disease  Chronic kidney disease stage IIIa  Hypertension associated chronic kidney disease stage IIIa  Degenerative joint disease  Hypokalemia  Bilateral upper lobe pulmonary nodularities  Gastroesophageal reflux disease without esophagitis      Plan:  Diuresis//supportive care        Antony Lawson MD  05/04/22  07:44 EDT

## 2022-05-04 NOTE — CONSULTS
Cardiology consult note  Chris Romero MD, PhD      Patient Care Team:  Antony Lawson MD as PCP - General  Tam Gonzalez MD as Consulting Physician (Nephrology)  Minh Kendrick MD as Consulting Physician (Cardiology)  Chris Romero MD as Consulting Physician (Cardiology)    CHIEF COMPLAINT: Shortness of breath dyspnea on exertion    HISTORY OF PRESENT ILLNESS:    This is a 89-year-old gentleman who I saw in clinic previously with dual-chamber pacemaker, sick sinus syndrome, recent generator change, cardiomyopathy with EF of 35 to 40% historically with chronic systolic and diastolic CHF, CKD, multivessel CAD with bypass remotely 1993 with 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  He presents with worsening dyspnea on exertion and feeling like he is smothering laying flat essentially orthopneic.  He had a recent admission in March just 6 to 8 weeks ago for very similar occurrence of dyspnea on exertion, orthopnea and PND.  He responded well to gentle diuretics and was discharged in 2 to 3 days.  He received Lasix in the ER as well as late yesterday with much improvement in shortness of breath he is on room air at bedside with wife at bedside.  We did discuss his narrow therapeutic window when he comes to his volume status with likely 500 mL being too much or too little with clinical dehydration versus volume overload.  No other questions concerns no fevers chill sweats nausea vomiting or diarrhea.     Telemetry reviewed by me demonstrates atrially paced rhythm     Stress testing 10 months ago with fixed inferior and septal defects on nuclear imaging, EF 34%     Carotid ultrasounds demonstrate moderate bilateral disease but nothing critical    Review of systems otherwise negative x14 point review of systems except was mentioned above    Historical data copied forward from previous encounters in EMR is unchanged      Past Medical History:    Diagnosis Date   • Atrial flutter ([facility]ly [facility])    • CHF (congestive heart failure) (Formerly [facility])    • Chronic kidney disease    • Coronary artery disease    • Coronary artery disease involving native coronary artery of native heart 2019    Status post multivessel CABG    • Diabetes mellitus ([facility]ly [facility])    • Essential hypertension 2019   • HFrEF (heart failure with reduced ejection fraction) (Formerly [facility])    • Hyperlipidemia    • Hyperlipidemia, mixed 2019   • Hypertension    • Presence of cardiac pacemaker 2019    S/P dual-chamber pacemaker implanted    • Sick sinus syndrome ([facility]) 2019   • Stroke (Formerly [facility])      Past Surgical History:   Procedure Laterality Date   • CARDIAC CATHETERIZATION     • CARDIAC ELECTROPHYSIOLOGY PROCEDURE N/A 2022    Procedure: Gen change-[place] aware;  Surgeon: Minh Kendrick MD;  Location: [facility] INVASIVE LOCATION;  Service: Cardiology;  Laterality: N/A;   • CORONARY ARTERY BYPASS GRAFT     • CYSTOSCOPY     • HERNIA REPAIR     • INSERT / REPLACE / REMOVE PACEMAKER  2010 insertion   • PACEMAKER REPLACEMENT  2022    [facility]   • VENA CAVA FILTER PLACEMENT  2016     Family History   Problem Relation Age of Onset   • Leukemia Mother    • Heart disease Father    • Heart attack Father 56         age at 56   • Asthma Paternal Aunt      Social History     Tobacco Use   • Smoking status: Former Smoker     Quit date: 1970     Years since quittin.9   • Smokeless tobacco: Never Used   Vaping Use   • Vaping Use: Never used   Substance Use Topics   • Alcohol use: Never   • Drug use: Never     Medications Prior to Admission   Medication Sig Dispense Refill Last Dose   • allopurinol (ZYLOPRIM) 100 MG tablet Take 100 mg by mouth Daily.      • ascorbic acid (VITAMIN C) 1000 MG tablet Take 1,000 mg by mouth Daily.      • aspirin 81 MG tablet Take 81 mg by mouth every night at bedtime.      • Cholecalciferol (Vitamin D3) 50 MCG (2000 UT) capsule Take 2,000  "Units by mouth 3 (Three) Times a Week. Monday, Wednesday and Friday      • dutasteride (AVODART) 0.5 MG capsule Take 0.5 mg by mouth Every Night.      • furosemide (LASIX) 40 MG tablet Take 1 tablet by mouth 2 (Two) Times a Day. 180 tablet 1    • metoprolol tartrate (LOPRESSOR) 25 MG tablet Take 25 mg by mouth 2 (Two) Times a Day.      • multivitamin with minerals tablet tablet Take 1 tablet by mouth Daily.      • pravastatin (PRAVACHOL) 40 MG tablet Take 40 mg by mouth 1 (One) Time Per Week. Monday      • Probiotic Product (Probiotic Daily) capsule Take 1 capsule by mouth Daily.      • spironolactone (ALDACTONE) 25 MG tablet Take 0.5 tablets by mouth Daily. 45 tablet 1      Allergies:  Penicillin g and Vancomycin    REVIEW OF SYSTEMS:  Please see the above history of present illness for pertinent positives and negatives.  The remainder of the patient's systems have been reviewed and are negative.     Vital Signs  Temp:  [96.5 °F (35.8 °C)-98.4 °F (36.9 °C)] 97.3 °F (36.3 °C)  Heart Rate:  [60-81] 73  Resp:  [15-24] 17  BP: (118-152)/(52-76) 118/61    Flowsheet Rows    Flowsheet Row First Filed Value   Admission Height 172.7 cm (68\") Documented at 05/03/2022 1114   Admission Weight 58.2 kg (128 lb 4.9 oz) Documented at 05/03/2022 1114           Physical Exam:  Physical Exam   Constitutional: Patient appears well-developed and well-nourished and in no acute distress   HEENT: Appears stated age, elderly male  Head: Normocephalic and atraumatic.   Eyes:  Pupils are equal, round, and reactive to light. EOM are intact. Sclerae are anicteric and noninjected.  Mouth and Throat: Patient has moist mucous membranes. Oropharynx is clear of any erythema or exudate.     Neck: Neck supple.  Mild JVD present.  CVP  estimated 10 no thyromegaly present. No lymphadenopathy present.  Cardiovascular: Regular rate, regular rhythm, paced S1 normal and S2 normal.  Exam reveals no gallop and no friction rub.  1 out of 6 systolic ejection " murmur lower sternal border  Pulmonary/Chest: Lungs are clear to auscultation bilaterally. No respiratory distress. No wheezes. No rhonchi. No rales. Mildly diminished bases no gross crackles  Abdominal: Soft. Bowel sounds are normal. No distension and no mass. There is no hepatosplenomegaly. There is no tenderness.   Musculoskeletal: Global muscle wasting typical for age at 89  Extremities: No edema peripherally. Pulses are palpable in all 4 extremities.  Neurological: Patient is alert and oriented to person, place, and time. Cranial nerves II-XII are grossly intact with no focal deficits.  Skin: Skin is warm. No rash noted. Nails show no clubbing.  No cyanosis or erythema.     Results Review:    I reviewed the patient's new clinical results.  Lab Results (most recent)     Procedure Component Value Units Date/Time    POC Glucose Once [[id number]]  (Abnormal) Collected: 05/04/22 0704    Specimen: Blood Updated: 05/04/22 0706     Glucose 130 mg/dL      Comment: Serial Number: [id number]:  [id number]       Basic Metabolic Panel [[id number]]  (Abnormal) Collected: 05/04/22 0538    Specimen: Blood Updated: 05/04/22 0659     Glucose 118 mg/dL      BUN 56 mg/dL      Creatinine 1.54 mg/dL      Sodium 139 mmol/L      Potassium 3.5 mmol/L      Chloride 100 mmol/L      CO2 24.0 mmol/L      Calcium 8.8 mg/dL      BUN/Creatinine Ratio 36.4     Anion Gap 15.0 mmol/L      eGFR 42.3 mL/min/1.73      Comment: National Kidney Foundation and American Society of Nephrology (ASN) Task Force recommended calculation based on the Chronic Kidney Disease Epidemiology Collaboration (CKD-EPI) equation refit without adjustment for race.       Narrative:      GFR Normal >60  Chronic Kidney Disease <60  Kidney Failure <15      CBC & Differential [[id number]]  (Abnormal) Collected: 05/04/22 0538    Specimen: Blood Updated: 05/04/22 0643    Narrative:      The following orders were created for panel order CBC & Differential.  Procedure                                Abnormality         Status                     ---------                               -----------         ------                     CBC Auto Differential[[id number]]        Abnormal            Final result                 Please view results for these tests on the individual orders.    CBC Auto Differential [[id number]]  (Abnormal) Collected: 05/04/22 0538    Specimen: Blood Updated: 05/04/22 0643     WBC 7.70 10*3/mm3      RBC 3.95 10*6/mm3      Hemoglobin 13.1 g/dL      Hematocrit 38.7 %      MCV 98.1 fL      MCH 33.1 pg      MCHC 33.7 g/dL      RDW 14.2 %      RDW-SD 48.6 fl      MPV 9.7 fL      Platelets 204 10*3/mm3      Neutrophil % 64.3 %      Lymphocyte % 20.0 %      Monocyte % 13.9 %      Eosinophil % 1.2 %      Basophil % 0.6 %      Neutrophils, Absolute 4.90 10*3/mm3      Lymphocytes, Absolute 1.50 10*3/mm3      Monocytes, Absolute 1.10 10*3/mm3      Eosinophils, Absolute 0.10 10*3/mm3      Basophils, Absolute 0.00 10*3/mm3      nRBC 0.1 /100 WBC     COVID PRE-OP / PRE-PROCEDURE SCREENING ORDER (NO ISOLATION) - Swab, Nasopharynx [[id number]]  (Normal) Collected: 05/03/22 1310    Specimen: Swab from Nasopharynx Updated: 05/03/22 1355    Narrative:      The following orders were created for panel order COVID PRE-OP / PRE-PROCEDURE SCREENING ORDER (NO ISOLATION) - Swab, Nasopharynx.  Procedure                               Abnormality         Status                     ---------                               -----------         ------                     COVID-19,CEPHEID/ENRIQUE,CO...[[id number]]  Normal              Final result                 Please view results for these tests on the individual orders.    COVID-19,CEPHEID/ENRIQUE,COR/EDSON/PAD/JAZMIN IN-HOUSE(OR EMERGENT/ADD-ON),NP SWAB IN TRANSPORT MEDIA 3-4 HR TAT, RT-PCR - Swab, Nasopharynx [[id number]]  (Normal) Collected: 05/03/22 1310    Specimen: Swab from Nasopharynx Updated: 05/03/22 1355     COVID19 Not Detected    Narrative:      Fact  sheet for providers: https://www.fda.gov/media/405271/download     Fact sheet for patients: https://www.fda.gov/media/512961/download  Fact sheet for providers: https://www.fda.gov/media/168293/download     Fact sheet for patients: https://www.fda.gov/media/566463/download    BNP [[id number]]  (Abnormal) Collected: 05/03/22 1156    Specimen: Blood Updated: 05/03/22 1233     proBNP 42,275.0 pg/mL     Narrative:      Among patients with dyspnea, NT-proBNP is highly sensitive for the detection of acute congestive heart failure. In addition NT-proBNP of <300 pg/ml effectively rules out acute congestive heart failure with 99% negative predictive value.    Results may be falsely decreased if patient taking Biotin.      Basic Metabolic Panel [[id number]]  (Abnormal) Collected: 05/03/22 1156    Specimen: Blood Updated: 05/03/22 1224     Glucose 125 mg/dL      BUN 62 mg/dL      Creatinine 1.66 mg/dL      Sodium 137 mmol/L      Potassium 4.1 mmol/L      Comment: Slight hemolysis detected by analyzer. Results may be affected.        Chloride 97 mmol/L      CO2 25.0 mmol/L      Calcium 9.7 mg/dL      BUN/Creatinine Ratio 37.3     Anion Gap 15.0 mmol/L      eGFR 38.7 mL/min/1.73      Comment: National Kidney Foundation and American Society of Nephrology (ASN) Task Force recommended calculation based on the Chronic Kidney Disease Epidemiology Collaboration (CKD-EPI) equation refit without adjustment for race.       Narrative:      GFR Normal >60  Chronic Kidney Disease <60  Kidney Failure <15      Troponin [[id number]]  (Normal) Collected: 05/03/22 1156    Specimen: Blood Updated: 05/03/22 1224     Troponin T 0.027 ng/mL     Narrative:      Troponin T Reference Range:  <= 0.03 ng/mL-   Negative for AMI  >0.03 ng/mL-     Abnormal for myocardial necrosis.  Clinicians would have to utilize clinical acumen, EKG, Troponin and serial changes to determine if it is an Acute Myocardial Infarction or myocardial injury due to an underlying  chronic condition.       Results may be falsely decreased if patient taking Biotin.      CBC & Differential [[id number]]  (Abnormal) Collected: 05/03/22 1156    Specimen: Blood Updated: 05/03/22 1202    Narrative:      The following orders were created for panel order CBC & Differential.  Procedure                               Abnormality         Status                     ---------                               -----------         ------                     CBC Auto Differential[[id number]]        Abnormal            Final result                 Please view results for these tests on the individual orders.    CBC Auto Differential [[id number]]  (Abnormal) Collected: 05/03/22 1156    Specimen: Blood Updated: 05/03/22 1202     WBC 8.50 10*3/mm3      RBC 4.36 10*6/mm3      Hemoglobin 14.4 g/dL      Hematocrit 43.2 %      MCV 98.9 fL      MCH 33.1 pg      MCHC 33.4 g/dL      RDW 14.1 %      RDW-SD 48.6 fl      MPV 9.9 fL      Platelets 222 10*3/mm3      Neutrophil % 65.4 %      Lymphocyte % 18.9 %      Monocyte % 13.9 %      Eosinophil % 1.0 %      Basophil % 0.8 %      Neutrophils, Absolute 5.50 10*3/mm3      Lymphocytes, Absolute 1.60 10*3/mm3      Monocytes, Absolute 1.20 10*3/mm3      Eosinophils, Absolute 0.10 10*3/mm3      Basophils, Absolute 0.10 10*3/mm3      nRBC 0.0 /100 WBC           Imaging Results (Most Recent)     Procedure Component Value Units Date/Time    XR Chest 1 View [[id number]] Collected: 05/03/22 1229     Updated: 05/03/22 1234    Narrative:      DATE OF EXAM:  5/3/2022 12:07 PM     PROCEDURE:  XR CHEST 1 VW-     INDICATIONS:  Shortness of breath. Cough for 4 months.     COMPARISON:  Chest radiographs 3/24/2022, 5/29/2021, and 5/27/2021. CT chest  3/24/2022     TECHNIQUE:   Single radiographic AP view of the chest was obtained.     FINDINGS:  Lordotic positioning. Overlying artifacts. Stable sternotomy wires,  postoperative changes from CABG, left chest wall cardiac pacer device.  Stable elevation  of the right hemidiaphragm with improved aeration of  both lungs. Mild linear right infrahilar and left basilar segmental  atelectasis and/or scarring with mild interstitial thickening in the  lung bases. No focal lung consolidation. No pneumothorax. Stable  cardiomegaly, likely accentuated by technique. Calcified atherosclerotic  disease in the thoracic aorta. Chronic changes in both shoulders. No  acute osseous normality is identified.        Impression:      Stable cardiomegaly with improved aeration of both lungs. Multifocal  bibasilar subsegmental atelectasis and/or scarring with mild  interstitial thickening in the lung bases that could represent mild  pulmonary vascular congestion/edema, chronic lung disease, or less  likely atypical pneumonia.     Electronically Signed By-Lance Mendes MD On:5/3/2022 12:32 PM  This report was finalized on [id number] by  Lance Mendes MD.        reviewed    ECG/EMG Results (most recent)     Procedure Component Value Units Date/Time    ECG 12 Lead [[id number]] Collected: 05/03/22 1127     Updated: 05/03/22 1128     QT Interval 416 ms     Narrative:      HEART RATE= 77  bpm  RR Interval= 788  ms  CO Interval= 158  ms  P Horizontal Axis= 24  deg  P Front Axis= 43  deg  QRSD Interval= 116  ms  QT Interval= 416  ms  QRS Axis= -28  deg  T Wave Axis= 134  deg  - ABNORMAL ECG -  Sinus rhythm  Ventricular premature complex  Incomplete left bundle branch block  LVH with secondary repolarization abnormality  When compared with ECG of 24-Mar-2022 9:38:59,  Nonspecific significant change  Electronically Signed By:   Date and Time of Study: 2022-05-03 11:27:18    SCANNED - TELEMETRY   [[id number]] Resulted: 05/03/22     Updated: 05/04/22 1057        reviewed    Assessment/Plan     Acute on chronic systolic and diastolic CHF      Acute on chronic systolic and diastolic CHF  EF 35%  Dual-chamber pacemaker well-functioning  No ICD upgrade at family discretion previously  Lasix gently,  nephrology on board appreciate recommendations  Room air is on 99% sitting up at bedside  Blood pressures controlled  Elevated BNP  No need to repeat echo at this time, diurese per renal function electrolytes and clinical course  Troponin negative no evidence of acute MI  Creatinine stable at 1.5  Lasix home dose twice daily,  Off Aldactone at this time  Blood pressure will likely not tolerate ARB as well as kidney function  Ischemic neuropathy CAD, no chest pain     Continue diuretics, gentle volume off  Likely discharge in the next 24 to 48 hours and if it goes well  Discussed narrow therapeutic window salt and free water restriction at home  Further recommendation follow findings clinical course and hemodynamic response    I discussed the patient's findings and my recommendations with patient and staff and family    Chris Romero MD  05/04/22  11:02 EDT

## 2022-05-04 NOTE — CONSULTS
NEPHROLOGY CONSULTATION-----[facility]    [facility]  [phone]  Tam Gonzalez MD    Patient Care Team:  Antony Lawson MD as PCP - General  Tam Gonzalez MD as Consulting Physician (Nephrology)  Minh Kendrick MD as Consulting Physician (Cardiology)  Chris Romero MD as Consulting Physician (Cardiology)    CC/REASON FOR CONSULTATION: CKD/fluid overload    PHYSICIAN REQUESTING CONSULTATION: Dr. Lawson    History of Present Illness  89-year-old male with past medical history of CKD stage III, CHF, coronary disease presented with progressive shortness of breath.  He is admitted with CHF.  His BNP was over 40,000.  He is followed in office for CKD.  Baseline creatinine 1.4-1.5.  No chest pain.  No vomiting no diarrhea.  No dysuria or gross hematuria. Feels much better this am. ECHO from March 2022 showed EF 35-40%.     Review of Systems   As noted above otherwise 10 systems reviewed and were negative.    Past Medical History:   Diagnosis Date   • Atrial flutter (HCC)    • CHF (congestive heart failure) ([facility])    • Chronic kidney disease    • Coronary artery disease    • Coronary artery disease involving native coronary artery of native heart 8/6/2019    Status post multivessel CABG 1993   • Diabetes mellitus ([facility])    • Essential hypertension 8/6/2019   • HFrEF (heart failure with reduced ejection fraction) ([facility])    • Hyperlipidemia    • Hyperlipidemia, mixed 8/6/2019   • Hypertension    • Presence of cardiac pacemaker 8/6/2019    S/P dual-chamber pacemaker implanted 2009   • Sick sinus syndrome (HCC) 8/6/2019   • Stroke ([facility])        Past Surgical History:   Procedure Laterality Date   • CARDIAC CATHETERIZATION     • CARDIAC ELECTROPHYSIOLOGY PROCEDURE N/A 1/17/2022    Procedure: Gen change-[place] aware;  Surgeon: Minh Kendrick MD;  Location: [facility] CATH INVASIVE LOCATION;  Service: Cardiology;  Laterality: N/A;   • CORONARY  ARTERY BYPASS GRAFT     • CYSTOSCOPY     • HERNIA REPAIR     • INSERT / REPLACE / REMOVE PACEMAKER      BS insertion   • PACEMAKER REPLACEMENT  2022    [facility]   • VENA CAVA FILTER PLACEMENT  2016       Family History   Problem Relation Age of Onset   • Leukemia Mother    • Heart disease Father    • Heart attack Father 56         age at 56   • Asthma Paternal Aunt        Social History     Tobacco Use   • Smoking status: Former Smoker     Quit date: 1970     Years since quittin.9   • Smokeless tobacco: Never Used   Vaping Use   • Vaping Use: Never used   Substance Use Topics   • Alcohol use: Never   • Drug use: Never       Home Meds:   Medications Prior to Admission   Medication Sig Dispense Refill Last Dose   • allopurinol (ZYLOPRIM) 100 MG tablet Take 100 mg by mouth Daily.      • ascorbic acid (VITAMIN C) 1000 MG tablet Take 1,000 mg by mouth Daily.      • aspirin 81 MG tablet Take 81 mg by mouth every night at bedtime.      • Cholecalciferol (Vitamin D3) 50 MCG (2000 UT) capsule Take 2,000 Units by mouth 3 (Three) Times a Week. Monday, Wednesday and Friday      • dutasteride (AVODART) 0.5 MG capsule Take 0.5 mg by mouth Every Night.      • furosemide (LASIX) 40 MG tablet Take 1 tablet by mouth 2 (Two) Times a Day. 180 tablet 1    • metoprolol tartrate (LOPRESSOR) 25 MG tablet Take 25 mg by mouth 2 (Two) Times a Day.      • multivitamin with minerals tablet tablet Take 1 tablet by mouth Daily.      • pravastatin (PRAVACHOL) 40 MG tablet Take 40 mg by mouth 1 (One) Time Per Week. Monday      • Probiotic Product (Probiotic Daily) capsule Take 1 capsule by mouth Daily.      • spironolactone (ALDACTONE) 25 MG tablet Take 0.5 tablets by mouth Daily. 45 tablet 1        Scheduled Meds:  allopurinol, 100 mg, Oral, Daily  aspirin, 81 mg, Oral, Daily  atorvastatin, 10 mg, Oral, Daily  enoxaparin, 30 mg, Subcutaneous, Daily  finasteride, 5 mg, Oral, Daily  metoprolol tartrate, 25 mg, Oral,  BID  sodium chloride, 3 mL, Intravenous, Q12H        Continuous Infusions:       PRN Meds:  •  acetaminophen  •  nitroglycerin  •  ondansetron  •  [COMPLETED] Insert peripheral IV **AND** sodium chloride  •  sodium chloride    Allergies:  Penicillin g and Vancomycin    OBJECTIVE    Vital Signs  Temp:  [96.5 °F (35.8 °C)-98.4 °F (36.9 °C)] 97.3 °F (36.3 °C)  Heart Rate:  [60-81] 73  Resp:  [15-24] 17  BP: (118-152)/(52-76) 118/61    I/O this shift:  In: 240 [P.O.:240]  Out: -   I/O last 3 completed shifts:  In: 240 [P.O.:240]  Out: 500 [Urine:500]    Physical Exam:  General Appearance: alert, appears stated age and cooperative  Head: normocephalic, without obvious abnormality and atraumatic  Eyes: conjunctivae and sclerae normal and no icterus  Neck: supple and no JVD  Lungs: clear to auscultation and respirations regular  Heart: regular rhythm & normal rate and normal S1, S2  Chest Wall: no abnormalities observed  Abdomen: normal bowel sounds and soft non-tender  Extremities: moves extremities well, no edema, no cyanosis  Skin: no bleeding, bruising or rash  Neurologic: Alert, and oriented. No focal deficits    Results Review:    I reviewed the patient's new clinical results.    WBC WBC   Date Value Ref Range Status   05/04/2022 7.70 3.40 - 10.80 10*3/mm3 Final   05/03/2022 8.50 3.40 - 10.80 10*3/mm3 Final      HGB Hemoglobin   Date Value Ref Range Status   05/04/2022 13.1 13.0 - 17.7 g/dL Final   05/03/2022 14.4 13.0 - 17.7 g/dL Final      HCT Hematocrit   Date Value Ref Range Status   05/04/2022 38.7 37.5 - 51.0 % Final   05/03/2022 43.2 37.5 - 51.0 % Final      Platlets No results found for: LABPLAT   MCV MCV   Date Value Ref Range Status   05/04/2022 98.1 (H) 79.0 - 97.0 fL Final   05/03/2022 98.9 (H) 79.0 - 97.0 fL Final          Sodium Sodium   Date Value Ref Range Status   05/04/2022 139 136 - 145 mmol/L Final   05/03/2022 137 136 - 145 mmol/L Final      Potassium Potassium   Date Value Ref Range Status    05/04/2022 3.5 3.5 - 5.2 mmol/L Final   05/03/2022 4.1 3.5 - 5.2 mmol/L Final     Comment:     Slight hemolysis detected by analyzer. Results may be affected.      Chloride Chloride   Date Value Ref Range Status   05/04/2022 100 98 - 107 mmol/L Final   05/03/2022 97 (L) 98 - 107 mmol/L Final      CO2 CO2   Date Value Ref Range Status   05/04/2022 24.0 22.0 - 29.0 mmol/L Final   05/03/2022 25.0 22.0 - 29.0 mmol/L Final      BUN BUN   Date Value Ref Range Status   05/04/2022 56 (H) 8 - 23 mg/dL Final   05/03/2022 62 (H) 8 - 23 mg/dL Final      Creatinine Creatinine   Date Value Ref Range Status   05/04/2022 1.54 (H) 0.76 - 1.27 mg/dL Final   05/03/2022 1.66 (H) 0.76 - 1.27 mg/dL Final      Calcium Calcium   Date Value Ref Range Status   05/04/2022 8.8 8.2 - 9.6 mg/dL Final   05/03/2022 9.7 (H) 8.2 - 9.6 mg/dL Final      PO4 No results found for: CAPO4   Albumin No results found for: ALBUMIN   Magnesium No results found for: MG   Uric Acid No results found for: URICACID       Imaging Results (Last 72 Hours)     Procedure Component Value Units Date/Time    XR Chest 1 View [[id number]] Collected: 05/03/22 1229     Updated: 05/03/22 1234    Narrative:      DATE OF EXAM:  5/3/2022 12:07 PM     PROCEDURE:  XR CHEST 1 VW-     INDICATIONS:  Shortness of breath. Cough for 4 months.     COMPARISON:  Chest radiographs 3/24/2022, 5/29/2021, and 5/27/2021. CT chest  3/24/2022     TECHNIQUE:   Single radiographic AP view of the chest was obtained.     FINDINGS:  Lordotic positioning. Overlying artifacts. Stable sternotomy wires,  postoperative changes from CABG, left chest wall cardiac pacer device.  Stable elevation of the right hemidiaphragm with improved aeration of  both lungs. Mild linear right infrahilar and left basilar segmental  atelectasis and/or scarring with mild interstitial thickening in the  lung bases. No focal lung consolidation. No pneumothorax. Stable  cardiomegaly, likely accentuated by technique. Calcified  atherosclerotic  disease in the thoracic aorta. Chronic changes in both shoulders. No  acute osseous normality is identified.        Impression:      Stable cardiomegaly with improved aeration of both lungs. Multifocal  bibasilar subsegmental atelectasis and/or scarring with mild  interstitial thickening in the lung bases that could represent mild  pulmonary vascular congestion/edema, chronic lung disease, or less  likely atypical pneumonia.     Electronically Signed By-Lance Mendes MD On:5/3/2022 12:32 PM  This report was finalized on [id number] by  Lance Mendes MD.            Results for orders placed during the hospital encounter of 05/03/22    XR Chest 1 View    Narrative  DATE OF EXAM:  5/3/2022 12:07 PM    PROCEDURE:  XR CHEST 1 VW-    INDICATIONS:  Shortness of breath. Cough for 4 months.    COMPARISON:  Chest radiographs 3/24/2022, 5/29/2021, and 5/27/2021. CT chest  3/24/2022    TECHNIQUE:  Single radiographic AP view of the chest was obtained.    FINDINGS:  Lordotic positioning. Overlying artifacts. Stable sternotomy wires,  postoperative changes from CABG, left chest wall cardiac pacer device.  Stable elevation of the right hemidiaphragm with improved aeration of  both lungs. Mild linear right infrahilar and left basilar segmental  atelectasis and/or scarring with mild interstitial thickening in the  lung bases. No focal lung consolidation. No pneumothorax. Stable  cardiomegaly, likely accentuated by technique. Calcified atherosclerotic  disease in the thoracic aorta. Chronic changes in both shoulders. No  acute osseous normality is identified.    Impression  Stable cardiomegaly with improved aeration of both lungs. Multifocal  bibasilar subsegmental atelectasis and/or scarring with mild  interstitial thickening in the lung bases that could represent mild  pulmonary vascular congestion/edema, chronic lung disease, or less  likely atypical pneumonia.    Electronically Signed By-Lance Mendes MD  On:5/3/2022 12:32 PM  This report was finalized on [id number] by  Lance Mendes MD.      Results for orders placed during the hospital encounter of 03/24/22    XR Chest 1 View    Narrative  DATE OF EXAM:  3/24/2022 9:49 AM    PROCEDURE:  XR CHEST 1 VW-    INDICATIONS:  soa, chest pain    COMPARISON:  5/29/2021    TECHNIQUE:  Single radiographic view of the chest was obtained.    FINDINGS:  Sternotomy wires overlie the midline. There is a left subclavian  dual-lead pacer which appears unchanged. Cardiomegaly is again present.  There is mixed interstitial and alveolar opacity bilaterally, right  greater than left. This may be due to pulmonary edema/CHF or atypical  infection pattern. Probable small amount of left pleural fluid is  present. There is elevation of the right hemidiaphragm again noted. No  pneumothorax is seen. Aortic vascular calcifications present.    Impression  1. Bilateral mixed interstitial and alveolar opacities may be due to  pulmonary edema/CHF versus atypical infection pattern. Cardiomegaly  suggest CHF is most likely.  2. Small amount of left pleural fluid.    Electronically Signed By-Earl Xiong MD On:3/24/2022 9:58 AM  This report was finalized on [id number] by  Earl Xiong MD.      Results for orders placed during the hospital encounter of 05/29/21    XR Chest 1 View    Narrative  DATE OF EXAM:  5/29/2021 9:58 AM    PROCEDURE:  XR CHEST 1 VW-    INDICATIONS:  soa    COMPARISON:  2 view chest x-ray 5/27/2021.    TECHNIQUE:  Single radiographic AP view of the chest was obtained.    FINDINGS:  There are increased airspace opacities in the right upper lung  superimposed on bilateral interstitial opacities. Small left pleural  effusion is suspected. Cardiac silhouette is mildly enlarged with  changes of CABG again seen. No pneumothorax is identified.    Impression  1.Increased right upper lung airspace opacities superimposed on  bilateral interstitial opacities, which may be due to  asymmetric  pulmonary edema or pneumonia superimposed on chronic changes.  2.Suspected small left pleural effusion.    Electronically Signed By-Hoa Bonilla MD On:5/29/2021 10:23 AM  This report was finalized on [id number] by  Hoa Bonilla MD.        Results for orders placed during the hospital encounter of 03/24/22    Duplex Carotid Ultrasound CAR    Interpretation Summary  · Proximal right internal carotid artery moderate stenosis.  · Proximal left internal carotid artery moderate stenosis.      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  Creatinine close to baseline.  · CHF- cautiously diurese.   · Hypertension  · Atrial fibrillation  · History coronary disease  · Diabetes    Avoid hypotension, ACE inhibitor's or ARB's for now.  Cautiously diurese  Cards to see  Monitor renal function fluid status electrolytes        I discussed the patients findings and my recommendations with patient and consulting provider    Will follow along closely. Thank you for allowing us to see this patient in renal consultation.    [facility]  [phone]  MD Tam Chi MD  05/04/22  10:27 EDT

## 2022-05-04 NOTE — CASE MANAGEMENT/SOCIAL WORK
Discharge Planning Assessment   Charles     Patient Name: Shon ZAYAS Kunal  MRN: [id number]  Today's Date: 5/4/2022    Admit Date: 5/3/2022     Discharge Needs Assessment     Row Name 05/04/22 1234       Living Environment    People in Home spouse    Name(s) of People in Home June    Current Living Arrangements home    Primary Care Provided by self    Provides Primary Care For no one    Family Caregiver if Needed spouse    Family Caregiver Names June    Quality of Family Relationships helpful;involved;supportive    Able to Return to Prior Arrangements yes       Resource/Environmental Concerns    Resource/Environmental Concerns none    Transportation Concerns none       Transition Planning    Patient/Family Anticipates Transition to home;home with family    Patient/Family Anticipated Services at Transition none    Transportation Anticipated family or friend will provide;car, drives self       Discharge Needs Assessment    Readmission Within the Last 30 Days no previous admission in last 30 days    Equipment Currently Used at Home bp cuff;glucometer    Concerns to be Addressed denies needs/concerns at this time;no discharge needs identified    Anticipated Changes Related to Illness none    Equipment Needed After Discharge none    Provided Post Acute Provider List? N/A    Provided Post Acute Provider Quality & Resource List? N/A               Discharge Plan     Row Name 05/04/22 1235       Plan    Plan DC plan: routine home with family.    Provided Post Acute Provider List? N/A    Provided Post Acute Provider Quality & Resource List? N/A    Patient/Family in Agreement with Plan yes    Plan Comments Met with patient and wife at bedside. Pt lives at home, drives, and is independent with ADL's. PCP and pharmacy confirmed. Pt uses a BP cuff and glucometer at home. Denies issues affording medications or food. Declines HHC/PT services. Wife Keisha to provide transportation at discharge. No needs/services identified at this  time. Provided [facility] letter, pt verbalized understanding.               Demographic Summary     Row Name 05/04/22 1234       General Information    Admission Type observation    Arrived From emergency department    Required Notices Provided Observation Status Notice    Referral Source admission list    Reason for Consult discharge planning;care coordination/care conference    Preferred Language English       Contact Information    Permission Granted to Share Info With                Functional Status     Row Name 05/04/22 1234       Functional Status    Usual Activity Tolerance good    Current Activity Tolerance good       Functional Status, IADL    Medications independent    Meal Preparation independent    Housekeeping independent    Laundry independent    Shopping independent               Patient Forms     Row Name 05/04/22 1237       Patient Forms    Important Message from Medicare (IMM) Delivered  [facility]INS 5/4 per CM    Delivered to Patient    Method of delivery In person              Met with patient in room wearing PPE: mask, face shield/goggles.      Maintained distance greater than six feet and spent less than 15 minutes in the room.      Megan Naegele, RN      Office Phone: [phone]  Office Cell: [phone]

## 2022-05-04 NOTE — PLAN OF CARE
Goal Outcome Evaluation:  Plan of Care Reviewed With: spouse, patient           Outcome Evaluation: 89 yo male admitted with SOA, CHF exacerbation.  Pt is from home with his spouse, normally independent and active for his age.  Pt does well with all mobility and is up ad daysi in room.  No further therapy needs at this time.  Plans return home with spouse at d/c.

## 2022-05-05 NOTE — PROGRESS NOTES
"NEPHROLOGY PROGRESS NOTE------[facility]    [facility]  [phone]  Tam Gonzalez MD      Patient Care Team:  Antony Lawson MD as PCP - Tam Monroe MD as Consulting Physician (Nephrology)  Minh Kendrick MD as Consulting Physician (Cardiology)  Chris Romero MD as Consulting Physician (Cardiology)      Provider:  Tam Gonzalez MD  Patient Name: Shon ZAYAS Kunal  :  1930    SUBJECTIVE:  F/U CKD  No chest pain or SOA  He has had some dizzy spells. BP in the 90-low 100s.    Medication:  allopurinol, 100 mg, Oral, Daily  aspirin, 81 mg, Oral, Daily  atorvastatin, 10 mg, Oral, Daily  enoxaparin, 30 mg, Subcutaneous, Daily  finasteride, 5 mg, Oral, Daily  furosemide, 40 mg, Oral, BID  metoprolol tartrate, 25 mg, Oral, BID  potassium chloride, 20 mEq, Oral, BID With Meals  sodium chloride, 3 mL, Intravenous, Q12H           OBJECTIVE    Vital Sign Min/Max for last 24 hours  Temp  Min: 94.6 °F (34.8 °C)  Max: 97.6 °F (36.4 °C)   BP  Min: 91/46  Max: 137/77   Pulse  Min: 52  Max: 77   Resp  Min: 16  Max: 18   SpO2  Min: 97 %  Max: 98 %   No data recorded   Weight  Min: 55.2 kg (121 lb 11.1 oz)  Max: 55.2 kg (121 lb 11.1 oz)     Flowsheet Rows    Flowsheet Row First Filed Value   Admission Height 172.7 cm (68\") Documented at 2022 1114   Admission Weight 58.2 kg (128 lb 4.9 oz) Documented at 2022 1114          No intake/output data recorded.  I/O last 3 completed shifts:  In: 960 [P.O.:960]  Out: 1040 [Urine:1040]    Physical Exam:  General Appearance: alert, appears stated age and cooperative  Head: normocephalic, without obvious abnormality and atraumatic  Eyes: conjunctivae and sclerae normal and no icterus  Neck: supple and no JVD  Lungs: clear to auscultation and respirations regular  Heart: regular rhythm & normal rate and normal S1, S2  Chest: Wall no abnormalities observed  Abdomen: normal bowel sounds " and soft non-tender  Extremities: moves extremities well, no edema, no cyanosis and no redness  Skin: no bleeding, bruising or rash, turgor normal, color normal and no lesions noted  Neurologic: Alert, and oriented. No focal deficits    Labs:    WBC WBC   Date Value Ref Range Status   05/05/2022 7.40 3.40 - 10.80 10*3/mm3 Final   05/04/2022 7.70 3.40 - 10.80 10*3/mm3 Final   05/03/2022 8.50 3.40 - 10.80 10*3/mm3 Final      HGB Hemoglobin   Date Value Ref Range Status   05/05/2022 13.5 13.0 - 17.7 g/dL Final   05/04/2022 13.1 13.0 - 17.7 g/dL Final   05/03/2022 14.4 13.0 - 17.7 g/dL Final      HCT Hematocrit   Date Value Ref Range Status   05/05/2022 40.1 37.5 - 51.0 % Final   05/04/2022 38.7 37.5 - 51.0 % Final   05/03/2022 43.2 37.5 - 51.0 % Final      Platlets No results found for: LABPLAT   MCV MCV   Date Value Ref Range Status   05/05/2022 99.3 (H) 79.0 - 97.0 fL Final   05/04/2022 98.1 (H) 79.0 - 97.0 fL Final   05/03/2022 98.9 (H) 79.0 - 97.0 fL Final          Sodium Sodium   Date Value Ref Range Status   05/05/2022 137 136 - 145 mmol/L Final   05/04/2022 139 136 - 145 mmol/L Final   05/03/2022 137 136 - 145 mmol/L Final      Potassium Potassium   Date Value Ref Range Status   05/05/2022 4.0 3.5 - 5.2 mmol/L Final   05/04/2022 3.5 3.5 - 5.2 mmol/L Final   05/03/2022 4.1 3.5 - 5.2 mmol/L Final     Comment:     Slight hemolysis detected by analyzer. Results may be affected.      Chloride Chloride   Date Value Ref Range Status   05/05/2022 96 (L) 98 - 107 mmol/L Final   05/04/2022 100 98 - 107 mmol/L Final   05/03/2022 97 (L) 98 - 107 mmol/L Final      CO2 CO2   Date Value Ref Range Status   05/05/2022 24.0 22.0 - 29.0 mmol/L Final   05/04/2022 24.0 22.0 - 29.0 mmol/L Final   05/03/2022 25.0 22.0 - 29.0 mmol/L Final      BUN BUN   Date Value Ref Range Status   05/05/2022 64 (H) 8 - 23 mg/dL Final   05/04/2022 56 (H) 8 - 23 mg/dL Final   05/03/2022 62 (H) 8 - 23 mg/dL Final      Creatinine Creatinine   Date Value  Ref Range Status   05/05/2022 1.74 (H) 0.76 - 1.27 mg/dL Final   05/04/2022 1.54 (H) 0.76 - 1.27 mg/dL Final   05/03/2022 1.66 (H) 0.76 - 1.27 mg/dL Final      Calcium Calcium   Date Value Ref Range Status   05/05/2022 9.0 8.2 - 9.6 mg/dL Final   05/04/2022 8.8 8.2 - 9.6 mg/dL Final   05/03/2022 9.7 (H) 8.2 - 9.6 mg/dL Final      PO4 No components found for: PO4   Albumin No results found for: ALBUMIN   Magnesium No results found for: MG   Uric Acid No components found for: URIC ACID     Imaging Results (Last 72 Hours)     Procedure Component Value Units Date/Time    XR Chest 1 View [[id number]] Collected: 05/03/22 1229     Updated: 05/03/22 1234    Narrative:      DATE OF EXAM:  5/3/2022 12:07 PM     PROCEDURE:  XR CHEST 1 VW-     INDICATIONS:  Shortness of breath. Cough for 4 months.     COMPARISON:  Chest radiographs 3/24/2022, 5/29/2021, and 5/27/2021. CT chest  3/24/2022     TECHNIQUE:   Single radiographic AP view of the chest was obtained.     FINDINGS:  Lordotic positioning. Overlying artifacts. Stable sternotomy wires,  postoperative changes from CABG, left chest wall cardiac pacer device.  Stable elevation of the right hemidiaphragm with improved aeration of  both lungs. Mild linear right infrahilar and left basilar segmental  atelectasis and/or scarring with mild interstitial thickening in the  lung bases. No focal lung consolidation. No pneumothorax. Stable  cardiomegaly, likely accentuated by technique. Calcified atherosclerotic  disease in the thoracic aorta. Chronic changes in both shoulders. No  acute osseous normality is identified.        Impression:      Stable cardiomegaly with improved aeration of both lungs. Multifocal  bibasilar subsegmental atelectasis and/or scarring with mild  interstitial thickening in the lung bases that could represent mild  pulmonary vascular congestion/edema, chronic lung disease, or less  likely atypical pneumonia.     Electronically Signed By-Lance Mendes MD  On:5/3/2022 12:32 PM  This report was finalized on [id number] by  Lance Mendes MD.          Results for orders placed during the hospital encounter of 05/03/22    XR Chest 1 View    Narrative  DATE OF EXAM:  5/3/2022 12:07 PM    PROCEDURE:  XR CHEST 1 VW-    INDICATIONS:  Shortness of breath. Cough for 4 months.    COMPARISON:  Chest radiographs 3/24/2022, 5/29/2021, and 5/27/2021. CT chest  3/24/2022    TECHNIQUE:  Single radiographic AP view of the chest was obtained.    FINDINGS:  Lordotic positioning. Overlying artifacts. Stable sternotomy wires,  postoperative changes from CABG, left chest wall cardiac pacer device.  Stable elevation of the right hemidiaphragm with improved aeration of  both lungs. Mild linear right infrahilar and left basilar segmental  atelectasis and/or scarring with mild interstitial thickening in the  lung bases. No focal lung consolidation. No pneumothorax. Stable  cardiomegaly, likely accentuated by technique. Calcified atherosclerotic  disease in the thoracic aorta. Chronic changes in both shoulders. No  acute osseous normality is identified.    Impression  Stable cardiomegaly with improved aeration of both lungs. Multifocal  bibasilar subsegmental atelectasis and/or scarring with mild  interstitial thickening in the lung bases that could represent mild  pulmonary vascular congestion/edema, chronic lung disease, or less  likely atypical pneumonia.    Electronically Signed By-Lance Mendes MD On:5/3/2022 12:32 PM  This report was finalized on [id number] by  Lance Mendes MD.      Results for orders placed during the hospital encounter of 03/24/22    XR Chest 1 View    Narrative  DATE OF EXAM:  3/24/2022 9:49 AM    PROCEDURE:  XR CHEST 1 VW-    INDICATIONS:  soa, chest pain    COMPARISON:  5/29/2021    TECHNIQUE:  Single radiographic view of the chest was obtained.    FINDINGS:  Sternotomy wires overlie the midline. There is a left subclavian  dual-lead pacer which appears  unchanged. Cardiomegaly is again present.  There is mixed interstitial and alveolar opacity bilaterally, right  greater than left. This may be due to pulmonary edema/CHF or atypical  infection pattern. Probable small amount of left pleural fluid is  present. There is elevation of the right hemidiaphragm again noted. No  pneumothorax is seen. Aortic vascular calcifications present.    Impression  1. Bilateral mixed interstitial and alveolar opacities may be due to  pulmonary edema/CHF versus atypical infection pattern. Cardiomegaly  suggest CHF is most likely.  2. Small amount of left pleural fluid.    Electronically Signed By-Earl Xiong MD On:3/24/2022 9:58 AM  This report was finalized on [id number] by  Earl Xiong MD.      Results for orders placed during the hospital encounter of 05/29/21    XR Chest 1 View    Narrative  DATE OF EXAM:  5/29/2021 9:58 AM    PROCEDURE:  XR CHEST 1 VW-    INDICATIONS:  soa    COMPARISON:  2 view chest x-ray 5/27/2021.    TECHNIQUE:  Single radiographic AP view of the chest was obtained.    FINDINGS:  There are increased airspace opacities in the right upper lung  superimposed on bilateral interstitial opacities. Small left pleural  effusion is suspected. Cardiac silhouette is mildly enlarged with  changes of CABG again seen. No pneumothorax is identified.    Impression  1.Increased right upper lung airspace opacities superimposed on  bilateral interstitial opacities, which may be due to asymmetric  pulmonary edema or pneumonia superimposed on chronic changes.  2.Suspected small left pleural effusion.    Electronically Signed By-Hoa Bonilla MD On:5/29/2021 10:23 AM  This report was finalized on [id number] by  Hoa Bonilla MD.      Results for orders placed during the hospital encounter of 03/24/22    Duplex Carotid Ultrasound CAR    Interpretation Summary  · Proximal right internal carotid artery moderate stenosis.  · Proximal left internal carotid artery moderate  stenosis.        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    · CHF- cautiously diurese.   · Hypertension  · Atrial fibrillation  · History coronary disease  · Diabetes     CR slightly up, BP running low.  Volume better, continue oral Lasix  Lower metoprolol 12.5 mg BID  Added midodrine 2.5 mg TID  Monitor renal function fluid status electrolytes      Tam Gonzalez MD  [facility]  [phone]  05/05/22  08:08 EDT

## 2022-05-05 NOTE — PLAN OF CARE
Goal Outcome Evaluation:              Outcome Evaluation: Patient reported SOA this shift, reported improvement once sitting up in chair. Patient stable at this time.

## 2022-05-05 NOTE — CONSULTS
Nutrition Services    Patient Name: Shon Syed  YOB: 1930  MRN: [id number]  Admission date: 5/3/2022    Comment:  -- Boost Glucose Control BID (Provides 380 kcals, 32 g protein if consumed)     -- Moderate chronic disease related malnutrition related to past medical history including CAD, HLD, DM, fluid retention as evidenced by fat/muscle loss per physical exam.  See MSA below.        PPE Documentation        PPE Worn By Provider mask, gloves and eye protection   PPE Worn By Patient  None      CLINICAL NUTRITION ASSESSMENT      Reason for Assessment 5/5: BMI less than 19     H&P  89 y.o. male with valvular heart disease and severe right ventricular dysfunction who presents with progressive shortness of breath and orthopnea for 2 weeks, worse over the last 2 days    Past Medical History:   Diagnosis Date   • Atrial flutter (HCC)    • CHF (congestive heart failure) ([facility])    • Chronic kidney disease    • Coronary artery disease    • Coronary artery disease involving native coronary artery of native heart 8/6/2019    Status post multivessel CABG 1993   • Diabetes mellitus ([facility])    • Essential hypertension 8/6/2019   • HFrEF (heart failure with reduced ejection fraction) ([facility])    • Hyperlipidemia    • Hyperlipidemia, mixed 8/6/2019   • Hypertension    • Presence of cardiac pacemaker 8/6/2019    S/P dual-chamber pacemaker implanted 2009   • Sick sinus syndrome (HCC) 8/6/2019   • Stroke ([facility])        Past Surgical History:   Procedure Laterality Date   • CARDIAC CATHETERIZATION     • CARDIAC ELECTROPHYSIOLOGY PROCEDURE N/A 1/17/2022    Procedure: [place];  Surgeon: Minh Kendrick MD;  Location: [facility] CATH INVASIVE LOC[facility];  Service: Cardiology;  Laterality: N/A;   • CORONARY ARTERY BYPASS GRAFT  1993   • CYSTOSCOPY     • HERNIA REPAIR     • INSERT / REPLACE / REMOVE PACEMAKER  2010    BS insertion   • PACEMAKER REPLACEMENT  01/17/2022    [facility]   • VENA CAVA FILTER  "PLACEMENT  04/2016        Current Problems   Dyspnea    Acute pulmonary edema  -Cardiology following    Cardiac stenosis    Sick sinus syndrome    HLD    CAD    CKD  -Nephrology following       Encounter Information        Trending Narrative     5/5: RD visited patient at bedside.  Patient states 150-160# UBW, has been on diuretics recently which may account for recent weight loss.  No N/V, no chewing/swallowing issues noted.  Spoke also with patients son who was visiting.  Patient and son looking at lunch menu during patient visit.       Anthropometrics        Current Height, Weight Height: 172.7 cm (68\")  Weight: 55.2 kg (121 lb 11.1 oz) (05/05/22 0357)       Ideal Body Weight (IBW) 148#    Usual Body Weight (UBW) 150-160# range        Trending Weight Hx     This admission: 5/5: 121-128# range              PTA: 7.6% weight loss x 4 months   12.3% weight loss x 1 year     Wt Readings from Last 30 Encounters:   05/05/22 0357 55.2 kg (121 lb 11.1 oz)   05/04/22 0338 55.1 kg (121 lb 7.6 oz)   05/03/22 1114 58.2 kg (128 lb 4.9 oz)   03/26/22 1435 58.5 kg (129 lb)   03/26/22 0546 58.9 kg (129 lb 13.6 oz)   03/25/22 0500 59.2 kg (130 lb 8.2 oz)   03/24/22 0915 61.3 kg (135 lb 2.3 oz)   01/17/22 1413 59.7 kg (131 lb 9.8 oz)   12/21/21 1022 63.3 kg (139 lb 8 oz)   11/24/21 1350 62.3 kg (137 lb 6.4 oz)   07/09/21 1035 59.9 kg (132 lb)   06/08/21 0943 59 kg (130 lb)   05/31/21 0410 59.1 kg (130 lb 4.7 oz)   05/30/21 0422 63.4 kg (139 lb 12.7 oz)   05/29/21 0917 63.4 kg (139 lb 12.4 oz)   05/17/21 1538 62.6 kg (138 lb)   02/23/21 1120 62.1 kg (137 lb)   08/06/20 0955 63 kg (139 lb)   03/27/20 1341 63.2 kg (139 lb 5.3 oz)   02/06/20 1107 64.7 kg (142 lb 9.6 oz)   08/06/19 1356 65 kg (143 lb 6.4 oz)   02/05/19 0752 65.3 kg (144 lb)   08/07/18 0951 63 kg (139 lb)   02/08/18 0654 66.7 kg (147 lb)   08/08/17 0804 64 kg (141 lb)   07/17/17 1449 62.2 kg (137 lb 3.2 oz)   02/06/17 0832 66.2 kg (146 lb)   05/03/16 0816 66.2 kg (146 " lb)   02/02/16 1331 67.6 kg (149 lb)      BMI kg/m2 Body mass index is 18.5 kg/m².       Labs        Pertinent Labs    Results from last 7 days   Lab Units 05/05/22  0325 05/04/22  0538 05/03/22  1156   SODIUM mmol/L 137 139 137   POTASSIUM mmol/L 4.0 3.5 4.1   CHLORIDE mmol/L 96* 100 97*   CO2 mmol/L 24.0 24.0 25.0   BUN mg/dL 64* 56* 62*   CREATININE mg/dL 1.74* 1.54* 1.66*   CALCIUM mg/dL 9.0 8.8 9.7*   GLUCOSE mg/dL 122* 118* 125*     Results from last 7 days   Lab Units 05/05/22  0325   HEMOGLOBIN g/dL 13.5   HEMATOCRIT % 40.1     COVID19   Date Value Ref Range Status   05/03/2022 Not Detected Not Detected - Ref. Range Final     No results found for: HGBA1C     Medications    Scheduled Medications allopurinol, 100 mg, Oral, Daily  aspirin, 81 mg, Oral, Daily  atorvastatin, 10 mg, Oral, Daily  enoxaparin, 30 mg, Subcutaneous, Daily  finasteride, 5 mg, Oral, Daily  [START ON 5/6/2022] furosemide, 40 mg, Oral, BID  guaiFENesin, 600 mg, Oral, Q12H  metoprolol tartrate, 12.5 mg, Oral, BID  midodrine, 2.5 mg, Oral, TID AC  potassium chloride, 20 mEq, Oral, BID With Meals  sodium chloride, 3 mL, Intravenous, Q12H        Infusions      PRN Medications •  acetaminophen  •  nitroglycerin  •  ondansetron  •  [COMPLETED] Insert peripheral IV **AND** sodium chloride  •  sodium chloride     Physical Findings        Trending Physical   Appearance, NFPE 5/5: NFPE completed, consistent with nutrition diagnosis of malnutrition using AND/ASPEN criteria. See MSA below.    --  Edema  No edema documented      Bowel Function Last BM 5/4 (yesterday)     Tubes No feeding tube      Chewing/Swallowing No issues per patient      Skin Intact      --  Current Nutrition Orders & Evaluation of Intake       Oral Nutrition     Food Allergies NKFA   Current PO Diet Diet Cardiac, Diabetic/Consistent Carbs; Healthy Heart; Diabetic - Consistent Carb   Supplement None ordered    PO Evaluation     Trending % PO Intake 5/5: 38% average po intakes  since admission    --  Nutritional Risk Screening        NRS-2002 Score          Nutrition Diagnosis         Nutrition Dx Problem 1 Moderate chronic disease related malnutrition related to past medical history including CAD, HLD, DM, fluid retention as evidenced by fat/muscle loss per physical exam.        Nutrition Dx Problem 2        Intervention Goal         Intervention Goal(s) Accept ONS  PO intakes at least 50%     Nutrition Intervention        RD Action Order ONS     Nutrition Prescription          Diet Prescription Consistent CHO/Heart Healthy    Supplement Prescription Boost Glucose Control BID   --  Monitor/Evaluation        Monitor Per protocol, I&O, PO intake, Supplement intake, Pertinent labs, Weight, Skin status, GI status, Symptoms, POC/GOC, Swallow function     Malnutrition Severity Assessment      Patient meets criteria for : Moderate (non-severe) Malnutrition  Malnutrition Type (last 8 hours)     Malnutrition Severity Assessment     Row Name 05/05/22 1550       Malnutrition Severity Assessment    Malnutrition Type Chronic Disease - Related Malnutrition    Row Name 05/05/22 1550       Muscle Loss    Loss of Muscle Mass Findings Moderate    [place] Region Moderate - slight depression    Clavicle Bone Region Moderate - some protrusion in females, visible in males    Acromion Bone Region Moderate - acromion may slightly protrude    Scapular Bone Region Moderate - mild depression, bones may show slightly    Dorsal Hand Region Moderate - slight depression    Patellar Region Moderate - patella more prominent, less muscle definition around patella    Anterior Thigh Region Moderate - mild depression on inner thigh    Posterior Calf Region Moderate - some roundness, slight firmness    Row Name 05/05/22 1550       Fat Loss    Subcutaneous Fat Loss Findings Moderate    Orbital Region  Moderate -  somewhat hollowness, slightly dark circles    Upper Arm Region Moderate - some fat tissue, not ample    Thoracic &  Lumbar Region Moderate - ribs visible with mild depressions, iliac crest somewhat prominent    Row Name 05/05/22 1550       Criteria Met (Must meet criteria for severity in at least 2 of these categories: M Wasting, Fat Loss, Fluid, Secondary Signs, Wt. Status, Intake)    Patient meets criteria for  Moderate (non-severe) Malnutrition                 Electronically signed by:  Evelin Grant RD  05/05/22 15:35 EDT;

## 2022-05-05 NOTE — PROGRESS NOTES
LOS: 1 day   Patient Care Team:  Antony Lawsno MD as PCP - General  Tam Gonzalez MD as Consulting Physician (Nephrology)  Minh Kendrick MD as Consulting Physician (Cardiology)  Chris Romero MD as Consulting Physician (Cardiology)    Subjective:  Better overall    Objective:   Afebrile      Review of Systems:   Review of Systems   Constitutional: Positive for activity change.   Respiratory: Positive for shortness of breath.    Neurological: Positive for weakness.           Vital Signs  Temp:  [94.6 °F (34.8 °C)-97.6 °F (36.4 °C)] 97.6 °F (36.4 °C)  Heart Rate:  [52-77] 69  Resp:  [16-18] 17  BP: ()/(46-77) 130/76    Physical Exam:  Physical Exam  Vitals reviewed.   Cardiovascular:      Rate and Rhythm: Normal rate.      Heart sounds: Normal heart sounds.   Pulmonary:      Breath sounds: Normal breath sounds.   Skin:     General: Skin is warm.   Neurological:      Mental Status: He is alert.          Radiology:  XR Chest 1 View    Result Date: 5/3/2022  Stable cardiomegaly with improved aeration of both lungs. Multifocal bibasilar subsegmental atelectasis and/or scarring with mild interstitial thickening in the lung bases that could represent mild pulmonary vascular congestion/edema, chronic lung disease, or less likely atypical pneumonia.  Electronically Signed By-Lance Mendes MD On:5/3/2022 12:32 PM This report was finalized on [id number] by  Lance Mendes MD.         Results Review:     I reviewed the patient's new clinical results.  I reviewed the patient's new imaging results and agree with the interpretation.    Medication Review:   Scheduled Meds:allopurinol, 100 mg, Oral, Daily  aspirin, 81 mg, Oral, Daily  atorvastatin, 10 mg, Oral, Daily  enoxaparin, 30 mg, Subcutaneous, Daily  finasteride, 5 mg, Oral, Daily  furosemide, 40 mg, Oral, BID  metoprolol tartrate, 25 mg, Oral, BID  potassium chloride, 20 mEq, Oral, BID With Meals  sodium chloride, 3 mL, Intravenous,  Q12H      Continuous Infusions:   PRN Meds:.•  acetaminophen  •  nitroglycerin  •  ondansetron  •  [COMPLETED] Insert peripheral IV **AND** sodium chloride  •  sodium chloride    Labs:    CBC    Results from last 7 days   Lab Units 05/05/22 0325 05/04/22  0538 05/03/22  1156   WBC 10*3/mm3 7.40 7.70 8.50   HEMOGLOBIN g/dL 13.5 13.1 14.4   PLATELETS 10*3/mm3 208 204 222     BMP   Results from last 7 days   Lab Units 05/05/22 0325 05/04/22  0538 05/03/22  1156   SODIUM mmol/L 137 139 137   POTASSIUM mmol/L 4.0 3.5 4.1   CHLORIDE mmol/L 96* 100 97*   CO2 mmol/L 24.0 24.0 25.0   BUN mg/dL 64* 56* 62*   CREATININE mg/dL 1.74* 1.54* 1.66*   GLUCOSE mg/dL 122* 118* 125*     Cr Clearance Estimated Creatinine Clearance: 21.6 mL/min (A) (by C-G formula based on SCr of 1.74 mg/dL (H)).  Coag     HbA1C No results found for: HGBA1C  Blood Glucose   Glucose   Date/Time Value Ref Range Status   05/05/2022 0718 89 70 - 105 mg/dL Final     Comment:     Serial Number: [id number]:  [id number]   05/04/2022 2110 156 (H) 70 - 105 mg/dL Final     Comment:     Serial Number: [id number]:  [id number]   05/04/2022 1615 171 (H) 70 - 105 mg/dL Final     Comment:     Serial Number: [id number]:  [id number]   05/04/2022 1130 128 (H) 70 - 105 mg/dL Final     Comment:     Serial Number: [id number]:  [id number]   05/04/2022 0704 130 (H) 70 - 105 mg/dL Final     Comment:     Serial Number: [id number]:  [id number]     Infection     CMP   Results from last 7 days   Lab Units 05/05/22 0325 05/04/22 0538 05/03/22  1156   SODIUM mmol/L 137 139 137   POTASSIUM mmol/L 4.0 3.5 4.1   CHLORIDE mmol/L 96* 100 97*   CO2 mmol/L 24.0 24.0 25.0   BUN mg/dL 64* 56* 62*   CREATININE mg/dL 1.74* 1.54* 1.66*   GLUCOSE mg/dL 122* 118* 125*     UA      Radiology(recent) XR Chest 1 View    Result Date: 5/3/2022  Stable cardiomegaly with improved aeration of both lungs. Multifocal bibasilar subsegmental atelectasis and/or scarring with mild  interstitial thickening in the lung bases that could represent mild pulmonary vascular congestion/edema, chronic lung disease, or less likely atypical pneumonia.  Electronically Signed By-Lance Mendes MD On:5/3/2022 12:32 PM This report was finalized on [id number] by  Lance Mendes MD.     Assessment:  Acute shortness of breath  Acute pulmonary edema  History of sick sinus syndrome  Acute exacerbation of chronic systolic congestive heart failure  History of pacemaker placement with replacement of generator 1/22  Atherosclerotic heart disease of native coronary arteries with angina pectoris  History of coronary artery bypass grafting in 1993  HFrEF  Chronic atrial fibrillation, persistent intermittent  Hypercoagulable state secondary to atrial fibrillation  Cerebrovascular disease with history of CVA  History of carotid occlusive disease  Chronic kidney disease stage IIIa  Hypertension associated chronic kidney disease stage IIIa  Degenerative joint disease  Hypokalemia  Bilateral upper lobe pulmonary nodularities  Gastroesophageal reflux disease without esophagitis      Plan:  Continue care as outlined//will observe an additional day given lability        Antony Lawson MD  05/05/22  08:01 EDT

## 2022-05-05 NOTE — PROGRESS NOTES
Cardiology consult note  Chris Romero MD, PhD      Patient Care Team:  Antony Lawson MD as PCP - General  Tam Gonzalez MD as Consulting Physician (Nephrology)  Minh Kendrick MD as Consulting Physician (Cardiology)  Chris Romero MD as Consulting Physician (Cardiology)    CHIEF COMPLAINT: Shortness of breath dyspnea on exertion    HISTORY OF PRESENT ILLNESS:    This is a 89-year-old gentleman who I saw in clinic previously with dual-chamber pacemaker, sick sinus syndrome, recent generator change, cardiomyopathy with EF of 35 to 40% historically with chronic systolic and diastolic CHF, CKD, multivessel CAD with bypass remotely 1993 with 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  He presents with worsening dyspnea on exertion and feeling like he is smothering laying flat essentially orthopneic.  He had a recent admission in March just 6 to 8 weeks ago for very similar occurrence of dyspnea on exertion, orthopnea and PND.  He responded well to gentle diuretics and was discharged in 2 to 3 days.  He received Lasix in the ER as well as late yesterday with much improvement in shortness of breath he is on room air at bedside with wife at bedside.  We did discuss his narrow therapeutic window when he comes to his volume status with likely 500 mL being too much or too little with clinical dehydration versus volume overload.  No other questions concerns no fevers chill sweats nausea vomiting or diarrhea.  ========================================================  Seen, breathing improved  Appears euvolemic  Holding further diuretics today with mildly elevated creatinine  Transition back to oral therapy  Likely okay for discharge tomorrow  No acute events overnight     Telemetry reviewed by me demonstrates atrially paced rhythm     Stress testing 10 months ago with fixed inferior and septal defects on nuclear imaging, EF 34%     Carotid ultrasounds  demonstrate moderate bilateral disease but nothing critical    Review of systems otherwise negative x14 point review of systems except was mentioned above    Historical data copied forward from previous encounters in EMR is unchanged      Past Medical History:   Diagnosis Date   • Atrial flutter ([facility])    • CHF (congestive heart failure) ([facility])    • Chronic kidney disease    • Coronary artery disease    • Coronary artery disease involving native coronary artery of native heart 2019    Status post multivessel CABG    • Diabetes mellitus ([facility])    • Essential hypertension 2019   • HFrEF (heart failure with reduced ejection fraction) ([facility])    • Hyperlipidemia    • Hyperlipidemia, mixed 2019   • Hypertension    • Presence of cardiac pacemaker 2019    S/P dual-chamber pacemaker implanted    • Sick sinus syndrome ([facility]) 2019   • Stroke ([facility])      Past Surgical History:   Procedure Laterality Date   • CARDIAC CATHETERIZATION     • CARDIAC ELECTROPHYSIOLOGY PROCEDURE N/A 2022    Procedure: Gen change-Walkerton Walkerton aware;  Surgeon: Minh Kendrick MD;  Location: [facility] INVASIVE LOCATION;  Service: Cardiology;  Laterality: N/A;   • CORONARY ARTERY BYPASS GRAFT     • CYSTOSCOPY     • HERNIA REPAIR     • INSERT / REPLACE / REMOVE PACEMAKER      BS insertion   • PACEMAKER REPLACEMENT  2022    [facility]   • VENA CAVA FILTER PLACEMENT  2016     Family History   Problem Relation Age of Onset   • Leukemia Mother    • Heart disease Father    • Heart attack Father 56         age at 56   • Asthma Paternal Aunt      Social History     Tobacco Use   • Smoking status: Former Smoker     Quit date: 1970     Years since quittin.9   • Smokeless tobacco: Never Used   Vaping Use   • Vaping Use: Never used   Substance Use Topics   • Alcohol use: Never   • Drug use: Never     Medications Prior to Admission   Medication Sig Dispense Refill Last Dose   • allopurinol (ZYLOPRIM) 100  "MG tablet Take 100 mg by mouth Daily.      • ascorbic acid (VITAMIN C) 1000 MG tablet Take 1,000 mg by mouth Daily.      • aspirin 81 MG tablet Take 81 mg by mouth every night at bedtime.      • Cholecalciferol (Vitamin D3) 50 MCG (2000 UT) capsule Take 2,000 Units by mouth 3 (Three) Times a Week. Monday, Wednesday and Friday      • dutasteride (AVODART) 0.5 MG capsule Take 0.5 mg by mouth Every Night.      • furosemide (LASIX) 40 MG tablet Take 1 tablet by mouth 2 (Two) Times a Day. 180 tablet 1    • metoprolol tartrate (LOPRESSOR) 25 MG tablet Take 25 mg by mouth 2 (Two) Times a Day.      • multivitamin with minerals tablet tablet Take 1 tablet by mouth Daily.      • pravastatin (PRAVACHOL) 40 MG tablet Take 40 mg by mouth 1 (One) Time Per Week. Monday      • Probiotic Product (Probiotic Daily) capsule Take 1 capsule by mouth Daily.      • spironolactone (ALDACTONE) 25 MG tablet Take 0.5 tablets by mouth Daily. 45 tablet 1      Allergies:  Penicillin g and Vancomycin    REVIEW OF SYSTEMS:  Please see the above history of present illness for pertinent positives and negatives.  The remainder of the patient's systems have been reviewed and are negative.     Vital Signs  Temp:  [94.6 °F (34.8 °C)-97.6 °F (36.4 °C)] 97.6 °F (36.4 °C)  Heart Rate:  [52-77] 76  Resp:  [16-18] 17  BP: ()/(46-77) 125/63    Flowsheet Rows    Flowsheet Row First Filed Value   Admission Height 172.7 cm (68\") Documented at 05/03/2022 1114   Admission Weight 58.2 kg (128 lb 4.9 oz) Documented at 05/03/2022 1114           Physical Exam:  Physical Exam   Constitutional: Patient appears well-developed and well-nourished and in no acute distress   HEENT: Appears stated age, elderly male  Head: Normocephalic and atraumatic.   Eyes:  Pupils are equal, round, and reactive to light. EOM are intact. Sclerae are anicteric and noninjected.  Mouth and Throat: Patient has moist mucous membranes. Oropharynx is clear of any erythema or exudate.   "   Neck: Neck supple.  Mild JVD present.  CVP  estimated 10 no thyromegaly present. No lymphadenopathy present.  Cardiovascular: Regular rate, regular rhythm, paced S1 normal and S2 normal.  Exam reveals no gallop and no friction rub.  1 out of 6 systolic ejection murmur lower sternal border  Pulmonary/Chest: Lungs are clear to auscultation bilaterally. No respiratory distress. No wheezes. No rhonchi. No rales. Mildly diminished bases no gross crackles  Abdominal: Soft. Bowel sounds are normal. No distension and no mass. There is no hepatosplenomegaly. There is no tenderness.   Musculoskeletal: Global muscle wasting typical for age at 89  Extremities: No edema peripherally. Pulses are palpable in all 4 extremities.  Neurological: Patient is alert and oriented to person, place, and time. Cranial nerves II-XII are grossly intact with no focal deficits.  Skin: Skin is warm. No rash noted. Nails show no clubbing.  No cyanosis or erythema.     Results Review:    I reviewed the patient's new clinical results.  Lab Results (most recent)     Procedure Component Value Units Date/Time    POC Glucose Once [[id number]]  (Abnormal) Collected: 05/04/22 0704    Specimen: Blood Updated: 05/04/22 0706     Glucose 130 mg/dL      Comment: Serial Number: [id number]:  [id number]       Basic Metabolic Panel [[id number]]  (Abnormal) Collected: 05/04/22 0538    Specimen: Blood Updated: 05/04/22 0659     Glucose 118 mg/dL      BUN 56 mg/dL      Creatinine 1.54 mg/dL      Sodium 139 mmol/L      Potassium 3.5 mmol/L      Chloride 100 mmol/L      CO2 24.0 mmol/L      Calcium 8.8 mg/dL      BUN/Creatinine Ratio 36.4     Anion Gap 15.0 mmol/L      eGFR 42.3 mL/min/1.73      Comment: National Kidney Foundation and American Society of Nephrology (ASN) Task Force recommended calculation based on the Chronic Kidney Disease Epidemiology Collaboration (CKD-EPI) equation refit without adjustment for race.       Narrative:      GFR Normal  >60  Chronic Kidney Disease <60  Kidney Failure <15      CBC & Differential [[id number]]  (Abnormal) Collected: 05/04/22 0538    Specimen: Blood Updated: 05/04/22 0643    Narrative:      The following orders were created for panel order CBC & Differential.  Procedure                               Abnormality         Status                     ---------                               -----------         ------                     CBC Auto Differential[[id number]]        Abnormal            Final result                 Please view results for these tests on the individual orders.    CBC Auto Differential [[id number]]  (Abnormal) Collected: 05/04/22 0538    Specimen: Blood Updated: 05/04/22 0643     WBC 7.70 10*3/mm3      RBC 3.95 10*6/mm3      Hemoglobin 13.1 g/dL      Hematocrit 38.7 %      MCV 98.1 fL      MCH 33.1 pg      MCHC 33.7 g/dL      RDW 14.2 %      RDW-SD 48.6 fl      MPV 9.7 fL      Platelets 204 10*3/mm3      Neutrophil % 64.3 %      Lymphocyte % 20.0 %      Monocyte % 13.9 %      Eosinophil % 1.2 %      Basophil % 0.6 %      Neutrophils, Absolute 4.90 10*3/mm3      Lymphocytes, Absolute 1.50 10*3/mm3      Monocytes, Absolute 1.10 10*3/mm3      Eosinophils, Absolute 0.10 10*3/mm3      Basophils, Absolute 0.00 10*3/mm3      nRBC 0.1 /100 WBC     COVID PRE-OP / PRE-PROCEDURE SCREENING ORDER (NO ISOLATION) - Swab, Nasopharynx [[id number]]  (Normal) Collected: 05/03/22 1310    Specimen: Swab from Nasopharynx Updated: 05/03/22 1355    Narrative:      The following orders were created for panel order COVID PRE-OP / PRE-PROCEDURE SCREENING ORDER (NO ISOLATION) - Swab, Nasopharynx.  Procedure                               Abnormality         Status                     ---------                               -----------         ------                     COVID-19,CEPHEID/ENRIQUE,CO...[[id number]]  Normal              Final result                 Please view results for these tests on the individual orders.     COVID-19,CEPHEID/ENRIQUE,COR/EDSON/PAD/JAZMIN IN-HOUSE(OR EMERGENT/ADD-ON),NP SWAB IN TRANSPORT MEDIA 3-4 HR TAT, RT-PCR - Swab, Nasopharynx [[id number]]  (Normal) Collected: 05/03/22 1310    Specimen: Swab from Nasopharynx Updated: 05/03/22 1355     COVID19 Not Detected    Narrative:      Fact sheet for providers: https://www.fda.gov/media/880494/download     Fact sheet for patients: https://www.fda.gov/media/348794/download  Fact sheet for providers: https://www.fda.gov/media/303093/download     Fact sheet for patients: https://www.fda.gov/media/883249/download    BNP [[id number]]  (Abnormal) Collected: 05/03/22 1156    Specimen: Blood Updated: 05/03/22 1233     proBNP 42,275.0 pg/mL     Narrative:      Among patients with dyspnea, NT-proBNP is highly sensitive for the detection of acute congestive heart failure. In addition NT-proBNP of <300 pg/ml effectively rules out acute congestive heart failure with 99% negative predictive value.    Results may be falsely decreased if patient taking Biotin.      Basic Metabolic Panel [[id number]]  (Abnormal) Collected: 05/03/22 1156    Specimen: Blood Updated: 05/03/22 1224     Glucose 125 mg/dL      BUN 62 mg/dL      Creatinine 1.66 mg/dL      Sodium 137 mmol/L      Potassium 4.1 mmol/L      Comment: Slight hemolysis detected by analyzer. Results may be affected.        Chloride 97 mmol/L      CO2 25.0 mmol/L      Calcium 9.7 mg/dL      BUN/Creatinine Ratio 37.3     Anion Gap 15.0 mmol/L      eGFR 38.7 mL/min/1.73      Comment: National Kidney Foundation and American Society of Nephrology (ASN) Task Force recommended calculation based on the Chronic Kidney Disease Epidemiology Collaboration (CKD-EPI) equation refit without adjustment for race.       Narrative:      GFR Normal >60  Chronic Kidney Disease <60  Kidney Failure <15      Troponin [[id number]]  (Normal) Collected: 05/03/22 1156    Specimen: Blood Updated: 05/03/22 1224     Troponin T 0.027 ng/mL     Narrative:      Troponin T  Reference Range:  <= 0.03 ng/mL-   Negative for AMI  >0.03 ng/mL-     Abnormal for myocardial necrosis.  Clinicians would have to utilize clinical acumen, EKG, Troponin and serial changes to determine if it is an Acute Myocardial Infarction or myocardial injury due to an underlying chronic condition.       Results may be falsely decreased if patient taking Biotin.      CBC & Differential [[id number]]  (Abnormal) Collected: 05/03/22 1156    Specimen: Blood Updated: 05/03/22 1202    Narrative:      The following orders were created for panel order CBC & Differential.  Procedure                               Abnormality         Status                     ---------                               -----------         ------                     CBC Auto Differential[[id number]]        Abnormal            Final result                 Please view results for these tests on the individual orders.    CBC Auto Differential [[id number]]  (Abnormal) Collected: 05/03/22 1156    Specimen: Blood Updated: 05/03/22 1202     WBC 8.50 10*3/mm3      RBC 4.36 10*6/mm3      Hemoglobin 14.4 g/dL      Hematocrit 43.2 %      MCV 98.9 fL      MCH 33.1 pg      MCHC 33.4 g/dL      RDW 14.1 %      RDW-SD 48.6 fl      MPV 9.9 fL      Platelets 222 10*3/mm3      Neutrophil % 65.4 %      Lymphocyte % 18.9 %      Monocyte % 13.9 %      Eosinophil % 1.0 %      Basophil % 0.8 %      Neutrophils, Absolute 5.50 10*3/mm3      Lymphocytes, Absolute 1.60 10*3/mm3      Monocytes, Absolute 1.20 10*3/mm3      Eosinophils, Absolute 0.10 10*3/mm3      Basophils, Absolute 0.10 10*3/mm3      nRBC 0.0 /100 WBC           Imaging Results (Most Recent)     Procedure Component Value Units Date/Time    XR Chest 1 View [[id number]] Collected: 05/03/22 1229     Updated: 05/03/22 1234    Narrative:      DATE OF EXAM:  5/3/2022 12:07 PM     PROCEDURE:  XR CHEST 1 VW-     INDICATIONS:  Shortness of breath. Cough for 4 months.     COMPARISON:  Chest radiographs 3/24/2022,  5/29/2021, and 5/27/2021. CT chest  3/24/2022     TECHNIQUE:   Single radiographic AP view of the chest was obtained.     FINDINGS:  Lordotic positioning. Overlying artifacts. Stable sternotomy wires,  postoperative changes from CABG, left chest wall cardiac pacer device.  Stable elevation of the right hemidiaphragm with improved aeration of  both lungs. Mild linear right infrahilar and left basilar segmental  atelectasis and/or scarring with mild interstitial thickening in the  lung bases. No focal lung consolidation. No pneumothorax. Stable  cardiomegaly, likely accentuated by technique. Calcified atherosclerotic  disease in the thoracic aorta. Chronic changes in both shoulders. No  acute osseous normality is identified.        Impression:      Stable cardiomegaly with improved aeration of both lungs. Multifocal  bibasilar subsegmental atelectasis and/or scarring with mild  interstitial thickening in the lung bases that could represent mild  pulmonary vascular congestion/edema, chronic lung disease, or less  likely atypical pneumonia.     Electronically Signed By-Lance Mendes MD On:5/3/2022 12:32 PM  This report was finalized on [id number] by  Lance Mendes MD.        reviewed    ECG/EMG Results (most recent)     Procedure Component Value Units Date/Time    ECG 12 Lead [[id number]] Collected: 05/03/22 1127     Updated: 05/03/22 1128     QT Interval 416 ms     Narrative:      HEART RATE= 77  bpm  RR Interval= 788  ms  NH Interval= 158  ms  P Horizontal Axis= 24  deg  P Front Axis= 43  deg  QRSD Interval= 116  ms  QT Interval= 416  ms  QRS Axis= -28  deg  T Wave Axis= 134  deg  - ABNORMAL ECG -  Sinus rhythm  Ventricular premature complex  Incomplete left bundle branch block  LVH with secondary repolarization abnormality  When compared with ECG of 24-Mar-2022 9:38:59,  Nonspecific significant change  Electronically Signed By:   Date and Time of Study: 2022-05-03 11:27:18    SCANNED - TELEMETRY   [[id number]]  Resulted: 05/03/22     Updated: 05/04/22 1057    SCANNED - TELEMETRY   [[id number]] Resulted: 05/03/22     Updated: 05/04/22 1134    SCANNED - TELEMETRY   [[id number]] Resulted: 05/03/22     Updated: 05/04/22 1602    SCANNED - TELEMETRY   [[id number]] Resulted: 05/03/22     Updated: 05/05/22 0949        reviewed    Assessment/Plan     Acute on chronic systolic and diastolic CHF      Acute on chronic systolic and diastolic CHF  EF 35%  Dual-chamber pacemaker well-functioning  No ICD upgrade at family discretion previously  Lasix gently, nephrology on board appreciate recommendations  Room air is on 99% sitting up at bedside  Blood pressures controlled  Elevated BNP  No need to repeat echo at this time, diurese per renal function electrolytes and clinical course  Troponin negative no evidence of acute MI  Creatinine stable at 1.5  Lasix home dose twice daily,  Off Aldactone at this time  Blood pressure will likely not tolerate ARB as well as kidney function  Ischemic neuropathy CAD, no chest pain     Hold further diuretics today  Restart twice daily tomorrow  Follow creatinine electrolytes  Likely okay to discharge tomorrow    Likely discharge in the next 24 to 48 hours and if it goes well  Discussed narrow therapeutic window salt and free water restriction at home  Further recommendation follow findings clinical course and hemodynamic response    I discussed the patient's findings and my recommendations with patient and staff and family    Chris Romero MD  05/05/22  10:14 EDT

## 2022-05-06 PROBLEM — E44.0 MODERATE MALNUTRITION: Status: ACTIVE | Noted: 2022-01-01

## 2022-05-06 NOTE — CONSULTS
Palliative Care Social Work Progress Note    Code Status:full code    Goals of Care: Full Treatment    Narrative: Palliative care  met with pt, wife, and son to assess for goals of care.  Pt appears alert and oriented, but was working with RN during most of visit.  Pt's wife and son acknowledge having a conversation with PCP this morning, and noted that the pt does not have many treatment options.  Family wants to avoid continued hospital readmissions.  Hospice discussed.  Pt's wife reports that she is a volunteer for [facility] and had planned on calling them.  I offered to refer [facility] and she was agreeable.  Also discussed advance care planning, which she reports they have already completed.  Emotional support provided.      Plan:  [facility] referral for EOS  Will continue to follow.          SHAWN Blanca

## 2022-05-06 NOTE — PROGRESS NOTES
"NEPHROLOGY PROGRESS NOTE------[facility]    [facility]  [phone]  Jonathan Villa MD      Patient Care Team:  Antony Lawson MD as PCP - Tam Monroe MD as Consulting Physician (Nephrology)  Minh Kendrick MD as Consulting Physician (Cardiology)  Chris Romero MD as Consulting Physician (Cardiology)      Provider:  Jonathan Villa MD  Patient Name: Shon ZAYAS Kunal  :  1930    SUBJECTIVE:    F/U ARF/HARMAN/CRF/CKD    Feeling okay this AM. Occasional SOB. No angina.     Medication:  allopurinol, 100 mg, Oral, Daily  aspirin, 81 mg, Oral, Daily  atorvastatin, 10 mg, Oral, Daily  enoxaparin, 30 mg, Subcutaneous, Daily  finasteride, 5 mg, Oral, Daily  furosemide, 40 mg, Oral, BID  guaiFENesin, 600 mg, Oral, Q12H  melatonin, 5 mg, Oral, Nightly  metoprolol tartrate, 12.5 mg, Oral, BID  midodrine, 2.5 mg, Oral, TID AC  potassium chloride, 20 mEq, Oral, BID With Meals  sodium chloride, 3 mL, Intravenous, Q12H           OBJECTIVE    Vital Sign Min/Max for last 24 hours  Temp  Min: 97.5 °F (36.4 °C)  Max: 97.7 °F (36.5 °C)   BP  Min: 115/57  Max: 145/70   Pulse  Min: 67  Max: 85   Resp  Min: 18  Max: 20   SpO2  Min: 95 %  Max: 100 %   No data recorded   Weight  Min: 56.1 kg (123 lb 11.2 oz)  Max: 56.1 kg (123 lb 11.2 oz)     Flowsheet Rows    Flowsheet Row First Filed Value   Admission Height 172.7 cm (68\") Documented at 2022 1114   Admission Weight 58.2 kg (128 lb 4.9 oz) Documented at 2022 1114          I/O this shift:  In: -   Out: 200 [Urine:200]  I/O last 3 completed shifts:  In: 840 [P.O.:840]  Out: 740 [Urine:740]    Physical Exam:  General Appearance: alert, appears stated age and cooperative  Head: normocephalic, without obvious abnormality and atraumatic +POOR DENTITION  Eyes: conjunctivae and sclerae normal and no icterus  Neck: supple and no JVD  Lungs: +FEW SCATTERED RHONCHI. NO " CRACKLES  Heart: regular rhythm & normal rate and normal S1, S2 +CELINA  Chest: Wall no abnormalities observed  Abdomen: normal bowel sounds and soft non-tender  Extremities: moves extremities well, no edema, no cyanosis and no redness +DJD  Skin: no bleeding, bruising or rash, turgor normal, color normal and no lesions noted  Neurologic: Alert, and oriented. No focal deficits    Labs:    WBC WBC   Date Value Ref Range Status   05/06/2022 9.60 3.40 - 10.80 10*3/mm3 Final   05/05/2022 7.40 3.40 - 10.80 10*3/mm3 Final   05/04/2022 7.70 3.40 - 10.80 10*3/mm3 Final   05/03/2022 8.50 3.40 - 10.80 10*3/mm3 Final      HGB Hemoglobin   Date Value Ref Range Status   05/06/2022 13.9 13.0 - 17.7 g/dL Final   05/05/2022 13.5 13.0 - 17.7 g/dL Final   05/04/2022 13.1 13.0 - 17.7 g/dL Final   05/03/2022 14.4 13.0 - 17.7 g/dL Final      HCT Hematocrit   Date Value Ref Range Status   05/06/2022 43.2 37.5 - 51.0 % Final   05/05/2022 40.1 37.5 - 51.0 % Final   05/04/2022 38.7 37.5 - 51.0 % Final   05/03/2022 43.2 37.5 - 51.0 % Final      Platlets No results found for: LABPLAT   MCV MCV   Date Value Ref Range Status   05/06/2022 100.3 (H) 79.0 - 97.0 fL Final   05/05/2022 99.3 (H) 79.0 - 97.0 fL Final   05/04/2022 98.1 (H) 79.0 - 97.0 fL Final   05/03/2022 98.9 (H) 79.0 - 97.0 fL Final          Sodium Sodium   Date Value Ref Range Status   05/06/2022 139 136 - 145 mmol/L Final   05/05/2022 137 136 - 145 mmol/L Final   05/04/2022 139 136 - 145 mmol/L Final   05/03/2022 137 136 - 145 mmol/L Final      Potassium Potassium   Date Value Ref Range Status   05/06/2022 4.9 3.5 - 5.2 mmol/L Final   05/05/2022 4.0 3.5 - 5.2 mmol/L Final   05/04/2022 3.5 3.5 - 5.2 mmol/L Final   05/03/2022 4.1 3.5 - 5.2 mmol/L Final     Comment:     Slight hemolysis detected by analyzer. Results may be affected.      Chloride Chloride   Date Value Ref Range Status   05/06/2022 100 98 - 107 mmol/L Final   05/05/2022 96 (L) 98 - 107 mmol/L Final   05/04/2022 100 98 -  107 mmol/L Final   05/03/2022 97 (L) 98 - 107 mmol/L Final      CO2 CO2   Date Value Ref Range Status   05/06/2022 21.0 (L) 22.0 - 29.0 mmol/L Final   05/05/2022 24.0 22.0 - 29.0 mmol/L Final   05/04/2022 24.0 22.0 - 29.0 mmol/L Final   05/03/2022 25.0 22.0 - 29.0 mmol/L Final      BUN BUN   Date Value Ref Range Status   05/06/2022 78 (H) 8 - 23 mg/dL Final   05/05/2022 64 (H) 8 - 23 mg/dL Final   05/04/2022 56 (H) 8 - 23 mg/dL Final   05/03/2022 62 (H) 8 - 23 mg/dL Final      Creatinine Creatinine   Date Value Ref Range Status   05/06/2022 1.94 (H) 0.76 - 1.27 mg/dL Final   05/05/2022 1.74 (H) 0.76 - 1.27 mg/dL Final   05/04/2022 1.54 (H) 0.76 - 1.27 mg/dL Final   05/03/2022 1.66 (H) 0.76 - 1.27 mg/dL Final      Calcium Calcium   Date Value Ref Range Status   05/06/2022 9.3 8.2 - 9.6 mg/dL Final   05/05/2022 9.0 8.2 - 9.6 mg/dL Final   05/04/2022 8.8 8.2 - 9.6 mg/dL Final   05/03/2022 9.7 (H) 8.2 - 9.6 mg/dL Final      PO4 No components found for: PO4   Albumin No results found for: ALBUMIN   Magnesium No results found for: MG   Uric Acid No components found for: URIC ACID     Imaging Results (Last 72 Hours)     Procedure Component Value Units Date/Time    XR Chest 1 View [[id number]] Collected: 05/03/22 1229     Updated: 05/03/22 1234    Narrative:      DATE OF EXAM:  5/3/2022 12:07 PM     PROCEDURE:  XR CHEST 1 VW-     INDICATIONS:  Shortness of breath. Cough for 4 months.     COMPARISON:  Chest radiographs 3/24/2022, 5/29/2021, and 5/27/2021. CT chest  3/24/2022     TECHNIQUE:   Single radiographic AP view of the chest was obtained.     FINDINGS:  Lordotic positioning. Overlying artifacts. Stable sternotomy wires,  postoperative changes from CABG, left chest wall cardiac pacer device.  Stable elevation of the right hemidiaphragm with improved aeration of  both lungs. Mild linear right infrahilar and left basilar segmental  atelectasis and/or scarring with mild interstitial thickening in the  lung bases. No focal  lung consolidation. No pneumothorax. Stable  cardiomegaly, likely accentuated by technique. Calcified atherosclerotic  disease in the thoracic aorta. Chronic changes in both shoulders. No  acute osseous normality is identified.        Impression:      Stable cardiomegaly with improved aeration of both lungs. Multifocal  bibasilar subsegmental atelectasis and/or scarring with mild  interstitial thickening in the lung bases that could represent mild  pulmonary vascular congestion/edema, chronic lung disease, or less  likely atypical pneumonia.     Electronically Signed By-Lance Mendes MD On:5/3/2022 12:32 PM  This report was finalized on [id number] by  Lance Mendes MD.          Results for orders placed during the hospital encounter of 05/03/22    XR Chest 1 View    Narrative  DATE OF EXAM:  5/3/2022 12:07 PM    PROCEDURE:  XR CHEST 1 VW-    INDICATIONS:  Shortness of breath. Cough for 4 months.    COMPARISON:  Chest radiographs 3/24/2022, 5/29/2021, and 5/27/2021. CT chest  3/24/2022    TECHNIQUE:  Single radiographic AP view of the chest was obtained.    FINDINGS:  Lordotic positioning. Overlying artifacts. Stable sternotomy wires,  postoperative changes from CABG, left chest wall cardiac pacer device.  Stable elevation of the right hemidiaphragm with improved aeration of  both lungs. Mild linear right infrahilar and left basilar segmental  atelectasis and/or scarring with mild interstitial thickening in the  lung bases. No focal lung consolidation. No pneumothorax. Stable  cardiomegaly, likely accentuated by technique. Calcified atherosclerotic  disease in the thoracic aorta. Chronic changes in both shoulders. No  acute osseous normality is identified.    Impression  Stable cardiomegaly with improved aeration of both lungs. Multifocal  bibasilar subsegmental atelectasis and/or scarring with mild  interstitial thickening in the lung bases that could represent mild  pulmonary vascular congestion/edema, chronic  lung disease, or less  likely atypical pneumonia.    Electronically Signed By-Lance Mendes MD On:5/3/2022 12:32 PM  This report was finalized on [id number] by  Lance Mendes MD.      Results for orders placed during the hospital encounter of 03/24/22    XR Chest 1 View    Narrative  DATE OF EXAM:  3/24/2022 9:49 AM    PROCEDURE:  XR CHEST 1 VW-    INDICATIONS:  soa, chest pain    COMPARISON:  5/29/2021    TECHNIQUE:  Single radiographic view of the chest was obtained.    FINDINGS:  Sternotomy wires overlie the midline. There is a left subclavian  dual-lead pacer which appears unchanged. Cardiomegaly is again present.  There is mixed interstitial and alveolar opacity bilaterally, right  greater than left. This may be due to pulmonary edema/CHF or atypical  infection pattern. Probable small amount of left pleural fluid is  present. There is elevation of the right hemidiaphragm again noted. No  pneumothorax is seen. Aortic vascular calcifications present.    Impression  1. Bilateral mixed interstitial and alveolar opacities may be due to  pulmonary edema/CHF versus atypical infection pattern. Cardiomegaly  suggest CHF is most likely.  2. Small amount of left pleural fluid.    Electronically Signed By-Earl Xiong MD On:3/24/2022 9:58 AM  This report was finalized on [id number] by  Earl Xiong MD.      Results for orders placed during the hospital encounter of 05/29/21    XR Chest 1 View    Narrative  DATE OF EXAM:  5/29/2021 9:58 AM    PROCEDURE:  XR CHEST 1 VW-    INDICATIONS:  soa    COMPARISON:  2 view chest x-ray 5/27/2021.    TECHNIQUE:  Single radiographic AP view of the chest was obtained.    FINDINGS:  There are increased airspace opacities in the right upper lung  superimposed on bilateral interstitial opacities. Small left pleural  effusion is suspected. Cardiac silhouette is mildly enlarged with  changes of CABG again seen. No pneumothorax is identified.    Impression  1.Increased right upper lung  airspace opacities superimposed on  bilateral interstitial opacities, which may be due to asymmetric  pulmonary edema or pneumonia superimposed on chronic changes.  2.Suspected small left pleural effusion.    Electronically Signed By-Hoa Bonilla MD On:5/29/2021 10:23 AM  This report was finalized on [id number] by  Hoa Bonilla MD.      Results for orders placed during the hospital encounter of 03/24/22    Duplex Carotid Ultrasound CAR    Interpretation Summary  · Proximal right internal carotid artery moderate stenosis.  · Proximal left internal carotid artery moderate stenosis.        ASSESSMENT / PLAN      Dyspnea, unspecified type    1. ARF/HARMAN/CRF/CKD---------Nonoliguric. BUN/Cr up a little with diuresis. Back down Lasix today. CRF/CKD STG 3B secondary to HTN NS. No NSAIDs or IV dye. Dose meds for CrCl 30 cc/min    2. VOLUME EXCESS/CHF------Back down Lasix today. Off Aldactone    3. HTN WITH CKD------BP okay. Avoid hypotension. No ACE-I/ARB/DRI    4. ATRIAL FIBRILLATION-------Rate controlled    5. CAD------No angina    6. BPH--------On Avodart    7. OA/DJD/HYPERURICEMIA------Allopurinol    8. HYPERLIPIDEMIA------On Statin    9. VITAMIN D DEFICIENCY-----on supplemenation    10. ACIDOSIS-----Mild. Metabolic. No elevation in AGAP. +Type 4 RTA. Give po NaHCO3 and follow      Jonathan Villa MD  [facility]  [phone]  05/06/22  08:05 EDT

## 2022-05-06 NOTE — PROGRESS NOTES
[facility] [place]        LOS:  LOS: 2 days   Patient Name: Shon ZAYAS Kunal  Age/Sex: 89 y.o. male  : 1930  MRN: [id number]    Day of Service: 22   Length of Stay: 2  Encounter Provider: CARMEN Martell  Place of Service: [facility] CARDIOLOGY  Patient Care Team:  Antony aLwson MD as PCP - General  CarlosTam torres MD as Consulting Physician (Nephrology)  Minh Kendrick MD as Consulting Physician (Cardiology)  Chris Romero MD as Consulting Physician (Cardiology)    Subjective:     Chief Complaint: shortness of breath, MERA    Subjective: no acute cardiac events. On lasix managed per renal. Creatinine up some. Denies chest pain.     Current Medications:   Scheduled Meds:allopurinol, 100 mg, Oral, Daily  aspirin, 81 mg, Oral, Daily  atorvastatin, 10 mg, Oral, Daily  enoxaparin, 30 mg, Subcutaneous, Daily  finasteride, 5 mg, Oral, Daily  furosemide, 20 mg, Oral, Q PM  furosemide, 40 mg, Oral, QAM  guaiFENesin, 600 mg, Oral, Q12H  melatonin, 5 mg, Oral, Nightly  metoprolol tartrate, 12.5 mg, Oral, BID  midodrine, 2.5 mg, Oral, TID AC  [START ON 2022] potassium chloride, 20 mEq, Oral, Daily  sodium bicarbonate, 650 mg, Oral, Daily  sodium chloride, 3 mL, Intravenous, Q12H      Continuous Infusions:     Allergies:  Allergies   Allergen Reactions   • Penicillin G Rash   • Vancomycin Rash       Review of Systems   Constitutional: Negative for chills, diaphoresis and malaise/fatigue.   Cardiovascular: Negative for chest pain, dyspnea on exertion, irregular heartbeat, leg swelling, near-syncope, orthopnea, palpitations, paroxysmal nocturnal dyspnea and syncope.   Respiratory: Negative for cough, shortness of breath, sleep disturbances due to breathing and sputum production.    Gastrointestinal: Negative for change in bowel habit.   Genitourinary: Negative for urgency.   Neurological: Negative for dizziness and headaches.   Psychiatric/Behavioral: Negative  for altered mental status.         Objective:     Temp:  [97.2 °F (36.2 °C)-97.7 °F (36.5 °C)] 97.2 °F (36.2 °C)  Heart Rate:  [67-88] 74  Resp:  [17-19] 17  BP: (125-150)/(51-76) 150/76     Intake/Output Summary (Last 24 hours) at 5/6/2022 1259  Last data filed at 5/6/2022 1122  Gross per 24 hour   Intake 600 ml   Output 400 ml   Net 200 ml     Body mass index is 18.81 kg/m².      05/04/22  0338 05/05/22  0357 05/06/22  0311   Weight: 55.1 kg (121 lb 7.6 oz) 55.2 kg (121 lb 11.1 oz) 56.1 kg (123 lb 11.2 oz)         General Appearance:    Alert, cooperative, in no acute distress                                Head: Atraumatic, normocephalic, PERRLA               Neck:   supple, trachea midline, no thyromegaly, no carotid bruit, no JVD   Lungs:     Clear to auscultation,respirations regular, even and               unlabored    Heart:    Regular rhythm and normal rate, normal S1 and S2, 1/6 murmur   Abdomen:     Normal bowel sounds, no masses, no organomegaly, soft  non-tender, non-distended, no guarding, no rebound  tenderness   Extremities:   Moves all extremities well, no edema, no cyanosis, no  redness   Pulses:   Pulses palpable and equal bilaterally   Skin:   No bleeding, bruising or rash   Neurologic:   Awake, alert, oriented x3         Lab Review:   Results from last 7 days   Lab Units 05/06/22  0421 05/05/22  0325   SODIUM mmol/L 139 137   POTASSIUM mmol/L 4.9 4.0   CHLORIDE mmol/L 100 96*   CO2 mmol/L 21.0* 24.0   BUN mg/dL 78* 64*   CREATININE mg/dL 1.94* 1.74*   GLUCOSE mg/dL 125* 122*   CALCIUM mg/dL 9.3 9.0     Results from last 7 days   Lab Units 05/03/22  1156   TROPONIN T ng/mL 0.027     Results from last 7 days   Lab Units 05/06/22  0133 05/05/22  0325   WBC 10*3/mm3 9.60 7.40   HEMOGLOBIN g/dL 13.9 13.5   HEMATOCRIT % 43.2 40.1   PLATELETS 10*3/mm3 220 208                   Invalid input(s): LDLCALC  Results from last 7 days   Lab Units 05/05/22  0325 05/03/22  1156   PROBNP pg/mL 45,156.0*  42,275.0*           Recent Radiology:  Imaging Results (Most Recent)     Procedure Component Value Units Date/Time    XR Chest 1 View [[id number]] Collected: 05/03/22 1229     Updated: 05/03/22 1234    Narrative:      DATE OF EXAM:  5/3/2022 12:07 PM     PROCEDURE:  XR CHEST 1 VW-     INDICATIONS:  Shortness of breath. Cough for 4 months.     COMPARISON:  Chest radiographs 3/24/2022, 5/29/2021, and 5/27/2021. CT chest  3/24/2022     TECHNIQUE:   Single radiographic AP view of the chest was obtained.     FINDINGS:  Lordotic positioning. Overlying artifacts. Stable sternotomy wires,  postoperative changes from CABG, left chest wall cardiac pacer device.  Stable elevation of the right hemidiaphragm with improved aeration of  both lungs. Mild linear right infrahilar and left basilar segmental  atelectasis and/or scarring with mild interstitial thickening in the  lung bases. No focal lung consolidation. No pneumothorax. Stable  cardiomegaly, likely accentuated by technique. Calcified atherosclerotic  disease in the thoracic aorta. Chronic changes in both shoulders. No  acute osseous normality is identified.        Impression:      Stable cardiomegaly with improved aeration of both lungs. Multifocal  bibasilar subsegmental atelectasis and/or scarring with mild  interstitial thickening in the lung bases that could represent mild  pulmonary vascular congestion/edema, chronic lung disease, or less  likely atypical pneumonia.     Electronically Signed By-Lance Mendes MD On:5/3/2022 12:32 PM  This report was finalized on [id number] by  Lance Mendes MD.          ECHOCARDIOGRAM:    Results for orders placed during the hospital encounter of 03/24/22    Adult Transthoracic Echo Complete W/ Cont if Necessary Per Protocol    Interpretation Summary  · Left ventricular ejection fraction appears to be 36 - 40%.  · The right ventricular cavity is severely dilated.  · The right atrial cavity is dilated.  · There is calcification of  the aortic valve.  · Severe mitral valve regurgitation is present.  · Moderate tricuspid valve regurgitation is present.  · No pericardial effusion noted        I reviewed the patient's new clinical results.    EKG:      Assessment:       Dyspnea, unspecified type    Moderate malnutrition (HCC)    1. Shortness of breath / Acute on chronic systolic and diastolic CHF  - LVEF 35%, Dual-chamber pacemaker well-functioning-No ICD upgrade at family discretion previously  -Lasix gently, nephrology on board appreciate recommendations  -Off Aldactone at this time  -Blood pressure will likely not tolerate ARB as well as kidney function    2. HARMAN / CKD  - creatinine 1.9 today, nephrology on board    3. HTN    4. Paroxysmal Atrial fibrillation  - not on a/c, CHADS VAsc at least 3, on ASA      5. SSS s/p PPM    Plan:   Volume status improved. On oral diuresis per nephrology  Creatinine 1.9 today  Patient stable to discharge from a cardiac standpoint  A paced on tele    Okay to discharge from cardiac standpoint. Outpt cardiology follow up in 2 weeks to reassess volume status          Justina Riojas, CARMEN  05/06/22  12:59 EDT

## 2022-05-06 NOTE — NURSING NOTE
Patient complaining of severe SOA despite 98% on room air and as needed oxygen. Notified Dr. Wu. Orders put in for ABGs, d-dimer, pulmonology consult, and CPAP.     Per ABGs, CO2 19.3 and HCO3 16.4. Per Dr. Martinez, amp of sodium bicarb given along with 1mg of ativan given. Patient is now on CPAP for a couple of hours to see how patient does after. Per respiratory, patient has apneic episodes every couple of minutes. Will continue to monitor.

## 2022-05-06 NOTE — PROGRESS NOTES
LOS: 2 days   Patient Care Team:  Antony Lawson MD as PCP - General  CarlosTam torres MD as Consulting Physician (Nephrology)  Minh Kendrick MD as Consulting Physician (Cardiology)  Chris Romero MD as Consulting Physician (Cardiology)    Subjective     Interval History:     Patient Complaints: pt is feeling better.  Still with episodes of SOB.  Son in room at bedside    History taken from: patient    Review of Systems   Constitutional: Positive for activity change and fatigue. Negative for fever.   HENT: Positive for hearing loss.    Respiratory: Positive for shortness of breath.    Cardiovascular: Negative.    Gastrointestinal: Negative.    Musculoskeletal: Positive for arthralgias, back pain and gait problem.   Neurological: Positive for weakness.   Psychiatric/Behavioral: Negative for confusion.           Objective     Vital Signs  Temp:  [97.2 °F (36.2 °C)-97.7 °F (36.5 °C)] 97.2 °F (36.2 °C)  Heart Rate:  [67-88] 74  Resp:  [17-19] 17  BP: (125-150)/(51-76) 150/76    Physical Exam:     General Appearance:    Alert, cooperative, in no acute distress   Head:    Normocephalic, without obvious abnormality, atraumatic   Eyes:            Lids and lashes normal, conjunctivae and sclerae normal, no   icterus, no pallor, corneas clear, PERRLA   Ears:    Ears appear intact with no abnormalities noted   Throat:   No oral lesions, no thrush, oral mucosa moist   Neck:   No adenopathy, supple, trachea midline, no thyromegaly, no   carotid bruit, no JVD   Lungs:     Clear to auscultation,respirations regular, even and                  unlabored    Heart:    Regular rhythm and normal rate, normal S1 and S2, no            murmur, no gallop, no rub, no click   Chest Wall:    No abnormalities observed   Abdomen:     Normal bowel sounds, no masses, no organomegaly, soft        non-tender, non-distended, no guarding, no rebound                tenderness   Extremities:   Moves all extremities well, no edema, no  cyanosis, no             redness   Pulses:   Pulses palpable and equal bilaterally   Skin:   No bleeding, bruising or rash   Lymph nodes:   No palpable adenopathy   Neurologic:   Cranial nerves 2 - 12 grossly intact, sensation intact, DTR       present and equal bilaterally        Results Review:    Lab Results (last 24 hours)     Procedure Component Value Units Date/Time    POC Glucose Once [[id number]]  (Abnormal) Collected: 05/06/22 1125    Specimen: Blood Updated: 05/06/22 1126     Glucose 212 mg/dL      Comment: Serial Number: [id number]:  [id number]       POC Glucose Once [[id number]]  (Abnormal) Collected: 05/06/22 0704    Specimen: Blood Updated: 05/06/22 0705     Glucose 126 mg/dL      Comment: Serial Number: [id number]:  [id number]       Basic Metabolic Panel [[id number]]  (Abnormal) Collected: 05/06/22 0421    Specimen: Blood Updated: 05/06/22 0504     Glucose 125 mg/dL      BUN 78 mg/dL      Creatinine 1.94 mg/dL      Sodium 139 mmol/L      Potassium 4.9 mmol/L      Chloride 100 mmol/L      CO2 21.0 mmol/L      Calcium 9.3 mg/dL      BUN/Creatinine Ratio 40.2     Anion Gap 18.0 mmol/L      eGFR 32.1 mL/min/1.73      Comment: National Kidney Foundation and American Society of Nephrology (ASN) Task Force recommended calculation based on the Chronic Kidney Disease Epidemiology Collaboration (CKD-EPI) equation refit without adjustment for race.       Narrative:      GFR Normal >60  Chronic Kidney Disease <60  Kidney Failure <15      CBC & Differential [[id number]]  (Abnormal) Collected: 05/06/22 0133    Specimen: Blood Updated: 05/06/22 0222    Narrative:      The following orders were created for panel order CBC & Differential.  Procedure                               Abnormality         Status                     ---------                               -----------         ------                     CBC Auto Differential[[id number]]        Abnormal            Final result                 Please view  results for these tests on the individual orders.    CBC Auto Differential [[id number]]  (Abnormal) Collected: 05/06/22 0133    Specimen: Blood Updated: 05/06/22 0222     WBC 9.60 10*3/mm3      RBC 4.31 10*6/mm3      Hemoglobin 13.9 g/dL      Hematocrit 43.2 %      .3 fL      MCH 32.3 pg      MCHC 32.2 g/dL      RDW 14.3 %      RDW-SD 49.0 fl      MPV 10.3 fL      Platelets 220 10*3/mm3      Neutrophil % 73.7 %      Lymphocyte % 14.5 %      Monocyte % 11.3 %      Eosinophil % 0.3 %      Basophil % 0.2 %      Neutrophils, Absolute 7.10 10*3/mm3      Lymphocytes, Absolute 1.40 10*3/mm3      Monocytes, Absolute 1.10 10*3/mm3      Eosinophils, Absolute 0.00 10*3/mm3      Basophils, Absolute 0.00 10*3/mm3      nRBC 0.1 /100 WBC     POC Glucose Once [[id number]]  (Abnormal) Collected: 05/05/22 1948    Specimen: Blood Updated: 05/05/22 1949     Glucose 174 mg/dL      Comment: Serial Number: [id number]:  [id number]       POC Glucose Once [[id number]]  (Abnormal) Collected: 05/05/22 1631    Specimen: Blood Updated: 05/05/22 1632     Glucose 119 mg/dL      Comment: Serial Number: [id number]:  [id number]              Imaging Results (Last 24 Hours)     ** No results found for the last 24 hours. **               I reviewed the patient's new clinical results.    Medication Review:   Scheduled Meds:allopurinol, 100 mg, Oral, Daily  aspirin, 81 mg, Oral, Daily  atorvastatin, 10 mg, Oral, Daily  enoxaparin, 30 mg, Subcutaneous, Daily  finasteride, 5 mg, Oral, Daily  furosemide, 20 mg, Oral, Q PM  furosemide, 40 mg, Oral, QAM  guaiFENesin, 600 mg, Oral, Q12H  melatonin, 5 mg, Oral, Nightly  metoprolol tartrate, 12.5 mg, Oral, BID  midodrine, 2.5 mg, Oral, TID AC  [START ON 5/7/2022] potassium chloride, 20 mEq, Oral, Daily  sodium bicarbonate, 650 mg, Oral, Daily  sodium chloride, 3 mL, Intravenous, Q12H      Continuous Infusions:   PRN Meds:.•  acetaminophen  •  ipratropium-albuterol  •  nitroglycerin  •   ondansetron  •  [COMPLETED] Insert peripheral IV **AND** sodium chloride  •  sodium chloride     Assessment/Plan       Dyspnea, unspecified type    Moderate malnutrition (HCC)    Acute shortness of breath  Acute pulmonary edema  History of sick sinus syndrome  Acute exacerbation of chronic systolic congestive heart failure  History of pacemaker placement with replacement of generator 1/22  Atherosclerotic heart disease of native coronary arteries with angina pectoris  History of coronary artery bypass grafting in 1993  HFrEF  Chronic atrial fibrillation, persistent intermittent  Hypercoagulable state secondary to atrial fibrillation  Cerebrovascular disease with history of CVA  History of carotid occlusive disease  Chronic kidney disease stage IIIa  Hypertension associated chronic kidney disease stage IIIa  Degenerative joint disease  Hypokalemia  Bilateral upper lobe pulmonary nodularities  Gastroesophageal reflux disease without esophagitis     HARMAN/CKD:  Creatine is up to 1.9.  Renal is following.  Lasix adjusted.    Acute on chronic systolic/diastolic CHF: Cardiology is also following.  No other med changes at this time.  Afib:  No anticoagulation at this time.  On ASA    Spoke with son at bedside.  Plan is for pt to go home with his wife.  She is concerned that she may not be able to care for him.  Hospice services were discussed.    provided options.   Consult palliative care    Plan for disposition:KYLE Wu MD  05/06/22  13:28 EDT

## 2022-05-06 NOTE — PLAN OF CARE
Goal Outcome Evaluation:  Plan of Care Reviewed With: patient, spouse, son        Progress: no change  Outcome Evaluation: Patient has complained of SOB this shift despite being 98% on room air and educated to breath in nose and out their mouth.  notified and ordered nebs. Patient started on sodium bicarb per nephrology. Cardiology has signed off and to follow up in 2 weeks. Palliative consulted. Will continue to monitor.

## 2022-05-06 NOTE — DISCHARGE PLACEMENT REQUEST
"Shon Cottrell (89 y.o. Male)             Date of Birth   09/17/1930    Social Security Number       Address   [id number] [place] [address] [place] [address]    Home Phone   [phone]    MRN   [id number]       Anglican   None    Marital Status                               Admission Date   5/3/22    Admission Type   Emergency    Admitting Provider   Kristen Crowell MD    Attending Provider   Antony Lawson MD    Department, Room/Bed   [facility] MEDICAL INPATIENT, 355/1       Discharge Date       Discharge Disposition       Discharge Destination                               Attending Provider: Antony Lawson MD    Allergies: Penicillin G, Vancomycin    Isolation: None   Infection: None   Code Status: CPR   Advance Care Planning Activity    Ht: 172.7 cm (68\")   Wt: 56.1 kg (123 lb 11.2 oz)    Admission Cmt: None   Principal Problem: None                Active Insurance as of 5/3/2022     Primary Coverage     Payor Plan Insurance Group Employer/Plan Group    MEDICARE MEDICARE A & B      Payor Plan Address Payor Plan Phone Number Payor Plan Fax Number Effective Dates    [address] [phone]  9/1/1995 - None Entered    [facility] [id number]       Subscriber Name Subscriber Birth Date Member ID       SHON COTTRELL 9/17/1930 [id number]           Secondary Coverage     Payor Plan Insurance Group Employer/Plan Group    [place] [id number]     Payor Plan Address Payor Plan Phone Number Payor Plan Fax Number Effective Dates    [address]   1/1/2016 - None Entered    [facility] [address]       Subscriber Name Subscriber Birth Date Member ID       SHON COTTRELL 9/17/1930 [id number]                 Emergency Contacts      (Rel.) Home Phone Work Phone Mobile Phone    RONIT,LONI (Spouse) [phone] -- --    ronit,john (Son) [phone] -- [phone]    Lauren Aranda (Daughter) [phone] -- --               History & Physical      Kristen Crowell MD at " 05/03/22 1807              Patient Care Team:  Antony Lawson MD as PCP - General  CarlosTam torres MD as Consulting Physician (Nephrology)  Minh Kendrick MD as Consulting Physician (Cardiology)  Chris Romero MD as Consulting Physician (Cardiology)    Chief complaint Shortness of breath    Subjective     Patient is a 89 y.o. male with valvular heart disease and severe right ventricular dysfunction who presents with progressive shortness of breath and orthopnea for 2 weeks, worse over the last 2 days.  He was hospitalized 6 weeks ago with similar symptoms.  Diuretics were increased during that hospitalization.  He initially felt well for the first few weeks but has noticed worsening shortness of breath recently.  He denies any chest pain, leg edema dizziness, palpitations or near syncope.    Review of Systems   Constitutional: Positive for activity change, appetite change and fatigue. Negative for chills, diaphoresis, fever and unexpected weight change.   HENT: Negative for facial swelling.    Respiratory: Positive for shortness of breath. Negative for cough, wheezing and stridor.    Cardiovascular: Negative for chest pain, palpitations and leg swelling.   Gastrointestinal: Negative for abdominal pain, constipation, diarrhea, nausea and vomiting.   Genitourinary: Negative for dysuria and hematuria.   Musculoskeletal: Negative for arthralgias and back pain.   Skin: Negative for rash and wound.   Neurological: Negative for dizziness, tremors, weakness, light-headedness and headaches.   Psychiatric/Behavioral: Negative for confusion.          History  Past Medical History:   Diagnosis Date   • Atrial flutter (HCC)    • CHF (congestive heart failure) (HCC)    • Chronic kidney disease    • Coronary artery disease    • Coronary artery disease involving native coronary artery of native heart 8/6/2019    Status post multivessel CABG 1993   • Diabetes mellitus (HCC)    • Essential hypertension 8/6/2019   •  HFrEF (heart failure with reduced ejection fraction) ([facility])    • Hyperlipidemia    • Hyperlipidemia, mixed 2019   • Hypertension    • Presence of cardiac pacemaker 2019    S/P dual-chamber pacemaker implanted    • Sick sinus syndrome (HCC) 2019   • Stroke ([facility])      Past Surgical History:   Procedure Laterality Date   • CARDIAC CATHETERIZATION     • CARDIAC ELECTROPHYSIOLOGY PROCEDURE N/A 2022    Procedure: Gen change-[place] aware;  Surgeon: Minh Kendrick MD;  Location: [facility] CATH INVASIVE LOCATION;  Service: Cardiology;  Laterality: N/A;   • CORONARY ARTERY BYPASS GRAFT     • CYSTOSCOPY     • HERNIA REPAIR     • INSERT / REPLACE / REMOVE PACEMAKER      BS insertion   • PACEMAKER REPLACEMENT  2022    [facility]   • VENA CAVA FILTER PLACEMENT  2016     Family History   Problem Relation Age of Onset   • Leukemia Mother    • Heart disease Father    • Heart attack Father 56         age at 56   • Asthma Paternal Aunt      Social History     Tobacco Use   • Smoking status: Former Smoker     Quit date: 1970     Years since quittin.9   • Smokeless tobacco: Never Used   Vaping Use   • Vaping Use: Never used   Substance Use Topics   • Alcohol use: Never   • Drug use: Never     Medications Prior to Admission   Medication Sig Dispense Refill Last Dose   • allopurinol (ZYLOPRIM) 100 MG tablet Take 100 mg by mouth Daily.      • ascorbic acid (VITAMIN C) 1000 MG tablet Take 1,000 mg by mouth Daily.      • aspirin 81 MG tablet Take 81 mg by mouth every night at bedtime.      • Cholecalciferol (Vitamin D3) 50 MCG (2000 UT) capsule Take 2,000 Units by mouth 3 (Three) Times a Week. Monday, Wednesday and Friday      • dutasteride (AVODART) 0.5 MG capsule Take 0.5 mg by mouth Every Night.      • furosemide (LASIX) 40 MG tablet Take 1 tablet by mouth 2 (Two) Times a Day. 180 tablet 1    • metoprolol tartrate (LOPRESSOR) 25 MG tablet Take 25 mg by mouth 2 (Two) Times a Day.       • multivitamin with minerals tablet tablet Take 1 tablet by mouth Daily.      • pravastatin (PRAVACHOL) 40 MG tablet Take 40 mg by mouth 1 (One) Time Per Week. Monday      • Probiotic Product (Probiotic Daily) capsule Take 1 capsule by mouth Daily.      • spironolactone (ALDACTONE) 25 MG tablet Take 0.5 tablets by mouth Daily. 45 tablet 1      Allergies:  Penicillin g and Vancomycin    Objective     Vital Signs  Temp:  [96.5 °F (35.8 °C)-97.7 °F (36.5 °C)] 97.7 °F (36.5 °C)  Heart Rate:  [64-81] 64  Resp:  [15-24] 16  BP: (120-149)/(52-75) 136/75     Physical Exam:      General Appearance:    Alert, cooperative, mild conversational dyspnea   Head:    Normocephalic, without obvious abnormality, atraumatic   Eyes:            Lids and lashes normal, conjunctivae and sclerae normal, no   icterus, no pallor, corneas clear, PERRLA   Ears:    Ears appear intact with no abnormalities noted   Throat:   No oral lesions, no thrush, oral mucosa moist   Neck:   No adenopathy, supple, trachea midline, no thyromegaly, no   carotid bruit, no JVD   Lungs:    Bibasilar crackles    Heart:    Regular rhythm and normal rate, 2/6 systolic high-pitched murmur   Chest Wall:    No abnormalities observed   Abdomen:     Normal bowel sounds, no masses, no organomegaly, soft        non-tender, non-distended, no guarding, no rebound                tenderness   Extremities:   Moves all extremities well, no edema, no cyanosis, no             redness   Pulses:   Pulses palpable and equal bilaterally   Skin:   No bleeding, bruising or rash   Lymph nodes:   No palpable adenopathy   Neurologic:   Cranial nerves 2 - 12 grossly intact, sensation intact, DTR       present and equal bilaterally       Results Review:     Imaging Results (Last 24 Hours)     Procedure Component Value Units Date/Time    XR Chest 1 View [[id number]] Collected: 05/03/22 1229     Updated: 05/03/22 1234    Narrative:      DATE OF EXAM:  5/3/2022 12:07 PM     PROCEDURE:  XR  CHEST 1 VW-     INDICATIONS:  Shortness of breath. Cough for 4 months.     COMPARISON:  Chest radiographs 3/24/2022, 5/29/2021, and 5/27/2021. CT chest  3/24/2022     TECHNIQUE:   Single radiographic AP view of the chest was obtained.     FINDINGS:  Lordotic positioning. Overlying artifacts. Stable sternotomy wires,  postoperative changes from CABG, left chest wall cardiac pacer device.  Stable elevation of the right hemidiaphragm with improved aeration of  both lungs. Mild linear right infrahilar and left basilar segmental  atelectasis and/or scarring with mild interstitial thickening in the  lung bases. No focal lung consolidation. No pneumothorax. Stable  cardiomegaly, likely accentuated by technique. Calcified atherosclerotic  disease in the thoracic aorta. Chronic changes in both shoulders. No  acute osseous normality is identified.        Impression:      Stable cardiomegaly with improved aeration of both lungs. Multifocal  bibasilar subsegmental atelectasis and/or scarring with mild  interstitial thickening in the lung bases that could represent mild  pulmonary vascular congestion/edema, chronic lung disease, or less  likely atypical pneumonia.     Electronically Signed By-Lance Mendes MD On:5/3/2022 12:32 PM  This report was finalized on [id number] by  Lance Mendes MD.           Lab Results (last 24 hours)     Procedure Component Value Units Date/Time    COVID PRE-OP / PRE-PROCEDURE SCREENING ORDER (NO ISOLATION) - Swab, Nasopharynx [[id number]]  (Normal) Collected: 05/03/22 1310    Specimen: Swab from Nasopharynx Updated: 05/03/22 1355    Narrative:      The following orders were created for panel order COVID PRE-OP / PRE-PROCEDURE SCREENING ORDER (NO ISOLATION) - Swab, Nasopharynx.  Procedure                               Abnormality         Status                     ---------                               -----------         ------                     COVID-19,CEPHEID/ENRIQUE,CO...[[id number]]  Normal               Final result                 Please view results for these tests on the individual orders.    COVID-19,CEPHEID/ENRIQUE,COR/EDSON/PAD/JAZMIN IN-HOUSE(OR EMERGENT/ADD-ON),NP SWAB IN TRANSPORT MEDIA 3-4 HR TAT, RT-PCR - Swab, Nasopharynx [[id number]]  (Normal) Collected: 05/03/22 1310    Specimen: Swab from Nasopharynx Updated: 05/03/22 1355     COVID19 Not Detected    Narrative:      Fact sheet for providers: https://www.fda.gov/media/428473/download     Fact sheet for patients: https://www.fda.gov/media/253414/download  Fact sheet for providers: https://www.fda.gov/media/892563/download     Fact sheet for patients: https://www.fda.gov/media/516900/download    BNP [[id number]]  (Abnormal) Collected: 05/03/22 1156    Specimen: Blood Updated: 05/03/22 1233     proBNP 42,275.0 pg/mL     Narrative:      Among patients with dyspnea, NT-proBNP is highly sensitive for the detection of acute congestive heart failure. In addition NT-proBNP of <300 pg/ml effectively rules out acute congestive heart failure with 99% negative predictive value.    Results may be falsely decreased if patient taking Biotin.      Basic Metabolic Panel [[id number]]  (Abnormal) Collected: 05/03/22 1156    Specimen: Blood Updated: 05/03/22 1224     Glucose 125 mg/dL      BUN 62 mg/dL      Creatinine 1.66 mg/dL      Sodium 137 mmol/L      Potassium 4.1 mmol/L      Comment: Slight hemolysis detected by analyzer. Results may be affected.        Chloride 97 mmol/L      CO2 25.0 mmol/L      Calcium 9.7 mg/dL      BUN/Creatinine Ratio 37.3     Anion Gap 15.0 mmol/L      eGFR 38.7 mL/min/1.73      Comment: National Kidney Foundation and American Society of Nephrology (ASN) Task Force recommended calculation based on the Chronic Kidney Disease Epidemiology Collaboration (CKD-EPI) equation refit without adjustment for race.       Narrative:      GFR Normal >60  Chronic Kidney Disease <60  Kidney Failure <15      Troponin [[id number]]  (Normal) Collected: 05/03/22  1156    Specimen: Blood Updated: 05/03/22 1224     Troponin T 0.027 ng/mL     Narrative:      Troponin T Reference Range:  <= 0.03 ng/mL-   Negative for AMI  >0.03 ng/mL-     Abnormal for myocardial necrosis.  Clinicians would have to utilize clinical acumen, EKG, Troponin and serial changes to determine if it is an Acute Myocardial Infarction or myocardial injury due to an underlying chronic condition.       Results may be falsely decreased if patient taking Biotin.      CBC & Differential [[id number]]  (Abnormal) Collected: 05/03/22 1156    Specimen: Blood Updated: 05/03/22 1202    Narrative:      The following orders were created for panel order CBC & Differential.  Procedure                               Abnormality         Status                     ---------                               -----------         ------                     CBC Auto Differential[[id number]]        Abnormal            Final result                 Please view results for these tests on the individual orders.    CBC Auto Differential [[id number]]  (Abnormal) Collected: 05/03/22 1156    Specimen: Blood Updated: 05/03/22 1202     WBC 8.50 10*3/mm3      RBC 4.36 10*6/mm3      Hemoglobin 14.4 g/dL      Hematocrit 43.2 %      MCV 98.9 fL      MCH 33.1 pg      MCHC 33.4 g/dL      RDW 14.1 %      RDW-SD 48.6 fl      MPV 9.9 fL      Platelets 222 10*3/mm3      Neutrophil % 65.4 %      Lymphocyte % 18.9 %      Monocyte % 13.9 %      Eosinophil % 1.0 %      Basophil % 0.8 %      Neutrophils, Absolute 5.50 10*3/mm3      Lymphocytes, Absolute 1.60 10*3/mm3      Monocytes, Absolute 1.20 10*3/mm3      Eosinophils, Absolute 0.10 10*3/mm3      Basophils, Absolute 0.10 10*3/mm3      nRBC 0.0 /100 WBC            I reviewed the patient's new clinical results.    Assessment/Plan       Dyspnea, unspecified type    Exertional dyspnea [R06.00]   Acute pulmonary edema (HCC) [J81.0]   Carotid stenosis, non-symptomatic, bilateral [I65.23]   Sick sinus syndrome  (HCC) [I49.5]   Presence of cardiac pacemaker [Z95.0]   Essential hypertension [I10]   Hyperlipidemia, mixed [E78.2]   Coronary artery disease involving native coronary artery of native heart [I25.10]   CKD stage 3B    We will proceed with diuresis tonight.  Echocardiogram from March 2022 was reviewed.  Monitor renal function and electrolytes closely with diuresis.  I have asked his primary cardiologist, Dr. Romero, to consult.    I discussed the patient's findings and my recommendations with patient.     Kristen Crowell MD  05/03/22  18:07 EDT        Electronically signed by Kristen Crowell MD at 05/03/22 1812         Current Facility-Administered Medications   Medication Dose Route Frequency Provider Last Rate Last Admin   • acetaminophen (TYLENOL) tablet 650 mg  650 mg Oral Q4H PRN Kristen Crowell MD   650 mg at 05/06/22 1510   • allopurinol (ZYLOPRIM) tablet 100 mg  100 mg Oral Daily Kristen Crowell MD   100 mg at 05/06/22 0821   • aspirin EC tablet 81 mg  81 mg Oral Daily Kristen Crowell MD   81 mg at 05/06/22 0820   • atorvastatin (LIPITOR) tablet 10 mg  10 mg Oral Daily Kristen Crowell MD   10 mg at 05/06/22 0821   • Enoxaparin Sodium (LOVENOX) syringe 30 mg  30 mg Subcutaneous Daily Kristen Crowell MD   30 mg at 05/05/22 1643   • finasteride (PROSCAR) tablet 5 mg  5 mg Oral Daily Kristen Crowell MD   5 mg at 05/06/22 0821   • furosemide (LASIX) tablet 20 mg  20 mg Oral Q PM Georgina Villa MD       • furosemide (LASIX) tablet 40 mg  40 mg Oral QAM Georgina Villa MD   40 mg at 05/06/22 0820   • guaiFENesin (MUCINEX) 12 hr tablet 600 mg  600 mg Oral Q12H Leann Short APRN   600 mg at 05/06/22 0821   • ipratropium-albuterol (DUO-NEB) nebulizer solution 3 mL  3 mL Nebulization Q4H PRN Emily Wu MD   3 mL at 05/06/22 1330   • melatonin tablet 5 mg  5 mg Oral Nightly Leann Short, CARMEN   5 mg at 05/05/22 2019   • metoprolol tartrate (LOPRESSOR) half tablet 12.5 mg  12.5 mg Oral BID Tam Gonzalez,  MD   12.5 mg at 05/06/22 0820   • midodrine (PROAMATINE) tablet 2.5 mg  2.5 mg Oral TID AC Tam Gonzalez MD   2.5 mg at 05/06/22 1139   • nitroglycerin (NITROSTAT) SL tablet 0.4 mg  0.4 mg Sublingual Q5 Min PRN Kristen Crowell MD       • ondansetron (ZOFRAN) injection 4 mg  4 mg Intravenous Q6H PRN Kristen Crowell MD       • [START ON 5/7/2022] potassium chloride (K-DUR,KLOR-CON) CR tablet 20 mEq  20 mEq Oral Daily Georgina Villa MD       • sodium bicarbonate tablet 650 mg  650 mg Oral Daily Georgina Villa MD   650 mg at 05/06/22 1317   • sodium chloride 0.9 % flush 10 mL  10 mL Intravenous PRN Kristen Crowell MD       • sodium chloride 0.9 % flush 3 mL  3 mL Intravenous Q12H Kristen Crowell MD   3 mL at 05/06/22 0821   • sodium chloride 0.9 % flush 3-10 mL  3-10 mL Intravenous PRN Kristen Crowell MD            Physician Progress Notes (last 24 hours)      Emily Wu MD at 05/06/22 1328           LOS: 2 days   Patient Care Team:  Antony Lawson MD as PCP - General  Tam Gonzalez MD as Consulting Physician (Nephrology)  Minh Kendrick MD as Consulting Physician (Cardiology)  Chris Romero MD as Consulting Physician (Cardiology)    Subjective     Interval History:     Patient Complaints: pt is feeling better.  Still with episodes of SOB.  Son in room at bedside    History taken from: patient    Review of Systems   Constitutional: Positive for activity change and fatigue. Negative for fever.   HENT: Positive for hearing loss.    Respiratory: Positive for shortness of breath.    Cardiovascular: Negative.    Gastrointestinal: Negative.    Musculoskeletal: Positive for arthralgias, back pain and gait problem.   Neurological: Positive for weakness.   Psychiatric/Behavioral: Negative for confusion.           Objective     Vital Signs  Temp:  [97.2 °F (36.2 °C)-97.7 °F (36.5 °C)] 97.2 °F (36.2 °C)  Heart Rate:  [67-88] 74  Resp:  [17-19] 17  BP: (125-150)/(51-76) 150/76    Physical  Exam:     General Appearance:    Alert, cooperative, in no acute distress   Head:    Normocephalic, without obvious abnormality, atraumatic   Eyes:            Lids and lashes normal, conjunctivae and sclerae normal, no   icterus, no pallor, corneas clear, PERRLA   Ears:    Ears appear intact with no abnormalities noted   Throat:   No oral lesions, no thrush, oral mucosa moist   Neck:   No adenopathy, supple, trachea midline, no thyromegaly, no   carotid bruit, no JVD   Lungs:     Clear to auscultation,respirations regular, even and                  unlabored    Heart:    Regular rhythm and normal rate, normal S1 and S2, no            murmur, no gallop, no rub, no click   Chest Wall:    No abnormalities observed   Abdomen:     Normal bowel sounds, no masses, no organomegaly, soft        non-tender, non-distended, no guarding, no rebound                tenderness   Extremities:   Moves all extremities well, no edema, no cyanosis, no             redness   Pulses:   Pulses palpable and equal bilaterally   Skin:   No bleeding, bruising or rash   Lymph nodes:   No palpable adenopathy   Neurologic:   Cranial nerves 2 - 12 grossly intact, sensation intact, DTR       present and equal bilaterally        Results Review:    Lab Results (last 24 hours)     Procedure Component Value Units Date/Time    POC Glucose Once [[id number]]  (Abnormal) Collected: 05/06/22 1125    Specimen: Blood Updated: 05/06/22 1126     Glucose 212 mg/dL      Comment: Serial Number: [id number]:  [id number]       POC Glucose Once [[id number]]  (Abnormal) Collected: 05/06/22 0704    Specimen: Blood Updated: 05/06/22 0705     Glucose 126 mg/dL      Comment: Serial Number: [id number]:  [id number]       Basic Metabolic Panel [[id number]]  (Abnormal) Collected: 05/06/22 0421    Specimen: Blood Updated: 05/06/22 0504     Glucose 125 mg/dL      BUN 78 mg/dL      Creatinine 1.94 mg/dL      Sodium 139 mmol/L      Potassium 4.9 mmol/L      Chloride 100  mmol/L      CO2 21.0 mmol/L      Calcium 9.3 mg/dL      BUN/Creatinine Ratio 40.2     Anion Gap 18.0 mmol/L      eGFR 32.1 mL/min/1.73      Comment: National Kidney Foundation and American Society of Nephrology (ASN) Task Force recommended calculation based on the Chronic Kidney Disease Epidemiology Collaboration (CKD-EPI) equation refit without adjustment for race.       Narrative:      GFR Normal >60  Chronic Kidney Disease <60  Kidney Failure <15      CBC & Differential [[id number]]  (Abnormal) Collected: 05/06/22 0133    Specimen: Blood Updated: 05/06/22 0222    Narrative:      The following orders were created for panel order CBC & Differential.  Procedure                               Abnormality         Status                     ---------                               -----------         ------                     CBC Auto Differential[[id number]]        Abnormal            Final result                 Please view results for these tests on the individual orders.    CBC Auto Differential [[id number]]  (Abnormal) Collected: 05/06/22 0133    Specimen: Blood Updated: 05/06/22 0222     WBC 9.60 10*3/mm3      RBC 4.31 10*6/mm3      Hemoglobin 13.9 g/dL      Hematocrit 43.2 %      .3 fL      MCH 32.3 pg      MCHC 32.2 g/dL      RDW 14.3 %      RDW-SD 49.0 fl      MPV 10.3 fL      Platelets 220 10*3/mm3      Neutrophil % 73.7 %      Lymphocyte % 14.5 %      Monocyte % 11.3 %      Eosinophil % 0.3 %      Basophil % 0.2 %      Neutrophils, Absolute 7.10 10*3/mm3      Lymphocytes, Absolute 1.40 10*3/mm3      Monocytes, Absolute 1.10 10*3/mm3      Eosinophils, Absolute 0.00 10*3/mm3      Basophils, Absolute 0.00 10*3/mm3      nRBC 0.1 /100 WBC     POC Glucose Once [[id number]]  (Abnormal) Collected: 05/05/22 1948    Specimen: Blood Updated: 05/05/22 1949     Glucose 174 mg/dL      Comment: Serial Number: [id number]:  [id number]       POC Glucose Once [[id number]]  (Abnormal) Collected: 05/05/22 1631     Specimen: Blood Updated: 05/05/22 1632     Glucose 119 mg/dL      Comment: Serial Number: [id number]:  [id number]              Imaging Results (Last 24 Hours)     ** No results found for the last 24 hours. **               I reviewed the patient's new clinical results.    Medication Review:   Scheduled Meds:allopurinol, 100 mg, Oral, Daily  aspirin, 81 mg, Oral, Daily  atorvastatin, 10 mg, Oral, Daily  enoxaparin, 30 mg, Subcutaneous, Daily  finasteride, 5 mg, Oral, Daily  furosemide, 20 mg, Oral, Q PM  furosemide, 40 mg, Oral, QAM  guaiFENesin, 600 mg, Oral, Q12H  melatonin, 5 mg, Oral, Nightly  metoprolol tartrate, 12.5 mg, Oral, BID  midodrine, 2.5 mg, Oral, TID AC  [START ON 5/7/2022] potassium chloride, 20 mEq, Oral, Daily  sodium bicarbonate, 650 mg, Oral, Daily  sodium chloride, 3 mL, Intravenous, Q12H      Continuous Infusions:   PRN Meds:.•  acetaminophen  •  ipratropium-albuterol  •  nitroglycerin  •  ondansetron  •  [COMPLETED] Insert peripheral IV **AND** sodium chloride  •  sodium chloride     Assessment/Plan       Dyspnea, unspecified type    Moderate malnutrition (HCC)    Acute shortness of breath  Acute pulmonary edema  History of sick sinus syndrome  Acute exacerbation of chronic systolic congestive heart failure  History of pacemaker placement with replacement of generator 1/22  Atherosclerotic heart disease of native coronary arteries with angina pectoris  History of coronary artery bypass grafting in 1993  HFrEF  Chronic atrial fibrillation, persistent intermittent  Hypercoagulable state secondary to atrial fibrillation  Cerebrovascular disease with history of CVA  History of carotid occlusive disease  Chronic kidney disease stage IIIa  Hypertension associated chronic kidney disease stage IIIa  Degenerative joint disease  Hypokalemia  Bilateral upper lobe pulmonary nodularities  Gastroesophageal reflux disease without esophagitis     HARMAN/CKD:  Creatine is up to 1.9.  Renal is following.   Lasix adjusted.    Acute on chronic systolic/diastolic CHF: Cardiology is also following.  No other med changes at this time.  Afib:  No anticoagulation at this time.  On ASA    Spoke with son at bedside.  Plan is for pt to go home with his wife.  She is concerned that she may not be able to care for him.  Hospice services were discussed.    provided options.   Consult palliative care    Plan for disposition:KYLE Wu MD  22  13:28 EDT      Electronically signed by Emily Wu MD at 22 1340     Justina Riojas APRN at 22 1256          Cardiology [place]        LOS:  LOS: 2 days   Patient Name: Shon ZAYAS Kunal  Age/Sex: 89 y.o. male  : 1930  MRN: [id number]    Day of Service: 22   Length of Stay: 2  Encounter Provider: CARMEN Martell  Place of Service: [facility] CARDIOLOGY  Patient Care Team:  Antony Lawson MD as PCP - General  Tam Gonzalez MD as Consulting Physician (Nephrology)  Minh Kendrick MD as Consulting Physician (Cardiology)  Chris Romero MD as Consulting Physician (Cardiology)    Subjective:     Chief Complaint: shortness of breath, MERA    Subjective: no acute cardiac events. On lasix managed per renal. Creatinine up some. Denies chest pain.     Current Medications:   Scheduled Meds:allopurinol, 100 mg, Oral, Daily  aspirin, 81 mg, Oral, Daily  atorvastatin, 10 mg, Oral, Daily  enoxaparin, 30 mg, Subcutaneous, Daily  finasteride, 5 mg, Oral, Daily  furosemide, 20 mg, Oral, Q PM  furosemide, 40 mg, Oral, QAM  guaiFENesin, 600 mg, Oral, Q12H  melatonin, 5 mg, Oral, Nightly  metoprolol tartrate, 12.5 mg, Oral, BID  midodrine, 2.5 mg, Oral, TID AC  [START ON 2022] potassium chloride, 20 mEq, Oral, Daily  sodium bicarbonate, 650 mg, Oral, Daily  sodium chloride, 3 mL, Intravenous, Q12H      Continuous Infusions:     Allergies:  Allergies   Allergen Reactions   • Penicillin G Rash   •  Vancomycin Rash       Review of Systems   Constitutional: Negative for chills, diaphoresis and malaise/fatigue.   Cardiovascular: Negative for chest pain, dyspnea on exertion, irregular heartbeat, leg swelling, near-syncope, orthopnea, palpitations, paroxysmal nocturnal dyspnea and syncope.   Respiratory: Negative for cough, shortness of breath, sleep disturbances due to breathing and sputum production.    Gastrointestinal: Negative for change in bowel habit.   Genitourinary: Negative for urgency.   Neurological: Negative for dizziness and headaches.   Psychiatric/Behavioral: Negative for altered mental status.         Objective:     Temp:  [97.2 °F (36.2 °C)-97.7 °F (36.5 °C)] 97.2 °F (36.2 °C)  Heart Rate:  [67-88] 74  Resp:  [17-19] 17  BP: (125-150)/(51-76) 150/76     Intake/Output Summary (Last 24 hours) at 5/6/2022 1259  Last data filed at 5/6/2022 1122  Gross per 24 hour   Intake 600 ml   Output 400 ml   Net 200 ml     Body mass index is 18.81 kg/m².      05/04/22  0338 05/05/22  0357 05/06/22  0311   Weight: 55.1 kg (121 lb 7.6 oz) 55.2 kg (121 lb 11.1 oz) 56.1 kg (123 lb 11.2 oz)         General Appearance:    Alert, cooperative, in no acute distress                                Head: Atraumatic, normocephalic, PERRLA               Neck:   supple, trachea midline, no thyromegaly, no carotid bruit, no JVD   Lungs:     Clear to auscultation,respirations regular, even and               unlabored    Heart:    Regular rhythm and normal rate, normal S1 and S2, 1/6 murmur   Abdomen:     Normal bowel sounds, no masses, no organomegaly, soft  non-tender, non-distended, no guarding, no rebound  tenderness   Extremities:   Moves all extremities well, no edema, no cyanosis, no  redness   Pulses:   Pulses palpable and equal bilaterally   Skin:   No bleeding, bruising or rash   Neurologic:   Awake, alert, oriented x3         Lab Review:   Results from last 7 days   Lab Units 05/06/22  0421 05/05/22  0325   SODIUM  mmol/L 139 137   POTASSIUM mmol/L 4.9 4.0   CHLORIDE mmol/L 100 96*   CO2 mmol/L 21.0* 24.0   BUN mg/dL 78* 64*   CREATININE mg/dL 1.94* 1.74*   GLUCOSE mg/dL 125* 122*   CALCIUM mg/dL 9.3 9.0     Results from last 7 days   Lab Units 05/03/22  1156   TROPONIN T ng/mL 0.027     Results from last 7 days   Lab Units 05/06/22  0133 05/05/22  0325   WBC 10*3/mm3 9.60 7.40   HEMOGLOBIN g/dL 13.9 13.5   HEMATOCRIT % 43.2 40.1   PLATELETS 10*3/mm3 220 208                   Invalid input(s): LDLCALC  Results from last 7 days   Lab Units 05/05/22  0325 05/03/22  1156   PROBNP pg/mL 45,156.0* 42,275.0*           Recent Radiology:  Imaging Results (Most Recent)     Procedure Component Value Units Date/Time    XR Chest 1 View [[id number]] Collected: 05/03/22 1229     Updated: 05/03/22 1234    Narrative:      DATE OF EXAM:  5/3/2022 12:07 PM     PROCEDURE:  XR CHEST 1 VW-     INDICATIONS:  Shortness of breath. Cough for 4 months.     COMPARISON:  Chest radiographs 3/24/2022, 5/29/2021, and 5/27/2021. CT chest  3/24/2022     TECHNIQUE:   Single radiographic AP view of the chest was obtained.     FINDINGS:  Lordotic positioning. Overlying artifacts. Stable sternotomy wires,  postoperative changes from CABG, left chest wall cardiac pacer device.  Stable elevation of the right hemidiaphragm with improved aeration of  both lungs. Mild linear right infrahilar and left basilar segmental  atelectasis and/or scarring with mild interstitial thickening in the  lung bases. No focal lung consolidation. No pneumothorax. Stable  cardiomegaly, likely accentuated by technique. Calcified atherosclerotic  disease in the thoracic aorta. Chronic changes in both shoulders. No  acute osseous normality is identified.        Impression:      Stable cardiomegaly with improved aeration of both lungs. Multifocal  bibasilar subsegmental atelectasis and/or scarring with mild  interstitial thickening in the lung bases that could represent mild  pulmonary  vascular congestion/edema, chronic lung disease, or less  likely atypical pneumonia.     Electronically Signed By-Lance Mendes MD On:5/3/2022 12:32 PM  This report was finalized on [id number] by  Lance Mendes MD.          ECHOCARDIOGRAM:    Results for orders placed during the hospital encounter of 03/24/22    Adult Transthoracic Echo Complete W/ Cont if Necessary Per Protocol    Interpretation Summary  · Left ventricular ejection fraction appears to be 36 - 40%.  · The right ventricular cavity is severely dilated.  · The right atrial cavity is dilated.  · There is calcification of the aortic valve.  · Severe mitral valve regurgitation is present.  · Moderate tricuspid valve regurgitation is present.  · No pericardial effusion noted        I reviewed the patient's new clinical results.    EKG:      Assessment:       Dyspnea, unspecified type    Moderate malnutrition (HCC)    1. Shortness of breath / Acute on chronic systolic and diastolic CHF  - LVEF 35%, Dual-chamber pacemaker well-functioning-No ICD upgrade at family discretion previously  -Lasix gently, nephrology on board appreciate recommendations  -Off Aldactone at this time  -Blood pressure will likely not tolerate ARB as well as kidney function    2. HARMAN / CKD  - creatinine 1.9 today, nephrology on board    3. HTN    4. Paroxysmal Atrial fibrillation  - not on a/c, CHADS VAsc at least 3, on ASA      5. SSS s/p PPM    Plan:   Volume status improved. On oral diuresis per nephrology  Creatinine 1.9 today  Patient stable to discharge from a cardiac standpoint  A paced on tele    Okay to discharge from cardiac standpoint. Outpt cardiology follow up in 2 weeks to reassess volume status          CARMEN Martell  05/06/22  12:59 EDT    Electronically signed by Justina Riojas APRN at 05/06/22 1307     Georgina Villa MD at 05/06/22 0805          NEPHROLOGY PROGRESS NOTE------[facility]    [facility]  [phone]  Jonathan Villa MD      Patient Care Team:  Antony Lawson MD as PCP - General  Tam Gonzalez MD as Consulting Physician (Nephrology)  Minh Kendrick MD as Consulting Physician (Cardiology)  Chris Romero MD as Consulting Physician (Cardiology)      Provider:  Jonathan Villa MD  Patient Name: Shon ZAYAS Kunal  :  1930    SUBJECTIVE:    F/U ARF/HARMAN/CRF/CKD    Feeling okay this AM. Occasional SOB. No angina.     Medication:  allopurinol, 100 mg, Oral, Daily  aspirin, 81 mg, Oral, Daily  atorvastatin, 10 mg, Oral, Daily  enoxaparin, 30 mg, Subcutaneous, Daily  finasteride, 5 mg, Oral, Daily  furosemide, 40 mg, Oral, BID  guaiFENesin, 600 mg, Oral, Q12H  melatonin, 5 mg, Oral, Nightly  metoprolol tartrate, 12.5 mg, Oral, BID  midodrine, 2.5 mg, Oral, TID AC  potassium chloride, 20 mEq, Oral, BID With Meals  sodium chloride, 3 mL, Intravenous, Q12H           OBJECTIVE    Vital Sign Min/Max for last 24 hours  Temp  Min: 97.5 °F (36.4 °C)  Max: 97.7 °F (36.5 °C)   BP  Min: 115/57  Max: 145/70   Pulse  Min: 67  Max: 85   Resp  Min: 18  Max: 20   SpO2  Min: 95 %  Max: 100 %   No data recorded   Weight  Min: 56.1 kg (123 lb 11.2 oz)  Max: 56.1 kg (123 lb 11.2 oz)     Flowsheet Rows    Flowsheet Row First Filed Value   Admission Height 172.7 cm (68\") Documented at 2022 1114   Admission Weight 58.2 kg (128 lb 4.9 oz) Documented at 2022 1114          I/O this shift:  In: -   Out: 200 [Urine:200]  I/O last 3 completed shifts:  In: 840 [P.O.:840]  Out: 740 [Urine:740]    Physical Exam:  General Appearance: alert, appears stated age and cooperative  Head: normocephalic, without obvious abnormality and atraumatic +POOR DENTITION  Eyes: conjunctivae and sclerae normal and no icterus  Neck: supple and no JVD  Lungs: +FEW SCATTERED RHONCHI. NO CRACKLES  Heart: regular rhythm & normal rate and normal S1, S2 +CELINA  Chest: Wall no abnormalities " observed  Abdomen: normal bowel sounds and soft non-tender  Extremities: moves extremities well, no edema, no cyanosis and no redness +DJD  Skin: no bleeding, bruising or rash, turgor normal, color normal and no lesions noted  Neurologic: Alert, and oriented. No focal deficits    Labs:    WBC WBC   Date Value Ref Range Status   05/06/2022 9.60 3.40 - 10.80 10*3/mm3 Final   05/05/2022 7.40 3.40 - 10.80 10*3/mm3 Final   05/04/2022 7.70 3.40 - 10.80 10*3/mm3 Final   05/03/2022 8.50 3.40 - 10.80 10*3/mm3 Final      HGB Hemoglobin   Date Value Ref Range Status   05/06/2022 13.9 13.0 - 17.7 g/dL Final   05/05/2022 13.5 13.0 - 17.7 g/dL Final   05/04/2022 13.1 13.0 - 17.7 g/dL Final   05/03/2022 14.4 13.0 - 17.7 g/dL Final      HCT Hematocrit   Date Value Ref Range Status   05/06/2022 43.2 37.5 - 51.0 % Final   05/05/2022 40.1 37.5 - 51.0 % Final   05/04/2022 38.7 37.5 - 51.0 % Final   05/03/2022 43.2 37.5 - 51.0 % Final      Platlets No results found for: LABPLAT   MCV MCV   Date Value Ref Range Status   05/06/2022 100.3 (H) 79.0 - 97.0 fL Final   05/05/2022 99.3 (H) 79.0 - 97.0 fL Final   05/04/2022 98.1 (H) 79.0 - 97.0 fL Final   05/03/2022 98.9 (H) 79.0 - 97.0 fL Final          Sodium Sodium   Date Value Ref Range Status   05/06/2022 139 136 - 145 mmol/L Final   05/05/2022 137 136 - 145 mmol/L Final   05/04/2022 139 136 - 145 mmol/L Final   05/03/2022 137 136 - 145 mmol/L Final      Potassium Potassium   Date Value Ref Range Status   05/06/2022 4.9 3.5 - 5.2 mmol/L Final   05/05/2022 4.0 3.5 - 5.2 mmol/L Final   05/04/2022 3.5 3.5 - 5.2 mmol/L Final   05/03/2022 4.1 3.5 - 5.2 mmol/L Final     Comment:     Slight hemolysis detected by analyzer. Results may be affected.      Chloride Chloride   Date Value Ref Range Status   05/06/2022 100 98 - 107 mmol/L Final   05/05/2022 96 (L) 98 - 107 mmol/L Final   05/04/2022 100 98 - 107 mmol/L Final   05/03/2022 97 (L) 98 - 107 mmol/L Final      CO2 CO2   Date Value Ref Range  Status   05/06/2022 21.0 (L) 22.0 - 29.0 mmol/L Final   05/05/2022 24.0 22.0 - 29.0 mmol/L Final   05/04/2022 24.0 22.0 - 29.0 mmol/L Final   05/03/2022 25.0 22.0 - 29.0 mmol/L Final      BUN BUN   Date Value Ref Range Status   05/06/2022 78 (H) 8 - 23 mg/dL Final   05/05/2022 64 (H) 8 - 23 mg/dL Final   05/04/2022 56 (H) 8 - 23 mg/dL Final   05/03/2022 62 (H) 8 - 23 mg/dL Final      Creatinine Creatinine   Date Value Ref Range Status   05/06/2022 1.94 (H) 0.76 - 1.27 mg/dL Final   05/05/2022 1.74 (H) 0.76 - 1.27 mg/dL Final   05/04/2022 1.54 (H) 0.76 - 1.27 mg/dL Final   05/03/2022 1.66 (H) 0.76 - 1.27 mg/dL Final      Calcium Calcium   Date Value Ref Range Status   05/06/2022 9.3 8.2 - 9.6 mg/dL Final   05/05/2022 9.0 8.2 - 9.6 mg/dL Final   05/04/2022 8.8 8.2 - 9.6 mg/dL Final   05/03/2022 9.7 (H) 8.2 - 9.6 mg/dL Final      PO4 No components found for: PO4   Albumin No results found for: ALBUMIN   Magnesium No results found for: MG   Uric Acid No components found for: URIC ACID     Imaging Results (Last 72 Hours)     Procedure Component Value Units Date/Time    XR Chest 1 View [[id number]] Collected: 05/03/22 1229     Updated: 05/03/22 1234    Narrative:      DATE OF EXAM:  5/3/2022 12:07 PM     PROCEDURE:  XR CHEST 1 VW-     INDICATIONS:  Shortness of breath. Cough for 4 months.     COMPARISON:  Chest radiographs 3/24/2022, 5/29/2021, and 5/27/2021. CT chest  3/24/2022     TECHNIQUE:   Single radiographic AP view of the chest was obtained.     FINDINGS:  Lordotic positioning. Overlying artifacts. Stable sternotomy wires,  postoperative changes from CABG, left chest wall cardiac pacer device.  Stable elevation of the right hemidiaphragm with improved aeration of  both lungs. Mild linear right infrahilar and left basilar segmental  atelectasis and/or scarring with mild interstitial thickening in the  lung bases. No focal lung consolidation. No pneumothorax. Stable  cardiomegaly, likely accentuated by technique.  Calcified atherosclerotic  disease in the thoracic aorta. Chronic changes in both shoulders. No  acute osseous normality is identified.        Impression:      Stable cardiomegaly with improved aeration of both lungs. Multifocal  bibasilar subsegmental atelectasis and/or scarring with mild  interstitial thickening in the lung bases that could represent mild  pulmonary vascular congestion/edema, chronic lung disease, or less  likely atypical pneumonia.     Electronically Signed By-Lance Mendes MD On:5/3/2022 12:32 PM  This report was finalized on [id number] by  Lance Mendes MD.          Results for orders placed during the hospital encounter of 05/03/22    XR Chest 1 View    Narrative  DATE OF EXAM:  5/3/2022 12:07 PM    PROCEDURE:  XR CHEST 1 VW-    INDICATIONS:  Shortness of breath. Cough for 4 months.    COMPARISON:  Chest radiographs 3/24/2022, 5/29/2021, and 5/27/2021. CT chest  3/24/2022    TECHNIQUE:  Single radiographic AP view of the chest was obtained.    FINDINGS:  Lordotic positioning. Overlying artifacts. Stable sternotomy wires,  postoperative changes from CABG, left chest wall cardiac pacer device.  Stable elevation of the right hemidiaphragm with improved aeration of  both lungs. Mild linear right infrahilar and left basilar segmental  atelectasis and/or scarring with mild interstitial thickening in the  lung bases. No focal lung consolidation. No pneumothorax. Stable  cardiomegaly, likely accentuated by technique. Calcified atherosclerotic  disease in the thoracic aorta. Chronic changes in both shoulders. No  acute osseous normality is identified.    Impression  Stable cardiomegaly with improved aeration of both lungs. Multifocal  bibasilar subsegmental atelectasis and/or scarring with mild  interstitial thickening in the lung bases that could represent mild  pulmonary vascular congestion/edema, chronic lung disease, or less  likely atypical pneumonia.    Electronically Signed By-Lance Mendes MD  On:5/3/2022 12:32 PM  This report was finalized on [id number] by  Lance Mendes MD.      Results for orders placed during the hospital encounter of 03/24/22    XR Chest 1 View    Narrative  DATE OF EXAM:  3/24/2022 9:49 AM    PROCEDURE:  XR CHEST 1 VW-    INDICATIONS:  soa, chest pain    COMPARISON:  5/29/2021    TECHNIQUE:  Single radiographic view of the chest was obtained.    FINDINGS:  Sternotomy wires overlie the midline. There is a left subclavian  dual-lead pacer which appears unchanged. Cardiomegaly is again present.  There is mixed interstitial and alveolar opacity bilaterally, right  greater than left. This may be due to pulmonary edema/CHF or atypical  infection pattern. Probable small amount of left pleural fluid is  present. There is elevation of the right hemidiaphragm again noted. No  pneumothorax is seen. Aortic vascular calcifications present.    Impression  1. Bilateral mixed interstitial and alveolar opacities may be due to  pulmonary edema/CHF versus atypical infection pattern. Cardiomegaly  suggest CHF is most likely.  2. Small amount of left pleural fluid.    Electronically Signed By-Earl Xiong MD On:3/24/2022 9:58 AM  This report was finalized on [id number] by  Earl Xiong MD.      Results for orders placed during the hospital encounter of 05/29/21    XR Chest 1 View    Narrative  DATE OF EXAM:  5/29/2021 9:58 AM    PROCEDURE:  XR CHEST 1 VW-    INDICATIONS:  soa    COMPARISON:  2 view chest x-ray 5/27/2021.    TECHNIQUE:  Single radiographic AP view of the chest was obtained.    FINDINGS:  There are increased airspace opacities in the right upper lung  superimposed on bilateral interstitial opacities. Small left pleural  effusion is suspected. Cardiac silhouette is mildly enlarged with  changes of CABG again seen. No pneumothorax is identified.    Impression  1.Increased right upper lung airspace opacities superimposed on  bilateral interstitial opacities, which may be due to  asymmetric  pulmonary edema or pneumonia superimposed on chronic changes.  2.Suspected small left pleural effusion.    Electronically Signed By-oHa Bonilla MD On:5/29/2021 10:23 AM  This report was finalized on [id number] by  Hoa Bonilla MD.      Results for orders placed during the hospital encounter of 03/24/22    Duplex Carotid Ultrasound CAR    Interpretation Summary  · Proximal right internal carotid artery moderate stenosis.  · Proximal left internal carotid artery moderate stenosis.        ASSESSMENT / PLAN      Dyspnea, unspecified type    1. ARF/HARMAN/CRF/CKD---------Nonoliguric. BUN/Cr up a little with diuresis. Back down Lasix today. CRF/CKD STG 3B secondary to HTN NS. No NSAIDs or IV dye. Dose meds for CrCl 30 cc/min    2. VOLUME EXCESS/CHF------Back down Lasix today. Off Aldactone    3. HTN WITH CKD------BP okay. Avoid hypotension. No ACE-I/ARB/DRI    4. ATRIAL FIBRILLATION-------Rate controlled    5. CAD------No angina    6. BPH--------On Avodart    7. OA/DJD/HYPERURICEMIA------Allopurinol    8. HYPERLIPIDEMIA------On Statin    9. VITAMIN D DEFICIENCY-----on supplemenation    10. ACIDOSIS-----Mild. Metabolic. No elevation in AGAP. +Type 4 RTA. Give po NaHCO3 and follow      Jonathan Villa MD  [facility]  [phone]  05/06/22  08:05 EDT      Electronically signed by Georgina Villa MD at 05/06/22 3990       Consult Notes (last 24 hours)  Notes from 05/05/22 1532 through 05/06/22 1532   No notes of this type exist for this encounter.

## 2022-05-06 NOTE — CASE MANAGEMENT/SOCIAL WORK
Continued Stay Note  [facility]     Patient Name: Shon ZAYAS Kunal  MRN: [id number]  Today's Date: 5/6/2022    Admit Date: 5/3/2022     Discharge Plan     Row Name 05/06/22 1237       Plan    Plan DC plan: pending choices/family decisions; [facility] referral for EOS    Provided Post Acute Provider List? Yes    Post Acute Provider List Home Health;Long Term Acute Care;Inpatient Rehab    Plan Comments met with pt's son and pt at bedside; family had expressed concerns about pt being home alone as wife (who is primary caregiver will be having heart surgery soon). Dr. Wu stated she would be consulting palliative care.  CM delivered hh and ecf list to pt's son at bedside.      1538: [facility] Referral for EOS sent to [place] by Bradley with Palliative Team after consulting with them.                Met with patient in room wearing PPE: mask.      Maintained distance greater than six feet and spent less than 15 minutes in the room.              Negrita Ortega RN

## 2022-05-07 NOTE — PLAN OF CARE
Goal Outcome Evaluation:  Plan of Care Reviewed With: patient, spouse        Progress: no change  Outcome Evaluation: Patient has rested well over night. Wife remained at bedside. [facility] is going to come talk to patient/family again today.

## 2022-05-07 NOTE — CONSULTS
Group: [facility]         CONSULT NOTE    Patient Identification:  Shon ZAYAS Kunal  89 y.o.  male  9/17/1930  [id number]            Requesting physician: Attending physician    Reason for Consultation: Dyspnea      History of Present Illness:  89 y.o. male with valvular heart disease and severe right ventricular dysfunction who presents with progressive shortness of breath and orthopnea for 2 weeks, worse over the last 2 days.  He was hospitalized 6 weeks ago with similar symptoms.  Diuretics were increased during that hospitalization.  He initially felt well for the first few weeks but has noticed worsening shortness of breath recently.  He denies any chest pain, leg edema dizziness, palpitations or near syncope.        Assessment:    Sling dyspnea on exertion and at rest  Alkalemia with respiratory alkalosis noted yesterday when patient has acute episode of shortness of breath,   Dyspnea  Pulmonary edema  CHF  Coronary artery disease  A. fib  CKD        Recommendations:    CT scan of the chest without contrast to rule out interstitial lung disease    Prednisone 6-day taper    Salicylate level            Review of Sytems:  Review of Systems   Respiratory: Positive for cough and shortness of breath.    Cardiovascular: Positive for palpitations and leg swelling.       Past Medical History:  Past Medical History:   Diagnosis Date   • Atrial flutter ([facility])    • CHF (congestive heart failure) ([facility])    • Chronic kidney disease    • Coronary artery disease    • Coronary artery disease involving native coronary artery of native heart 8/6/2019    Status post multivessel CABG 1993   • Diabetes mellitus ([facility])    • Essential hypertension 8/6/2019   • HFrEF (heart failure with reduced ejection fraction) ([facility])    • Hyperlipidemia    • Hyperlipidemia, mixed 8/6/2019   • Hypertension    • Presence of cardiac pacemaker 8/6/2019    S/P dual-chamber pacemaker implanted 2009   • Sick sinus syndrome ([facility]) 8/6/2019   •  Stroke (HCC)        Past Surgical History:  Past Surgical History:   Procedure Laterality Date   • CARDIAC CATHETERIZATION     • CARDIAC ELECTROPHYSIOLOGY PROCEDURE N/A 2022    Procedure: Gen change-Traer Traer aware;  Surgeon: Minh Kendrick MD;  Location: [facility] CATH INVASIVE LOCATION;  Service: Cardiology;  Laterality: N/A;   • CORONARY ARTERY BYPASS GRAFT     • CYSTOSCOPY     • HERNIA REPAIR     • INSERT / REPLACE / REMOVE PACEMAKER      BS insertion   • PACEMAKER REPLACEMENT  2022    [place] [facility]   • VENA CAVA FILTER PLACEMENT  2016        Home Meds:  Medications Prior to Admission   Medication Sig Dispense Refill Last Dose   • allopurinol (ZYLOPRIM) 100 MG tablet Take 100 mg by mouth Daily.      • ascorbic acid (VITAMIN C) 1000 MG tablet Take 1,000 mg by mouth Daily.      • aspirin 81 MG tablet Take 81 mg by mouth every night at bedtime.      • Cholecalciferol (Vitamin D3) 50 MCG (2000) capsule Take 2,000 Units by mouth 3 (Three) Times a Week. Monday, Wednesday and Friday      • dutasteride (AVODART) 0.5 MG capsule Take 0.5 mg by mouth Every Night.      • furosemide (LASIX) 40 MG tablet Take 1 tablet by mouth 2 (Two) Times a Day. 180 tablet 1    • metoprolol tartrate (LOPRESSOR) 25 MG tablet Take 25 mg by mouth 2 (Two) Times a Day.      • multivitamin with minerals tablet tablet Take 1 tablet by mouth Daily.      • pravastatin (PRAVACHOL) 40 MG tablet Take 40 mg by mouth 1 (One) Time Per Week. Monday      • Probiotic Product (Probiotic Daily) capsule Take 1 capsule by mouth Daily.      • spironolactone (ALDACTONE) 25 MG tablet Take 0.5 tablets by mouth Daily. 45 tablet 1        Allergies:  Allergies   Allergen Reactions   • Penicillin G Rash   • Vancomycin Rash       Social History:   Social History     Socioeconomic History   • Marital status:    Tobacco Use   • Smoking status: Former Smoker     Quit date: 1970     Years since quittin.9   • Smokeless tobacco:  "Never Used   Vaping Use   • Vaping Use: Never used   Substance and Sexual Activity   • Alcohol use: Never   • Drug use: Never   • Sexual activity: Defer       Family History:  Family History   Problem Relation Age of Onset   • Leukemia Mother    • Heart disease Father    • Heart attack Father 56         age at 56   • Asthma Paternal Aunt        Physical Exam:  /55 (BP Location: Right arm, Patient Position: Lying)   Pulse 73   Temp 97.4 °F (36.3 °C) (Oral)   Resp 20   Ht 172.7 cm (68\")   Wt 59.3 kg (130 lb 11.4 oz)   SpO2 95%   BMI 19.87 kg/m²  Body mass index is 19.87 kg/m². 95% 59.3 kg (130 lb 11.4 oz)  Physical Exam  Cardiovascular:      Heart sounds: Murmur heard.   Pulmonary:      Breath sounds: Rhonchi present.         LABS:  Lab Results   Component Value Date    CALCIUM 8.7 2022    PHOS 5.0 (H) 2022     Results from last 7 days   Lab Units 22  0428 22  0234 22  0421 22  0133 22  0325 22  0538 22  1156   MAGNESIUM mg/dL  --  2.5*  --   --   --   --   --    SODIUM mmol/L 143  --  139  --  137   < > 137   POTASSIUM mmol/L 4.2  --  4.9  --  4.0   < > 4.1   CHLORIDE mmol/L 103  --  100  --  96*   < > 97*   CO2 mmol/L 25.0  --  21.0*  --  24.0   < > 25.0   BUN mg/dL 75*  --  78*  --  64*   < > 62*   CREATININE mg/dL 1.85*  --  1.94*  --  1.74*   < > 1.66*   GLUCOSE mg/dL 99  --  125*  --  122*   < > 125*   CALCIUM mg/dL 8.7  --  9.3  --  9.0   < > 9.7*   WBC 10*3/mm3  --  8.30  --  9.60 7.40   < > 8.50   HEMOGLOBIN g/dL  --  13.2  --  13.9 13.5   < > 14.4   PLATELETS 10*3/mm3  --  208  --  220 208   < > 222   ALT (SGPT) U/L 386*  --   --   --   --   --   --    AST (SGOT) U/L 269*  --   --   --   --   --   --    PROBNP pg/mL  --   --   --   --  45,156.0*  --  42,275.0*    < > = values in this interval not displayed.     Lab Results   Component Value Date    CKTOTAL 99 2021    TROPONINT 0.027 2022     Results from last 7 days   Lab " Units 05/03/22  1156   TROPONIN T ng/mL 0.027             Results from last 7 days   Lab Units 05/06/22  1412   PH, ARTERIAL pH units 7.538*   PCO2, ARTERIAL mm Hg 19.3*   PO2 ART mm Hg 328.6*   O2 SATURATION ART % 100.0*   MODALITY  NRB                 Lab Results   Component Value Date    TSH 2.600 03/24/2022     Estimated Creatinine Clearance: 21.8 mL/min (A) (by C-G formula based on SCr of 1.85 mg/dL (H)).         Imaging:  Imaging Results (Last 24 Hours)     Procedure Component Value Units Date/Time    XR Chest 1 View [[id number]] Collected: 05/06/22 1631     Updated: 05/06/22 1634    Narrative:      DATE OF EXAM:  5/6/2022 4:26 PM     PROCEDURE:  XR CHEST 1 VW-     INDICATIONS:  Severe SOA; R06.00-Dyspnea, unspecified; I50.9-Heart failure,  unspecified; N17.9-Acute kidney failure, unspecified     COMPARISON:  Chest radiograph 05/03/2022     TECHNIQUE:   Single radiographic view of the chest was obtained.     FINDINGS:  Patient is rotated this limits evaluation of the right lung. There is  cardiomegaly. Dual lead transvenous pacemaker device. Blunting the  costophrenic angles. Left lower lobe airspace opacity. Background  chronic interstitial lung disease.       Impression:      Since previous exam there is been development of probable small pleural  effusions. There is left lower lobe airspace opacity atelectasis versus  pneumonia.     Electronically Signed By-Marianna Flowers MD On:5/6/2022 4:32 PM  This report was finalized on [id number] by  Marianna Flowers MD.            Current Meds:   SCHEDULE  allopurinol, 100 mg, Oral, Daily  aspirin, 81 mg, Oral, Daily  enoxaparin, 1 mg/kg, Subcutaneous, Q24H  finasteride, 5 mg, Oral, Daily  furosemide, 20 mg, Oral, Q PM  furosemide, 40 mg, Oral, QAM  guaiFENesin, 600 mg, Oral, Q12H  melatonin, 5 mg, Oral, Nightly  metoprolol tartrate, 12.5 mg, Oral, BID  midodrine, 2.5 mg, Oral, TID AC  potassium chloride, 20 mEq, Oral, Daily  sodium bicarbonate, 650 mg, Oral, Daily  sodium  chloride, 3 mL, Intravenous, Q12H      Infusions  Pharmacy to Dose enoxaparin (LOVENOX),       PRNs  •  acetaminophen  •  ipratropium-albuterol  •  nitroglycerin  •  ondansetron  •  Pharmacy to Dose enoxaparin (LOVENOX)  •  [COMPLETED] Insert peripheral IV **AND** sodium chloride  •  sodium chloride        Elena Mojica MD  5/7/2022  12:46 EDT      Much of this encounter note is an electronic transcription/translation of spoken language to printed text using Dragon Software.

## 2022-05-07 NOTE — PROGRESS NOTES
"NEPHROLOGY PROGRESS NOTE------[facility]    [facility]  [phone]  Jonathan Villa MD      Patient Care Team:  Antony Lawson MD as PCP - General  Tam Gonzalez MD as Consulting Physician (Nephrology)  Minh Kendrick MD as Consulting Physician (Cardiology)  Chris Romero MD as Consulting Physician (Cardiology)      Provider:  Jonathan Villa MD  Patient Name: Shon ZAYAS Kunal  :  1930    SUBJECTIVE:    F/U ARF/HARMAN/CRF/CKD    Doing okay this AM. Had an episode of hyperventilation yesterday with SOB. No angina. No dysuria.     Medication:  allopurinol, 100 mg, Oral, Daily  aspirin, 81 mg, Oral, Daily  atorvastatin, 10 mg, Oral, Daily  enoxaparin, 1 mg/kg, Subcutaneous, Q24H  finasteride, 5 mg, Oral, Daily  furosemide, 20 mg, Oral, Q PM  furosemide, 40 mg, Oral, QAM  guaiFENesin, 600 mg, Oral, Q12H  melatonin, 5 mg, Oral, Nightly  metoprolol tartrate, 12.5 mg, Oral, BID  midodrine, 2.5 mg, Oral, TID AC  potassium chloride, 20 mEq, Oral, Daily  sodium bicarbonate, 650 mg, Oral, Daily  sodium chloride, 3 mL, Intravenous, Q12H      Pharmacy to Dose enoxaparin (LOVENOX),         OBJECTIVE    Vital Sign Min/Max for last 24 hours  Temp  Min: 97.2 °F (36.2 °C)  Max: 97.6 °F (36.4 °C)   BP  Min: 105/62  Max: 150/76   Pulse  Min: 60  Max: 88   Resp  Min: 16  Max: 18   SpO2  Min: 91 %  Max: 99 %   No data recorded   Weight  Min: 56.3 kg (124 lb 1.6 oz)  Max: 59.3 kg (130 lb 11.4 oz)     Flowsheet Rows    Flowsheet Row First Filed Value   Admission Height 172.7 cm (68\") Documented at 2022 1114   Admission Weight 58.2 kg (128 lb 4.9 oz) Documented at 2022 1114          No intake/output data recorded.  I/O last 3 completed shifts:  In: 360 [P.O.:360]  Out: 1750 [Urine:1750]    Physical Exam:  General Appearance: alert, appears stated age and cooperative  Head: normocephalic, without obvious abnormality and atraumatic " +POOR DENTITION  Eyes: conjunctivae and sclerae normal and no icterus  Neck: supple and no JVD  Lungs: +FEW SCATTERED RHONCHI. NO CRACKLES  Heart: regular rhythm & normal rate and normal S1, S2 +CELINA  Chest: Wall no abnormalities observed  Abdomen: normal bowel sounds and soft non-tender  Extremities: moves extremities well, no edema, no cyanosis and no redness +DJD  Skin: no bleeding, bruising or rash, turgor normal, color normal and no lesions noted  Neurologic: Alert, and oriented. No focal deficits    Labs:    WBC WBC   Date Value Ref Range Status   05/07/2022 8.30 3.40 - 10.80 10*3/mm3 Final   05/06/2022 9.60 3.40 - 10.80 10*3/mm3 Final   05/05/2022 7.40 3.40 - 10.80 10*3/mm3 Final      HGB Hemoglobin   Date Value Ref Range Status   05/07/2022 13.2 13.0 - 17.7 g/dL Final   05/06/2022 13.9 13.0 - 17.7 g/dL Final   05/05/2022 13.5 13.0 - 17.7 g/dL Final      HCT Hematocrit   Date Value Ref Range Status   05/07/2022 40.6 37.5 - 51.0 % Final   05/06/2022 43.2 37.5 - 51.0 % Final   05/05/2022 40.1 37.5 - 51.0 % Final      Platlets No results found for: LABPLAT   MCV MCV   Date Value Ref Range Status   05/07/2022 102.3 (H) 79.0 - 97.0 fL Final   05/06/2022 100.3 (H) 79.0 - 97.0 fL Final   05/05/2022 99.3 (H) 79.0 - 97.0 fL Final          Sodium Sodium   Date Value Ref Range Status   05/07/2022 143 136 - 145 mmol/L Final   05/06/2022 139 136 - 145 mmol/L Final   05/05/2022 137 136 - 145 mmol/L Final      Potassium Potassium   Date Value Ref Range Status   05/07/2022 4.2 3.5 - 5.2 mmol/L Final   05/06/2022 4.9 3.5 - 5.2 mmol/L Final   05/05/2022 4.0 3.5 - 5.2 mmol/L Final      Chloride Chloride   Date Value Ref Range Status   05/07/2022 103 98 - 107 mmol/L Final   05/06/2022 100 98 - 107 mmol/L Final   05/05/2022 96 (L) 98 - 107 mmol/L Final      CO2 CO2   Date Value Ref Range Status   05/07/2022 25.0 22.0 - 29.0 mmol/L Final   05/06/2022 21.0 (L) 22.0 - 29.0 mmol/L Final   05/05/2022 24.0 22.0 - 29.0 mmol/L Final       BUN BUN   Date Value Ref Range Status   05/07/2022 75 (H) 8 - 23 mg/dL Final   05/06/2022 78 (H) 8 - 23 mg/dL Final   05/05/2022 64 (H) 8 - 23 mg/dL Final      Creatinine Creatinine   Date Value Ref Range Status   05/07/2022 1.85 (H) 0.76 - 1.27 mg/dL Final   05/06/2022 1.94 (H) 0.76 - 1.27 mg/dL Final   05/05/2022 1.74 (H) 0.76 - 1.27 mg/dL Final      Calcium Calcium   Date Value Ref Range Status   05/07/2022 8.7 8.2 - 9.6 mg/dL Final   05/06/2022 9.3 8.2 - 9.6 mg/dL Final   05/05/2022 9.0 8.2 - 9.6 mg/dL Final      PO4 No components found for: PO4   Albumin Albumin   Date Value Ref Range Status   05/07/2022 3.20 (L) 3.50 - 5.20 g/dL Final      Magnesium Magnesium   Date Value Ref Range Status   05/07/2022 2.5 (H) 1.7 - 2.3 mg/dL Final      Uric Acid No components found for: URIC ACID     Imaging Results (Last 72 Hours)     Procedure Component Value Units Date/Time    XR Chest 1 View [[id number]] Collected: 05/06/22 1631     Updated: 05/06/22 1634    Narrative:      DATE OF EXAM:  5/6/2022 4:26 PM     PROCEDURE:  XR CHEST 1 VW-     INDICATIONS:  Severe SOA; R06.00-Dyspnea, unspecified; I50.9-Heart failure,  unspecified; N17.9-Acute kidney failure, unspecified     COMPARISON:  Chest radiograph 05/03/2022     TECHNIQUE:   Single radiographic view of the chest was obtained.     FINDINGS:  Patient is rotated this limits evaluation of the right lung. There is  cardiomegaly. Dual lead transvenous pacemaker device. Blunting the  costophrenic angles. Left lower lobe airspace opacity. Background  chronic interstitial lung disease.       Impression:      Since previous exam there is been development of probable small pleural  effusions. There is left lower lobe airspace opacity atelectasis versus  pneumonia.     Electronically Signed By-Marianna Flowers MD On:5/6/2022 4:32 PM  This report was finalized on [id number] by  Marianna Flowers MD.          Results for orders placed during the hospital encounter of 05/03/22    XR Chest 1  View    Narrative  DATE OF EXAM:  5/6/2022 4:26 PM    PROCEDURE:  XR CHEST 1 VW-    INDICATIONS:  Severe SOA; R06.00-Dyspnea, unspecified; I50.9-Heart failure,  unspecified; N17.9-Acute kidney failure, unspecified    COMPARISON:  Chest radiograph 05/03/2022    TECHNIQUE:  Single radiographic view of the chest was obtained.    FINDINGS:  Patient is rotated this limits evaluation of the right lung. There is  cardiomegaly. Dual lead transvenous pacemaker device. Blunting the  costophrenic angles. Left lower lobe airspace opacity. Background  chronic interstitial lung disease.    Impression  Since previous exam there is been development of probable small pleural  effusions. There is left lower lobe airspace opacity atelectasis versus  pneumonia.    Electronically Signed By-Marianna Flowers MD On:5/6/2022 4:32 PM  This report was finalized on [id number] by  Marianna Flowers MD.      XR Chest 1 View    Narrative  DATE OF EXAM:  5/3/2022 12:07 PM    PROCEDURE:  XR CHEST 1 VW-    INDICATIONS:  Shortness of breath. Cough for 4 months.    COMPARISON:  Chest radiographs 3/24/2022, 5/29/2021, and 5/27/2021. CT chest  3/24/2022    TECHNIQUE:  Single radiographic AP view of the chest was obtained.    FINDINGS:  Lordotic positioning. Overlying artifacts. Stable sternotomy wires,  postoperative changes from CABG, left chest wall cardiac pacer device.  Stable elevation of the right hemidiaphragm with improved aeration of  both lungs. Mild linear right infrahilar and left basilar segmental  atelectasis and/or scarring with mild interstitial thickening in the  lung bases. No focal lung consolidation. No pneumothorax. Stable  cardiomegaly, likely accentuated by technique. Calcified atherosclerotic  disease in the thoracic aorta. Chronic changes in both shoulders. No  acute osseous normality is identified.    Impression  Stable cardiomegaly with improved aeration of both lungs. Multifocal  bibasilar subsegmental atelectasis and/or scarring  with mild  interstitial thickening in the lung bases that could represent mild  pulmonary vascular congestion/edema, chronic lung disease, or less  likely atypical pneumonia.    Electronically Signed By-Lance Mendes MD On:5/3/2022 12:32 PM  This report was finalized on [id number] by  Lance Mendes MD.      Results for orders placed during the hospital encounter of 03/24/22    XR Chest 1 View    Narrative  DATE OF EXAM:  3/24/2022 9:49 AM    PROCEDURE:  XR CHEST 1 VW-    INDICATIONS:  soa, chest pain    COMPARISON:  5/29/2021    TECHNIQUE:  Single radiographic view of the chest was obtained.    FINDINGS:  Sternotomy wires overlie the midline. There is a left subclavian  dual-lead pacer which appears unchanged. Cardiomegaly is again present.  There is mixed interstitial and alveolar opacity bilaterally, right  greater than left. This may be due to pulmonary edema/CHF or atypical  infection pattern. Probable small amount of left pleural fluid is  present. There is elevation of the right hemidiaphragm again noted. No  pneumothorax is seen. Aortic vascular calcifications present.    Impression  1. Bilateral mixed interstitial and alveolar opacities may be due to  pulmonary edema/CHF versus atypical infection pattern. Cardiomegaly  suggest CHF is most likely.  2. Small amount of left pleural fluid.    Electronically Signed By-Earl Xiong MD On:3/24/2022 9:58 AM  This report was finalized on [id number] by  Earl Xiong MD.      Results for orders placed during the hospital encounter of 03/24/22    Duplex Carotid Ultrasound CAR    Interpretation Summary  · Proximal right internal carotid artery moderate stenosis.  · Proximal left internal carotid artery moderate stenosis.        ASSESSMENT / PLAN      Dyspnea, unspecified type    Moderate malnutrition (HCC)    1. ARF/HARMAN/CRF/CKD---------Nonoliguric. BUN/Cr down a little.   CRF/CKD STG 3B secondary to HTN NS. No NSAIDs or IV dye. Dose meds for CrCl 30 cc/min    2.  VOLUME EXCESS/CHF------Stable on current Lasix dose and off of Aldactone    3. HTN WITH CKD------BP okay. Avoid hypotension. No ACE-I/ARB/DRI    4. ATRIAL FIBRILLATION-------Rate controlled    5. CAD------No angina    6. BPH--------On Avodart    7. OA/DJD/HYPERURICEMIA------Allopurinol    8. HYPERLIPIDEMIA------On Statin    9. VITAMIN D DEFICIENCY-----on supplemenation    10. ACIDOSIS-----Mild. Metabolic. No elevation in AGAP. +Type 4 RTA. Give po NaHCO3 and follow    11. ELEVATED LFTS/TRANSAMINASES-----Marked elevation. D/C Statin and follow. Consider MAEGAN Villa MD  [facility]  [phone]  05/07/22  07:33 EDT

## 2022-05-07 NOTE — PROGRESS NOTES
LOS: 3 days   Patient Care Team:  Antony Lawson MD as PCP - General  CarlosTam torres MD as Consulting Physician (Nephrology)  Minh Kendrick MD as Consulting Physician (Cardiology)  Chris Romero MD as Consulting Physician (Cardiology)    Subjective     Patient states he had difficulty breathing yesterday briefly but slept well and feels much better today    Review of Systems   Constitutional: Positive for activity change, appetite change and fatigue.   HENT: Positive for congestion.    Eyes: Negative.    Respiratory: Negative for shortness of breath.    Cardiovascular: Negative for chest pain and leg swelling.   Gastrointestinal: Negative.    Genitourinary: Negative.    Musculoskeletal: Positive for arthralgias.   Neurological: Negative.    Psychiatric/Behavioral: Negative.            Objective     Vital Signs  Temp:  [97.2 °F (36.2 °C)-97.6 °F (36.4 °C)] 97.6 °F (36.4 °C)  Heart Rate:  [60-83] 60  Resp:  [16-18] 16  BP: (105-150)/(55-85) 120/55      Physical Exam  Vitals reviewed.   Constitutional:       Appearance: He is not ill-appearing.   HENT:      Head: Normocephalic and atraumatic.      Right Ear: External ear normal.      Left Ear: External ear normal.      Nose: Nose normal.      Mouth/Throat:      Mouth: Mucous membranes are moist.   Eyes:      General:         Right eye: No discharge.         Left eye: No discharge.   Cardiovascular:      Rate and Rhythm: Normal rate and regular rhythm.      Pulses: Normal pulses.      Heart sounds: Normal heart sounds.   Pulmonary:      Effort: Pulmonary effort is normal.      Breath sounds: Normal breath sounds.   Abdominal:      General: Bowel sounds are normal.      Palpations: Abdomen is soft.   Musculoskeletal:         General: Normal range of motion.      Cervical back: Normal range of motion.   Skin:     General: Skin is warm and dry.   Neurological:      Mental Status: He is alert and oriented to person, place, and time.   Psychiatric:          Behavior: Behavior normal.              Results Review:    Lab Results (last 24 hours)     Procedure Component Value Units Date/Time    POC Glucose Once [[id number]]  (Abnormal) Collected: 05/07/22 0702    Specimen: Blood Updated: 05/07/22 0725     Glucose 107 mg/dL      Comment: Serial Number: [id number]:  [id number]       Comprehensive Metabolic Panel [[id number]]  (Abnormal) Collected: 05/07/22 0428    Specimen: Blood Updated: 05/07/22 0522     Glucose 99 mg/dL      BUN 75 mg/dL      Creatinine 1.85 mg/dL      Sodium 143 mmol/L      Potassium 4.2 mmol/L      Chloride 103 mmol/L      CO2 25.0 mmol/L      Calcium 8.7 mg/dL      Total Protein 5.8 g/dL      Albumin 3.20 g/dL      ALT (SGPT) 386 U/L      AST (SGOT) 269 U/L      Alkaline Phosphatase 120 U/L      Total Bilirubin 0.7 mg/dL      Globulin 2.6 gm/dL      A/G Ratio 1.2 g/dL      BUN/Creatinine Ratio 40.5     Anion Gap 15.0 mmol/L      eGFR 34.0 mL/min/1.73      Comment: National Kidney Foundation and American Society of Nephrology (ASN) Task Force recommended calculation based on the Chronic Kidney Disease Epidemiology Collaboration (CKD-EPI) equation refit without adjustment for race.       Narrative:      GFR Normal >60  Chronic Kidney Disease <60  Kidney Failure <15      Phosphorus [[id number]]  (Abnormal) Collected: 05/07/22 0234    Specimen: Blood Updated: 05/07/22 0339     Phosphorus 5.0 mg/dL     Magnesium [[id number]]  (Abnormal) Collected: 05/07/22 0234    Specimen: Blood Updated: 05/07/22 0339     Magnesium 2.5 mg/dL     Calcium, Ionized [[id number]]  (Abnormal) Collected: 05/07/22 0234    Specimen: Blood Updated: 05/07/22 0325     Ionized Calcium 1.15 mmol/L     CBC & Differential [[id number]]  (Abnormal) Collected: 05/07/22 0234    Specimen: Blood Updated: 05/07/22 0312    Narrative:      The following orders were created for panel order CBC & Differential.  Procedure                               Abnormality         Status                      ---------                               -----------         ------                     CBC Auto Differential[[id number]]        Abnormal            Final result                 Please view results for these tests on the individual orders.    CBC Auto Differential [[id number]]  (Abnormal) Collected: 05/07/22 0234    Specimen: Blood Updated: 05/07/22 0312     WBC 8.30 10*3/mm3      RBC 3.97 10*6/mm3      Hemoglobin 13.2 g/dL      Hematocrit 40.6 %      .3 fL      MCH 33.3 pg      MCHC 32.5 g/dL      RDW 15.1 %      RDW-SD 54.3 fl      MPV 9.5 fL      Platelets 208 10*3/mm3      Neutrophil % 67.5 %      Lymphocyte % 19.8 %      Monocyte % 11.0 %      Eosinophil % 1.1 %      Basophil % 0.6 %      Neutrophils, Absolute 5.60 10*3/mm3      Lymphocytes, Absolute 1.70 10*3/mm3      Monocytes, Absolute 0.90 10*3/mm3      Eosinophils, Absolute 0.10 10*3/mm3      Basophils, Absolute 0.00 10*3/mm3      nRBC 0.1 /100 WBC     POC Glucose Once [[id number]]  (Abnormal) Collected: 05/06/22 1915    Specimen: Blood Updated: 05/06/22 1916     Glucose 146 mg/dL      Comment: Serial Number: [id number]:  [id number]       POC Glucose Once [[id number]]  (Abnormal) Collected: 05/06/22 1657    Specimen: Blood Updated: 05/06/22 1658     Glucose 143 mg/dL      Comment: Serial Number: [id number]:  [id number]       D-dimer, Quantitative [[id number]]  (Abnormal) Collected: 05/06/22 1423    Specimen: Blood Updated: 05/06/22 1539     D-Dimer, Quantitative 2.08 mg/L (FEU)     Narrative:      Reference Range  --------------------------------------------------------------------     < 0.50   Negative Predictive Value  0.50-0.59   Indeterminate    >= 0.60   Probable VTE             A very low percentage of patients with DVT may yield D-Dimer results   below the cut-off of 0.50 mg/L FEU.  This is known to be more   prevalent in patients with distal DVT.             Results of this test should always be interpreted in conjunction with    the patient's medical history, clinical presentation and other   findings.  Clinical diagnosis should not be based on the result of   [place] D-Dimer alone.    Blood Gas, Arterial - [[id number]]  (Abnormal) Collected: 05/06/22 1412    Specimen: Arterial Blood Updated: 05/06/22 1417     Site Right Radial     Td's Test Positive     pH, Arterial 7.538 pH units      pCO2, Arterial 19.3 mm Hg      pO2, Arterial 328.6 mm Hg      HCO3, Arterial 16.4 mmol/L      Base Excess, Arterial -3.6 mmol/L      Comment: Serial Number: [id number]:  [id number]        O2 Saturation, Arterial 100.0 %      CO2 Content 17.0 mmol/L      Barometric Pressure for Blood Gas --     Comment: N/A        Modality NRB     FIO2 100 %      Hemodilution No    POC Glucose Once [[id number]]  (Abnormal) Collected: 05/06/22 1125    Specimen: Blood Updated: 05/06/22 1126     Glucose 212 mg/dL      Comment: Serial Number: [id number]:  [id number]              Imaging Results (Last 24 Hours)     Procedure Component Value Units Date/Time    XR Chest 1 View [[id number]] Collected: 05/06/22 1631     Updated: 05/06/22 1634    Narrative:      DATE OF EXAM:  5/6/2022 4:26 PM     PROCEDURE:  XR CHEST 1 VW-     INDICATIONS:  Severe SOA; R06.00-Dyspnea, unspecified; I50.9-Heart failure,  unspecified; N17.9-Acute kidney failure, unspecified     COMPARISON:  Chest radiograph 05/03/2022     TECHNIQUE:   Single radiographic view of the chest was obtained.     FINDINGS:  Patient is rotated this limits evaluation of the right lung. There is  cardiomegaly. Dual lead transvenous pacemaker device. Blunting the  costophrenic angles. Left lower lobe airspace opacity. Background  chronic interstitial lung disease.       Impression:      Since previous exam there is been development of probable small pleural  effusions. There is left lower lobe airspace opacity atelectasis versus  pneumonia.     Electronically Signed By-Marianna Flowers MD On:5/6/2022 4:32 PM  This report was  finalized on [id number] by  Marianna Flowers MD.               I reviewed the patient's new clinical results.    Medication Review:   Scheduled Meds:allopurinol, 100 mg, Oral, Daily  aspirin, 81 mg, Oral, Daily  enoxaparin, 1 mg/kg, Subcutaneous, Q24H  finasteride, 5 mg, Oral, Daily  furosemide, 20 mg, Oral, Q PM  furosemide, 40 mg, Oral, QAM  guaiFENesin, 600 mg, Oral, Q12H  melatonin, 5 mg, Oral, Nightly  metoprolol tartrate, 12.5 mg, Oral, BID  midodrine, 2.5 mg, Oral, TID AC  potassium chloride, 20 mEq, Oral, Daily  sodium bicarbonate, 650 mg, Oral, Daily  sodium chloride, 3 mL, Intravenous, Q12H      Continuous Infusions:Pharmacy to Dose enoxaparin (LOVENOX),       PRN Meds:.•  acetaminophen  •  ipratropium-albuterol  •  nitroglycerin  •  ondansetron  •  Pharmacy to Dose enoxaparin (LOVENOX)  •  [COMPLETED] Insert peripheral IV **AND** sodium chloride  •  sodium chloride     Interval History:    Assessment/Plan       Acute shortness of breath  Acute pulmonary edema  History of sick sinus syndrome  Acute exacerbation of chronic systolic congestive heart failure  History of pacemaker placement with replacement of generator 1/22  Atherosclerotic heart disease of native coronary arteries with angina pectoris  History of coronary artery bypass grafting in 1993  HFrEF  Chronic atrial fibrillation, persistent intermittent  Hypercoagulable state secondary to atrial fibrillation  Cerebrovascular disease with history of CVA  History of carotid occlusive disease  Chronic kidney disease stage IIIa  Hypertension associated chronic kidney disease stage IIIa  Degenerative joint disease  Hypokalemia  Bilateral upper lobe pulmonary nodularities  Gastroesophageal reflux disease without esophagitis        Plan:  Patient and family contemplating whether to discharge home with hospice or   Continue current plan and support     Plan for disposition:KYLE Short, CARMEN  05/07/22  10:12 EDT

## 2022-05-07 NOTE — PLAN OF CARE
Goal Outcome Evaluation:  Plan of Care Reviewed With: patient           Outcome Evaluation: patient resting in bed and chair throughout shift. complaints of back pain treated per MAR. no other complaints.

## 2022-05-08 NOTE — CASE MANAGEMENT/SOCIAL WORK
Continued Stay Note  [facility]     Patient Name: Shon ZAYAS Kunal  MRN: [id number]  Today's Date: 5/8/2022    Admit Date: 5/3/2022     Discharge Plan     Row Name 05/08/22 0923       Plan    Plan Plan for LTC with [facility] vs home with spouse and [facility]; decision pending.    Patient/Family in Agreement with Plan yes    Plan Comments Patient's spouse reached out to  regarding discharge plan. Family would like to move forward with [facility] services, but are unsure if wanting LTC or home with [facility]. Spouse stated she had [facility] RN number and would be contacting them today to set up a meeting.              Phone communication or documentation only - no physical contact with patient or family.    Faye Mcconnell RN  Weekend   [address] [address]  Office: [phone]  Fax: [phone]  [email or web address]

## 2022-05-08 NOTE — PLAN OF CARE
Goal Outcome Evaluation:  Plan of Care Reviewed With: patient        Progress: no change  Outcome Evaluation: Patient resting in bed throughout shift. Patient continues to have periods of SOA. Notes that CPAP helps. Sats remain WNL. No further complaints.

## 2022-05-08 NOTE — PROGRESS NOTES
"NEPHROLOGY PROGRESS NOTE------[facility]    [facility]  [phone]  Jonathan Villa MD      Patient Care Team:  Antony Lawson MD as PCP - Tam Monroe MD as Consulting Physician (Nephrology)  Minh Kendrick MD as Consulting Physician (Cardiology)  Chris Romero MD as Consulting Physician (Cardiology)      Provider:  Jonathan Villa MD  Patient Name: Shon ZAYAS Kunal  :  1930    SUBJECTIVE:    F/U ARF/HARMAN/CRF/CKD    SOB and on BiPap. No angina. No dysuria.     Medication:  allopurinol, 100 mg, Oral, Daily  aspirin, 81 mg, Oral, Daily  enoxaparin, 1 mg/kg, Subcutaneous, Q24H  finasteride, 5 mg, Oral, Daily  furosemide, 20 mg, Oral, Q PM  furosemide, 40 mg, Oral, QAM  guaiFENesin, 600 mg, Oral, Q12H  melatonin, 5 mg, Oral, Nightly  metoprolol tartrate, 12.5 mg, Oral, BID  midodrine, 2.5 mg, Oral, TID AC  Morphine, 2 mg, Intravenous, Once  potassium chloride, 20 mEq, Oral, Daily  sodium bicarbonate, 650 mg, Oral, Daily  sodium chloride, 3 mL, Intravenous, Q12H      Pharmacy to Dose enoxaparin (LOVENOX),         OBJECTIVE    Vital Sign Min/Max for last 24 hours  Temp  Min: 97.1 °F (36.2 °C)  Max: 97.6 °F (36.4 °C)   BP  Min: 111/60  Max: 154/73   Pulse  Min: 67  Max: 77   Resp  Min: 16  Max: 20   SpO2  Min: 95 %  Max: 100 %   No data recorded   No data recorded     Flowsheet Rows    Flowsheet Row First Filed Value   Admission Height 172.7 cm (68\") Documented at 2022 1114   Admission Weight 58.2 kg (128 lb 4.9 oz) Documented at 2022 1114          No intake/output data recorded.  I/O last 3 completed shifts:  In: 840 [P.O.:840]  Out: 1400 [Urine:1400]    Physical Exam:  General Appearance: alert, appears stated age and cooperative  Head: normocephalic, without obvious abnormality and atraumatic +POOR DENTITION  Eyes: conjunctivae and sclerae normal and no icterus  Neck: supple and no JVD  Lungs: +FEW " SCATTERED RHONCHI. NO CRACKLES  Heart: regular rhythm & normal rate and normal S1, S2 +CELINA  Chest: Wall no abnormalities observed  Abdomen: normal bowel sounds and soft non-tender  Extremities: moves extremities well, no edema, no cyanosis and no redness +DJD  Skin: no bleeding, bruising or rash, turgor normal, color normal and no lesions noted  Neurologic: Alert, and oriented. No focal deficits    Labs:    WBC WBC   Date Value Ref Range Status   05/08/2022 10.60 3.40 - 10.80 10*3/mm3 Final   05/07/2022 8.30 3.40 - 10.80 10*3/mm3 Final   05/06/2022 9.60 3.40 - 10.80 10*3/mm3 Final      HGB Hemoglobin   Date Value Ref Range Status   05/08/2022 13.8 13.0 - 17.7 g/dL Final   05/07/2022 13.2 13.0 - 17.7 g/dL Final   05/06/2022 13.9 13.0 - 17.7 g/dL Final      HCT Hematocrit   Date Value Ref Range Status   05/08/2022 41.6 37.5 - 51.0 % Final   05/07/2022 40.6 37.5 - 51.0 % Final   05/06/2022 43.2 37.5 - 51.0 % Final      Platlets No results found for: LABPLAT   MCV MCV   Date Value Ref Range Status   05/08/2022 100.1 (H) 79.0 - 97.0 fL Final   05/07/2022 102.3 (H) 79.0 - 97.0 fL Final   05/06/2022 100.3 (H) 79.0 - 97.0 fL Final          Sodium Sodium   Date Value Ref Range Status   05/08/2022 136 136 - 145 mmol/L Final   05/07/2022 143 136 - 145 mmol/L Final   05/06/2022 139 136 - 145 mmol/L Final      Potassium Potassium   Date Value Ref Range Status   05/08/2022 4.8 3.5 - 5.2 mmol/L Final   05/07/2022 4.2 3.5 - 5.2 mmol/L Final   05/06/2022 4.9 3.5 - 5.2 mmol/L Final      Chloride Chloride   Date Value Ref Range Status   05/08/2022 99 98 - 107 mmol/L Final   05/07/2022 103 98 - 107 mmol/L Final   05/06/2022 100 98 - 107 mmol/L Final      CO2 CO2   Date Value Ref Range Status   05/08/2022 20.0 (L) 22.0 - 29.0 mmol/L Final   05/07/2022 25.0 22.0 - 29.0 mmol/L Final   05/06/2022 21.0 (L) 22.0 - 29.0 mmol/L Final      BUN BUN   Date Value Ref Range Status   05/08/2022 91 (H) 8 - 23 mg/dL Final   05/07/2022 75 (H) 8 - 23  mg/dL Final   05/06/2022 78 (H) 8 - 23 mg/dL Final      Creatinine Creatinine   Date Value Ref Range Status   05/08/2022 2.36 (H) 0.76 - 1.27 mg/dL Final   05/07/2022 1.85 (H) 0.76 - 1.27 mg/dL Final   05/06/2022 1.94 (H) 0.76 - 1.27 mg/dL Final      Calcium Calcium   Date Value Ref Range Status   05/08/2022 9.6 8.2 - 9.6 mg/dL Final   05/07/2022 8.7 8.2 - 9.6 mg/dL Final   05/06/2022 9.3 8.2 - 9.6 mg/dL Final      PO4 No components found for: PO4   Albumin Albumin   Date Value Ref Range Status   05/07/2022 3.20 (L) 3.50 - 5.20 g/dL Final      Magnesium Magnesium   Date Value Ref Range Status   05/08/2022 2.6 (H) 1.7 - 2.3 mg/dL Final   05/07/2022 2.5 (H) 1.7 - 2.3 mg/dL Final      Uric Acid No components found for: URIC ACID     Imaging Results (Last 72 Hours)     Procedure Component Value Units Date/Time    CT Chest Hi Resolution Diagnostic [[id number]] Collected: 05/07/22 1653     Updated: 05/07/22 1701    Narrative:         DATE OF EXAM:   5/7/2022 4:28 PM     PROCEDURE:   CT CHEST HI RESOLUTION DIAGNOSTIC-     INDICATIONS:   Interstitial lung disease; R06.00-Dyspnea, unspecified; I50.9-Heart  failure, unspecified; N17.9-Acute kidney failure, unspecified     COMPARISON:  03/24/2022.     TECHNIQUE:   Routine transaxial slices were obtained through the chest without the  administration of intravenous contrast. The study was performed  utilizing our high resolution CT protocol which includes supine  inspiratory and expiratory imaging as well as prone inspiratory imaging.  Reconstructed coronal and sagittal images were also obtained. Automated  exposure control and iterative reconstruction methods were used.     FINDINGS:   Interval resolution of previous identified pulmonary edema. There is  nearly completely resolved small bilateral pleural effusions. There is  mild interlobular septal thickening, which may represent mild residual  interstitial edema or mild component of chronic disease. There is  scarred  consolidation in the right lung base. Many of the small nodules  identified in the upper lobes of either resolved or decreased in size  compared with the prior study. There are persistent clusters of nodules  in both upper lungs, right greater than left with the largest occurring  in the right upper lobe on axial image 41 measuring up to 10 mm  diameter. There is decreased peribronchial thickening. No new lung  nodules.     There is severe aortic and aortic branch vessel atherosclerosis. There  are severe native coronary artery calcifications status post CABG. There  is moderate cardiomegaly. No pericardial effusion. There is no  pathologic mediastinal lymphadenopathy following size criteria. There  are dense aortic annular and valvular calcifications.     There is a left-sided pacemaker/ICD in place. No acute findings below  the diaphragm. There is a stable low-attenuation nodule at the left  adrenal gland, likely adenoma. There is S-shaped scoliosis of the  thoracolumbar spine. There are moderate lower thoracic and upper lumbar  degenerative changes.        Impression:      IMPRESSION :      1. Near complete resolution of previous identified interstitial  pulmonary edema and near complete resolution of now small bilateral  pleural effusions.  2. There may be a small component of chronic subpleural interstitial  disease, but the majority of the findings on the prior study are favored  to reflect pulmonary edema.  3. Improved nodular disease in the upper lungs, right greater than left  with significant residual nodularity. Recommend 6 month follow-up chest  CT. The course of disease on imaging would suggest an infectious or  inflammatory process.  4. Cardiomegaly and coronary artery disease status post CABG and  pacemaker/ICD placement.  5. Scoliosis and spinal degenerative changes.     Electronically Signed By-Hudson Walker MD On:5/7/2022 4:59 PM  This report was finalized on [id number] by  Hudson Walker MD.     XR Chest 1 View [[id number]] Collected: 05/06/22 1631     Updated: 05/06/22 1634    Narrative:      DATE OF EXAM:  5/6/2022 4:26 PM     PROCEDURE:  XR CHEST 1 VW-     INDICATIONS:  Severe SOA; R06.00-Dyspnea, unspecified; I50.9-Heart failure,  unspecified; N17.9-Acute kidney failure, unspecified     COMPARISON:  Chest radiograph 05/03/2022     TECHNIQUE:   Single radiographic view of the chest was obtained.     FINDINGS:  Patient is rotated this limits evaluation of the right lung. There is  cardiomegaly. Dual lead transvenous pacemaker device. Blunting the  costophrenic angles. Left lower lobe airspace opacity. Background  chronic interstitial lung disease.       Impression:      Since previous exam there is been development of probable small pleural  effusions. There is left lower lobe airspace opacity atelectasis versus  pneumonia.     Electronically Signed By-Marianna Flowers MD On:5/6/2022 4:32 PM  This report was finalized on [id number] by  Marianna Flowers MD.          Results for orders placed during the hospital encounter of 05/03/22    XR Chest 1 View    Narrative  DATE OF EXAM:  5/6/2022 4:26 PM    PROCEDURE:  XR CHEST 1 VW-    INDICATIONS:  Severe SOA; R06.00-Dyspnea, unspecified; I50.9-Heart failure,  unspecified; N17.9-Acute kidney failure, unspecified    COMPARISON:  Chest radiograph 05/03/2022    TECHNIQUE:  Single radiographic view of the chest was obtained.    FINDINGS:  Patient is rotated this limits evaluation of the right lung. There is  cardiomegaly. Dual lead transvenous pacemaker device. Blunting the  costophrenic angles. Left lower lobe airspace opacity. Background  chronic interstitial lung disease.    Impression  Since previous exam there is been development of probable small pleural  effusions. There is left lower lobe airspace opacity atelectasis versus  pneumonia.    Electronically Signed By-Marianna Flowers MD On:5/6/2022 4:32 PM  This report was finalized on [id number] by  Marianna Flowers  MD.      XR Chest 1 View    Narrative  DATE OF EXAM:  5/3/2022 12:07 PM    PROCEDURE:  XR CHEST 1 VW-    INDICATIONS:  Shortness of breath. Cough for 4 months.    COMPARISON:  Chest radiographs 3/24/2022, 5/29/2021, and 5/27/2021. CT chest  3/24/2022    TECHNIQUE:  Single radiographic AP view of the chest was obtained.    FINDINGS:  Lordotic positioning. Overlying artifacts. Stable sternotomy wires,  postoperative changes from CABG, left chest wall cardiac pacer device.  Stable elevation of the right hemidiaphragm with improved aeration of  both lungs. Mild linear right infrahilar and left basilar segmental  atelectasis and/or scarring with mild interstitial thickening in the  lung bases. No focal lung consolidation. No pneumothorax. Stable  cardiomegaly, likely accentuated by technique. Calcified atherosclerotic  disease in the thoracic aorta. Chronic changes in both shoulders. No  acute osseous normality is identified.    Impression  Stable cardiomegaly with improved aeration of both lungs. Multifocal  bibasilar subsegmental atelectasis and/or scarring with mild  interstitial thickening in the lung bases that could represent mild  pulmonary vascular congestion/edema, chronic lung disease, or less  likely atypical pneumonia.    Electronically Signed By-Lance Mendes MD On:5/3/2022 12:32 PM  This report was finalized on [id number] by  Lance Mendes MD.      Results for orders placed during the hospital encounter of 03/24/22    XR Chest 1 View    Narrative  DATE OF EXAM:  3/24/2022 9:49 AM    PROCEDURE:  XR CHEST 1 VW-    INDICATIONS:  soa, chest pain    COMPARISON:  5/29/2021    TECHNIQUE:  Single radiographic view of the chest was obtained.    FINDINGS:  Sternotomy wires overlie the midline. There is a left subclavian  dual-lead pacer which appears unchanged. Cardiomegaly is again present.  There is mixed interstitial and alveolar opacity bilaterally, right  greater than left. This may be due to pulmonary  edema/CHF or atypical  infection pattern. Probable small amount of left pleural fluid is  present. There is elevation of the right hemidiaphragm again noted. No  pneumothorax is seen. Aortic vascular calcifications present.    Impression  1. Bilateral mixed interstitial and alveolar opacities may be due to  pulmonary edema/CHF versus atypical infection pattern. Cardiomegaly  suggest CHF is most likely.  2. Small amount of left pleural fluid.    Electronically Signed By-Earl Xiong MD On:3/24/2022 9:58 AM  This report was finalized on [id number] by  Earl Xiong MD.      Results for orders placed during the hospital encounter of 03/24/22    Duplex Carotid Ultrasound CAR    Interpretation Summary  · Proximal right internal carotid artery moderate stenosis.  · Proximal left internal carotid artery moderate stenosis.        ASSESSMENT / PLAN      Dyspnea, unspecified type    Moderate malnutrition (HCC)    1. ARF/HARMAN/CRF/CKD---------Nonoliguric. BUN/Cr up. Pulmonary edema resolved on CT. Back down diuretics and follow. CRF/CKD STG 3B secondary to HTN NS. No NSAIDs or IV dye. Dose meds for CrCl 30 cc/min    2. VOLUME EXCESS/CHF------Back down Lasix today and keep off of Aldactone. Pulmonary edema resolved on Hi resolution CT    3. HTN WITH CKD------BP okay. Avoid hypotension. No ACE-I/ARB/DRI    4. ATRIAL FIBRILLATION-------Rate controlled    5. CAD------No angina    6. BPH--------On Avodart    7. OA/DJD/HYPERURICEMIA------Allopurinol    8. HYPERLIPIDEMIA------On Statin    9. VITAMIN D DEFICIENCY-----on supplemenation    10. ACIDOSIS-----Mild. Metabolic. No elevation in AGAP. +Type 4 RTA. Give po NaHCO3 and follow    11. ELEVATED LFTS/TRANSAMINASES-----Marked elevation. D/C Statin and follow. Consider MAEGAN Villa MD  [facility]  [phone]  05/08/22  09:07 EDT

## 2022-05-08 NOTE — PROGRESS NOTES
Daily Progress Note        Dyspnea, unspecified type    Moderate malnutrition (HCC)           Assessment:     dyspnea on exertion and at rest  Proved interstitial markings with diuretics unlikely interstitial lung disease  Or suggestive of Pulmonary edema  CHF EF 35%  Coronary artery disease  A. fib  CKD           Recommendations:     Repeat CT scan of the chest after 3 months to follow-up on right upper lobe nodule     Prednisone 6-day taper    Diuresis for cardiomyopathy EF 35% consider cardiology consultation                 LOS: 4 days     Subjective         Objective     Vital signs for last 24 hours:  Vitals:    05/07/22 2315 05/08/22 0328 05/08/22 0814 05/08/22 1122   BP: 130/85 111/60 133/70 145/71   BP Location: Right arm Left arm Left arm Left arm   Patient Position: Lying   Lying   Pulse: 76 71 67 71   Resp: 16 16 16    Temp: 97.6 °F (36.4 °C) 97.1 °F (36.2 °C)     TempSrc: Axillary Axillary     SpO2: 98% 100%     Weight:       Height:           Intake/Output last 3 shifts:  I/O last 3 completed shifts:  In: 840 [P.O.:840]  Out: 1400 [Urine:1400]  Intake/Output this shift:  I/O this shift:  In: 240 [P.O.:240]  Out: -       Radiology  Imaging Results (Last 24 Hours)     Procedure Component Value Units Date/Time    CT Chest Hi Resolution Diagnostic [[id number]] Collected: 05/07/22 1653     Updated: 05/07/22 1701    Narrative:         DATE OF EXAM:   5/7/2022 4:28 PM     PROCEDURE:   CT CHEST HI RESOLUTION DIAGNOSTIC-     INDICATIONS:   Interstitial lung disease; R06.00-Dyspnea, unspecified; I50.9-Heart  failure, unspecified; N17.9-Acute kidney failure, unspecified     COMPARISON:  03/24/2022.     TECHNIQUE:   Routine transaxial slices were obtained through the chest without the  administration of intravenous contrast. The study was performed  utilizing our high resolution CT protocol which includes supine  inspiratory and expiratory imaging as well as prone inspiratory imaging.  Reconstructed coronal and  sagittal images were also obtained. Automated  exposure control and iterative reconstruction methods were used.     FINDINGS:   Interval resolution of previous identified pulmonary edema. There is  nearly completely resolved small bilateral pleural effusions. There is  mild interlobular septal thickening, which may represent mild residual  interstitial edema or mild component of chronic disease. There is  scarred consolidation in the right lung base. Many of the small nodules  identified in the upper lobes of either resolved or decreased in size  compared with the prior study. There are persistent clusters of nodules  in both upper lungs, right greater than left with the largest occurring  in the right upper lobe on axial image 41 measuring up to 10 mm  diameter. There is decreased peribronchial thickening. No new lung  nodules.     There is severe aortic and aortic branch vessel atherosclerosis. There  are severe native coronary artery calcifications status post CABG. There  is moderate cardiomegaly. No pericardial effusion. There is no  pathologic mediastinal lymphadenopathy following size criteria. There  are dense aortic annular and valvular calcifications.     There is a left-sided pacemaker/ICD in place. No acute findings below  the diaphragm. There is a stable low-attenuation nodule at the left  adrenal gland, likely adenoma. There is S-shaped scoliosis of the  thoracolumbar spine. There are moderate lower thoracic and upper lumbar  degenerative changes.        Impression:      IMPRESSION :      1. Near complete resolution of previous identified interstitial  pulmonary edema and near complete resolution of now small bilateral  pleural effusions.  2. There may be a small component of chronic subpleural interstitial  disease, but the majority of the findings on the prior study are favored  to reflect pulmonary edema.  3. Improved nodular disease in the upper lungs, right greater than left  with significant  residual nodularity. Recommend 6 month follow-up chest  CT. The course of disease on imaging would suggest an infectious or  inflammatory process.  4. Cardiomegaly and coronary artery disease status post CABG and  pacemaker/ICD placement.  5. Scoliosis and spinal degenerative changes.     Electronically Signed By-Hudson Walker MD On:5/7/2022 4:59 PM  This report was finalized on [id number] by  Hudson Walker MD.          Labs:  Results from last 7 days   Lab Units 05/08/22  0027   WBC 10*3/mm3 10.60   HEMOGLOBIN g/dL 13.8   HEMATOCRIT % 41.6   PLATELETS 10*3/mm3 208     Results from last 7 days   Lab Units 05/08/22  0026 05/07/22  0428   SODIUM mmol/L 136 143   POTASSIUM mmol/L 4.8 4.2   CHLORIDE mmol/L 99 103   CO2 mmol/L 20.0* 25.0   BUN mg/dL 91* 75*   CREATININE mg/dL 2.36* 1.85*   CALCIUM mg/dL 9.6 8.7   BILIRUBIN mg/dL  --  0.7   ALK PHOS U/L  --  120*   ALT (SGPT) U/L  --  386*   AST (SGOT) U/L  --  269*   GLUCOSE mg/dL 156* 99     Results from last 7 days   Lab Units 05/06/22  1412   PH, ARTERIAL pH units 7.538*   PO2 ART mm Hg 328.6*   PCO2, ARTERIAL mm Hg 19.3*   HCO3 ART mmol/L 16.4*     Results from last 7 days   Lab Units 05/07/22  0428   ALBUMIN g/dL 3.20*     Results from last 7 days   Lab Units 05/08/22  0026 05/03/22  1156   CK TOTAL U/L 95  --    TROPONIN T ng/mL  --  0.027         Results from last 7 days   Lab Units 05/08/22  0026   MAGNESIUM mg/dL 2.6*                   Meds:   SCHEDULE  allopurinol, 100 mg, Oral, Daily  aspirin, 81 mg, Oral, Daily  enoxaparin, 1 mg/kg, Subcutaneous, Q24H  finasteride, 5 mg, Oral, Daily  furosemide, 40 mg, Oral, QAM  guaiFENesin, 600 mg, Oral, Q12H  melatonin, 5 mg, Oral, Nightly  metoprolol tartrate, 12.5 mg, Oral, BID  midodrine, 2.5 mg, Oral, TID AC  sodium bicarbonate, 650 mg, Oral, BID  sodium chloride, 3 mL, Intravenous, Q12H      Infusions  Pharmacy to Dose enoxaparin (LOVENOX),       PRNs  •  acetaminophen  •  ipratropium-albuterol  •   nitroglycerin  •  ondansetron  •  Pharmacy to Dose enoxaparin (LOVENOX)  •  [COMPLETED] Insert peripheral IV **AND** sodium chloride  •  sodium chloride    Physical Exam:  Physical Exam  Cardiovascular:      Heart sounds: Murmur heard.   Pulmonary:      Breath sounds: Rhonchi and rales present.         ROS  Review of Systems   Respiratory: Positive for cough and shortness of breath.              Total time spent with patient greater than: 45 Minutes

## 2022-05-08 NOTE — PROGRESS NOTES
LOS: 4 days   Patient Care Team:  Antony Lawson MD as PCP - General  CarlosTam torres MD as Consulting Physician (Nephrology)  Minh Kendrick MD as Consulting Physician (Cardiology)  Chris Romero MD as Consulting Physician (Cardiology)    Subjective     Patient states he had another breathing spell yesterday but it resolved he is weak this am    Review of Systems   Constitutional: Positive for activity change, appetite change and fatigue.   HENT: Positive for congestion.    Eyes: Negative.    Respiratory: Negative for shortness of breath.    Cardiovascular: Negative for chest pain and leg swelling.   Gastrointestinal: Negative.    Genitourinary: Negative.    Musculoskeletal: Positive for arthralgias.   Neurological: Negative.    Psychiatric/Behavioral: Negative.            Objective     Vital Signs  Temp:  [97.1 °F (36.2 °C)-97.6 °F (36.4 °C)] 97.1 °F (36.2 °C)  Heart Rate:  [67-77] 67  Resp:  [16-20] 16  BP: (111-154)/(60-85) 133/70      Physical Exam  Vitals reviewed.   Constitutional:       Appearance: He is not ill-appearing.   HENT:      Head: Normocephalic and atraumatic.      Right Ear: External ear normal.      Left Ear: External ear normal.      Nose: Nose normal.      Mouth/Throat:      Mouth: Mucous membranes are moist.   Eyes:      General:         Right eye: No discharge.         Left eye: No discharge.   Cardiovascular:      Rate and Rhythm: Normal rate and regular rhythm.      Pulses: Normal pulses.      Heart sounds: Normal heart sounds.   Pulmonary:      Effort: Pulmonary effort is normal.      Breath sounds: Normal breath sounds.   Abdominal:      General: Bowel sounds are normal.      Palpations: Abdomen is soft.   Musculoskeletal:         General: Normal range of motion.      Cervical back: Normal range of motion.   Skin:     General: Skin is warm and dry.   Neurological:      Mental Status: He is alert and oriented to person, place, and time.   Psychiatric:          Behavior: Behavior normal.              Results Review:    Lab Results (last 24 hours)     Procedure Component Value Units Date/Time    CK [[id number]]  (Normal) Collected: 05/08/22 0026    Specimen: Blood Updated: 05/08/22 0944     Creatine Kinase 95 U/L     POC Glucose Once [[id number]]  (Normal) Collected: 05/08/22 0707    Specimen: Blood Updated: 05/08/22 0708     Glucose 104 mg/dL      Comment: Serial Number: [id number]:  [id number]       Basic Metabolic Panel [[id number]]  (Abnormal) Collected: 05/08/22 0026    Specimen: Blood Updated: 05/08/22 0106     Glucose 156 mg/dL      BUN 91 mg/dL      Creatinine 2.36 mg/dL      Sodium 136 mmol/L      Potassium 4.8 mmol/L      Chloride 99 mmol/L      CO2 20.0 mmol/L      Calcium 9.6 mg/dL      BUN/Creatinine Ratio 38.6     Anion Gap 17.0 mmol/L      eGFR 25.4 mL/min/1.73      Comment: National Kidney Foundation and American Society of Nephrology (ASN) Task Force recommended calculation based on the Chronic Kidney Disease Epidemiology Collaboration (CKD-EPI) equation refit without adjustment for race.       Narrative:      GFR Normal >60  Chronic Kidney Disease <60  Kidney Failure <15      Phosphorus [[id number]]  (Abnormal) Collected: 05/08/22 0026    Specimen: Blood Updated: 05/08/22 0106     Phosphorus 5.0 mg/dL     Magnesium [[id number]]  (Abnormal) Collected: 05/08/22 0026    Specimen: Blood Updated: 05/08/22 0106     Magnesium 2.6 mg/dL     CBC & Differential [[id number]]  (Abnormal) Collected: 05/08/22 0027    Specimen: Blood Updated: 05/08/22 0045    Narrative:      The following orders were created for panel order CBC & Differential.  Procedure                               Abnormality         Status                     ---------                               -----------         ------                     CBC Auto Differential[[id number]]        Abnormal            Final result                 Please view results for these tests on the individual orders.    CBC  Auto Differential [[id number]]  (Abnormal) Collected: 05/08/22 0027    Specimen: Blood Updated: 05/08/22 0045     WBC 10.60 10*3/mm3      RBC 4.15 10*6/mm3      Hemoglobin 13.8 g/dL      Hematocrit 41.6 %      .1 fL      MCH 33.1 pg      MCHC 33.1 g/dL      RDW 14.6 %      RDW-SD 49.9 fl      MPV 10.2 fL      Platelets 208 10*3/mm3      Neutrophil % 66.7 %      Lymphocyte % 16.6 %      Monocyte % 15.3 %      Eosinophil % 0.8 %      Basophil % 0.6 %      Neutrophils, Absolute 7.10 10*3/mm3      Lymphocytes, Absolute 1.80 10*3/mm3      Monocytes, Absolute 1.60 10*3/mm3      Eosinophils, Absolute 0.10 10*3/mm3      Basophils, Absolute 0.10 10*3/mm3      nRBC 0.1 /100 WBC     POC Glucose Once [[id number]]  (Abnormal) Collected: 05/07/22 1930    Specimen: Blood Updated: 05/07/22 1931     Glucose 148 mg/dL      Comment: Serial Number: [id number]:  [id number]       POC Glucose Once [[id number]]  (Abnormal) Collected: 05/07/22 1656    Specimen: Blood Updated: 05/07/22 1659     Glucose 108 mg/dL      Comment: Serial Number: [id number]:  [id number]       Salicylate Level [[id number]]  (Normal) Collected: 05/07/22 1356    Specimen: Blood Updated: 05/07/22 1430     Salicylate 1.1 mg/dL     POC Glucose Once [[id number]]  (Abnormal) Collected: 05/07/22 1100    Specimen: Blood Updated: 05/07/22 1108     Glucose 153 mg/dL      Comment: Serial Number: [id number]:  [id number]              Imaging Results (Last 24 Hours)     Procedure Component Value Units Date/Time    CT Chest Hi Resolution Diagnostic [[id number]] Collected: 05/07/22 1653     Updated: 05/07/22 1701    Narrative:         DATE OF EXAM:   5/7/2022 4:28 PM     PROCEDURE:   CT CHEST HI RESOLUTION DIAGNOSTIC-     INDICATIONS:   Interstitial lung disease; R06.00-Dyspnea, unspecified; I50.9-Heart  failure, unspecified; N17.9-Acute kidney failure, unspecified     COMPARISON:  03/24/2022.     TECHNIQUE:   Routine transaxial slices were obtained through the  chest without the  administration of intravenous contrast. The study was performed  utilizing our high resolution CT protocol which includes supine  inspiratory and expiratory imaging as well as prone inspiratory imaging.  Reconstructed coronal and sagittal images were also obtained. Automated  exposure control and iterative reconstruction methods were used.     FINDINGS:   Interval resolution of previous identified pulmonary edema. There is  nearly completely resolved small bilateral pleural effusions. There is  mild interlobular septal thickening, which may represent mild residual  interstitial edema or mild component of chronic disease. There is  scarred consolidation in the right lung base. Many of the small nodules  identified in the upper lobes of either resolved or decreased in size  compared with the prior study. There are persistent clusters of nodules  in both upper lungs, right greater than left with the largest occurring  in the right upper lobe on axial image 41 measuring up to 10 mm  diameter. There is decreased peribronchial thickening. No new lung  nodules.     There is severe aortic and aortic branch vessel atherosclerosis. There  are severe native coronary artery calcifications status post CABG. There  is moderate cardiomegaly. No pericardial effusion. There is no  pathologic mediastinal lymphadenopathy following size criteria. There  are dense aortic annular and valvular calcifications.     There is a left-sided pacemaker/ICD in place. No acute findings below  the diaphragm. There is a stable low-attenuation nodule at the left  adrenal gland, likely adenoma. There is S-shaped scoliosis of the  thoracolumbar spine. There are moderate lower thoracic and upper lumbar  degenerative changes.        Impression:      IMPRESSION :      1. Near complete resolution of previous identified interstitial  pulmonary edema and near complete resolution of now small bilateral  pleural effusions.  2. There may be a  small component of chronic subpleural interstitial  disease, but the majority of the findings on the prior study are favored  to reflect pulmonary edema.  3. Improved nodular disease in the upper lungs, right greater than left  with significant residual nodularity. Recommend 6 month follow-up chest  CT. The course of disease on imaging would suggest an infectious or  inflammatory process.  4. Cardiomegaly and coronary artery disease status post CABG and  pacemaker/ICD placement.  5. Scoliosis and spinal degenerative changes.     Electronically Signed By-Hudson Walker MD On:5/7/2022 4:59 PM  This report was finalized on [id number] by  Hudson Walker MD.               I reviewed the patient's new clinical results.    Medication Review:   Scheduled Meds:allopurinol, 100 mg, Oral, Daily  aspirin, 81 mg, Oral, Daily  enoxaparin, 1 mg/kg, Subcutaneous, Q24H  finasteride, 5 mg, Oral, Daily  furosemide, 40 mg, Oral, QAM  guaiFENesin, 600 mg, Oral, Q12H  melatonin, 5 mg, Oral, Nightly  metoprolol tartrate, 12.5 mg, Oral, BID  midodrine, 2.5 mg, Oral, TID AC  Morphine, 2 mg, Intravenous, Once  sodium bicarbonate, 650 mg, Oral, BID  sodium chloride, 3 mL, Intravenous, Q12H      Continuous Infusions:Pharmacy to Dose enoxaparin (LOVENOX),       PRN Meds:.•  acetaminophen  •  ipratropium-albuterol  •  nitroglycerin  •  ondansetron  •  Pharmacy to Dose enoxaparin (LOVENOX)  •  [COMPLETED] Insert peripheral IV **AND** sodium chloride  •  sodium chloride     Interval History:    Assessment/Plan       Acute shortness of breath  Acute pulmonary edema  History of sick sinus syndrome  Acute exacerbation of chronic systolic congestive heart failure  History of pacemaker placement with replacement of generator 1/22  Atherosclerotic heart disease of native coronary arteries with angina pectoris  History of coronary artery bypass grafting in 1993  HFrEF  Chronic atrial fibrillation, persistent intermittent  Hypercoagulable state  secondary to atrial fibrillation  Cerebrovascular disease with history of CVA  History of carotid occlusive disease  Chronic kidney disease stage IIIa  Hypertension associated chronic kidney disease stage IIIa  Degenerative joint disease  Hypokalemia  Bilateral upper lobe pulmonary nodularities  Gastroesophageal reflux disease without esophagitis        Plan:    Spoke with patient and wife this am; patient wishes to be DNR/DNI  Discussed home health and hospice; they want HH at this point   Continue current plan and support     Plan for disposition:CARMEN Gaitan  05/08/22  10:16 EDT

## 2022-05-08 NOTE — PLAN OF CARE
Goal Outcome Evaluation:  Plan of Care Reviewed With: patient        Progress: no change  Outcome Evaluation: Patient had an episode of difficulty breathing last night. Sats were good on room air though. Placed on CPAP for a little bit. Patient did improve.

## 2022-05-08 NOTE — NURSING NOTE
Patient c/o shortness of breath again. Oxygen saturation is good on room air. Placed him on nasal cannula for comfort. He did not want to put CPAP back on. Will monitor closely.

## 2022-05-09 ENCOUNTER — INPATIENT HOSPITAL (AMBULATORY)
Dept: URBAN - METROPOLITAN AREA HOSPITAL 84 | Facility: HOSPITAL | Age: 87
End: 2022-05-09

## 2022-05-09 DIAGNOSIS — R10.13 EPIGASTRIC PAIN: ICD-10-CM

## 2022-05-09 DIAGNOSIS — R94.5 ABNORMAL RESULTS OF LIVER FUNCTION STUDIES: ICD-10-CM

## 2022-05-09 DIAGNOSIS — Z95.0 PRESENCE OF CARDIAC PACEMAKER: ICD-10-CM

## 2022-05-09 DIAGNOSIS — R06.02 SHORTNESS OF BREATH: ICD-10-CM

## 2022-05-09 DIAGNOSIS — N18.30 CHRONIC KIDNEY DISEASE, STAGE 3 UNSPECIFIED: ICD-10-CM

## 2022-05-09 DIAGNOSIS — I25.10 ATHEROSCLEROTIC HEART DISEASE OF NATIVE CORONARY ARTERY WITH: ICD-10-CM

## 2022-05-09 PROCEDURE — 99222 1ST HOSP IP/OBS MODERATE 55: CPT | Mod: FS | Performed by: NURSE PRACTITIONER

## 2022-05-09 NOTE — PROGRESS NOTES
Nutrition Services    Patient Name: Shon ZAYAS Kunal  YOB: 1930  MRN: [id number]  Admission date: 5/3/2022    PPE Documentation        PPE Worn By Provider Did not enter room for this encounter   PPE Worn By Patient  N/A     PROGRESS NOTE      Encounter Information: 5/9: Progress note to monitor plan of care. Noted plan was for [facility], but now going home with HH. Pt currently DNI/DNR. Monitor po intake- aggressive nutrition interventions are not warranted in this patient.       PO Diet: Diet Cardiac, Diabetic/Consistent Carbs, Renal; Healthy Heart; Diabetic - Consistent Carb; 2gm K+   PO Supplements: Boost GC BID   PO Intake:  Variable, 0-100%, noted 50% documented consumption of ONS       Nutrition support orders:    Nutrition support review:        Labs (reviewed below): Na low, BUN/Creat elev, LFTs/Alk Phos elev, Mag/Phos elev        GI Function:  Stool Output  Stool (mL): 0 mL (05/04/22 1500)  Stool Unmeasured Occurrence: 1 (05/09/22 1145)  Bowel Incontinence: No (05/09/22 1145)  Stool Amount: small (05/09/22 1145)          Nutrition Intervention: Continue current diet with supplement change to Novasource Renal BID    Novasource Renal BID (Provides 950 kcals, 43 g protein if consumed)        Results from last 7 days   Lab Units 05/09/22 0522 05/08/22  0026 05/07/22  0428   SODIUM mmol/L 135* 136 143   POTASSIUM mmol/L 5.1 4.8 4.2   CHLORIDE mmol/L 98 99 103   CO2 mmol/L 16.0* 20.0* 25.0   BUN mg/dL 91* 91* 75*   CREATININE mg/dL 2.16* 2.36* 1.85*   CALCIUM mg/dL 9.5 9.6 8.7   BILIRUBIN mg/dL 0.8  --  0.7   ALK PHOS U/L 230*  --  120*   ALT (SGPT) U/L 756*  --  386*   AST (SGOT) U/L 353*  --  269*   GLUCOSE mg/dL 152* 156* 99     Results from last 7 days   Lab Units 05/09/22 0522 05/08/22  0027 05/08/22  0026 05/07/22  0234   MAGNESIUM mg/dL 2.7*  --  2.6* 2.5*   PHOSPHORUS mg/dL 5.7*  --  5.0* 5.0*   HEMOGLOBIN g/dL 14.0   < >  --  13.2   HEMATOCRIT % 43.4   < >  --  40.6    < > = values in  this interval not displayed.     COVID19   Date Value Ref Range Status   05/03/2022 Not Detected Not Detected - Ref. Range Final     No results found for: HGBA1C      RD to follow up per protocol.    Electronically signed by:  Yanci Hernández RD  05/09/22 13:07 EDT

## 2022-05-09 NOTE — PROGRESS NOTES
Daily Progress Note        Dyspnea, unspecified type    Moderate malnutrition (HCC)           Assessment:     dyspnea on exertion and at rest  Proved interstitial markings with diuretics unlikely interstitial lung disease  Or suggestive of Pulmonary edema  CHF EF 35%  Coronary artery disease  A. fib  CKD           Recommendations:   CXR today since patient feels more SOB    Repeat CT scan of the chest after 3 months to follow-up on right upper lobe nodule     CPAP at bedtime/as needed  Completed prednisone  Mucinex  Sodium bicarb 1300 mg 3 times a day  Midodrine  DuoNebs as needed  Diuresis for cardiomyopathy EF 35% consider cardiology consultation  Lasix 40 mg TID     5/7/2022        LOS: 5 days     Subjective         Objective     Vital signs for last 24 hours:  Vitals:    05/09/22 0448 05/09/22 0619 05/09/22 0719 05/09/22 1145   BP:  150/69 128/66 128/75   BP Location:   Right arm Right arm   Patient Position:   Sitting Sitting   Pulse:  64 67 68   Resp:   20 18   Temp: 97.8 °F (36.6 °C)      TempSrc: Rectal      SpO2: 96%  100% 99%   Weight:       Height:           Intake/Output last 3 shifts:  I/O last 3 completed shifts:  In: 360 [P.O.:360]  Out: 1000 [Urine:1000]  Intake/Output this shift:  I/O this shift:  In: 240 [P.O.:240]  Out: 100 [Urine:100]      Radiology  Imaging Results (Last 24 Hours)     Procedure Component Value Units Date/Time    US Liver [[id number]] Collected: 05/09/22 0920     Updated: 05/09/22 0924    Narrative:      DATE OF EXAM:  5/9/2022 8:54 AM     PROCEDURE:  US LIVER-     INDICATIONS:  Hepatic transaminase derangement; R06.00-Dyspnea, unspecified;  I50.9-Heart failure, unspecified; N17.9-Acute kidney failure,  unspecified     COMPARISON:  No Comparisons Available     TECHNIQUE:   Grayscale and color Doppler ultrasound evaluation of the limited abdomen  was performed.     FINDINGS:  The liver size is normal, 12.8 cm in length. The liver maintains normal  homogeneous echotexture without  focal or suspicious abnormality. There  is no ascites. The portal vein is patent with hepatopedal flow. Imaged  hepatic veins are patent. The common duct measures 2 mm. No intrahepatic  biliary dilation is seen. Incidental note is made of a 1.8 cm shadowing  gallstone, and gallbladder adenomyomatosis. The gallbladder wall does  not appear grossly thickened, and no pericholecystic fluid is seen.        Impression:         1. Normal sonographic appearance of the liver.  2. Uncomplicated cholelithiasis.  3. Gallbladder adenomyomatosis.     Electronically Signed By-Shahrzad Biggs MD On:5/9/2022 9:22 AM  This report was finalized on [id number] by  Shahrzad Biggs MD.          Labs:  Results from last 7 days   Lab Units 05/09/22  0522   WBC 10*3/mm3 11.10*   HEMOGLOBIN g/dL 14.0   HEMATOCRIT % 43.4   PLATELETS 10*3/mm3 198     Results from last 7 days   Lab Units 05/09/22  0522   SODIUM mmol/L 135*   POTASSIUM mmol/L 5.1   CHLORIDE mmol/L 98   CO2 mmol/L 16.0*   BUN mg/dL 91*   CREATININE mg/dL 2.16*   CALCIUM mg/dL 9.5   BILIRUBIN mg/dL 0.8   ALK PHOS U/L 230*   ALT (SGPT) U/L 756*   AST (SGOT) U/L 353*   GLUCOSE mg/dL 152*     Results from last 7 days   Lab Units 05/06/22  1412   PH, ARTERIAL pH units 7.538*   PO2 ART mm Hg 328.6*   PCO2, ARTERIAL mm Hg 19.3*   HCO3 ART mmol/L 16.4*     Results from last 7 days   Lab Units 05/09/22  0522 05/07/22  0428   ALBUMIN g/dL 3.60 3.20*     Results from last 7 days   Lab Units 05/08/22  0026 05/03/22  1156   CK TOTAL U/L 95  --    TROPONIN T ng/mL  --  0.027         Results from last 7 days   Lab Units 05/09/22  0522   MAGNESIUM mg/dL 2.7*                   Meds:   SCHEDULE  allopurinol, 100 mg, Oral, Daily  aspirin, 81 mg, Oral, Daily  enoxaparin, 1 mg/kg, Subcutaneous, Q24H  finasteride, 5 mg, Oral, Daily  furosemide, 40 mg, Oral, QAM  guaiFENesin, 600 mg, Oral, Q12H  melatonin, 5 mg, Oral, Nightly  metoprolol succinate XL, 12.5 mg, Oral, Q24H  midodrine, 2.5 mg, Oral, TID  AC  sodium bicarbonate, 1,300 mg, Oral, TID  sodium chloride, 3 mL, Intravenous, Q12H      Infusions  Pharmacy to Dose enoxaparin (LOVENOX),       PRNs  •  acetaminophen  •  ipratropium-albuterol  •  nitroglycerin  •  ondansetron  •  Pharmacy to Dose enoxaparin (LOVENOX)  •  [COMPLETED] Insert peripheral IV **AND** sodium chloride  •  sodium chloride    Physical Exam:  Physical Exam  Cardiovascular:      Heart sounds: Murmur heard.   Pulmonary:      Effort: Pulmonary effort is normal.      Breath sounds: Examination of the right-lower field reveals decreased breath sounds. Examination of the left-lower field reveals decreased breath sounds. Decreased breath sounds and rales present.         ROS  Review of Systems   Respiratory: Positive for cough and shortness of breath.              I have reviewed current clinicals.     Electronically signed by CARMEN Narayan, 05/09/22, 12:12 PM EDT.     The NP scribed the note for me, I have examined the patient and reviewed and verified the above findings and and I formulated the assessment and plan as documented

## 2022-05-09 NOTE — CONSULTS
GI CONSULT  NOTE:    Referring Provider:  Dr. Lawson    Chief complaint: elevated LFTs    Subjective .     History of present illness: Shon Syed is a 89 y.o. male with history of CHF, CKD stage III, pacemaker, CAD, DMII, stroke, atrial flutter presented 5/30 with worsening shortness of air for 2 weeks.  He was admitted about 6 weeks ago for similar symptoms and his diuretics were increased.  GI has been consulted for elevated LFTs which have been elevated since they were first checked on 5/7.  Upon chart review, LFTs were normal in 12/2021.  Patient reports no history of liver problems.  He complains of epigastric pain that comes and goes.  Reports he is feeling better today.  Had nausea overnight.  He had a bowel movement this morning and denies bright red blood per rectum or melena.  He complains of decreased appetite within the past few weeks.  States food does not taste good.  He is a former smoker and has no history of alcohol or drug use.      Endo History:  7/2017 colonoscopy ([facility]): Diverticulosis sigmoid colon-no recall  1/2007 EGD/colonoscopy: Grade a esophagitis, diverticulosis  1/2007 M2A endoscopy: 1 polyp, no bleeding    Past Medical History:  Past Medical History:   Diagnosis Date   • Atrial flutter (HCC)    • CHF (congestive heart failure) (HCC)    • Chronic kidney disease    • Coronary artery disease    • Coronary artery disease involving native coronary artery of native heart 8/6/2019    Status post multivessel CABG 1993   • Diabetes mellitus (HCC)    • Essential hypertension 8/6/2019   • HFrEF (heart failure with reduced ejection fraction) ([facility])    • Hyperlipidemia    • Hyperlipidemia, mixed 8/6/2019   • Hypertension    • Presence of cardiac pacemaker 8/6/2019    S/P dual-chamber pacemaker implanted 2009   • Sick sinus syndrome (HCC) 8/6/2019   • Stroke (HCC)        Past Surgical History:  Past Surgical History:   Procedure Laterality Date   • CARDIAC CATHETERIZATION     • CARDIAC  ELECTROPHYSIOLOGY PROCEDURE N/A 2022    Procedure: Gen change-Victor Victor aware;  Surgeon: Minh Kendrick MD;  Location: [facility] CATH INVASIVE LOCATION;  Service: Cardiology;  Laterality: N/A;   • CORONARY ARTERY BYPASS GRAFT     • CYSTOSCOPY     • HERNIA REPAIR     • INSERT / REPLACE / REMOVE PACEMAKER      BS insertion   • PACEMAKER REPLACEMENT  2022    [facility]   • VENA CAVA FILTER PLACEMENT  2016       Social History:  Social History     Tobacco Use   • Smoking status: Former Smoker     Quit date: 1970     Years since quittin.9   • Smokeless tobacco: Never Used   Vaping Use   • Vaping Use: Never used   Substance Use Topics   • Alcohol use: Never   • Drug use: Never       Family History:  Family History   Problem Relation Age of Onset   • Leukemia Mother    • Heart disease Father    • Heart attack Father 56         age at 56   • Asthma Paternal Aunt        Medications:  Medications Prior to Admission   Medication Sig Dispense Refill Last Dose   • allopurinol (ZYLOPRIM) 100 MG tablet Take 100 mg by mouth Daily.      • ascorbic acid (VITAMIN C) 1000 MG tablet Take 1,000 mg by mouth Daily.      • aspirin 81 MG tablet Take 81 mg by mouth every night at bedtime.      • Cholecalciferol (Vitamin D3) 50 MCG (2000 UT) capsule Take 2,000 Units by mouth 3 (Three) Times a Week. Monday, Wednesday and Friday      • dutasteride (AVODART) 0.5 MG capsule Take 0.5 mg by mouth Every Night.      • furosemide (LASIX) 40 MG tablet Take 1 tablet by mouth 2 (Two) Times a Day. 180 tablet 1    • metoprolol tartrate (LOPRESSOR) 25 MG tablet Take 25 mg by mouth 2 (Two) Times a Day.      • multivitamin with minerals tablet tablet Take 1 tablet by mouth Daily.      • pravastatin (PRAVACHOL) 40 MG tablet Take 40 mg by mouth 1 (One) Time Per Week. Monday      • Probiotic Product (Probiotic Daily) capsule Take 1 capsule by mouth Daily.      • spironolactone (ALDACTONE) 25 MG tablet Take 0.5 tablets by  mouth Daily. 45 tablet 1        Scheduled Meds:allopurinol, 100 mg, Oral, Daily  aspirin, 81 mg, Oral, Daily  enoxaparin, 1 mg/kg, Subcutaneous, Q24H  finasteride, 5 mg, Oral, Daily  furosemide, 40 mg, Oral, QAM  guaiFENesin, 600 mg, Oral, Q12H  melatonin, 5 mg, Oral, Nightly  metoprolol tartrate, 12.5 mg, Oral, BID  midodrine, 2.5 mg, Oral, TID AC  sodium bicarbonate, 1,300 mg, Oral, TID  sodium chloride, 3 mL, Intravenous, Q12H      Continuous Infusions:Pharmacy to Dose enoxaparin (LOVENOX),       PRN Meds:.•  acetaminophen  •  ipratropium-albuterol  •  nitroglycerin  •  ondansetron  •  Pharmacy to Dose enoxaparin (LOVENOX)  •  [COMPLETED] Insert peripheral IV **AND** sodium chloride  •  sodium chloride    ALLERGIES:  Penicillin g and Vancomycin    ROS:  The following systems were reviewed and negative;   Constitution:  No fevers, chills, no unintentional weight loss  Skin: no rash, no jaundice  Eyes:  No blurry vision, no eye pain  HENT:  No change in hearing or smell  Resp:  No dyspnea or cough  CV:  No chest pain or palpitations  :  No dysuria, hematuria  Musculoskeletal:  No leg cramps or arthralgias  Neuro:  No tremor, no numbness  Psych:  No depression or confusion    Objective     Vital Signs:   Vitals:    05/09/22 0448 05/09/22 0619 05/09/22 0719 05/09/22 0912   BP:  150/69 128/66 156/86   BP Location:   Right arm Left arm   Patient Position:   Sitting Lying   Pulse:  64 67 (!) 134   Resp:   20 20   Temp: 97.8 °F (36.6 °C)   (!) 101.1 °F (38.4 °C)   TempSrc: Rectal   Oral   SpO2: 96%  100% 98%   Weight:       Height:           Physical Exam:       General Appearance:    Awake and alert, in no acute distress   Head:    Normocephalic, without obvious abnormality, atraumatic       Lungs:     Respirations regular, even and unlabored   Chest Wall:    No abnormalities observed   Abdomen:     Soft, mild epigastric tenderness, no rebound or guarding, non-distended, no hepatosplenomegaly   Rectal:     Deferred    Extremities:   Moves all extremities, no edema, no cyanosis   Pulses:   Pulses palpable and equal bilaterally   Skin:   No rash, no jaundice, normal palpation       Neurologic:   Cranial nerves 2 - 12 grossly intact, no asterixis       Results Review:   I reviewed the patient's labs and imaging.  CBC    Results from last 7 days   Lab Units 05/09/22  0522 05/08/22  0027 05/07/22  0234 05/06/22  0133 05/05/22  0325 05/04/22  0538 05/03/22  1156   WBC 10*3/mm3 11.10* 10.60 8.30 9.60 7.40 7.70 8.50   HEMOGLOBIN g/dL 14.0 13.8 13.2 13.9 13.5 13.1 14.4   PLATELETS 10*3/mm3 198 208 208 220 208 204 222     CMP   Results from last 7 days   Lab Units 05/09/22 0522 05/08/22  0026 05/07/22  0428 05/07/22  0234 05/06/22  0421 05/05/22  0325 05/04/22  0538 05/03/22  1156   SODIUM mmol/L 135* 136 143  --  139 137 139 137   POTASSIUM mmol/L 5.1 4.8 4.2  --  4.9 4.0 3.5 4.1   CHLORIDE mmol/L 98 99 103  --  100 96* 100 97*   CO2 mmol/L 16.0* 20.0* 25.0  --  21.0* 24.0 24.0 25.0   BUN mg/dL 91* 91* 75*  --  78* 64* 56* 62*   CREATININE mg/dL 2.16* 2.36* 1.85*  --  1.94* 1.74* 1.54* 1.66*   GLUCOSE mg/dL 152* 156* 99  --  125* 122* 118* 125*   ALBUMIN g/dL 3.60  --  3.20*  --   --   --   --   --    BILIRUBIN mg/dL 0.8  --  0.7  --   --   --   --   --    ALK PHOS U/L 230*  --  120*  --   --   --   --   --    AST (SGOT) U/L 353*  --  269*  --   --   --   --   --    ALT (SGPT) U/L 756*  --  386*  --   --   --   --   --    MAGNESIUM mg/dL 2.7* 2.6*  --  2.5*  --   --   --   --    PHOSPHORUS mg/dL 5.7* 5.0*  --  5.0*  --   --   --   --      Cr Clearance Estimated Creatinine Clearance: 17.6 mL/min (A) (by C-G formula based on SCr of 2.16 mg/dL (H)).  Coag     HbA1C No results found for: HGBA1C  Blood Glucose   Glucose   Date/Time Value Ref Range Status   05/09/2022 0722 103 70 - 105 mg/dL Final     Comment:     Serial Number: [id number]:  [id number]   05/08/2022 2108 169 (H) 70 - 105 mg/dL Final     Comment:     Serial Number:  [id number]:  [id number]   05/08/2022 1618 171 (H) 70 - 105 mg/dL Final     Comment:     Serial Number: [id number]:  [id number]   05/08/2022 1102 165 (H) 70 - 105 mg/dL Final     Comment:     Serial Number: [id number]:  [id number]   05/08/2022 0707 104 70 - 105 mg/dL Final     Comment:     Serial Number: [id number]:  [id number]   05/07/2022 1930 148 (H) 70 - 105 mg/dL Final     Comment:     Serial Number: [id number]:  [id number]   05/07/2022 1656 108 (H) 70 - 105 mg/dL Final     Comment:     Serial Number: [id number]:  [id number]   05/07/2022 1100 153 (H) 70 - 105 mg/dL Final     Comment:     Serial Number: [id number]:  [id number]     Infection     UA      Radiology(recent) US Liver    Result Date: 5/9/2022   1. Normal sonographic appearance of the liver. 2. Uncomplicated cholelithiasis. 3. Gallbladder adenomyomatosis.  Electronically Signed By-Shahrzad Biggs MD On:5/9/2022 9:22 AM This report was finalized on [id number] by  Shahrzad Biggs MD.    CT Chest Hi Resolution Diagnostic    Result Date: 5/7/2022  IMPRESSION :  1. Near complete resolution of previous identified interstitial pulmonary edema and near complete resolution of now small bilateral pleural effusions. 2. There may be a small component of chronic subpleural interstitial disease, but the majority of the findings on the prior study are favored to reflect pulmonary edema. 3. Improved nodular disease in the upper lungs, right greater than left with significant residual nodularity. Recommend 6 month follow-up chest CT. The course of disease on imaging would suggest an infectious or inflammatory process. 4. Cardiomegaly and coronary artery disease status post CABG and pacemaker/ICD placement. 5. Scoliosis and spinal degenerative changes.  Electronically Signed By-Hudson Walker MD On:5/7/2022 4:59 PM This report was finalized on [id number] by  Hudson Walker MD.         ASSESSMENT  -Elevated LFTs (,  )  -Epigastric pain  -Shortness of air -likely secondary to CHF, improved  -CAD  -Pacemaker  -CHF  -CKD stage III (Cr 2.16)    PLAN:  89-year-old male presented 5/3 with shortness of air.    GI has been consulted for elevated LFTs which have been normal in the past.  Ultrasound was normal other than gallbladder thickening and uncomplicated cholelithiasis. Hepatitis panel, MED, ASMA, mitochondrial antibodies pending.  Suspect medication induced vs shock liver vs other. No hypotension noted on chart review.  He is on allopurinol which can cause liver injury, however this is not a new medication.  Overall, he is feeling well. Continue to monitor labs and follow up on liver serologies. Continue supportive care.    I discussed the patients findings and my recommendations with the patient.    We appreciate the referral    Electronically signed by CARMEN Ken, 05/09/22, 9:51 AM EDT.

## 2022-05-09 NOTE — PLAN OF CARE
Goal Outcome Evaluation:  Plan of Care Reviewed With: patient        Progress: no change  Outcome Evaluation: Patient has slept on and off tonight, family at bedside, no complaints of shortness of breath at this time, remains on room air. CPAP prn, at bedside, no new complaints at this time, d/c with HH

## 2022-05-09 NOTE — PLAN OF CARE
Goal Outcome Evaluation:   Patient has been on side of bed during shift, has complained of some back pain and given medication which resolved pain. Patient has had increase needs for CPAP, chest xray obtained and NP notified. Will continue to monitor.

## 2022-05-09 NOTE — PROGRESS NOTES
"NEPHROLOGY PROGRESS NOTE------[facility]    [facility]  [phone]  Tam Gonzalez MD      Patient Care Team:  Antony Lawson MD as PCP - Tam Monroe MD as Consulting Physician (Nephrology)  Minh Kendrick MD as Consulting Physician (Cardiology)  Chris Romero MD as Consulting Physician (Cardiology)      Provider:  Tam Gonzalez MD  Patient Name: Shon ZAYAS Kunal  :  1930    SUBJECTIVE:  F/U CKD  No chest pain, but still with dyspnea  Voiding by self      Medication:  allopurinol, 100 mg, Oral, Daily  aspirin, 81 mg, Oral, Daily  enoxaparin, 1 mg/kg, Subcutaneous, Q24H  finasteride, 5 mg, Oral, Daily  furosemide, 40 mg, Oral, QAM  guaiFENesin, 600 mg, Oral, Q12H  melatonin, 5 mg, Oral, Nightly  metoprolol succinate XL, 12.5 mg, Oral, Q24H  midodrine, 2.5 mg, Oral, TID AC  sodium bicarbonate, 1,300 mg, Oral, TID  sodium chloride, 3 mL, Intravenous, Q12H      Pharmacy to Dose enoxaparin (LOVENOX),         OBJECTIVE    Vital Sign Min/Max for last 24 hours  Temp  Min: 94 °F (34.4 °C)  Max: 101.1 °F (38.4 °C)   BP  Min: 120/70  Max: 156/86   Pulse  Min: 60  Max: 134   Resp  Min: 16  Max: 20   SpO2  Min: 96 %  Max: 100 %   No data recorded   Weight  Min: 56 kg (123 lb 7.3 oz)  Max: 56 kg (123 lb 7.3 oz)     Flowsheet Rows    Flowsheet Row First Filed Value   Admission Height 172.7 cm (68\") Documented at 2022 1114   Admission Weight 58.2 kg (128 lb 4.9 oz) Documented at 2022 1114          I/O this shift:  In: 240 [P.O.:240]  Out: -   I/O last 3 completed shifts:  In: 360 [P.O.:360]  Out: 1000 [Urine:1000]    Physical Exam:  General Appearance: alert, appears stated age and cooperative  Head: normocephalic, without obvious abnormality and atraumatic  Eyes: conjunctivae and sclerae normal and no icterus  Neck: supple and +JVD  Lungs: fine basilar crackles  Heart: regular rhythm & normal rate and normal S1, " S2  Chest: Wall no abnormalities observed  Abdomen: normal bowel sounds and soft non-tender  Extremities: moves extremities well, no edema, no cyanosis and no redness  Skin: no bleeding, bruising or rash, turgor normal, color normal and no lesions noted  Neurologic: Alert, and oriented. No focal deficits    Labs:    WBC WBC   Date Value Ref Range Status   05/09/2022 11.10 (H) 3.40 - 10.80 10*3/mm3 Final   05/08/2022 10.60 3.40 - 10.80 10*3/mm3 Final   05/07/2022 8.30 3.40 - 10.80 10*3/mm3 Final      HGB Hemoglobin   Date Value Ref Range Status   05/09/2022 14.0 13.0 - 17.7 g/dL Final   05/08/2022 13.8 13.0 - 17.7 g/dL Final   05/07/2022 13.2 13.0 - 17.7 g/dL Final      HCT Hematocrit   Date Value Ref Range Status   05/09/2022 43.4 37.5 - 51.0 % Final   05/08/2022 41.6 37.5 - 51.0 % Final   05/07/2022 40.6 37.5 - 51.0 % Final      Platlets No results found for: LABPLAT   MCV MCV   Date Value Ref Range Status   05/09/2022 99.2 (H) 79.0 - 97.0 fL Final   05/08/2022 100.1 (H) 79.0 - 97.0 fL Final   05/07/2022 102.3 (H) 79.0 - 97.0 fL Final          Sodium Sodium   Date Value Ref Range Status   05/09/2022 135 (L) 136 - 145 mmol/L Final   05/08/2022 136 136 - 145 mmol/L Final   05/07/2022 143 136 - 145 mmol/L Final      Potassium Potassium   Date Value Ref Range Status   05/09/2022 5.1 3.5 - 5.2 mmol/L Final   05/08/2022 4.8 3.5 - 5.2 mmol/L Final   05/07/2022 4.2 3.5 - 5.2 mmol/L Final      Chloride Chloride   Date Value Ref Range Status   05/09/2022 98 98 - 107 mmol/L Final   05/08/2022 99 98 - 107 mmol/L Final   05/07/2022 103 98 - 107 mmol/L Final      CO2 CO2   Date Value Ref Range Status   05/09/2022 16.0 (L) 22.0 - 29.0 mmol/L Final   05/08/2022 20.0 (L) 22.0 - 29.0 mmol/L Final   05/07/2022 25.0 22.0 - 29.0 mmol/L Final      BUN BUN   Date Value Ref Range Status   05/09/2022 91 (H) 8 - 23 mg/dL Final   05/08/2022 91 (H) 8 - 23 mg/dL Final   05/07/2022 75 (H) 8 - 23 mg/dL Final      Creatinine Creatinine   Date  Value Ref Range Status   05/09/2022 2.16 (H) 0.76 - 1.27 mg/dL Final   05/08/2022 2.36 (H) 0.76 - 1.27 mg/dL Final   05/07/2022 1.85 (H) 0.76 - 1.27 mg/dL Final      Calcium Calcium   Date Value Ref Range Status   05/09/2022 9.5 8.2 - 9.6 mg/dL Final   05/08/2022 9.6 8.2 - 9.6 mg/dL Final   05/07/2022 8.7 8.2 - 9.6 mg/dL Final      PO4 No components found for: PO4   Albumin Albumin   Date Value Ref Range Status   05/09/2022 3.60 3.50 - 5.20 g/dL Final   05/07/2022 3.20 (L) 3.50 - 5.20 g/dL Final      Magnesium Magnesium   Date Value Ref Range Status   05/09/2022 2.7 (H) 1.7 - 2.3 mg/dL Final   05/08/2022 2.6 (H) 1.7 - 2.3 mg/dL Final   05/07/2022 2.5 (H) 1.7 - 2.3 mg/dL Final      Uric Acid No components found for: URIC ACID     Imaging Results (Last 72 Hours)     Procedure Component Value Units Date/Time    US Liver [[id number]] Collected: 05/09/22 0920     Updated: 05/09/22 0924    Narrative:      DATE OF EXAM:  5/9/2022 8:54 AM     PROCEDURE:  US LIVER-     INDICATIONS:  Hepatic transaminase derangement; R06.00-Dyspnea, unspecified;  I50.9-Heart failure, unspecified; N17.9-Acute kidney failure,  unspecified     COMPARISON:  No Comparisons Available     TECHNIQUE:   Grayscale and color Doppler ultrasound evaluation of the limited abdomen  was performed.     FINDINGS:  The liver size is normal, 12.8 cm in length. The liver maintains normal  homogeneous echotexture without focal or suspicious abnormality. There  is no ascites. The portal vein is patent with hepatopedal flow. Imaged  hepatic veins are patent. The common duct measures 2 mm. No intrahepatic  biliary dilation is seen. Incidental note is made of a 1.8 cm shadowing  gallstone, and gallbladder adenomyomatosis. The gallbladder wall does  not appear grossly thickened, and no pericholecystic fluid is seen.        Impression:         1. Normal sonographic appearance of the liver.  2. Uncomplicated cholelithiasis.  3. Gallbladder adenomyomatosis.      Electronically Signed By-Shahrzad Biggs MD On:5/9/2022 9:22 AM  This report was finalized on [id number] by  Shahrzad Biggs MD.    CT Chest Hi Resolution Diagnostic [[id number]] Collected: 05/07/22 1653     Updated: 05/07/22 1701    Narrative:         DATE OF EXAM:   5/7/2022 4:28 PM     PROCEDURE:   CT CHEST HI RESOLUTION DIAGNOSTIC-     INDICATIONS:   Interstitial lung disease; R06.00-Dyspnea, unspecified; I50.9-Heart  failure, unspecified; N17.9-Acute kidney failure, unspecified     COMPARISON:  03/24/2022.     TECHNIQUE:   Routine transaxial slices were obtained through the chest without the  administration of intravenous contrast. The study was performed  utilizing our high resolution CT protocol which includes supine  inspiratory and expiratory imaging as well as prone inspiratory imaging.  Reconstructed coronal and sagittal images were also obtained. Automated  exposure control and iterative reconstruction methods were used.     FINDINGS:   Interval resolution of previous identified pulmonary edema. There is  nearly completely resolved small bilateral pleural effusions. There is  mild interlobular septal thickening, which may represent mild residual  interstitial edema or mild component of chronic disease. There is  scarred consolidation in the right lung base. Many of the small nodules  identified in the upper lobes of either resolved or decreased in size  compared with the prior study. There are persistent clusters of nodules  in both upper lungs, right greater than left with the largest occurring  in the right upper lobe on axial image 41 measuring up to 10 mm  diameter. There is decreased peribronchial thickening. No new lung  nodules.     There is severe aortic and aortic branch vessel atherosclerosis. There  are severe native coronary artery calcifications status post CABG. There  is moderate cardiomegaly. No pericardial effusion. There is no  pathologic mediastinal lymphadenopathy following size  criteria. There  are dense aortic annular and valvular calcifications.     There is a left-sided pacemaker/ICD in place. No acute findings below  the diaphragm. There is a stable low-attenuation nodule at the left  adrenal gland, likely adenoma. There is S-shaped scoliosis of the  thoracolumbar spine. There are moderate lower thoracic and upper lumbar  degenerative changes.        Impression:      IMPRESSION :      1. Near complete resolution of previous identified interstitial  pulmonary edema and near complete resolution of now small bilateral  pleural effusions.  2. There may be a small component of chronic subpleural interstitial  disease, but the majority of the findings on the prior study are favored  to reflect pulmonary edema.  3. Improved nodular disease in the upper lungs, right greater than left  with significant residual nodularity. Recommend 6 month follow-up chest  CT. The course of disease on imaging would suggest an infectious or  inflammatory process.  4. Cardiomegaly and coronary artery disease status post CABG and  pacemaker/ICD placement.  5. Scoliosis and spinal degenerative changes.     Electronically Signed By-Hudson Walker MD On:5/7/2022 4:59 PM  This report was finalized on [id number] by  Hudson Walker MD.    XR Chest 1 View [[id number]] Collected: 05/06/22 1631     Updated: 05/06/22 1634    Narrative:      DATE OF EXAM:  5/6/2022 4:26 PM     PROCEDURE:  XR CHEST 1 VW-     INDICATIONS:  Severe SOA; R06.00-Dyspnea, unspecified; I50.9-Heart failure,  unspecified; N17.9-Acute kidney failure, unspecified     COMPARISON:  Chest radiograph 05/03/2022     TECHNIQUE:   Single radiographic view of the chest was obtained.     FINDINGS:  Patient is rotated this limits evaluation of the right lung. There is  cardiomegaly. Dual lead transvenous pacemaker device. Blunting the  costophrenic angles. Left lower lobe airspace opacity. Background  chronic interstitial lung disease.       Impression:       Since previous exam there is been development of probable small pleural  effusions. There is left lower lobe airspace opacity atelectasis versus  pneumonia.     Electronically Signed By-Marianna Flowers MD On:5/6/2022 4:32 PM  This report was finalized on [id number] by  Marianna Flowers MD.          Results for orders placed during the hospital encounter of 05/03/22    XR Chest 1 View    Narrative  DATE OF EXAM:  5/6/2022 4:26 PM    PROCEDURE:  XR CHEST 1 VW-    INDICATIONS:  Severe SOA; R06.00-Dyspnea, unspecified; I50.9-Heart failure,  unspecified; N17.9-Acute kidney failure, unspecified    COMPARISON:  Chest radiograph 05/03/2022    TECHNIQUE:  Single radiographic view of the chest was obtained.    FINDINGS:  Patient is rotated this limits evaluation of the right lung. There is  cardiomegaly. Dual lead transvenous pacemaker device. Blunting the  costophrenic angles. Left lower lobe airspace opacity. Background  chronic interstitial lung disease.    Impression  Since previous exam there is been development of probable small pleural  effusions. There is left lower lobe airspace opacity atelectasis versus  pneumonia.    Electronically Signed By-Marianna Flowers MD On:5/6/2022 4:32 PM  This report was finalized on [id number] by  Marianna Flowers MD.      XR Chest 1 View    Narrative  DATE OF EXAM:  5/3/2022 12:07 PM    PROCEDURE:  XR CHEST 1 VW-    INDICATIONS:  Shortness of breath. Cough for 4 months.    COMPARISON:  Chest radiographs 3/24/2022, 5/29/2021, and 5/27/2021. CT chest  3/24/2022    TECHNIQUE:  Single radiographic AP view of the chest was obtained.    FINDINGS:  Lordotic positioning. Overlying artifacts. Stable sternotomy wires,  postoperative changes from CABG, left chest wall cardiac pacer device.  Stable elevation of the right hemidiaphragm with improved aeration of  both lungs. Mild linear right infrahilar and left basilar segmental  atelectasis and/or scarring with mild interstitial thickening in the  lung  bases. No focal lung consolidation. No pneumothorax. Stable  cardiomegaly, likely accentuated by technique. Calcified atherosclerotic  disease in the thoracic aorta. Chronic changes in both shoulders. No  acute osseous normality is identified.    Impression  Stable cardiomegaly with improved aeration of both lungs. Multifocal  bibasilar subsegmental atelectasis and/or scarring with mild  interstitial thickening in the lung bases that could represent mild  pulmonary vascular congestion/edema, chronic lung disease, or less  likely atypical pneumonia.    Electronically Signed By-Lance Mendes MD On:5/3/2022 12:32 PM  This report was finalized on [id number] by  Lance Mendes MD.      Results for orders placed during the hospital encounter of 03/24/22    XR Chest 1 View    Narrative  DATE OF EXAM:  3/24/2022 9:49 AM    PROCEDURE:  XR CHEST 1 VW-    INDICATIONS:  soa, chest pain    COMPARISON:  5/29/2021    TECHNIQUE:  Single radiographic view of the chest was obtained.    FINDINGS:  Sternotomy wires overlie the midline. There is a left subclavian  dual-lead pacer which appears unchanged. Cardiomegaly is again present.  There is mixed interstitial and alveolar opacity bilaterally, right  greater than left. This may be due to pulmonary edema/CHF or atypical  infection pattern. Probable small amount of left pleural fluid is  present. There is elevation of the right hemidiaphragm again noted. No  pneumothorax is seen. Aortic vascular calcifications present.    Impression  1. Bilateral mixed interstitial and alveolar opacities may be due to  pulmonary edema/CHF versus atypical infection pattern. Cardiomegaly  suggest CHF is most likely.  2. Small amount of left pleural fluid.    Electronically Signed By-Earl Xiong MD On:3/24/2022 9:58 AM  This report was finalized on [id number] by  Earl Xiong MD.      Results for orders placed during the hospital encounter of 03/24/22    Duplex Carotid Ultrasound  CAR    Interpretation Summary  · Proximal right internal carotid artery moderate stenosis.  · Proximal left internal carotid artery moderate stenosis.        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    · CHF- EF 30-40%. cautiously diurese.   · Hypertension  · Atrial fibrillation  · History coronary disease  · Diabetes     CR still up, but trending down  Continue Lasix, will switch to IV  BP ok  K 5.1-will stop glucerna, change to nepro. Not on KCl  D/w family at bedside. May need dialysis with worsening azotemia.  Monitor renal function fluid status electrolytes      Tam Gonzalez MD  [facility]  [phone]  05/09/22  11:11 EDT

## 2022-05-09 NOTE — CASE MANAGEMENT/SOCIAL WORK
Continued Stay Note   Charles     Patient Name: Shon ZAYAS Kunal  MRN: [id number]  Today's Date: 5/9/2022    Admit Date: 5/3/2022     Discharge Plan     Row Name 05/09/22 1256       Plan    Plan DC plan: referral with [facility] pending.    Post Acute Provider List Home Health    Delivered To Support Person    Method of Delivery In person    Plan Comments CM reached out to [facility] liaison Jazmín to verify services with patient. She met with pt and family at bedside and family would like to go home with home health services and possible [facility] referral. CM met with pt and son at bedside and provided [facility] agency. [facility] liaison Melissa that family had contacted office, wanting more details. Sent referral in Epic basket and called spouse. Confirmed that they'd like more information, lialaura Torres will provide more information once insurance reviewed.              Met with patient in room wearing PPE: mask, face shield/goggles.      Maintained distance greater than six feet and spent less than 15 minutes in the room.      Megan Naegele, RN      Office Phone: [phone]  Office Cell: [phone]

## 2022-05-09 NOTE — PROGRESS NOTES
Card[facility]        LOS:  LOS: 5 days   Patient Name: Shon ZAYAS Kunal  Age/Sex: 89 y.o. male  : 1930  MRN: [id number]    Day of Service: 22   Length of Stay: 5  Encounter Provider: CARMEN Martell  Place of Service: [facility] CARDIOLOGY  Patient Care Team:  Antony Lawson MD as PCP - General  CarlosTam torres MD as Consulting Physician (Nephrology)  Minh Kendrick MD as Consulting Physician (Cardiology)  Chris Romero MD as Consulting Physician (Cardiology)    Subjective:     Chief Complaint: shortness of breath, MERA    Subjective: Shortness of breath, hypoxic, was on BiPAP over the weekend, now needs 2 L nasal cannula, nephrology note noted change back to IV diuretics  Hold any further beta-blockers at this point with decompensated heart failure  Elevated LFTs, could be congestive  May warrant dopamine or dobutamine inotropic assistance for added diuresis  Creatinine is up trending  LFTs are uptrending    Will consider repeat limited echo    Current Medications:   Scheduled Meds:allopurinol, 100 mg, Oral, Daily  aspirin, 81 mg, Oral, Daily  enoxaparin, 1 mg/kg, Subcutaneous, Q24H  finasteride, 5 mg, Oral, Daily  furosemide, 40 mg, Oral, QAM  guaiFENesin, 600 mg, Oral, Q12H  melatonin, 5 mg, Oral, Nightly  metoprolol tartrate, 12.5 mg, Oral, BID  midodrine, 2.5 mg, Oral, TID AC  sodium bicarbonate, 1,300 mg, Oral, TID  sodium chloride, 3 mL, Intravenous, Q12H      Continuous Infusions:Pharmacy to Dose enoxaparin (LOVENOX),         Allergies:  Allergies   Allergen Reactions   • Penicillin G Rash   • Vancomycin Rash       Review of Systems   Constitutional: Positive for malaise/fatigue. Negative for chills and diaphoresis.   Cardiovascular: Negative for chest pain, dyspnea on exertion, irregular heartbeat, leg swelling, near-syncope, orthopnea, palpitations, paroxysmal nocturnal dyspnea and syncope.   Respiratory: Positive for shortness of  breath. Negative for cough, sleep disturbances due to breathing and sputum production.    Gastrointestinal: Negative for change in bowel habit.   Genitourinary: Negative for urgency.   Neurological: Negative for dizziness and headaches.   Psychiatric/Behavioral: Negative for altered mental status.         Objective:     Temp:  [94 °F (34.4 °C)-101.1 °F (38.4 °C)] 101.1 °F (38.4 °C)  Heart Rate:  [] 134  Resp:  [16-20] 20  BP: (120-156)/(66-86) 156/86     Intake/Output Summary (Last 24 hours) at 5/9/2022 1015  Last data filed at 5/9/2022 0958  Gross per 24 hour   Intake 600 ml   Output 700 ml   Net -100 ml     Body mass index is 18.77 kg/m².      05/06/22  1912 05/07/22  0452 05/09/22  0429   Weight: 56.3 kg (124 lb 1.6 oz) 59.3 kg (130 lb 11.4 oz) 56 kg (123 lb 7.3 oz)         General Appearance:    Alert, cooperative, in no acute distress                                Head: Atraumatic, normocephalic, PERRLA               Neck:   supple, no JVD   Lungs:     2L NC, dim bases    Heart:    Regular rhythm and normal rate, normal S1 and S2, 1/6 murmur   Abdomen:     Normal bowel sounds, no masses, no organomegaly, soft  non-tender, non-distended, no guarding, no rebound  tenderness   Extremities:   Moves all extremities well, no edema, no cyanosis, no  redness   Pulses:   Pulses palpable and equal bilaterally   Skin:   No bleeding, bruising or rash   Neurologic:   Awake, alert, oriented x3   Agree with exam as discussed with nurse practitioner after my face-to-face encounter with the patient      Lab Review:   Results from last 7 days   Lab Units 05/09/22  0522 05/08/22  0026 05/07/22  0428   SODIUM mmol/L 135* 136 143   POTASSIUM mmol/L 5.1 4.8 4.2   CHLORIDE mmol/L 98 99 103   CO2 mmol/L 16.0* 20.0* 25.0   BUN mg/dL 91* 91* 75*   CREATININE mg/dL 2.16* 2.36* 1.85*   GLUCOSE mg/dL 152* 156* 99   CALCIUM mg/dL 9.5 9.6 8.7   AST (SGOT) U/L 353*  --  269*   ALT (SGPT) U/L 756*  --  386*     Results from last 7 days    Lab Units 05/08/22  0026 05/03/22  1156   CK TOTAL U/L 95  --    TROPONIN T ng/mL  --  0.027     Results from last 7 days   Lab Units 05/09/22  0522 05/08/22  0027   WBC 10*3/mm3 11.10* 10.60   HEMOGLOBIN g/dL 14.0 13.8   HEMATOCRIT % 43.4 41.6   PLATELETS 10*3/mm3 198 208         Results from last 7 days   Lab Units 05/09/22  0522 05/08/22  0026   MAGNESIUM mg/dL 2.7* 2.6*           Invalid input(s): LDLCALC  Results from last 7 days   Lab Units 05/05/22  0325 05/03/22  1156   PROBNP pg/mL 45,156.0* 42,275.0*           Recent Radiology:  Imaging Results (Most Recent)     Procedure Component Value Units Date/Time    US Liver [[id number]] Collected: 05/09/22 0920     Updated: 05/09/22 0924    Narrative:      DATE OF EXAM:  5/9/2022 8:54 AM     PROCEDURE:  US LIVER-     INDICATIONS:  Hepatic transaminase derangement; R06.00-Dyspnea, unspecified;  I50.9-Heart failure, unspecified; N17.9-Acute kidney failure,  unspecified     COMPARISON:  No Comparisons Available     TECHNIQUE:   Grayscale and color Doppler ultrasound evaluation of the limited abdomen  was performed.     FINDINGS:  The liver size is normal, 12.8 cm in length. The liver maintains normal  homogeneous echotexture without focal or suspicious abnormality. There  is no ascites. The portal vein is patent with hepatopedal flow. Imaged  hepatic veins are patent. The common duct measures 2 mm. No intrahepatic  biliary dilation is seen. Incidental note is made of a 1.8 cm shadowing  gallstone, and gallbladder adenomyomatosis. The gallbladder wall does  not appear grossly thickened, and no pericholecystic fluid is seen.        Impression:         1. Normal sonographic appearance of the liver.  2. Uncomplicated cholelithiasis.  3. Gallbladder adenomyomatosis.     Electronically Signed By-Shahrzad Biggs MD On:5/9/2022 9:22 AM  This report was finalized on [id number] by  Shahrzad Biggs MD.    CT Chest Hi Resolution Diagnostic [[id number]] Collected: 05/07/22 3522      Updated: 05/07/22 1701    Narrative:         DATE OF EXAM:   5/7/2022 4:28 PM     PROCEDURE:   CT CHEST HI RESOLUTION DIAGNOSTIC-     INDICATIONS:   Interstitial lung disease; R06.00-Dyspnea, unspecified; I50.9-Heart  failure, unspecified; N17.9-Acute kidney failure, unspecified     COMPARISON:  03/24/2022.     TECHNIQUE:   Routine transaxial slices were obtained through the chest without the  administration of intravenous contrast. The study was performed  utilizing our high resolution CT protocol which includes supine  inspiratory and expiratory imaging as well as prone inspiratory imaging.  Reconstructed coronal and sagittal images were also obtained. Automated  exposure control and iterative reconstruction methods were used.     FINDINGS:   Interval resolution of previous identified pulmonary edema. There is  nearly completely resolved small bilateral pleural effusions. There is  mild interlobular septal thickening, which may represent mild residual  interstitial edema or mild component of chronic disease. There is  scarred consolidation in the right lung base. Many of the small nodules  identified in the upper lobes of either resolved or decreased in size  compared with the prior study. There are persistent clusters of nodules  in both upper lungs, right greater than left with the largest occurring  in the right upper lobe on axial image 41 measuring up to 10 mm  diameter. There is decreased peribronchial thickening. No new lung  nodules.     There is severe aortic and aortic branch vessel atherosclerosis. There  are severe native coronary artery calcifications status post CABG. There  is moderate cardiomegaly. No pericardial effusion. There is no  pathologic mediastinal lymphadenopathy following size criteria. There  are dense aortic annular and valvular calcifications.     There is a left-sided pacemaker/ICD in place. No acute findings below  the diaphragm. There is a stable low-attenuation nodule at the  left  adrenal gland, likely adenoma. There is S-shaped scoliosis of the  thoracolumbar spine. There are moderate lower thoracic and upper lumbar  degenerative changes.        Impression:      IMPRESSION :      1. Near complete resolution of previous identified interstitial  pulmonary edema and near complete resolution of now small bilateral  pleural effusions.  2. There may be a small component of chronic subpleural interstitial  disease, but the majority of the findings on the prior study are favored  to reflect pulmonary edema.  3. Improved nodular disease in the upper lungs, right greater than left  with significant residual nodularity. Recommend 6 month follow-up chest  CT. The course of disease on imaging would suggest an infectious or  inflammatory process.  4. Cardiomegaly and coronary artery disease status post CABG and  pacemaker/ICD placement.  5. Scoliosis and spinal degenerative changes.     Electronically Signed By-Hudson Walker MD On:5/7/2022 4:59 PM  This report was finalized on [id number] by  Hudson Walker MD.    XR Chest 1 View [[id number]] Collected: 05/06/22 1631     Updated: 05/06/22 1634    Narrative:      DATE OF EXAM:  5/6/2022 4:26 PM     PROCEDURE:  XR CHEST 1 VW-     INDICATIONS:  Severe SOA; R06.00-Dyspnea, unspecified; I50.9-Heart failure,  unspecified; N17.9-Acute kidney failure, unspecified     COMPARISON:  Chest radiograph 05/03/2022     TECHNIQUE:   Single radiographic view of the chest was obtained.     FINDINGS:  Patient is rotated this limits evaluation of the right lung. There is  cardiomegaly. Dual lead transvenous pacemaker device. Blunting the  costophrenic angles. Left lower lobe airspace opacity. Background  chronic interstitial lung disease.       Impression:      Since previous exam there is been development of probable small pleural  effusions. There is left lower lobe airspace opacity atelectasis versus  pneumonia.     Electronically Signed By-Marianna Flowers MD  On:5/6/2022 4:32 PM  This report was finalized on [id number] by  Marianna Flowers MD.    XR Chest 1 View [[id number]] Collected: 05/03/22 1229     Updated: 05/03/22 1234    Narrative:      DATE OF EXAM:  5/3/2022 12:07 PM     PROCEDURE:  XR CHEST 1 VW-     INDICATIONS:  Shortness of breath. Cough for 4 months.     COMPARISON:  Chest radiographs 3/24/2022, 5/29/2021, and 5/27/2021. CT chest  3/24/2022     TECHNIQUE:   Single radiographic AP view of the chest was obtained.     FINDINGS:  Lordotic positioning. Overlying artifacts. Stable sternotomy wires,  postoperative changes from CABG, left chest wall cardiac pacer device.  Stable elevation of the right hemidiaphragm with improved aeration of  both lungs. Mild linear right infrahilar and left basilar segmental  atelectasis and/or scarring with mild interstitial thickening in the  lung bases. No focal lung consolidation. No pneumothorax. Stable  cardiomegaly, likely accentuated by technique. Calcified atherosclerotic  disease in the thoracic aorta. Chronic changes in both shoulders. No  acute osseous normality is identified.        Impression:      Stable cardiomegaly with improved aeration of both lungs. Multifocal  bibasilar subsegmental atelectasis and/or scarring with mild  interstitial thickening in the lung bases that could represent mild  pulmonary vascular congestion/edema, chronic lung disease, or less  likely atypical pneumonia.     Electronically Signed By-Lance Mendes MD On:5/3/2022 12:32 PM  This report was finalized on [id number] by  Lance Mendes MD.          ECHOCARDIOGRAM:    Results for orders placed during the hospital encounter of 03/24/22    Adult Transthoracic Echo Complete W/ Cont if Necessary Per Protocol    Interpretation Summary  · Left ventricular ejection fraction appears to be 36 - 40%.  · The right ventricular cavity is severely dilated.  · The right atrial cavity is dilated.  · There is calcification of the aortic valve.  · Severe  mitral valve regurgitation is present.  · Moderate tricuspid valve regurgitation is present.  · No pericardial effusion noted        I reviewed the patient's new clinical results.    EKG:      Assessment:       Dyspnea, unspecified type    Moderate malnutrition (HCC)    1. Shortness of breath / Acute on chronic systolic and diastolic CHF  - LVEF 35%, Dual-chamber pacemaker well-functioning-No ICD upgrade at family discretion previously  -Lasix gently, nephrology on board appreciate recommendations  -Off Aldactone at this time  -Blood pressure will likely not tolerate ARB as well as kidney function    2. HARMAN / CKD  - creatinine varies, 2.1 today  - on oral lasix    3. HTN    4. Paroxysmal Atrial fibrillation  - not on a/c, CHADS VAsc at least 3, on ASA      5. SSS s/p PPM    6. Elevated LFTs  - GI following  - AST / /756    Plan:   Creatinine remains elevated, nephrology following and managing diuresis  Pulmonary edema overall improved on High Resolution CT completed 5/7  GI consulted given elevated LFTs  Hold any further beta-blocker at this point  Will consider dobutamine or dopamine, may need RV support  Changed back to IV diuretics  LFTs are uptrending, continue to follow, right upper quadrant ultrasound was unremarkable for any obstruction, may assume this is congestive hepatopathy  May need inotropic support  Blood pressures are adequate    Chris Romero MD, PhD    Justina Riojas, CARMEN  05/09/22  10:15 EDT

## 2022-05-09 NOTE — PROGRESS NOTES
LOS: 5 days   Patient Care Team:  Antony Lawson MD as PCP - General  Tam Gonzalez MD as Consulting Physician (Nephrology)  Minh Kendrick MD as Consulting Physician (Cardiology)  Chris Romero MD as Consulting Physician (Cardiology)    Subjective:  Orthopnea this morning with some significant shortness of breath    Objective:   Afebrile      Review of Systems:   Review of Systems   Constitutional: Positive for activity change.   Respiratory: Positive for shortness of breath. Negative for chest tightness.    Cardiovascular: Negative.    Neurological: Positive for weakness.   Psychiatric/Behavioral: The patient is nervous/anxious.            Vital Signs  Temp:  [94 °F (34.4 °C)-97.8 °F (36.6 °C)] 97.8 °F (36.6 °C)  Heart Rate:  [60-80] 67  Resp:  [16-20] 20  BP: (120-152)/(66-77) 128/66    Physical Exam:  Physical Exam  Vitals and nursing note reviewed.   Constitutional:       General: He is in acute distress.      Appearance: He is not ill-appearing, toxic-appearing or diaphoretic.   Cardiovascular:      Rate and Rhythm: Rhythm irregular.      Heart sounds: Normal heart sounds.   Pulmonary:      Breath sounds: Normal breath sounds.   Skin:     General: Skin is warm.   Neurological:      General: No focal deficit present.      Mental Status: He is alert and oriented to person, place, and time.          Radiology:  XR Chest 1 View    Result Date: 5/6/2022  Since previous exam there is been development of probable small pleural effusions. There is left lower lobe airspace opacity atelectasis versus pneumonia.  Electronically Signed By-Marianna Flowers MD On:5/6/2022 4:32 PM This report was finalized on [id number] by  Marianna Flowers MD.    XR Chest 1 View    Result Date: 5/3/2022  Stable cardiomegaly with improved aeration of both lungs. Multifocal bibasilar subsegmental atelectasis and/or scarring with mild interstitial thickening in the lung bases that could represent mild pulmonary vascular  congestion/edema, chronic lung disease, or less likely atypical pneumonia.  Electronically Signed By-Lance Mendes MD On:5/3/2022 12:32 PM This report was finalized on [id number] by  Lance Mendes MD.    CT Chest Hi Resolution Diagnostic    Result Date: 5/7/2022  IMPRESSION :  1. Near complete resolution of previous identified interstitial pulmonary edema and near complete resolution of now small bilateral pleural effusions. 2. There may be a small component of chronic subpleural interstitial disease, but the majority of the findings on the prior study are favored to reflect pulmonary edema. 3. Improved nodular disease in the upper lungs, right greater than left with significant residual nodularity. Recommend 6 month follow-up chest CT. The course of disease on imaging would suggest an infectious or inflammatory process. 4. Cardiomegaly and coronary artery disease status post CABG and pacemaker/ICD placement. 5. Scoliosis and spinal degenerative changes.  Electronically Signed By-Hudson Walker MD On:5/7/2022 4:59 PM This report was finalized on [id number] by  Hudson Walker MD.         Results Review:     I reviewed the patient's new clinical results.  I reviewed the patient's new imaging results and agree with the interpretation.    Medication Review:   Scheduled Meds:allopurinol, 100 mg, Oral, Daily  aspirin, 81 mg, Oral, Daily  enoxaparin, 1 mg/kg, Subcutaneous, Q24H  finasteride, 5 mg, Oral, Daily  furosemide, 40 mg, Oral, QAM  guaiFENesin, 600 mg, Oral, Q12H  melatonin, 5 mg, Oral, Nightly  metoprolol tartrate, 12.5 mg, Oral, BID  midodrine, 2.5 mg, Oral, TID AC  sodium bicarbonate, 1,300 mg, Oral, TID  sodium chloride, 3 mL, Intravenous, Q12H      Continuous Infusions:Pharmacy to Dose enoxaparin (LOVENOX),       PRN Meds:.•  acetaminophen  •  ipratropium-albuterol  •  nitroglycerin  •  ondansetron  •  Pharmacy to Dose enoxaparin (LOVENOX)  •  [COMPLETED] Insert peripheral IV **AND** sodium chloride  •   sodium chloride    Labs:    CBC    Results from last 7 days   Lab Units 05/09/22  0522 05/08/22  0027 05/07/22  0234 05/06/22  0133 05/05/22  0325 05/04/22  0538 05/03/22  1156   WBC 10*3/mm3 11.10* 10.60 8.30 9.60 7.40 7.70 8.50   HEMOGLOBIN g/dL 14.0 13.8 13.2 13.9 13.5 13.1 14.4   PLATELETS 10*3/mm3 198 208 208 220 208 204 222     BMP   Results from last 7 days   Lab Units 05/09/22  0522 05/08/22  0026 05/07/22  0428 05/07/22  0234 05/06/22  0421 05/05/22 0325 05/04/22  0538 05/03/22  1156   SODIUM mmol/L 135* 136 143  --  139 137 139 137   POTASSIUM mmol/L 5.1 4.8 4.2  --  4.9 4.0 3.5 4.1   CHLORIDE mmol/L 98 99 103  --  100 96* 100 97*   CO2 mmol/L 16.0* 20.0* 25.0  --  21.0* 24.0 24.0 25.0   BUN mg/dL 91* 91* 75*  --  78* 64* 56* 62*   CREATININE mg/dL 2.16* 2.36* 1.85*  --  1.94* 1.74* 1.54* 1.66*   GLUCOSE mg/dL 152* 156* 99  --  125* 122* 118* 125*   MAGNESIUM mg/dL 2.7* 2.6*  --  2.5*  --   --   --   --    PHOSPHORUS mg/dL 5.7* 5.0*  --  5.0*  --   --   --   --      Cr Clearance Estimated Creatinine Clearance: 17.6 mL/min (A) (by C-G formula based on SCr of 2.16 mg/dL (H)).  Coag     HbA1C No results found for: HGBA1C  Blood Glucose   Glucose   Date/Time Value Ref Range Status   05/09/2022 0722 103 70 - 105 mg/dL Final     Comment:     Serial Number: [id number]:  [id number]   05/08/2022 2108 169 (H) 70 - 105 mg/dL Final     Comment:     Serial Number: [id number]:  [id number]   05/08/2022 1618 171 (H) 70 - 105 mg/dL Final     Comment:     Serial Number: [id number]:  [id number]   05/08/2022 1102 165 (H) 70 - 105 mg/dL Final     Comment:     Serial Number: [id number]:  [id number]   05/08/2022 0707 104 70 - 105 mg/dL Final     Comment:     Serial Number: [id number]:  [id number]   05/07/2022 1930 148 (H) 70 - 105 mg/dL Final     Comment:     Serial Number: [id number]:  [id number]   05/07/2022 1656 108 (H) 70 - 105 mg/dL Final     Comment:     Serial Number:  [id number]:  [id number]   05/07/2022 1100 153 (H) 70 - 105 mg/dL Final     Comment:     Serial Number: [id number]:  [id number]     Infection     CMP   Results from last 7 days   Lab Units 05/09/22  0522 05/08/22  0026 05/07/22  0428 05/06/22  0421 05/05/22  0325 05/04/22  0538 05/03/22  1156   SODIUM mmol/L 135* 136 143 139 137 139 137   POTASSIUM mmol/L 5.1 4.8 4.2 4.9 4.0 3.5 4.1   CHLORIDE mmol/L 98 99 103 100 96* 100 97*   CO2 mmol/L 16.0* 20.0* 25.0 21.0* 24.0 24.0 25.0   BUN mg/dL 91* 91* 75* 78* 64* 56* 62*   CREATININE mg/dL 2.16* 2.36* 1.85* 1.94* 1.74* 1.54* 1.66*   GLUCOSE mg/dL 152* 156* 99 125* 122* 118* 125*   ALBUMIN g/dL 3.60  --  3.20*  --   --   --   --    BILIRUBIN mg/dL 0.8  --  0.7  --   --   --   --    ALK PHOS U/L 230*  --  120*  --   --   --   --    AST (SGOT) U/L 353*  --  269*  --   --   --   --    ALT (SGPT) U/L 756*  --  386*  --   --   --   --      UA      Radiology(recent) CT Chest Hi Resolution Diagnostic    Result Date: 5/7/2022  IMPRESSION :  1. Near complete resolution of previous identified interstitial pulmonary edema and near complete resolution of now small bilateral pleural effusions. 2. There may be a small component of chronic subpleural interstitial disease, but the majority of the findings on the prior study are favored to reflect pulmonary edema. 3. Improved nodular disease in the upper lungs, right greater than left with significant residual nodularity. Recommend 6 month follow-up chest CT. The course of disease on imaging would suggest an infectious or inflammatory process. 4. Cardiomegaly and coronary artery disease status post CABG and pacemaker/ICD placement. 5. Scoliosis and spinal degenerative changes.  Electronically Signed By-Hudson Walker MD On:5/7/2022 4:59 PM This report was finalized on [id number] by  Hudson Walkre MD.     Assessment:    Acute shortness of breath  Acute pulmonary edema  Acute metabolic acidosis  Hepatic transaminase  derangement likely secondary to shock liver  History of sick sinus syndrome  Acute exacerbation of chronic systolic congestive heart failure  Acute renal failure superimposed upon chronic kidney disease stage IIIb  Acute kidney injury  History of pacemaker placement with replacement of generator 1/22  Atherosclerotic heart disease of native coronary arteries with angina pectoris  History of coronary artery bypass grafting in 1993  HFrEF  Chronic atrial fibrillation, persistent intermittent  Hypercoagulable state secondary to atrial fibrillation  Cerebrovascular disease with history of CVA  History of carotid occlusive disease  Vitamin D deficiency  Hypertension associated chronic kidney disease stage IIIb  Degenerative joint disease  Hypokalemia  Bilateral upper lobe pulmonary nodularities  Gastroesophageal reflux disease without esophagitis    Plan:  Parenteral diuresis this morning  Will continue gentle diuresis today and optimization of home medications//continue to observe//status remains quite labile//will investigate hepatic transaminase derangement      Antony Lawson MD  05/09/22  07:29 EDT

## 2022-05-10 ENCOUNTER — INPATIENT HOSPITAL (AMBULATORY)
Dept: URBAN - METROPOLITAN AREA HOSPITAL 84 | Facility: HOSPITAL | Age: 87
End: 2022-05-10

## 2022-05-10 DIAGNOSIS — Z95.0 PRESENCE OF CARDIAC PACEMAKER: ICD-10-CM

## 2022-05-10 DIAGNOSIS — R94.5 ABNORMAL RESULTS OF LIVER FUNCTION STUDIES: ICD-10-CM

## 2022-05-10 DIAGNOSIS — N18.30 CHRONIC KIDNEY DISEASE, STAGE 3 UNSPECIFIED: ICD-10-CM

## 2022-05-10 DIAGNOSIS — R10.13 EPIGASTRIC PAIN: ICD-10-CM

## 2022-05-10 DIAGNOSIS — I25.10 ATHEROSCLEROTIC HEART DISEASE OF NATIVE CORONARY ARTERY WITH: ICD-10-CM

## 2022-05-10 DIAGNOSIS — R06.02 SHORTNESS OF BREATH: ICD-10-CM

## 2022-05-10 PROCEDURE — 99232 SBSQ HOSP IP/OBS MODERATE 35: CPT | Mod: FS | Performed by: NURSE PRACTITIONER

## 2022-05-10 NOTE — PROGRESS NOTES
"NEPHROLOGY PROGRESS NOTE------[facility]    [facility]  [phone]  Tam Gonzalez MD      Patient Care Team:  Antony Lawson MD as PCP - Tam Monroe MD as Consulting Physician (Nephrology)  Minh Kendrick MD as Consulting Physician (Cardiology)  Chris Romero MD as Consulting Physician (Cardiology)      Provider:  Tam Gonzalez MD  Patient Name: Shon ZAYAS Kunal  :  1930    SUBJECTIVE:  F/U CKD  No chest pain, breathing better  On BIPAP      Medication:  aspirin, 81 mg, Oral, Daily  enoxaparin, 1 mg/kg, Subcutaneous, Q24H  finasteride, 5 mg, Oral, Daily  guaiFENesin, 600 mg, Oral, Q12H  midodrine, 2.5 mg, Oral, TID AC  sodium bicarbonate, 1,300 mg, Oral, TID  sodium chloride, 3 mL, Intravenous, Q12H      Pharmacy to Dose enoxaparin (LOVENOX),         OBJECTIVE    Vital Sign Min/Max for last 24 hours  Temp  Min: 96.5 °F (35.8 °C)  Max: 97.5 °F (36.4 °C)   BP  Min: 128/78  Max: 148/66   Pulse  Min: 61  Max: 74   Resp  Min: 18  Max: 19   SpO2  Min: 97 %  Max: 100 %   No data recorded   Weight  Min: 57.2 kg (126 lb)  Max: 57.2 kg (126 lb)     Flowsheet Rows    Flowsheet Row First Filed Value   Admission Height 172.7 cm (68\") Documented at 2022 1114   Admission Weight 58.2 kg (128 lb 4.9 oz) Documented at 2022 1114          No intake/output data recorded.  I/O last 3 completed shifts:  In: 720 [P.O.:720]  Out: 1150 [Urine:1150]    Physical Exam:  General Appearance: alert, appears stated age and cooperative  Head: normocephalic, without obvious abnormality and atraumatic  Eyes: conjunctivae and sclerae normal and no icterus  Neck: supple and +JVD  Lungs: clear to auscultation  Heart: regular rhythm & normal rate and normal S1, S2  Chest: Wall no abnormalities observed  Abdomen: normal bowel sounds and soft non-tender  Extremities: moves extremities well, no edema, no cyanosis and no redness  Skin: no " bleeding, bruising or rash, turgor normal, color normal and no lesions noted  Neurologic: Alert, and oriented. No focal deficits    Labs:    WBC WBC   Date Value Ref Range Status   05/10/2022 8.70 3.40 - 10.80 10*3/mm3 Final   05/09/2022 11.10 (H) 3.40 - 10.80 10*3/mm3 Final   05/08/2022 10.60 3.40 - 10.80 10*3/mm3 Final      HGB Hemoglobin   Date Value Ref Range Status   05/10/2022 15.0 13.0 - 17.7 g/dL Final   05/09/2022 14.0 13.0 - 17.7 g/dL Final   05/08/2022 13.8 13.0 - 17.7 g/dL Final      HCT Hematocrit   Date Value Ref Range Status   05/10/2022 45.5 37.5 - 51.0 % Final   05/09/2022 43.4 37.5 - 51.0 % Final   05/08/2022 41.6 37.5 - 51.0 % Final      Platlets No results found for: LABPLAT   MCV MCV   Date Value Ref Range Status   05/10/2022 99.9 (H) 79.0 - 97.0 fL Final   05/09/2022 99.2 (H) 79.0 - 97.0 fL Final   05/08/2022 100.1 (H) 79.0 - 97.0 fL Final          Sodium Sodium   Date Value Ref Range Status   05/10/2022 139 136 - 145 mmol/L Final   05/09/2022 135 (L) 136 - 145 mmol/L Final   05/08/2022 136 136 - 145 mmol/L Final      Potassium Potassium   Date Value Ref Range Status   05/10/2022 5.4 (H) 3.5 - 5.2 mmol/L Final   05/09/2022 5.1 3.5 - 5.2 mmol/L Final   05/08/2022 4.8 3.5 - 5.2 mmol/L Final      Chloride Chloride   Date Value Ref Range Status   05/10/2022 94 (L) 98 - 107 mmol/L Final   05/09/2022 98 98 - 107 mmol/L Final   05/08/2022 99 98 - 107 mmol/L Final      CO2 CO2   Date Value Ref Range Status   05/10/2022 25.0 22.0 - 29.0 mmol/L Final   05/09/2022 16.0 (L) 22.0 - 29.0 mmol/L Final   05/08/2022 20.0 (L) 22.0 - 29.0 mmol/L Final      BUN BUN   Date Value Ref Range Status   05/10/2022 100 (H) 8 - 23 mg/dL Final   05/09/2022 91 (H) 8 - 23 mg/dL Final   05/08/2022 91 (H) 8 - 23 mg/dL Final      Creatinine Creatinine   Date Value Ref Range Status   05/10/2022 2.53 (H) 0.76 - 1.27 mg/dL Final   05/09/2022 2.16 (H) 0.76 - 1.27 mg/dL Final   05/08/2022 2.36 (H) 0.76 - 1.27 mg/dL Final      Calcium  Calcium   Date Value Ref Range Status   05/10/2022 9.7 (H) 8.2 - 9.6 mg/dL Final   05/09/2022 9.5 8.2 - 9.6 mg/dL Final   05/08/2022 9.6 8.2 - 9.6 mg/dL Final      PO4 No components found for: PO4   Albumin Albumin   Date Value Ref Range Status   05/10/2022 3.80 3.50 - 5.20 g/dL Final   05/09/2022 3.60 3.50 - 5.20 g/dL Final      Magnesium Magnesium   Date Value Ref Range Status   05/09/2022 2.7 (H) 1.7 - 2.3 mg/dL Final   05/08/2022 2.6 (H) 1.7 - 2.3 mg/dL Final      Uric Acid No components found for: URIC ACID     Imaging Results (Last 72 Hours)     Procedure Component Value Units Date/Time    XR Chest 1 View [[id number]] Collected: 05/09/22 1510     Updated: 05/09/22 1515    Narrative:      DATE OF EXAM:  5/9/2022 3:02 PM     PROCEDURE:  XR CHEST 1 VW-     INDICATIONS:  Dyspnea; R06.00-Dyspnea, unspecified; I50.9-Heart failure, unspecified;  N17.9-Acute kidney failure, unspecified     COMPARISON:  CT chest high-resolution 05/07/2022, AP chest x-ray 05/06/2022.     TECHNIQUE:   Single radiographic AP view of the chest was obtained.     FINDINGS:  The patient's chin partially obscures evaluation of the upper chest.  Allowing for this limitation, no pneumothorax is seen. Cardiac  silhouette remains mildly enlarged. Changes are redemonstrated from  CABG. Pacemaker leads are stable. There are mildly increased right upper  lung airspace opacities. Blunting of the left lateral costophrenic angle  appears stable. Left lung appears grossly clear.        Impression:      1.Mildly increased right upper lung airspace opacities, which may be due  to atelectasis and/or pneumonia.  2.Small left pleural effusion.     Electronically Signed By-Hoa Bonilla MD On:5/9/2022 3:13 PM  This report was finalized on [id number] by  Hoa Bonilla MD.    US Liver [[id number]] Collected: 05/09/22 0920     Updated: 05/09/22 0924    Narrative:      DATE OF EXAM:  5/9/2022 8:54 AM     PROCEDURE:  US LIVER-     INDICATIONS:  Hepatic  transaminase derangement; R06.00-Dyspnea, unspecified;  I50.9-Heart failure, unspecified; N17.9-Acute kidney failure,  unspecified     COMPARISON:  No Comparisons Available     TECHNIQUE:   Grayscale and color Doppler ultrasound evaluation of the limited abdomen  was performed.     FINDINGS:  The liver size is normal, 12.8 cm in length. The liver maintains normal  homogeneous echotexture without focal or suspicious abnormality. There  is no ascites. The portal vein is patent with hepatopedal flow. Imaged  hepatic veins are patent. The common duct measures 2 mm. No intrahepatic  biliary dilation is seen. Incidental note is made of a 1.8 cm shadowing  gallstone, and gallbladder adenomyomatosis. The gallbladder wall does  not appear grossly thickened, and no pericholecystic fluid is seen.        Impression:         1. Normal sonographic appearance of the liver.  2. Uncomplicated cholelithiasis.  3. Gallbladder adenomyomatosis.     Electronically Signed By-Shahrzad Biggs MD On:5/9/2022 9:22 AM  This report was finalized on [id number] by  Shahrzad Biggs MD.    CT Chest Hi Resolution Diagnostic [[id number]] Collected: 05/07/22 1653     Updated: 05/07/22 1701    Narrative:         DATE OF EXAM:   5/7/2022 4:28 PM     PROCEDURE:   CT CHEST HI RESOLUTION DIAGNOSTIC-     INDICATIONS:   Interstitial lung disease; R06.00-Dyspnea, unspecified; I50.9-Heart  failure, unspecified; N17.9-Acute kidney failure, unspecified     COMPARISON:  03/24/2022.     TECHNIQUE:   Routine transaxial slices were obtained through the chest without the  administration of intravenous contrast. The study was performed  utilizing our high resolution CT protocol which includes supine  inspiratory and expiratory imaging as well as prone inspiratory imaging.  Reconstructed coronal and sagittal images were also obtained. Automated  exposure control and iterative reconstruction methods were used.     FINDINGS:   Interval resolution of previous identified  pulmonary edema. There is  nearly completely resolved small bilateral pleural effusions. There is  mild interlobular septal thickening, which may represent mild residual  interstitial edema or mild component of chronic disease. There is  scarred consolidation in the right lung base. Many of the small nodules  identified in the upper lobes of either resolved or decreased in size  compared with the prior study. There are persistent clusters of nodules  in both upper lungs, right greater than left with the largest occurring  in the right upper lobe on axial image 41 measuring up to 10 mm  diameter. There is decreased peribronchial thickening. No new lung  nodules.     There is severe aortic and aortic branch vessel atherosclerosis. There  are severe native coronary artery calcifications status post CABG. There  is moderate cardiomegaly. No pericardial effusion. There is no  pathologic mediastinal lymphadenopathy following size criteria. There  are dense aortic annular and valvular calcifications.     There is a left-sided pacemaker/ICD in place. No acute findings below  the diaphragm. There is a stable low-attenuation nodule at the left  adrenal gland, likely adenoma. There is S-shaped scoliosis of the  thoracolumbar spine. There are moderate lower thoracic and upper lumbar  degenerative changes.        Impression:      IMPRESSION :      1. Near complete resolution of previous identified interstitial  pulmonary edema and near complete resolution of now small bilateral  pleural effusions.  2. There may be a small component of chronic subpleural interstitial  disease, but the majority of the findings on the prior study are favored  to reflect pulmonary edema.  3. Improved nodular disease in the upper lungs, right greater than left  with significant residual nodularity. Recommend 6 month follow-up chest  CT. The course of disease on imaging would suggest an infectious or  inflammatory process.  4. Cardiomegaly and  coronary artery disease status post CABG and  pacemaker/ICD placement.  5. Scoliosis and spinal degenerative changes.     Electronically Signed By-Hudson Walker MD On:5/7/2022 4:59 PM  This report was finalized on [id number] by  Hudson Walker MD.          Results for orders placed during the hospital encounter of 05/03/22    XR Chest 1 View    Narrative  DATE OF EXAM:  5/9/2022 3:02 PM    PROCEDURE:  XR CHEST 1 VW-    INDICATIONS:  Dyspnea; R06.00-Dyspnea, unspecified; I50.9-Heart failure, unspecified;  N17.9-Acute kidney failure, unspecified    COMPARISON:  CT chest high-resolution 05/07/2022, AP chest x-ray 05/06/2022.    TECHNIQUE:  Single radiographic AP view of the chest was obtained.    FINDINGS:  The patient's chin partially obscures evaluation of the upper chest.  Allowing for this limitation, no pneumothorax is seen. Cardiac  silhouette remains mildly enlarged. Changes are redemonstrated from  CABG. Pacemaker leads are stable. There are mildly increased right upper  lung airspace opacities. Blunting of the left lateral costophrenic angle  appears stable. Left lung appears grossly clear.    Impression  1.Mildly increased right upper lung airspace opacities, which may be due  to atelectasis and/or pneumonia.  2.Small left pleural effusion.    Electronically Signed By-Hoa Bonilla MD On:5/9/2022 3:13 PM  This report was finalized on [id number] by  Hoa Bonilla MD.      XR Chest 1 View    Narrative  DATE OF EXAM:  5/6/2022 4:26 PM    PROCEDURE:  XR CHEST 1 VW-    INDICATIONS:  Severe SOA; R06.00-Dyspnea, unspecified; I50.9-Heart failure,  unspecified; N17.9-Acute kidney failure, unspecified    COMPARISON:  Chest radiograph 05/03/2022    TECHNIQUE:  Single radiographic view of the chest was obtained.    FINDINGS:  Patient is rotated this limits evaluation of the right lung. There is  cardiomegaly. Dual lead transvenous pacemaker device. Blunting the  costophrenic angles. Left lower lobe airspace  opacity. Background  chronic interstitial lung disease.    Impression  Since previous exam there is been development of probable small pleural  effusions. There is left lower lobe airspace opacity atelectasis versus  pneumonia.    Electronically Signed By-Marianna Flowers MD On:5/6/2022 4:32 PM  This report was finalized on [id number] by  Marianna Flowers MD.      XR Chest 1 View    Narrative  DATE OF EXAM:  5/3/2022 12:07 PM    PROCEDURE:  XR CHEST 1 VW-    INDICATIONS:  Shortness of breath. Cough for 4 months.    COMPARISON:  Chest radiographs 3/24/2022, 5/29/2021, and 5/27/2021. CT chest  3/24/2022    TECHNIQUE:  Single radiographic AP view of the chest was obtained.    FINDINGS:  Lordotic positioning. Overlying artifacts. Stable sternotomy wires,  postoperative changes from CABG, left chest wall cardiac pacer device.  Stable elevation of the right hemidiaphragm with improved aeration of  both lungs. Mild linear right infrahilar and left basilar segmental  atelectasis and/or scarring with mild interstitial thickening in the  lung bases. No focal lung consolidation. No pneumothorax. Stable  cardiomegaly, likely accentuated by technique. Calcified atherosclerotic  disease in the thoracic aorta. Chronic changes in both shoulders. No  acute osseous normality is identified.    Impression  Stable cardiomegaly with improved aeration of both lungs. Multifocal  bibasilar subsegmental atelectasis and/or scarring with mild  interstitial thickening in the lung bases that could represent mild  pulmonary vascular congestion/edema, chronic lung disease, or less  likely atypical pneumonia.    Electronically Signed By-Lance Mendes MD On:5/3/2022 12:32 PM  This report was finalized on [id number] by  Lance Mendes MD.      Results for orders placed during the hospital encounter of 03/24/22    Duplex Carotid Ultrasound CAR    Interpretation Summary  · Proximal right internal carotid artery moderate stenosis.  · Proximal left internal  carotid artery moderate stenosis.        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    · CHF- EF 30-40%. cautiously diurese.   · Hypertension  · Atrial fibrillation  · History coronary disease  · Diabetes  · Elevated LFTs--GI following. Suspect related to congestive hepatomegaly     CR up today, BP ok  Will hold IV lasix  To get repeat ECHO to assess RV filling pressures. May need inotropic support.  May need dialysis with worsening azotemia.  D/w son in room  Monitor renal function fluid status electrolytes      Tam Gonzalez MD  [facility]  [phone]  05/10/22  10:51 EDT

## 2022-05-10 NOTE — PLAN OF CARE
Goal Outcome Evaluation:  Plan of Care Reviewed With: patient        Progress: no change  Outcome Evaluation: Patient has slept on and off tonight, cpap worn PRN d/t SOB, repeat chest xray today IV lasix restarted, GI consulted for elevated liver enzymse will d/c with  pt reminded to turn throughout the night, no new complaints at this time

## 2022-05-10 NOTE — PROGRESS NOTES
LOS: 6 days   Patient Care Team:  Antony Lawson MD as PCP - General  Tam Gonzalez MD as Consulting Physician (Nephrology)  Minh Kendrick MD as Consulting Physician (Cardiology)  Chris Romero MD as Consulting Physician (Cardiology)      Subjective     Interval History:     Subjective: Patient is feeling well and abdominal pain has improved.  Complaining of more right flank pain.  We discussed his elevation in liver enzymes and that they continue to rise.  He rarely uses Tylenol at home.  Wife states the most Tylenol he has had since he has been here.      ROS:   No chest pain, shortness of breath, or cough.        Medication Review:     Current Facility-Administered Medications:   •  aspirin EC tablet 81 mg, 81 mg, Oral, Daily, Kristen Crowell MD, 81 mg at 05/10/22 0829  •  Enoxaparin Sodium (LOVENOX) syringe 60 mg, 1 mg/kg, Subcutaneous, Q24H, Emily Wu MD, 60 mg at 05/09/22 1632  •  finasteride (PROSCAR) tablet 5 mg, 5 mg, Oral, Daily, Kristen Crowell MD, 5 mg at 05/10/22 0829  •  furosemide (LASIX) injection 40 mg, 40 mg, Intravenous, TID, Tam Gonzalez MD, 40 mg at 05/10/22 0829  •  guaiFENesin (MUCINEX) 12 hr tablet 600 mg, 600 mg, Oral, Q12H, Leann Short APRN, 600 mg at 05/10/22 0829  •  ipratropium-albuterol (DUO-NEB) nebulizer solution 3 mL, 3 mL, Nebulization, Q4H PRN, Emily Wu MD, 3 mL at 05/06/22 1330  •  midodrine (PROAMATINE) tablet 2.5 mg, 2.5 mg, Oral, TID AC, Tam Gonzalez MD, 2.5 mg at 05/10/22 0829  •  nitroglycerin (NITROSTAT) SL tablet 0.4 mg, 0.4 mg, Sublingual, Q5 Min PRN, Kristen Crowell MD  •  ondansetron (ZOFRAN) injection 4 mg, 4 mg, Intravenous, Q6H PRN, Kristen Crowell MD  •  Pharmacy to Dose enoxaparin (LOVENOX), , Does not apply, Continuous PRN, Emily Wu MD  •  sodium bicarbonate tablet 1,300 mg, 1,300 mg, Oral, TID, Georgina Villa MD, 1,300 mg at 05/10/22 0829  •  [COMPLETED] Insert peripheral IV, , , Once **AND** sodium  chloride 0.9 % flush 10 mL, 10 mL, Intravenous, PRN, Kristen Crowell MD  •  sodium chloride 0.9 % flush 3 mL, 3 mL, Intravenous, Q12H, Kristen Crowell MD, 3 mL at 05/10/22 0828  •  sodium chloride 0.9 % flush 3-10 mL, 3-10 mL, Intravenous, PRN, Kristen Crowell MD      Objective     Vital Signs  Vitals:    05/09/22 2325 05/10/22 0309 05/10/22 0713 05/10/22 0816   BP: 136/79 148/66  136/72   BP Location: Right arm Right arm  Right arm   Patient Position: Lying Lying  Sitting   Pulse: 67 74  67   Resp: 19 18  18   Temp: 97.2 °F (36.2 °C) 97.5 °F (36.4 °C)  97.1 °F (36.2 °C)   TempSrc: Oral Oral  Axillary   SpO2: 98% 97% 98% 99%   Weight:  57.2 kg (126 lb)     Height:           Physical Exam:     General Appearance:    Awake and alert, in no acute distress   Head:    Normocephalic, without obvious abnormality   Eyes:          Conjunctivae normal, anicteric sclera   Throat:   No oral lesions, no thrush, oral mucosa moist   Neck:   No adenopathy, supple, no JVD   Lungs:     respirations regular, even and unlabored   Abdomen:     Soft, non-tender, no rebound or guarding, non-distended, no hepatosplenomegaly   Rectal:     Deferred   Extremities:   No edema, no cyanosis   Skin:   No bruising or rash, no jaundice        Results Review:    CBC    Results from last 7 days   Lab Units 05/10/22  0611 05/09/22 0522 05/08/22  0027 05/07/22  0234 05/06/22  0133 05/05/22  0325 05/04/22  0538   WBC 10*3/mm3 8.70 11.10* 10.60 8.30 9.60 7.40 7.70   HEMOGLOBIN g/dL 15.0 14.0 13.8 13.2 13.9 13.5 13.1   PLATELETS 10*3/mm3 193 198 208 208 220 208 204     CMP   Results from last 7 days   Lab Units 05/10/22  0611 05/09/22  0522 05/08/22  0026 05/07/22  0428 05/07/22  0234 05/06/22  0421 05/05/22  0325 05/04/22  0538   SODIUM mmol/L 139 135* 136 143  --  139 137 139   POTASSIUM mmol/L 5.4* 5.1 4.8 4.2  --  4.9 4.0 3.5   CHLORIDE mmol/L 94* 98 99 103  --  100 96* 100   CO2 mmol/L 25.0 16.0* 20.0* 25.0  --  21.0* 24.0 24.0   BUN mg/dL 100* 91* 91* 75*   --  78* 64* 56*   CREATININE mg/dL 2.53* 2.16* 2.36* 1.85*  --  1.94* 1.74* 1.54*   GLUCOSE mg/dL 116* 152* 156* 99  --  125* 122* 118*   ALBUMIN g/dL 3.80 3.60  --  3.20*  --   --   --   --    BILIRUBIN mg/dL 0.8 0.8  --  0.7  --   --   --   --    ALK PHOS U/L 321* 230*  --  120*  --   --   --   --    AST (SGOT) U/L 459* 353*  --  269*  --   --   --   --    ALT (SGPT) U/L 968* 756*  --  386*  --   --   --   --    MAGNESIUM mg/dL  --  2.7* 2.6*  --  2.5*  --   --   --    PHOSPHORUS mg/dL  --  5.7* 5.0*  --  5.0*  --   --   --      Cr Clearance Estimated Creatinine Clearance: 15.4 mL/min (A) (by C-G formula based on SCr of 2.53 mg/dL (H)).  Coag     HbA1C No results found for: HGBA1C  Blood Glucose   Glucose   Date/Time Value Ref Range Status   05/10/2022 0718 104 70 - 105 mg/dL Final     Comment:     Serial Number: [id number]:  [id number]   05/09/2022 1915 152 (H) 70 - 105 mg/dL Final     Comment:     Serial Number: [id number]:  [id number]   05/09/2022 1647 153 (H) 70 - 105 mg/dL Final     Comment:     Serial Number: [id number]:  [id number]   05/09/2022 1148 166 (H) 70 - 105 mg/dL Final     Comment:     Serial Number: [id number]:  [id number]   05/09/2022 0722 103 70 - 105 mg/dL Final     Comment:     Serial Number: [id number]:  [id number]   05/08/2022 2108 169 (H) 70 - 105 mg/dL Final     Comment:     Serial Number: [id number]:  [id number]   05/08/2022 1618 171 (H) 70 - 105 mg/dL Final     Comment:     Serial Number: [id number]:  [id number]   05/08/2022 1102 165 (H) 70 - 105 mg/dL Final     Comment:     Serial Number: [id number]:  [id number]     Infection     UA      Radiology(recent) US Liver    Result Date: 5/9/2022   1. Normal sonographic appearance of the liver. 2. Uncomplicated cholelithiasis. 3. Gallbladder adenomyomatosis.  Electronically Signed By-Shahrzad Biggs MD On:5/9/2022 9:22 AM This report was finalized on [id number] by  Shahrzad Biggs MD.    XR  Chest 1 View    Result Date: 5/9/2022  1.Mildly increased right upper lung airspace opacities, which may be due to atelectasis and/or pneumonia. 2.Small left pleural effusion.  Electronically Signed By-Hoa Bonilla MD On:5/9/2022 3:13 PM This report was finalized on [id number] by  Hoa Bonilla MD.       ASSESSMENT  -Elevated LFTs (, , Alk phos 321) - trending up   -Epigastric pain - improved  -Shortness of air -likely secondary to CHF, improved  -CAD  -Pacemaker  -CHF  -CKD stage III (Cr 2.53)     PLAN:  89-year-old male presented 5/3 with shortness of air.    GI has been consulted for elevated LFTs which have been normal in the past.  Ultrasound was normal other than gallbladder thickening and uncomplicated cholelithiasis.  Hepatitis panel negative, MED, ASMA, mitochondrial antibodies, GGT, LDH are pending. Tick panel sent. Discussed with pharmacist and cannot identify any new start medications that could have triggered this.  No hypotension noted.  He is on allopurinol, but this is not a new medication.  Start Acetadote.  Continue to monitor labs and follow-up on liver serologies.  He is overall feeling well.    Continue supportive care.    I discussed the patients findings and my recommendations with the patient.     We appreciate the referral    Electronically signed by CARMEN Ken, 05/10/22, 10:34 AM EDT.

## 2022-05-10 NOTE — PROGRESS NOTES
LOS: 6 days   Patient Care Team:  Antony Lawson MD as PCP - General  Tam Gonzalez MD as Consulting Physician (Nephrology)  Minh Kendrick MD as Consulting Physician (Cardiology)  Chris Romero MD as Consulting Physician (Cardiology)    Subjective:  Better overall    Objective:   Less short of breath      Review of Systems:   Review of Systems   Constitutional: Positive for activity change.   Respiratory: Positive for shortness of breath.    Cardiovascular: Negative.    Genitourinary: Negative.    Neurological: Positive for weakness.           Vital Signs  Temp:  [96.5 °F (35.8 °C)-97.5 °F (36.4 °C)] 97.5 °F (36.4 °C)  Heart Rate:  [61-74] 74  Resp:  [18-19] 18  BP: (128-148)/(66-79) 148/66    Physical Exam:  Physical Exam  Vitals and nursing note reviewed.   Cardiovascular:      Rate and Rhythm: Normal rate.      Heart sounds: Normal heart sounds.   Pulmonary:      Breath sounds: Normal breath sounds.   Skin:     General: Skin is warm.   Neurological:      General: No focal deficit present.      Mental Status: He is oriented to person, place, and time.          Radiology:  US Liver    Result Date: 5/9/2022   1. Normal sonographic appearance of the liver. 2. Uncomplicated cholelithiasis. 3. Gallbladder adenomyomatosis.  Electronically Signed By-Shahrzad Biggs MD On:5/9/2022 9:22 AM This report was finalized on [id number] by  Shahrzad Biggs MD.    XR Chest 1 View    Result Date: 5/9/2022  1.Mildly increased right upper lung airspace opacities, which may be due to atelectasis and/or pneumonia. 2.Small left pleural effusion.  Electronically Signed By-Hoa Bonilla MD On:5/9/2022 3:13 PM This report was finalized on [id number] by  Hoa Bonilla MD.    XR Chest 1 View    Result Date: 5/6/2022  Since previous exam there is been development of probable small pleural effusions. There is left lower lobe airspace opacity atelectasis versus pneumonia.  Electronically Signed By-Marianna Flowers  MD On:5/6/2022 4:32 PM This report was finalized on [id number] by  Marianna Flowers MD.    XR Chest 1 View    Result Date: 5/3/2022  Stable cardiomegaly with improved aeration of both lungs. Multifocal bibasilar subsegmental atelectasis and/or scarring with mild interstitial thickening in the lung bases that could represent mild pulmonary vascular congestion/edema, chronic lung disease, or less likely atypical pneumonia.  Electronically Signed By-Lance Mendes MD On:5/3/2022 12:32 PM This report was finalized on [id number] by  Lance Mendes MD.    CT Chest Hi Resolution Diagnostic    Result Date: 5/7/2022  IMPRESSION :  1. Near complete resolution of previous identified interstitial pulmonary edema and near complete resolution of now small bilateral pleural effusions. 2. There may be a small component of chronic subpleural interstitial disease, but the majority of the findings on the prior study are favored to reflect pulmonary edema. 3. Improved nodular disease in the upper lungs, right greater than left with significant residual nodularity. Recommend 6 month follow-up chest CT. The course of disease on imaging would suggest an infectious or inflammatory process. 4. Cardiomegaly and coronary artery disease status post CABG and pacemaker/ICD placement. 5. Scoliosis and spinal degenerative changes.  Electronically Signed By-Hudson Walker MD On:5/7/2022 4:59 PM This report was finalized on [id number] by  Hudson Walker MD.         Results Review:     I reviewed the patient's new clinical results.  I reviewed the patient's new imaging results and agree with the interpretation.    Medication Review:   Scheduled Meds:allopurinol, 100 mg, Oral, Daily  aspirin, 81 mg, Oral, Daily  doxycycline, 100 mg, Oral, Q12H  enoxaparin, 1 mg/kg, Subcutaneous, Q24H  finasteride, 5 mg, Oral, Daily  furosemide, 40 mg, Intravenous, TID  guaiFENesin, 600 mg, Oral, Q12H  melatonin, 5 mg, Oral, Nightly  metoprolol succinate XL, 12.5  mg, Oral, Q24H  midodrine, 2.5 mg, Oral, TID AC  sodium bicarbonate, 1,300 mg, Oral, TID  sodium chloride, 3 mL, Intravenous, Q12H      Continuous Infusions:Pharmacy to Dose enoxaparin (LOVENOX),       PRN Meds:.•  acetaminophen  •  ipratropium-albuterol  •  nitroglycerin  •  ondansetron  •  Pharmacy to Dose enoxaparin (LOVENOX)  •  [COMPLETED] Insert peripheral IV **AND** sodium chloride  •  sodium chloride    Labs:    CBC    Results from last 7 days   Lab Units 05/10/22  0611 05/09/22  0522 05/08/22  0027 05/07/22  0234 05/06/22  0133 05/05/22  0325 05/04/22  0538   WBC 10*3/mm3 8.70 11.10* 10.60 8.30 9.60 7.40 7.70   HEMOGLOBIN g/dL 15.0 14.0 13.8 13.2 13.9 13.5 13.1   PLATELETS 10*3/mm3 193 198 208 208 220 208 204     BMP   Results from last 7 days   Lab Units 05/10/22  0611 05/09/22  0522 05/08/22  0026 05/07/22  0428 05/07/22  0234 05/06/22  0421 05/05/22  0325 05/04/22  0538   SODIUM mmol/L 139 135* 136 143  --  139 137 139   POTASSIUM mmol/L 5.4* 5.1 4.8 4.2  --  4.9 4.0 3.5   CHLORIDE mmol/L 94* 98 99 103  --  100 96* 100   CO2 mmol/L 25.0 16.0* 20.0* 25.0  --  21.0* 24.0 24.0   BUN mg/dL 100* 91* 91* 75*  --  78* 64* 56*   CREATININE mg/dL 2.53* 2.16* 2.36* 1.85*  --  1.94* 1.74* 1.54*   GLUCOSE mg/dL 116* 152* 156* 99  --  125* 122* 118*   MAGNESIUM mg/dL  --  2.7* 2.6*  --  2.5*  --   --   --    PHOSPHORUS mg/dL  --  5.7* 5.0*  --  5.0*  --   --   --      Cr Clearance Estimated Creatinine Clearance: 15.4 mL/min (A) (by C-G formula based on SCr of 2.53 mg/dL (H)).  Coag     HbA1C No results found for: HGBA1C  Blood Glucose   Glucose   Date/Time Value Ref Range Status   05/10/2022 0718 104 70 - 105 mg/dL Final     Comment:     Serial Number: [id number]:  [id number]   05/09/2022 1915 152 (H) 70 - 105 mg/dL Final     Comment:     Serial Number: [id number]:  [id number]   05/09/2022 1647 153 (H) 70 - 105 mg/dL Final     Comment:     Serial Number: [id number]:  [id number]   05/09/2022 1148 166  (H) 70 - 105 mg/dL Final     Comment:     Serial Number: [id number]:  [id number]   05/09/2022 0722 103 70 - 105 mg/dL Final     Comment:     Serial Number: [id number]:  [id number]   05/08/2022 2108 169 (H) 70 - 105 mg/dL Final     Comment:     Serial Number: [id number]:  [id number]   05/08/2022 1618 171 (H) 70 - 105 mg/dL Final     Comment:     Serial Number: [id number]:  [id number]   05/08/2022 1102 165 (H) 70 - 105 mg/dL Final     Comment:     Serial Number: [id number]:  [id number]     Infection     CMP   Results from last 7 days   Lab Units 05/10/22  0611 05/09/22  0522 05/08/22  0026 05/07/22  0428 05/06/22  0421 05/05/22  0325 05/04/22  0538   SODIUM mmol/L 139 135* 136 143 139 137 139   POTASSIUM mmol/L 5.4* 5.1 4.8 4.2 4.9 4.0 3.5   CHLORIDE mmol/L 94* 98 99 103 100 96* 100   CO2 mmol/L 25.0 16.0* 20.0* 25.0 21.0* 24.0 24.0   BUN mg/dL 100* 91* 91* 75* 78* 64* 56*   CREATININE mg/dL 2.53* 2.16* 2.36* 1.85* 1.94* 1.74* 1.54*   GLUCOSE mg/dL 116* 152* 156* 99 125* 122* 118*   ALBUMIN g/dL 3.80 3.60  --  3.20*  --   --   --    BILIRUBIN mg/dL 0.8 0.8  --  0.7  --   --   --    ALK PHOS U/L 321* 230*  --  120*  --   --   --    AST (SGOT) U/L 459* 353*  --  269*  --   --   --    ALT (SGPT) U/L 968* 756*  --  386*  --   --   --      UA      Radiology(recent) US Liver    Result Date: 5/9/2022   1. Normal sonographic appearance of the liver. 2. Uncomplicated cholelithiasis. 3. Gallbladder adenomyomatosis.  Electronically Signed By-Shahrzad Biggs MD On:5/9/2022 9:22 AM This report was finalized on [id number] by  Shahrzad Biggs MD.    XR Chest 1 View    Result Date: 5/9/2022  1.Mildly increased right upper lung airspace opacities, which may be due to atelectasis and/or pneumonia. 2.Small left pleural effusion.  Electronically Signed By-Hoa Bonilla MD On:5/9/2022 3:13 PM This report was finalized on [id number] by  Hoa Bonilla MD.     Assessment:       Acute shortness of  breath  Acute pulmonary edema  Acute metabolic acidosis  Acute hyperkalemia  Hepatic transaminase derangement likely secondary to shock liver  History of sick sinus syndrome  Acute exacerbation of chronic systolic congestive heart failure  Acute renal failure superimposed upon chronic kidney disease stage IIIb  Acute kidney injury  Right upper lobe pulmonary nodule  History of pacemaker placement with replacement of generator 1/22  Atherosclerotic heart disease of native coronary arteries with angina pectoris  History of coronary artery bypass grafting in 1993  HFrEF 35%  Chronic atrial fibrillation, persistent intermittent  Hypercoagulable state secondary to atrial fibrillation  Cerebrovascular disease with history of CVA  History of carotid occlusive disease  Vitamin D deficiency  Hypertension associated chronic kidney disease stage IIIb  Degenerative joint disease  Hypokalemia  Bilateral upper lobe pulmonary nodularities  Gastroesophageal reflux disease without esophagitis    Plan:    Supportive care//PT//discharge planning//electrolyte management        Antony Lawson MD  05/10/22  08:01 EDT

## 2022-05-10 NOTE — PROGRESS NOTES
6 minute walk not completed due to patient not being discharged today but moving units to [facility] or PCU.

## 2022-05-10 NOTE — PROGRESS NOTES
Daily Progress Note        Dyspnea, unspecified type    Moderate malnutrition (HCC)           Assessment:     dyspnea improved with diuretics  As well as interstitial markings with diuretics unlikely interstitial lung disease  Presentation suggestive of Pulmonary edema  CHF EF 35%  Coronary artery disease  A. fib  CKD           Recommendations:      Diuresis for cardiomyopathy EF 35%    CPAP at bedtime/as needed  Completed prednisone  Mucinex  Sodium bicarb 1300 mg 3 times a day  Midodrine  DuoNebs as needed    Repeat CT scan of the chest after 3 months to follow-up on right upper lobe nodule     5/7/2022        LOS: 6 days     Subjective         Objective     Vital signs for last 24 hours:  Vitals:    05/10/22 1245 05/10/22 1300 05/10/22 1411 05/10/22 1756   BP: 120/50 117/61 147/58 141/60   BP Location: Left arm  Right arm Right arm   Patient Position: Sitting  Sitting Lying   Pulse: 60 65 70 84   Resp: 18 22 17   Temp: 97.3 °F (36.3 °C)      TempSrc: Oral      SpO2: 100% 99% 98% 100%   Weight:       Height:           Intake/Output last 3 shifts:  I/O last 3 completed shifts:  In: 720 [P.O.:720]  Out: 1150 [Urine:1150]  Intake/Output this shift:  I/O this shift:  In: 10 [P.O.:10]  Out: -       Radiology  Imaging Results (Last 24 Hours)     ** No results found for the last 24 hours. **          Labs:  Results from last 7 days   Lab Units 05/10/22  0611   WBC 10*3/mm3 8.70   HEMOGLOBIN g/dL 15.0   HEMATOCRIT % 45.5   PLATELETS 10*3/mm3 193     Results from last 7 days   Lab Units 05/10/22  1216 05/10/22  0611   SODIUM mmol/L  --  139   POTASSIUM mmol/L 4.6 5.4*   CHLORIDE mmol/L  --  94*   CO2 mmol/L  --  25.0   BUN mg/dL  --  100*   CREATININE mg/dL  --  2.53*   CALCIUM mg/dL  --  9.7*   BILIRUBIN mg/dL  --  0.8   ALK PHOS U/L  --  321*   ALT (SGPT) U/L  --  968*   AST (SGOT) U/L  --  459*   GLUCOSE mg/dL  --  116*     Results from last 7 days   Lab Units 05/06/22  1412   PH, ARTERIAL pH units 7.538*   PO2 ART mm  Hg 328.6*   PCO2, ARTERIAL mm Hg 19.3*   HCO3 ART mmol/L 16.4*     Results from last 7 days   Lab Units 05/10/22  0611 05/09/22  0522 05/07/22  0428   ALBUMIN g/dL 3.80 3.60 3.20*     Results from last 7 days   Lab Units 05/08/22  0026   CK TOTAL U/L 95         Results from last 7 days   Lab Units 05/09/22  0522   MAGNESIUM mg/dL 2.7*                   Meds:   SCHEDULE  aspirin, 81 mg, Oral, Daily  enoxaparin, 1 mg/kg, Subcutaneous, Q24H  finasteride, 5 mg, Oral, Daily  guaiFENesin, 600 mg, Oral, Q12H  midodrine, 2.5 mg, Oral, TID AC  sodium bicarbonate, 1,300 mg, Oral, TID  sodium chloride, 3 mL, Intravenous, Q12H      Infusions  DOBUTamine, 2.5 mcg/kg/min, Last Rate: 2.5 mcg/kg/min (05/10/22 1809)  Pharmacy to Dose enoxaparin (LOVENOX),       PRNs  ipratropium-albuterol  •  nitroglycerin  •  ondansetron  •  oxyCODONE  •  Pharmacy to Dose enoxaparin (LOVENOX)  •  [COMPLETED] Insert peripheral IV **AND** sodium chloride  •  sodium chloride    Physical Exam:  Physical Exam  Cardiovascular:      Heart sounds: Murmur heard.   Pulmonary:      Effort: Pulmonary effort is normal.      Breath sounds: Examination of the right-lower field reveals decreased breath sounds. Examination of the left-lower field reveals decreased breath sounds. Decreased breath sounds and rales present.         ROS  Review of Systems   Respiratory: Positive for cough and shortness of breath.              I have reviewed current clinicals.     Electronically signed by CARMEN Narayan, 05/09/22, 12:12 PM EDT.     The NP scribed the note for me, I have examined the patient and reviewed and verified the above findings and and I formulated the assessment and plan as documented

## 2022-05-10 NOTE — PROGRESS NOTES
[facility]        LOS:  LOS: 6 days   Patient Name: Shon ZAYAS Kunal  Age/Sex: 89 y.o. male  : 1930  MRN: [id number]    Day of Service: 05/10/22   Length of Stay: 6  Encounter Provider: Chris Romero MD  Place of Service: [facility] CARDIOLOGY  Patient Care Team:  Antony Lawson MD as PCP - General  CarlosTam rod MD as Consulting Physician (Nephrology)  Minh Kendrick MD as Consulting Physician (Cardiology)  Chris Romero MD as Consulting Physician (Cardiology)    Subjective:     Chief Complaint: shortness of breath, MERA    Subjective: Shortness of breath, nephrology note noted change back to IV diuretics  Hold any further beta-blockers at this point with decompensated heart failure  Elevated LFTs, could be congestive, trending upwards as his creatinine with diuretics  May warrant dopamine or dobutamine inotropic assistance for added diuresis    Repeat limited echo today, estimate filling pressures, good RV size and function, IVC  Depending on findings we will consider inotropes    Current Medications:   Scheduled Meds:aspirin, 81 mg, Oral, Daily  enoxaparin, 1 mg/kg, Subcutaneous, Q24H  finasteride, 5 mg, Oral, Daily  furosemide, 40 mg, Intravenous, TID  guaiFENesin, 600 mg, Oral, Q12H  midodrine, 2.5 mg, Oral, TID AC  sodium bicarbonate, 1,300 mg, Oral, TID  sodium chloride, 3 mL, Intravenous, Q12H      Continuous Infusions:Pharmacy to Dose enoxaparin (LOVENOX),         Allergies:  Allergies   Allergen Reactions   • Penicillin G Rash   • Vancomycin Rash       Review of Systems   Constitutional: Positive for malaise/fatigue. Negative for chills and diaphoresis.   Cardiovascular: Negative for chest pain, dyspnea on exertion, irregular heartbeat, leg swelling, near-syncope, orthopnea, palpitations, paroxysmal nocturnal dyspnea and syncope.   Respiratory: Positive for shortness of breath. Negative for cough, sleep disturbances due to  breathing and sputum production.    Gastrointestinal: Negative for change in bowel habit.   Genitourinary: Negative for urgency.   Neurological: Negative for dizziness and headaches.   Psychiatric/Behavioral: Negative for altered mental status.         Objective:     Temp:  [96.5 °F (35.8 °C)-97.5 °F (36.4 °C)] 97.1 °F (36.2 °C)  Heart Rate:  [61-74] 67  Resp:  [18-19] 18  BP: (128-148)/(66-79) 136/72     Intake/Output Summary (Last 24 hours) at 5/10/2022 0835  Last data filed at 5/10/2022 0500  Gross per 24 hour   Intake 720 ml   Output 450 ml   Net 270 ml     Body mass index is 19.16 kg/m².      05/07/22  0452 05/09/22  0429 05/10/22  0309   Weight: 59.3 kg (130 lb 11.4 oz) 56 kg (123 lb 7.3 oz) 57.2 kg (126 lb)         General Appearance:    Alert, cooperative, in no acute distress                                Head: Atraumatic, normocephalic, PERRLA               Neck:   supple, no JVD   Lungs:     2L NC, dim bases    Heart:    Regular rhythm and normal rate, normal S1 and S2, 1/6 murmur   Abdomen:     Normal bowel sounds, no masses, no organomegaly, soft  non-tender, non-distended, no guarding, no rebound  tenderness   Extremities:   Moves all extremities well, no edema, no cyanosis, no  redness   Pulses:   Pulses palpable and equal bilaterally   Skin:   No bleeding, bruising or rash   Neurologic:   Awake, alert, oriented x3   Agree with exam as discussed with nurse practitioner after my face-to-face encounter with the patient      Lab Review:   Results from last 7 days   Lab Units 05/10/22  0611 05/09/22  0522   SODIUM mmol/L 139 135*   POTASSIUM mmol/L 5.4* 5.1   CHLORIDE mmol/L 94* 98   CO2 mmol/L 25.0 16.0*   BUN mg/dL 100* 91*   CREATININE mg/dL 2.53* 2.16*   GLUCOSE mg/dL 116* 152*   CALCIUM mg/dL 9.7* 9.5   AST (SGOT) U/L 459* 353*   ALT (SGPT) U/L 968* 756*     Results from last 7 days   Lab Units 05/08/22  0026 05/03/22  1156   CK TOTAL U/L 95  --    TROPONIN T ng/mL  --  0.027     Results from last 7  days   Lab Units 05/10/22  0611 05/09/22  0522   WBC 10*3/mm3 8.70 11.10*   HEMOGLOBIN g/dL 15.0 14.0   HEMATOCRIT % 45.5 43.4   PLATELETS 10*3/mm3 193 198         Results from last 7 days   Lab Units 05/09/22  0522 05/08/22  0026   MAGNESIUM mg/dL 2.7* 2.6*           Invalid input(s): LDLCALC  Results from last 7 days   Lab Units 05/05/22  0325 05/03/22  1156   PROBNP pg/mL 45,156.0* 42,275.0*           Recent Radiology:  Imaging Results (Most Recent)     Procedure Component Value Units Date/Time    XR Chest 1 View [[id number]] Collected: 05/09/22 1510     Updated: 05/09/22 1515    Narrative:      DATE OF EXAM:  5/9/2022 3:02 PM     PROCEDURE:  XR CHEST 1 VW-     INDICATIONS:  Dyspnea; R06.00-Dyspnea, unspecified; I50.9-Heart failure, unspecified;  N17.9-Acute kidney failure, unspecified     COMPARISON:  CT chest high-resolution 05/07/2022, AP chest x-ray 05/06/2022.     TECHNIQUE:   Single radiographic AP view of the chest was obtained.     FINDINGS:  The patient's chin partially obscures evaluation of the upper chest.  Allowing for this limitation, no pneumothorax is seen. Cardiac  silhouette remains mildly enlarged. Changes are redemonstrated from  CABG. Pacemaker leads are stable. There are mildly increased right upper  lung airspace opacities. Blunting of the left lateral costophrenic angle  appears stable. Left lung appears grossly clear.        Impression:      1.Mildly increased right upper lung airspace opacities, which may be due  to atelectasis and/or pneumonia.  2.Small left pleural effusion.     Electronically Signed By-Hoa Bonilla MD On:5/9/2022 3:13 PM  This report was finalized on [id number] by  Hoa Bonilla MD.    US Liver [[id number]] Collected: 05/09/22 0920     Updated: 05/09/22 0924    Narrative:      DATE OF EXAM:  5/9/2022 8:54 AM     PROCEDURE:  US LIVER-     INDICATIONS:  Hepatic transaminase derangement; R06.00-Dyspnea, unspecified;  I50.9-Heart failure, unspecified; N17.9-Acute  kidney failure,  unspecified     COMPARISON:  No Comparisons Available     TECHNIQUE:   Grayscale and color Doppler ultrasound evaluation of the limited abdomen  was performed.     FINDINGS:  The liver size is normal, 12.8 cm in length. The liver maintains normal  homogeneous echotexture without focal or suspicious abnormality. There  is no ascites. The portal vein is patent with hepatopedal flow. Imaged  hepatic veins are patent. The common duct measures 2 mm. No intrahepatic  biliary dilation is seen. Incidental note is made of a 1.8 cm shadowing  gallstone, and gallbladder adenomyomatosis. The gallbladder wall does  not appear grossly thickened, and no pericholecystic fluid is seen.        Impression:         1. Normal sonographic appearance of the liver.  2. Uncomplicated cholelithiasis.  3. Gallbladder adenomyomatosis.     Electronically Signed By-Shahrzad Biggs MD On:5/9/2022 9:22 AM  This report was finalized on [id number] by  Shahrzad Biggs MD.    CT Chest Hi Resolution Diagnostic [[id number]] Collected: 05/07/22 1653     Updated: 05/07/22 1701    Narrative:         DATE OF EXAM:   5/7/2022 4:28 PM     PROCEDURE:   CT CHEST HI RESOLUTION DIAGNOSTIC-     INDICATIONS:   Interstitial lung disease; R06.00-Dyspnea, unspecified; I50.9-Heart  failure, unspecified; N17.9-Acute kidney failure, unspecified     COMPARISON:  03/24/2022.     TECHNIQUE:   Routine transaxial slices were obtained through the chest without the  administration of intravenous contrast. The study was performed  utilizing our high resolution CT protocol which includes supine  inspiratory and expiratory imaging as well as prone inspiratory imaging.  Reconstructed coronal and sagittal images were also obtained. Automated  exposure control and iterative reconstruction methods were used.     FINDINGS:   Interval resolution of previous identified pulmonary edema. There is  nearly completely resolved small bilateral pleural effusions. There  is  mild interlobular septal thickening, which may represent mild residual  interstitial edema or mild component of chronic disease. There is  scarred consolidation in the right lung base. Many of the small nodules  identified in the upper lobes of either resolved or decreased in size  compared with the prior study. There are persistent clusters of nodules  in both upper lungs, right greater than left with the largest occurring  in the right upper lobe on axial image 41 measuring up to 10 mm  diameter. There is decreased peribronchial thickening. No new lung  nodules.     There is severe aortic and aortic branch vessel atherosclerosis. There  are severe native coronary artery calcifications status post CABG. There  is moderate cardiomegaly. No pericardial effusion. There is no  pathologic mediastinal lymphadenopathy following size criteria. There  are dense aortic annular and valvular calcifications.     There is a left-sided pacemaker/ICD in place. No acute findings below  the diaphragm. There is a stable low-attenuation nodule at the left  adrenal gland, likely adenoma. There is S-shaped scoliosis of the  thoracolumbar spine. There are moderate lower thoracic and upper lumbar  degenerative changes.        Impression:      IMPRESSION :      1. Near complete resolution of previous identified interstitial  pulmonary edema and near complete resolution of now small bilateral  pleural effusions.  2. There may be a small component of chronic subpleural interstitial  disease, but the majority of the findings on the prior study are favored  to reflect pulmonary edema.  3. Improved nodular disease in the upper lungs, right greater than left  with significant residual nodularity. Recommend 6 month follow-up chest  CT. The course of disease on imaging would suggest an infectious or  inflammatory process.  4. Cardiomegaly and coronary artery disease status post CABG and  pacemaker/ICD placement.  5. Scoliosis and spinal  degenerative changes.     Electronically Signed By-Hudson Walker MD On:5/7/2022 4:59 PM  This report was finalized on [id number] by  Hudson Walker MD.    XR Chest 1 View [[id number]] Collected: 05/06/22 1631     Updated: 05/06/22 1634    Narrative:      DATE OF EXAM:  5/6/2022 4:26 PM     PROCEDURE:  XR CHEST 1 VW-     INDICATIONS:  Severe SOA; R06.00-Dyspnea, unspecified; I50.9-Heart failure,  unspecified; N17.9-Acute kidney failure, unspecified     COMPARISON:  Chest radiograph 05/03/2022     TECHNIQUE:   Single radiographic view of the chest was obtained.     FINDINGS:  Patient is rotated this limits evaluation of the right lung. There is  cardiomegaly. Dual lead transvenous pacemaker device. Blunting the  costophrenic angles. Left lower lobe airspace opacity. Background  chronic interstitial lung disease.       Impression:      Since previous exam there is been development of probable small pleural  effusions. There is left lower lobe airspace opacity atelectasis versus  pneumonia.     Electronically Signed By-Marianna Flowers MD On:5/6/2022 4:32 PM  This report was finalized on [id number] by  Marianna Flowers MD.    XR Chest 1 View [[id number]] Collected: 05/03/22 1229     Updated: 05/03/22 1234    Narrative:      DATE OF EXAM:  5/3/2022 12:07 PM     PROCEDURE:  XR CHEST 1 VW-     INDICATIONS:  Shortness of breath. Cough for 4 months.     COMPARISON:  Chest radiographs 3/24/2022, 5/29/2021, and 5/27/2021. CT chest  3/24/2022     TECHNIQUE:   Single radiographic AP view of the chest was obtained.     FINDINGS:  Lordotic positioning. Overlying artifacts. Stable sternotomy wires,  postoperative changes from CABG, left chest wall cardiac pacer device.  Stable elevation of the right hemidiaphragm with improved aeration of  both lungs. Mild linear right infrahilar and left basilar segmental  atelectasis and/or scarring with mild interstitial thickening in the  lung bases. No focal lung consolidation. No pneumothorax.  Stable  cardiomegaly, likely accentuated by technique. Calcified atherosclerotic  disease in the thoracic aorta. Chronic changes in both shoulders. No  acute osseous normality is identified.        Impression:      Stable cardiomegaly with improved aeration of both lungs. Multifocal  bibasilar subsegmental atelectasis and/or scarring with mild  interstitial thickening in the lung bases that could represent mild  pulmonary vascular congestion/edema, chronic lung disease, or less  likely atypical pneumonia.     Electronically Signed By-Lance Mendes MD On:5/3/2022 12:32 PM  This report was finalized on [id number] by  Lance Mendes MD.          ECHOCARDIOGRAM:    Results for orders placed during the hospital encounter of 03/24/22    Adult Transthoracic Echo Complete W/ Cont if Necessary Per Protocol    Interpretation Summary  · Left ventricular ejection fraction appears to be 36 - 40%.  · The right ventricular cavity is severely dilated.  · The right atrial cavity is dilated.  · There is calcification of the aortic valve.  · Severe mitral valve regurgitation is present.  · Moderate tricuspid valve regurgitation is present.  · No pericardial effusion noted        I reviewed the patient's new clinical results.    EKG:      Assessment:       Dyspnea, unspecified type    Moderate malnutrition (HCC)    1. Shortness of breath / Acute on chronic systolic and diastolic CHF  - LVEF 35%, Dual-chamber pacemaker well-functioning-No ICD upgrade at family discretion previously  -Lasix gently, nephrology on board appreciate recommendations  -Off Aldactone at this time  -Blood pressure will likely not tolerate ARB as well as kidney function    2. HARMAN / CKD  - creatinine varies, 2.1 today  - on oral lasix    3. HTN    4. Paroxysmal Atrial fibrillation  - not on a/c, CHADS VAsc at least 3, on ASA      5. SSS s/p PPM    6. Elevated LFTs  - GI following  - AST / /756    Plan:   Creatinine remains elevated, nephrology following  and managing diuresis  Pulmonary edema overall improved on High Resolution CT completed 5/7  GI consulted given elevated LFTs  Hold any further beta-blocker at this point  Will consider dobutamine or dopamine, may need RV support  Changed back to IV diuretics  LFTs are uptrending, continue to follow, right upper quadrant ultrasound was unremarkable for any obstruction, may assume this is congestive hepatopathy  May need inotropic support  Blood pressures are adequate    Chris Romero MD, PhD    Chris Romero MD  05/10/22  08:35 EDT

## 2022-05-10 NOTE — PLAN OF CARE
Goal Outcome Evaluation:  Pt A&Ox3, son at bedside, v/s stable, call light in reach, RN will monitor.

## 2022-05-11 ENCOUNTER — INPATIENT HOSPITAL (AMBULATORY)
Dept: URBAN - METROPOLITAN AREA HOSPITAL 84 | Facility: HOSPITAL | Age: 87
End: 2022-05-11

## 2022-05-11 DIAGNOSIS — Z95.0 PRESENCE OF CARDIAC PACEMAKER: ICD-10-CM

## 2022-05-11 DIAGNOSIS — R10.13 EPIGASTRIC PAIN: ICD-10-CM

## 2022-05-11 DIAGNOSIS — R94.5 ABNORMAL RESULTS OF LIVER FUNCTION STUDIES: ICD-10-CM

## 2022-05-11 DIAGNOSIS — N18.30 CHRONIC KIDNEY DISEASE, STAGE 3 UNSPECIFIED: ICD-10-CM

## 2022-05-11 DIAGNOSIS — I25.10 ATHEROSCLEROTIC HEART DISEASE OF NATIVE CORONARY ARTERY WITH: ICD-10-CM

## 2022-05-11 DIAGNOSIS — R06.02 SHORTNESS OF BREATH: ICD-10-CM

## 2022-05-11 PROCEDURE — 99232 SBSQ HOSP IP/OBS MODERATE 35: CPT | Mod: FS | Performed by: NURSE PRACTITIONER

## 2022-05-11 NOTE — PLAN OF CARE
Problem: Fluid Volume Excess  Goal: Fluid Balance  Outcome: Ongoing, Progressing  Intervention: Monitor and Manage Hypervolemia  Recent Flowsheet Documentation  Taken 5/11/2022 0000 by Mandi Milligan RN  Skin Protection: adhesive use limited  Taken 5/10/2022 2000 by Mandi Milligan RN  Skin Protection: adhesive use limited     Problem: Adult Inpatient Plan of Care  Goal: Plan of Care Review  Outcome: Ongoing, Progressing  Goal: Patient-Specific Goal (Individualized)  Outcome: Ongoing, Progressing  Goal: Absence of Hospital-Acquired Illness or Injury  Outcome: Ongoing, Progressing  Intervention: Identify and Manage Fall Risk  Recent Flowsheet Documentation  Taken 5/11/2022 0200 by Mandi Milligan RN  Safety Promotion/Fall Prevention:   clutter free environment maintained   activity supervised   fall prevention program maintained   lighting adjusted   room organization consistent   safety round/check completed  Taken 5/11/2022 0000 by Mandi Milligan RN  Safety Promotion/Fall Prevention:   clutter free environment maintained   fall prevention program maintained   gait belt   lighting adjusted   room organization consistent   safety round/check completed  Taken 5/10/2022 2200 by Mandi Milligan RN  Safety Promotion/Fall Prevention:   clutter free environment maintained   activity supervised   fall prevention program maintained   lighting adjusted   toileting scheduled   safety round/check completed  Taken 5/10/2022 2000 by Mandi Milligan RN  Safety Promotion/Fall Prevention:   clutter free environment maintained   activity supervised   fall prevention program maintained   lighting adjusted   safety round/check completed   nonskid shoes/slippers when out of bed  Intervention: Prevent Skin Injury  Recent Flowsheet Documentation  Taken 5/11/2022 0000 by Mandi Milligan RN  Skin Protection: adhesive use limited  Taken 5/10/2022 2000 by Mandi Milligan RN  Skin Protection: adhesive use  limited  Intervention: Prevent and Manage VTE (Venous Thromboembolism) Risk  Recent Flowsheet Documentation  Taken 5/11/2022 0000 by Mandi Milligan RN  VTE Prevention/Management:   bilateral   sequential compression devices on  Taken 5/10/2022 2000 by Mandi Milligan RN  Activity Management:   activity adjusted per tolerance   activity encouraged  VTE Prevention/Management:   bilateral   sequential compression devices on  Intervention: Prevent Infection  Recent Flowsheet Documentation  Taken 5/11/2022 0000 by Mandi Milligan RN  Infection Prevention: hand hygiene promoted  Taken 5/10/2022 2200 by Mandi Milligan RN  Infection Prevention: hand hygiene promoted  Goal: Optimal Comfort and Wellbeing  Outcome: Ongoing, Progressing  Intervention: Monitor Pain and Promote Comfort  Recent Flowsheet Documentation  Taken 5/11/2022 0000 by Mandi Milligan RN  Pain Management Interventions: care clustered  Taken 5/10/2022 2000 by Mandi Milligan RN  Pain Management Interventions:   care clustered   quiet environment facilitated   pillow support provided   position adjusted  Intervention: Provide Person-Centered Care  Recent Flowsheet Documentation  Taken 5/11/2022 0000 by Mandi Milligan RN  Trust Relationship/Rapport: care explained  Taken 5/10/2022 2000 by Mandi Milligan RN  Trust Relationship/Rapport:   care explained   questions answered   questions encouraged  Goal: Readiness for Transition of Care  Outcome: Ongoing, Progressing     Problem: Cardiac Output Decreased (Heart Failure)  Goal: Optimal Cardiac Output  Outcome: Ongoing, Progressing  Intervention: Optimize Cardiac Output  Recent Flowsheet Documentation  Taken 5/11/2022 0000 by Mandi Milligan RN  Environmental Support: calm environment promoted  Taken 5/10/2022 2000 by Mandi Milligan RN  Environmental Support:   calm environment promoted   environmental consistency promoted     Problem: Fluid Imbalance (Heart Failure)  Goal: Fluid  Balance  Outcome: Ongoing, Progressing     Problem: Dysrhythmia (Heart Failure)  Goal: Stable Heart Rate and Rhythm  Outcome: Ongoing, Progressing     Problem: Functional Ability Impaired (Heart Failure)  Goal: Optimal Functional Ability  Outcome: Ongoing, Progressing  Intervention: Optimize Functional Ability  Recent Flowsheet Documentation  Taken 5/10/2022 2000 by Mandi Milligan RN  Activity Management:   activity adjusted per tolerance   activity encouraged     Problem: Respiratory Compromise (Heart Failure)  Goal: Effective Oxygenation and Ventilation  Outcome: Ongoing, Progressing  Intervention: Promote Airway Secretion Clearance  Recent Flowsheet Documentation  Taken 5/10/2022 2000 by Mandi Milligan RN  Breathing Techniques/Airway Clearance: deep/controlled cough encouraged  Cough And Deep Breathing: done independently per patient  Intervention: Optimize Oxygenation and Ventilation  Recent Flowsheet Documentation  Taken 5/10/2022 2000 by Mandi Milligan RN  Airway/Ventilation Management: airway patency maintained     Problem: Fall Injury Risk  Goal: Absence of Fall and Fall-Related Injury  Outcome: Ongoing, Progressing  Intervention: Identify and Manage Contributors  Recent Flowsheet Documentation  Taken 5/11/2022 0200 by Mandi Milligan RN  Medication Review/Management: medications reviewed  Taken 5/11/2022 0000 by Mandi Milligan RN  Medication Review/Management: medications reviewed  Taken 5/10/2022 2200 by Mandi Milligan RN  Medication Review/Management: medications reviewed  Taken 5/10/2022 2000 by Mandi Milligan RN  Medication Review/Management: medications reviewed  Intervention: Promote Injury-Free Environment  Recent Flowsheet Documentation  Taken 5/11/2022 0200 by Mandi Milligan RN  Safety Promotion/Fall Prevention:   clutter free environment maintained   activity supervised   fall prevention program maintained   lighting adjusted   room organization consistent   safety  round/check completed  Taken 5/11/2022 0000 by Mandi Milligan RN  Safety Promotion/Fall Prevention:   clutter free environment maintained   fall prevention program maintained   gait belt   lighting adjusted   room organization consistent   safety round/check completed  Taken 5/10/2022 2200 by Mandi Milligan RN  Safety Promotion/Fall Prevention:   clutter free environment maintained   activity supervised   fall prevention program maintained   lighting adjusted   toileting scheduled   safety round/check completed  Taken 5/10/2022 2000 by Mandi Milligan RN  Safety Promotion/Fall Prevention:   clutter free environment maintained   activity supervised   fall prevention program maintained   lighting adjusted   safety round/check completed   nonskid shoes/slippers when out of bed     Problem: Skin Injury Risk Increased  Goal: Skin Health and Integrity  Outcome: Ongoing, Progressing  Intervention: Promote and Optimize Oral Intake  Recent Flowsheet Documentation  Taken 5/11/2022 0000 by Mandi Milligan RN  Oral Nutrition Promotion: calorie-dense foods provided  Intervention: Optimize Skin Protection  Recent Flowsheet Documentation  Taken 5/11/2022 0000 by Mandi Milligan RN  Pressure Reduction Devices: pressure-redistributing mattress utilized  Skin Protection: adhesive use limited  Taken 5/10/2022 2000 by Mandi Milligan RN  Pressure Reduction Devices: pressure-redistributing mattress utilized  Skin Protection: adhesive use limited   Goal Outcome Evaluation:         Patient is alert and oriented x 4, patient gets anxious and feels he is SOB, although oxygen sating at 100 percent. Patient on Bipap at times. Patient is on Dobutamine gtt. Patient without complaints. Wife at bedside, will continue to monitor

## 2022-05-11 NOTE — PLAN OF CARE
Goal Outcome Evaluation:  Pt A&Ox3 w/ forgetfullness, v/s stable, call light in reach, family at bedside, RN will cont to monitor.

## 2022-05-11 NOTE — CASE MANAGEMENT/SOCIAL WORK
Continued Stay Note  [facility]     Patient Name: Shon ZAYAS Kunal  MRN: [id number]  Today's Date: 5/11/2022    Admit Date: 5/3/2022     Discharge Plan     Row Name 05/11/22 1115       Plan    Plan Discharge home w/[facility] (accepted). Lives w/spouse.    Plan Comments Plan is home with spouse and [facility]. Has glucometer.                    Expected Discharge Date and Time     Expected Discharge Date Expected Discharge Time    May 13, 2022         Chely Escobar RN, MSN  Care Manager  [phone]

## 2022-05-11 NOTE — PROGRESS NOTES
Spoke to pt son  Agrees to [facility] services  Demographics verified   Dr Lawson will follow   SN admit  PT poss OT

## 2022-05-11 NOTE — PROGRESS NOTES
"NEPHROLOGY PROGRESS NOTE------[facility]    [facility]  [phone]  Tam Gonzalez MD      Patient Care Team:  Antony Lawson MD as PCP - General  Tam Gonzalez MD as Consulting Physician (Nephrology)  Minh Kendrick MD as Consulting Physician (Cardiology)  Chris Romero MD as Consulting Physician (Cardiology)      Provider:  Tam Gonzalez MD  Patient Name: Shon ZAYAS Kunal  :  1930    SUBJECTIVE:  F/U CKD  No chest pain, has SOA  ECHO with high right sided pressures. Severe TR, reduce LVF      Medication:  amiodarone, 300 mg, Oral, Q12H  aspirin, 81 mg, Oral, Daily  digoxin, 125 mcg, Intravenous, Once  enoxaparin, 1 mg/kg, Subcutaneous, Q24H  finasteride, 5 mg, Oral, Daily  guaiFENesin, 600 mg, Oral, Q12H  midodrine, 2.5 mg, Oral, TID AC  sodium bicarbonate, 1,300 mg, Oral, TID  sodium chloride, 3 mL, Intravenous, Q12H      DOBUTamine, 2.5 mcg/kg/min, Last Rate: Stopped (22 0552)  Pharmacy to Dose enoxaparin (LOVENOX),         OBJECTIVE    Vital Sign Min/Max for last 24 hours  Temp  Min: 97.3 °F (36.3 °C)  Max: 97.8 °F (36.6 °C)   BP  Min: 94/46  Max: 155/83   Pulse  Min: 60  Max: 121   Resp  Min: 14  Max: 25   SpO2  Min: 79 %  Max: 100 %   No data recorded   Weight  Min: 57 kg (125 lb 9.6 oz)  Max: 57 kg (125 lb 9.6 oz)     Flowsheet Rows    Flowsheet Row First Filed Value   Admission Height 172.7 cm (68\") Documented at 2022 1114   Admission Weight 58.2 kg (128 lb 4.9 oz) Documented at 2022 1114          I/O this shift:  In: 480 [P.O.:480]  Out: -   I/O last 3 completed shifts:  In: 250 [P.O.:250]  Out: 600 [Urine:600]    Physical Exam:  General Appearance: alert, appears stated age and cooperative  Head: normocephalic, without obvious abnormality and atraumatic  Eyes: conjunctivae and sclerae normal and no icterus  Neck: supple and +JVD  Lungs: clear to auscultation  Heart: regular rhythm & normal rate " and normal S1, S2  Chest: Wall no abnormalities observed  Abdomen: normal bowel sounds and soft non-tender  Extremities: moves extremities well, no edema, no cyanosis and no redness  Skin: no bleeding, bruising or rash, turgor normal, color normal and no lesions noted  Neurologic: Alert, and oriented. No focal deficits    Labs:    WBC WBC   Date Value Ref Range Status   05/11/2022 9.10 3.40 - 10.80 10*3/mm3 Final   05/10/2022 8.70 3.40 - 10.80 10*3/mm3 Final   05/09/2022 11.10 (H) 3.40 - 10.80 10*3/mm3 Final      HGB Hemoglobin   Date Value Ref Range Status   05/11/2022 13.3 13.0 - 17.7 g/dL Final   05/10/2022 15.0 13.0 - 17.7 g/dL Final   05/09/2022 14.0 13.0 - 17.7 g/dL Final      HCT Hematocrit   Date Value Ref Range Status   05/11/2022 41.4 37.5 - 51.0 % Final   05/10/2022 45.5 37.5 - 51.0 % Final   05/09/2022 43.4 37.5 - 51.0 % Final      Platlets No results found for: LABPLAT   MCV MCV   Date Value Ref Range Status   05/11/2022 99.6 (H) 79.0 - 97.0 fL Final   05/10/2022 99.9 (H) 79.0 - 97.0 fL Final   05/09/2022 99.2 (H) 79.0 - 97.0 fL Final          Sodium Sodium   Date Value Ref Range Status   05/11/2022 137 136 - 145 mmol/L Final   05/10/2022 139 136 - 145 mmol/L Final   05/09/2022 135 (L) 136 - 145 mmol/L Final      Potassium Potassium   Date Value Ref Range Status   05/11/2022 4.1 3.5 - 5.2 mmol/L Final   05/10/2022 4.6 3.5 - 5.2 mmol/L Final     Comment:     Slight hemolysis detected by analyzer. Results may be affected.   05/10/2022 5.4 (H) 3.5 - 5.2 mmol/L Final   05/09/2022 5.1 3.5 - 5.2 mmol/L Final      Chloride Chloride   Date Value Ref Range Status   05/11/2022 97 (L) 98 - 107 mmol/L Final   05/10/2022 94 (L) 98 - 107 mmol/L Final   05/09/2022 98 98 - 107 mmol/L Final      CO2 CO2   Date Value Ref Range Status   05/11/2022 24.0 22.0 - 29.0 mmol/L Final   05/10/2022 25.0 22.0 - 29.0 mmol/L Final   05/09/2022 16.0 (L) 22.0 - 29.0 mmol/L Final      BUN BUN   Date Value Ref Range Status   05/11/2022  99 (H) 8 - 23 mg/dL Final   05/10/2022 100 (H) 8 - 23 mg/dL Final   05/09/2022 91 (H) 8 - 23 mg/dL Final      Creatinine Creatinine   Date Value Ref Range Status   05/11/2022 2.04 (H) 0.76 - 1.27 mg/dL Final   05/10/2022 2.53 (H) 0.76 - 1.27 mg/dL Final   05/09/2022 2.16 (H) 0.76 - 1.27 mg/dL Final      Calcium Calcium   Date Value Ref Range Status   05/11/2022 8.6 8.2 - 9.6 mg/dL Final   05/10/2022 9.7 (H) 8.2 - 9.6 mg/dL Final   05/09/2022 9.5 8.2 - 9.6 mg/dL Final      PO4 No components found for: PO4   Albumin Albumin   Date Value Ref Range Status   05/11/2022 3.50 3.50 - 5.20 g/dL Final   05/10/2022 3.80 3.50 - 5.20 g/dL Final   05/09/2022 3.60 3.50 - 5.20 g/dL Final      Magnesium Magnesium   Date Value Ref Range Status   05/09/2022 2.7 (H) 1.7 - 2.3 mg/dL Final      Uric Acid No components found for: URIC ACID     Imaging Results (Last 72 Hours)     Procedure Component Value Units Date/Time    XR Chest 1 View [[id number]] Collected: 05/09/22 1510     Updated: 05/09/22 1515    Narrative:      DATE OF EXAM:  5/9/2022 3:02 PM     PROCEDURE:  XR CHEST 1 VW-     INDICATIONS:  Dyspnea; R06.00-Dyspnea, unspecified; I50.9-Heart failure, unspecified;  N17.9-Acute kidney failure, unspecified     COMPARISON:  CT chest high-resolution 05/07/2022, AP chest x-ray 05/06/2022.     TECHNIQUE:   Single radiographic AP view of the chest was obtained.     FINDINGS:  The patient's chin partially obscures evaluation of the upper chest.  Allowing for this limitation, no pneumothorax is seen. Cardiac  silhouette remains mildly enlarged. Changes are redemonstrated from  CABG. Pacemaker leads are stable. There are mildly increased right upper  lung airspace opacities. Blunting of the left lateral costophrenic angle  appears stable. Left lung appears grossly clear.        Impression:      1.Mildly increased right upper lung airspace opacities, which may be due  to atelectasis and/or pneumonia.  2.Small left pleural effusion.      Electronically Signed By-Hoa Bonilla MD On:5/9/2022 3:13 PM  This report was finalized on [id number] by  Hoa Bonilla MD.    US Liver [[id number]] Collected: 05/09/22 0920     Updated: 05/09/22 0924    Narrative:      DATE OF EXAM:  5/9/2022 8:54 AM     PROCEDURE:  US LIVER-     INDICATIONS:  Hepatic transaminase derangement; R06.00-Dyspnea, unspecified;  I50.9-Heart failure, unspecified; N17.9-Acute kidney failure,  unspecified     COMPARISON:  No Comparisons Available     TECHNIQUE:   Grayscale and color Doppler ultrasound evaluation of the limited abdomen  was performed.     FINDINGS:  The liver size is normal, 12.8 cm in length. The liver maintains normal  homogeneous echotexture without focal or suspicious abnormality. There  is no ascites. The portal vein is patent with hepatopedal flow. Imaged  hepatic veins are patent. The common duct measures 2 mm. No intrahepatic  biliary dilation is seen. Incidental note is made of a 1.8 cm shadowing  gallstone, and gallbladder adenomyomatosis. The gallbladder wall does  not appear grossly thickened, and no pericholecystic fluid is seen.        Impression:         1. Normal sonographic appearance of the liver.  2. Uncomplicated cholelithiasis.  3. Gallbladder adenomyomatosis.     Electronically Signed By-Shahrzad Biggs MD On:5/9/2022 9:22 AM  This report was finalized on [id number] by  Shahrzad Biggs MD.          Results for orders placed during the hospital encounter of 05/03/22    XR Chest 1 View    Narrative  DATE OF EXAM:  5/9/2022 3:02 PM    PROCEDURE:  XR CHEST 1 VW-    INDICATIONS:  Dyspnea; R06.00-Dyspnea, unspecified; I50.9-Heart failure, unspecified;  N17.9-Acute kidney failure, unspecified    COMPARISON:  CT chest high-resolution 05/07/2022, AP chest x-ray 05/06/2022.    TECHNIQUE:  Single radiographic AP view of the chest was obtained.    FINDINGS:  The patient's chin partially obscures evaluation of the upper chest.  Allowing for this  limitation, no pneumothorax is seen. Cardiac  silhouette remains mildly enlarged. Changes are redemonstrated from  CABG. Pacemaker leads are stable. There are mildly increased right upper  lung airspace opacities. Blunting of the left lateral costophrenic angle  appears stable. Left lung appears grossly clear.    Impression  1.Mildly increased right upper lung airspace opacities, which may be due  to atelectasis and/or pneumonia.  2.Small left pleural effusion.    Electronically Signed By-Hoa Bonilla MD On:5/9/2022 3:13 PM  This report was finalized on [id number] by  Hoa Bonilla MD.      XR Chest 1 View    Narrative  DATE OF EXAM:  5/6/2022 4:26 PM    PROCEDURE:  XR CHEST 1 VW-    INDICATIONS:  Severe SOA; R06.00-Dyspnea, unspecified; I50.9-Heart failure,  unspecified; N17.9-Acute kidney failure, unspecified    COMPARISON:  Chest radiograph 05/03/2022    TECHNIQUE:  Single radiographic view of the chest was obtained.    FINDINGS:  Patient is rotated this limits evaluation of the right lung. There is  cardiomegaly. Dual lead transvenous pacemaker device. Blunting the  costophrenic angles. Left lower lobe airspace opacity. Background  chronic interstitial lung disease.    Impression  Since previous exam there is been development of probable small pleural  effusions. There is left lower lobe airspace opacity atelectasis versus  pneumonia.    Electronically Signed By-Marianna Flowers MD On:5/6/2022 4:32 PM  This report was finalized on [id number] by  Marianna Flowers MD.      XR Chest 1 View    Narrative  DATE OF EXAM:  5/3/2022 12:07 PM    PROCEDURE:  XR CHEST 1 VW-    INDICATIONS:  Shortness of breath. Cough for 4 months.    COMPARISON:  Chest radiographs 3/24/2022, 5/29/2021, and 5/27/2021. CT chest  3/24/2022    TECHNIQUE:  Single radiographic AP view of the chest was obtained.    FINDINGS:  Lordotic positioning. Overlying artifacts. Stable sternotomy wires,  postoperative changes from CABG, left chest wall  cardiac pacer device.  Stable elevation of the right hemidiaphragm with improved aeration of  both lungs. Mild linear right infrahilar and left basilar segmental  atelectasis and/or scarring with mild interstitial thickening in the  lung bases. No focal lung consolidation. No pneumothorax. Stable  cardiomegaly, likely accentuated by technique. Calcified atherosclerotic  disease in the thoracic aorta. Chronic changes in both shoulders. No  acute osseous normality is identified.    Impression  Stable cardiomegaly with improved aeration of both lungs. Multifocal  bibasilar subsegmental atelectasis and/or scarring with mild  interstitial thickening in the lung bases that could represent mild  pulmonary vascular congestion/edema, chronic lung disease, or less  likely atypical pneumonia.    Electronically Signed By-Lance Mendes MD On:5/3/2022 12:32 PM  This report was finalized on [id number] by  Lance Mendes MD.      Results for orders placed during the hospital encounter of 03/24/22    Duplex Carotid Ultrasound CAR    Interpretation Summary  · Proximal right internal carotid artery moderate stenosis.  · Proximal left internal carotid artery moderate stenosis.        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    · CHF- EF 25-30%. Severe TR, high RVSP. cautiously diurese.   · Hypertension  · Atrial fibrillation  · History coronary disease  · Diabetes  · Elevated LFTs--GI following. Suspect related to congestive hepatomegaly     CR back down  Grossly volume overloaded. ECHO noted with severe TR and high right sided pressures, severe LV dysfunction  Will start on lasix gtt along with dobutamine  Monitor renal function fluid status electrolytes      Tam Gonzalez MD  [facility]  [phone]  05/11/22  11:39 EDT

## 2022-05-11 NOTE — PROGRESS NOTES
[facility] [place]        LOS:  LOS: 7 days   Patient Name: Shon ZAYAS Kunal  Age/Sex: 89 y.o. male  : 1930  MRN: [id number]    Day of Service: 22   Length of Stay: 7  Encounter Provider: Chris Romero MD  Place of Service: [facility] CARDIOLOGY  Patient Care Team:  Antony Lawson MD as PCP - Tam Monroe MD as Consulting Physician (Nephrology)  Minh Kendrick MD as Consulting Physician (Cardiology)  Chris Romero MD as Consulting Physician (Cardiology)    Subjective:     Chief Complaint: shortness of breath, MERA    Subjective:   Seen and examined  Remains short of breath  Blood pressures are adequate 1 10-1 20 systolic  A. fib with intermittent RVR 90s to 120s  Dobutamine drip is off secondary to tachycardia  Beta-blockers off secondary decompensated biventricular heart failure    Complex condition overall  Remains with volume overload and high filling pressures on both sides  LV function is at 25% with severe MR and severe left atrial enlargement also severe pulmonary hypertension secondary to left-sided filling pressures chronically  RV is moderate to severely hypokinetic, overwhelmed by pulmonary hypertension as well as volume, IVC is distended at 2.6-2.7 indicative of high filling pressures along with severe TR and high filling pressures on the right side with hepatic congestion and renal congestion with cardiopulmonary syndrome    Treatment will consist of further volume reduction  Stopping beta-blockers  Controlling atrial fibrillation with digoxin which will be renally dosed and dosed per level  Gentle amiodarone for rate control as well  Dobutamine drip for inotropic assisted to assist RV function and hopefully will be able to restart diuretics gently once rates are controlled and can tolerate dobutamine and further diuretics  Pacemaker rate increased to a backup rate of at least 75 rather than 60 to promote better  cardiac output  Well out atrial fibrillation in the 90s but avoid in the 130s to 140s  Digoxin for inotropic assistance    Patient is not a surgical candidate and 89 years old with severe MR, severe TR, severe pulm hypertension, severely reduced LV and RV systolic function and multiorgan dysfunction    Interventions above have resulted in improvement in creatinine and improvement in LFTs with better heart rates, gentle inotropic assistance overnight    Current Medications:   Scheduled Meds:amiodarone, 300 mg, Oral, Q12H  aspirin, 81 mg, Oral, Daily  digoxin, 125 mcg, Intravenous, Once  enoxaparin, 1 mg/kg, Subcutaneous, Q24H  finasteride, 5 mg, Oral, Daily  guaiFENesin, 600 mg, Oral, Q12H  midodrine, 2.5 mg, Oral, TID AC  sodium bicarbonate, 1,300 mg, Oral, TID  sodium chloride, 3 mL, Intravenous, Q12H      Continuous Infusions:DOBUTamine, 2.5 mcg/kg/min, Last Rate: Stopped (05/11/22 0552)  Pharmacy to Dose enoxaparin (LOVENOX),         Allergies:  Allergies   Allergen Reactions   • Penicillin G Rash   • Vancomycin Rash       Review of Systems   Constitutional: Positive for malaise/fatigue. Negative for chills and diaphoresis.   Cardiovascular: Negative for chest pain, dyspnea on exertion, irregular heartbeat, leg swelling, near-syncope, orthopnea, palpitations, paroxysmal nocturnal dyspnea and syncope.   Respiratory: Positive for shortness of breath. Negative for cough, sleep disturbances due to breathing and sputum production.    Gastrointestinal: Negative for change in bowel habit.   Genitourinary: Negative for urgency.   Neurological: Negative for dizziness and headaches.   Psychiatric/Behavioral: Negative for altered mental status.         Objective:     Temp:  [97.3 °F (36.3 °C)-97.8 °F (36.6 °C)] 97.8 °F (36.6 °C)  Heart Rate:  [] 106  Resp:  [14-25] 25  BP: ()/(46-83) 155/83     Intake/Output Summary (Last 24 hours) at 5/11/2022 1112  Last data filed at 5/11/2022 0956  Gross per 24 hour   Intake  490 ml   Output 250 ml   Net 240 ml     Body mass index is 19.1 kg/m².      05/10/22  0309 05/10/22  1119 05/11/22  0535   Weight: 57.2 kg (126 lb) 57.2 kg (126 lb) 57 kg (125 lb 9.6 oz)         General Appearance:    Alert, cooperative, in no acute distress                                Head: Atraumatic, normocephalic, PERRLA               Neck:   supple, no JVD   Lungs:     2L NC, dim bases    Heart:    Regular rhythm and normal rate, normal S1 and S2, 1/6 murmur   Abdomen:     Normal bowel sounds, no masses, no organomegaly, soft  non-tender, non-distended, no guarding, no rebound  tenderness   Extremities:   Moves all extremities well, no edema, no cyanosis, no  redness   Pulses:   Pulses palpable and equal bilaterally   Skin:   No bleeding, bruising or rash   Neurologic:   Awake, alert, oriented x3   Agree with exam as discussed with nurse practitioner after my face-to-face encounter with the patient      Lab Review:   Results from last 7 days   Lab Units 05/11/22  0429 05/10/22  1216 05/10/22  0611   SODIUM mmol/L 137  --  139   POTASSIUM mmol/L 4.1 4.6 5.4*   CHLORIDE mmol/L 97*  --  94*   CO2 mmol/L 24.0  --  25.0   BUN mg/dL 99*  --  100*   CREATININE mg/dL 2.04*  --  2.53*   GLUCOSE mg/dL 114*  --  116*   CALCIUM mg/dL 8.6  --  9.7*   AST (SGOT) U/L 214*  --  459*   ALT (SGPT) U/L 616*  --  968*     Results from last 7 days   Lab Units 05/08/22  0026   CK TOTAL U/L 95     Results from last 7 days   Lab Units 05/11/22  0429 05/10/22  0611   WBC 10*3/mm3 9.10 8.70   HEMOGLOBIN g/dL 13.3 15.0   HEMATOCRIT % 41.4 45.5   PLATELETS 10*3/mm3 175 193     Results from last 7 days   Lab Units 05/11/22  0429   INR  1.31*     Results from last 7 days   Lab Units 05/09/22  0522 05/08/22  0026   MAGNESIUM mg/dL 2.7* 2.6*           Invalid input(s): LDLCALC  Results from last 7 days   Lab Units 05/10/22  0611 05/05/22  0325   PROBNP pg/mL >70,000.0* 45,156.0*           Recent Radiology:  Imaging Results (Most Recent)      Procedure Component Value Units Date/Time    XR Chest 1 View [[id number]] Collected: 05/09/22 1510     Updated: 05/09/22 1515    Narrative:      DATE OF EXAM:  5/9/2022 3:02 PM     PROCEDURE:  XR CHEST 1 VW-     INDICATIONS:  Dyspnea; R06.00-Dyspnea, unspecified; I50.9-Heart failure, unspecified;  N17.9-Acute kidney failure, unspecified     COMPARISON:  CT chest high-resolution 05/07/2022, AP chest x-ray 05/06/2022.     TECHNIQUE:   Single radiographic AP view of the chest was obtained.     FINDINGS:  The patient's chin partially obscures evaluation of the upper chest.  Allowing for this limitation, no pneumothorax is seen. Cardiac  silhouette remains mildly enlarged. Changes are redemonstrated from  CABG. Pacemaker leads are stable. There are mildly increased right upper  lung airspace opacities. Blunting of the left lateral costophrenic angle  appears stable. Left lung appears grossly clear.        Impression:      1.Mildly increased right upper lung airspace opacities, which may be due  to atelectasis and/or pneumonia.  2.Small left pleural effusion.     Electronically Signed By-Hoa Bonilla MD On:5/9/2022 3:13 PM  This report was finalized on [id number] by  Hoa Bonilla MD.    US Liver [[id number]] Collected: 05/09/22 0920     Updated: 05/09/22 0924    Narrative:      DATE OF EXAM:  5/9/2022 8:54 AM     PROCEDURE:  US LIVER-     INDICATIONS:  Hepatic transaminase derangement; R06.00-Dyspnea, unspecified;  I50.9-Heart failure, unspecified; N17.9-Acute kidney failure,  unspecified     COMPARISON:  No Comparisons Available     TECHNIQUE:   Grayscale and color Doppler ultrasound evaluation of the limited abdomen  was performed.     FINDINGS:  The liver size is normal, 12.8 cm in length. The liver maintains normal  homogeneous echotexture without focal or suspicious abnormality. There  is no ascites. The portal vein is patent with hepatopedal flow. Imaged  hepatic veins are patent. The common duct measures 2  mm. No intrahepatic  biliary dilation is seen. Incidental note is made of a 1.8 cm shadowing  gallstone, and gallbladder adenomyomatosis. The gallbladder wall does  not appear grossly thickened, and no pericholecystic fluid is seen.        Impression:         1. Normal sonographic appearance of the liver.  2. Uncomplicated cholelithiasis.  3. Gallbladder adenomyomatosis.     Electronically Signed By-Shahrzad Biggs MD On:5/9/2022 9:22 AM  This report was finalized on [id number] by  Shahrzad Biggs MD.    CT Chest Hi Resolution Diagnostic [[id number]] Collected: 05/07/22 1653     Updated: 05/07/22 1701    Narrative:         DATE OF EXAM:   5/7/2022 4:28 PM     PROCEDURE:   CT CHEST HI RESOLUTION DIAGNOSTIC-     INDICATIONS:   Interstitial lung disease; R06.00-Dyspnea, unspecified; I50.9-Heart  failure, unspecified; N17.9-Acute kidney failure, unspecified     COMPARISON:  03/24/2022.     TECHNIQUE:   Routine transaxial slices were obtained through the chest without the  administration of intravenous contrast. The study was performed  utilizing our high resolution CT protocol which includes supine  inspiratory and expiratory imaging as well as prone inspiratory imaging.  Reconstructed coronal and sagittal images were also obtained. Automated  exposure control and iterative reconstruction methods were used.     FINDINGS:   Interval resolution of previous identified pulmonary edema. There is  nearly completely resolved small bilateral pleural effusions. There is  mild interlobular septal thickening, which may represent mild residual  interstitial edema or mild component of chronic disease. There is  scarred consolidation in the right lung base. Many of the small nodules  identified in the upper lobes of either resolved or decreased in size  compared with the prior study. There are persistent clusters of nodules  in both upper lungs, right greater than left with the largest occurring  in the right upper lobe on axial  image 41 measuring up to 10 mm  diameter. There is decreased peribronchial thickening. No new lung  nodules.     There is severe aortic and aortic branch vessel atherosclerosis. There  are severe native coronary artery calcifications status post CABG. There  is moderate cardiomegaly. No pericardial effusion. There is no  pathologic mediastinal lymphadenopathy following size criteria. There  are dense aortic annular and valvular calcifications.     There is a left-sided pacemaker/ICD in place. No acute findings below  the diaphragm. There is a stable low-attenuation nodule at the left  adrenal gland, likely adenoma. There is S-shaped scoliosis of the  thoracolumbar spine. There are moderate lower thoracic and upper lumbar  degenerative changes.        Impression:      IMPRESSION :      1. Near complete resolution of previous identified interstitial  pulmonary edema and near complete resolution of now small bilateral  pleural effusions.  2. There may be a small component of chronic subpleural interstitial  disease, but the majority of the findings on the prior study are favored  to reflect pulmonary edema.  3. Improved nodular disease in the upper lungs, right greater than left  with significant residual nodularity. Recommend 6 month follow-up chest  CT. The course of disease on imaging would suggest an infectious or  inflammatory process.  4. Cardiomegaly and coronary artery disease status post CABG and  pacemaker/ICD placement.  5. Scoliosis and spinal degenerative changes.     Electronically Signed By-Hudson Walker MD On:5/7/2022 4:59 PM  This report was finalized on [id number] by  Hudson Walker MD.    XR Chest 1 View [[id number]] Collected: 05/06/22 1631     Updated: 05/06/22 1634    Narrative:      DATE OF EXAM:  5/6/2022 4:26 PM     PROCEDURE:  XR CHEST 1 VW-     INDICATIONS:  Severe SOA; R06.00-Dyspnea, unspecified; I50.9-Heart failure,  unspecified; N17.9-Acute kidney failure, unspecified      COMPARISON:  Chest radiograph 05/03/2022     TECHNIQUE:   Single radiographic view of the chest was obtained.     FINDINGS:  Patient is rotated this limits evaluation of the right lung. There is  cardiomegaly. Dual lead transvenous pacemaker device. Blunting the  costophrenic angles. Left lower lobe airspace opacity. Background  chronic interstitial lung disease.       Impression:      Since previous exam there is been development of probable small pleural  effusions. There is left lower lobe airspace opacity atelectasis versus  pneumonia.     Electronically Signed By-Marianna Flowers MD On:5/6/2022 4:32 PM  This report was finalized on [id number] by  Marianna Flowers MD.    XR Chest 1 View [[id number]] Collected: 05/03/22 1229     Updated: 05/03/22 1234    Narrative:      DATE OF EXAM:  5/3/2022 12:07 PM     PROCEDURE:  XR CHEST 1 VW-     INDICATIONS:  Shortness of breath. Cough for 4 months.     COMPARISON:  Chest radiographs 3/24/2022, 5/29/2021, and 5/27/2021. CT chest  3/24/2022     TECHNIQUE:   Single radiographic AP view of the chest was obtained.     FINDINGS:  Lordotic positioning. Overlying artifacts. Stable sternotomy wires,  postoperative changes from CABG, left chest wall cardiac pacer device.  Stable elevation of the right hemidiaphragm with improved aeration of  both lungs. Mild linear right infrahilar and left basilar segmental  atelectasis and/or scarring with mild interstitial thickening in the  lung bases. No focal lung consolidation. No pneumothorax. Stable  cardiomegaly, likely accentuated by technique. Calcified atherosclerotic  disease in the thoracic aorta. Chronic changes in both shoulders. No  acute osseous normality is identified.        Impression:      Stable cardiomegaly with improved aeration of both lungs. Multifocal  bibasilar subsegmental atelectasis and/or scarring with mild  interstitial thickening in the lung bases that could represent mild  pulmonary vascular congestion/edema,  chronic lung disease, or less  likely atypical pneumonia.     Electronically Signed By-Lance Mendes MD On:5/3/2022 12:32 PM  This report was finalized on [id number] by  Lance Mendes MD.          ECHOCARDIOGRAM:    Results for orders placed during the hospital encounter of 05/03/22    Adult Transthoracic Echo Complete W/ Cont if Necessary Per Protocol    Interpretation Summary  · Left atrial volume is severely increased.  · Abnormal mitral valve structure consistent with dilated annulus.  · Moderate to severe tricuspid valve regurgitation is present.  · Estimated right ventricular systolic pressure from tricuspid regurgitation is markedly elevated (>55 mmHg).  · Moderate pulmonic valve regurgitation is present.  · The left ventricular cavity is moderately dilated.  · Estimated left ventricular EF was in agreement with the calculated left ventricular EF. Left ventricular ejection fraction appears to be 26 - 30%. Left ventricular systolic function is severely decreased.  · Left ventricular diastolic function is consistent with (grade II w/high LAP) pseudonormalization.  · The right ventricular cavity is moderately dilated.  · Severely reduced right ventricular systolic function noted.  · Severe mitral valve regurgitation is present with a centrally-directed jet noted.  · Moderate aortic valve regurgitation is present.  · Moderate aortic valve stenosis is present.    Severe LV systolic dysfunction EF of 25%, moderately dilated LV  Severe MR without stenosis  Moderate AR with moderate aortic valve stenosis  Severe TR without stenosis  PA systolic pressure severely elevated 60-65 with severe pulm hypertension  Grade 2-3 diastolic dysfunction  Right atrial pressure estimated greater than 20 with severely dilated IVC  No masses or effusion seen  RV is moderate to severely hypokinetic, moderately increased in size        I reviewed the patient's new clinical results.    EKG:      Assessment:       Dyspnea, unspecified  type    Moderate malnutrition (HCC)    1. Shortness of breath / Acute on chronic systolic and diastolic CHF  - LVEF 35%, Dual-chamber pacemaker well-functioning-No ICD upgrade at family discretion previously  -Lasix gently, nephrology on board appreciate recommendations  -Off Aldactone at this time  -Blood pressure will likely not tolerate ARB as well as kidney function    2. HARMAN / CKD  - creatinine varies, 2.1 today  - on oral lasix    3. HTN    4. Paroxysmal Atrial fibrillation  - not on a/c, CHADS VAsc at least 3, on ASA      5. SSS s/p PPM    6. Elevated LFTs  - GI following  - AST / /756    Plan:   Extensively described above    Chris Romero MD, PhD    Crhis Romero MD  05/11/22  11:12 EDT

## 2022-05-11 NOTE — PROGRESS NOTES
LOS: 7 days   Patient Care Team:  Antony Lawson MD as PCP - General  Tam Gonzalez MD as Consulting Physician (Nephrology)  Minh Kendrick MD as Consulting Physician (Cardiology)  Chris Romero MD as Consulting Physician (Cardiology)      Subjective     Interval History:     Subjective: Patient states he feels about the same, but is not having any more abdominal pain.  He mostly complains of mild back pain.  He is tolerating a diet.      ROS:   No chest pain, shortness of breath, or cough.      Medication Review:     Current Facility-Administered Medications:   •  amiodarone (PACERONE) tablet 300 mg, 300 mg, Oral, Q12H, Chris Romero MD, 300 mg at 05/11/22 0930  •  aspirin EC tablet 81 mg, 81 mg, Oral, Daily, Kristen Crowell MD, 81 mg at 05/11/22 0919  •  digoxin (LANOXIN) injection 125 mcg, 125 mcg, Intravenous, Once, Chris Romero MD  •  DOBUTamine (DOBUTREX) 1 mg/mL infusion, 2.5 mcg/kg/min, Intravenous, Titrated, Chris Romero MD, Stopped at 05/11/22 0552  •  Enoxaparin Sodium (LOVENOX) syringe 60 mg, 1 mg/kg, Subcutaneous, Q24H, Emily Wu MD, 60 mg at 05/10/22 1701  •  finasteride (PROSCAR) tablet 5 mg, 5 mg, Oral, Daily, Kristen Crowell MD, 5 mg at 05/11/22 0919  •  guaiFENesin (MUCINEX) 12 hr tablet 600 mg, 600 mg, Oral, Q12H, Leann Short APRN, 600 mg at 05/11/22 0919  •  ipratropium-albuterol (DUO-NEB) nebulizer solution 3 mL, 3 mL, Nebulization, Q4H PRN, Emily Wu MD, 3 mL at 05/06/22 1330  •  midodrine (PROAMATINE) tablet 2.5 mg, 2.5 mg, Oral, TID AC, Tam Gonzalez MD, 2.5 mg at 05/11/22 0919  •  nitroglycerin (NITROSTAT) SL tablet 0.4 mg, 0.4 mg, Sublingual, Q5 Min PRN, Kristen Crowell MD  •  ondansetron (ZOFRAN) injection 4 mg, 4 mg, Intravenous, Q6H PRN, Kristen Crowell MD  •  oxyCODONE (ROXICODONE) immediate release tablet 5 mg, 5 mg, Oral, Q4H PRN, Leann Short APRN, 5 mg at 05/10/22 1108  •  Pharmacy to Dose enoxaparin (LOVENOX),  , Does not apply, Continuous PRN, Emily Wu MD  •  sodium bicarbonate tablet 1,300 mg, 1,300 mg, Oral, TID, Georgina Villa MD, 1,300 mg at 05/11/22 0919  •  [COMPLETED] Insert peripheral IV, , , Once **AND** sodium chloride 0.9 % flush 10 mL, 10 mL, Intravenous, PRN, Kristen Crowell MD  •  sodium chloride 0.9 % flush 3 mL, 3 mL, Intravenous, Q12H, Kristen Crowell MD, 3 mL at 05/11/22 0919  •  sodium chloride 0.9 % flush 3-10 mL, 3-10 mL, Intravenous, PRN, Kristen Crowell MD      Objective     Vital Signs  Vitals:    05/11/22 0845 05/11/22 0900 05/11/22 0915 05/11/22 0956   BP: 114/61 115/56 115/60 155/83   BP Location:    Right arm   Patient Position:    Sitting   Pulse: 111 116 101 106   Resp:    25   Temp:    97.8 °F (36.6 °C)   TempSrc:    Oral   SpO2: 100% 90% (!) 79% 90%   Weight:       Height:           Physical Exam:     General Appearance:    Awake and alert, in no acute distress   Head:    Normocephalic, without obvious abnormality   Eyes:          Conjunctivae normal, anicteric sclera   Throat:   No oral lesions, no thrush, oral mucosa moist   Neck:   No adenopathy, supple, no JVD   Lungs:     respirations regular, even and unlabored   Abdomen:     Soft, non-tender, no rebound or guarding, non-distended, no hepatosplenomegaly   Rectal:     Deferred   Extremities:   No edema, no cyanosis   Skin:   No bruising or rash, no jaundice        Results Review:    CBC    Results from last 7 days   Lab Units 05/11/22  0429 05/10/22  0611 05/09/22  0522 05/08/22  0027 05/07/22  0234 05/06/22  0133 05/05/22  0325   WBC 10*3/mm3 9.10 8.70 11.10* 10.60 8.30 9.60 7.40   HEMOGLOBIN g/dL 13.3 15.0 14.0 13.8 13.2 13.9 13.5   PLATELETS 10*3/mm3 175 193 198 208 208 220 208     CMP   Results from last 7 days   Lab Units 05/11/22  0429 05/10/22  1216 05/10/22  0611 05/09/22  0522 05/08/22  0026 05/07/22  0428 05/07/22  0234 05/06/22  0421 05/05/22  0325   SODIUM mmol/L 137  --  139 135* 136 143  --  139 137    POTASSIUM mmol/L 4.1 4.6 5.4* 5.1 4.8 4.2  --  4.9 4.0   CHLORIDE mmol/L 97*  --  94* 98 99 103  --  100 96*   CO2 mmol/L 24.0  --  25.0 16.0* 20.0* 25.0  --  21.0* 24.0   BUN mg/dL 99*  --  100* 91* 91* 75*  --  78* 64*   CREATININE mg/dL 2.04*  --  2.53* 2.16* 2.36* 1.85*  --  1.94* 1.74*   GLUCOSE mg/dL 114*  --  116* 152* 156* 99  --  125* 122*   ALBUMIN g/dL 3.50  --  3.80 3.60  --  3.20*  --   --   --    BILIRUBIN mg/dL 0.8  --  0.8 0.8  --  0.7  --   --   --    ALK PHOS U/L 309*  --  321* 230*  --  120*  --   --   --    AST (SGOT) U/L 214*  --  459* 353*  --  269*  --   --   --    ALT (SGPT) U/L 616*  --  968* 756*  --  386*  --   --   --    MAGNESIUM mg/dL  --   --   --  2.7* 2.6*  --  2.5*  --   --    PHOSPHORUS mg/dL  --   --   --  5.7* 5.0*  --  5.0*  --   --      Cr Clearance Estimated Creatinine Clearance: 19 mL/min (A) (by C-G formula based on SCr of 2.04 mg/dL (H)).  Coag   Results from last 7 days   Lab Units 05/11/22  0429   INR  1.31*     HbA1C No results found for: HGBA1C  Blood Glucose   Glucose   Date/Time Value Ref Range Status   05/11/2022 1115 181 (H) 70 - 105 mg/dL Final     Comment:     Serial Number: [id number]:  [id number]   05/11/2022 0736 99 70 - 105 mg/dL Final     Comment:     Serial Number: [id number]:  [id number]   05/10/2022 2154 215 (H) 70 - 105 mg/dL Final     Comment:     Serial Number: [id number]:  [id number]   05/10/2022 1624 118 (H) 70 - 105 mg/dL Final     Comment:     Serial Number: [id number]:  [id number]   05/10/2022 1206 133 (H) 70 - 105 mg/dL Final     Comment:     Serial Number: [id number]:  [id number]   05/10/2022 0718 104 70 - 105 mg/dL Final     Comment:     Serial Number: [id number]:  [id number]   05/09/2022 1915 152 (H) 70 - 105 mg/dL Final     Comment:     Serial Number: [id number]:  [id number]   05/09/2022 1647 153 (H) 70 - 105 mg/dL Final     Comment:     Serial Number: [id number]:  [id number]     Infection            Radiology(recent) XR Chest 1 View    Result Date: 5/9/2022  1.Mildly increased right upper lung airspace opacities, which may be due to atelectasis and/or pneumonia. 2.Small left pleural effusion.  Electronically Signed By-Hoa Bonilla MD On:5/9/2022 3:13 PM This report was finalized on [id number] by  Hoa Bonilla MD.         Assessment & Plan   ASSESSMENT  -Elevated LFTs -improving   -Epigastric pain - improved  -Shortness of air -likely secondary to CHF, improved  -CAD  -Pacemaker  -CHF  -CKD stage III (Cr 2.53)     PLAN:  89-year-old male presented 5/3 with shortness of air.    GI has been consulted for elevated LFTs which have been normal in the past.  Ultrasound was normal other than gallbladder thickening and uncomplicated cholelithiasis.   He received Acetadote yesterday.    Liver serologies negative.  Lyme IgG negative.  GGT and LDH elevated.  Suspect shock liver.  LFTs are trending down.  Continue supportive care, continue to monitor labs.  Anticipate with adequate blood pressure that LFTs will continue to improve.    I discussed the patients findings and my recommendations with the patient.     We appreciate the referral  Electronically signed by CARMEN Ken, 05/11/22, 12:00 PM EDT.

## 2022-05-11 NOTE — PROGRESS NOTES
LOS: 7 days   Patient Care Team:  Antony Lawson MD as PCP - General  Tam Gonzalez MD as Consulting Physician (Nephrology)  iMnh Kendrick MD as Consulting Physician (Cardiology)  Chris Romero MD as Consulting Physician (Cardiology)    Subjective:  No distress    Objective:   afebrile      Review of Systems:   Review of Systems   Constitutional: Positive for activity change.   Respiratory: Positive for shortness of breath.    Cardiovascular: Negative.    Neurological: Positive for weakness.           Vital Signs  Temp:  [97.4 °F (36.3 °C)-97.8 °F (36.6 °C)] 97.8 °F (36.6 °C)  Heart Rate:  [] 100  Resp:  [14-25] 22  BP: ()/(46-83) 141/62    Physical Exam:  Physical Exam  Vitals and nursing note reviewed.   Cardiovascular:      Rate and Rhythm: Rhythm irregular.      Heart sounds: Normal heart sounds.   Skin:     General: Skin is warm.          Radiology:  US Liver    Result Date: 5/9/2022   1. Normal sonographic appearance of the liver. 2. Uncomplicated cholelithiasis. 3. Gallbladder adenomyomatosis.  Electronically Signed By-Shahrzad Biggs MD On:5/9/2022 9:22 AM This report was finalized on [id number] by  Shahrzad Biggs MD.    XR Chest 1 View    Result Date: 5/9/2022  1.Mildly increased right upper lung airspace opacities, which may be due to atelectasis and/or pneumonia. 2.Small left pleural effusion.  Electronically Signed By-Hoa Bonilla MD On:5/9/2022 3:13 PM This report was finalized on [id number] by  Hoa Bonilla MD.    XR Chest 1 View    Result Date: 5/6/2022  Since previous exam there is been development of probable small pleural effusions. There is left lower lobe airspace opacity atelectasis versus pneumonia.  Electronically Signed By-Marianna Flowers MD On:5/6/2022 4:32 PM This report was finalized on [id number] by  Marianna Flowers MD.    CT Chest Hi Resolution Diagnostic    Result Date: 5/7/2022  IMPRESSION :  1. Near complete resolution of previous  identified interstitial pulmonary edema and near complete resolution of now small bilateral pleural effusions. 2. There may be a small component of chronic subpleural interstitial disease, but the majority of the findings on the prior study are favored to reflect pulmonary edema. 3. Improved nodular disease in the upper lungs, right greater than left with significant residual nodularity. Recommend 6 month follow-up chest CT. The course of disease on imaging would suggest an infectious or inflammatory process. 4. Cardiomegaly and coronary artery disease status post CABG and pacemaker/ICD placement. 5. Scoliosis and spinal degenerative changes.  Electronically Signed By-Hudson Walker MD On:5/7/2022 4:59 PM This report was finalized on [id number] by  Hudson Walker MD.         Results Review:     I reviewed the patient's new clinical results.  I reviewed the patient's new imaging results and agree with the interpretation.    Medication Review:   Scheduled Meds:amiodarone, 300 mg, Oral, Q12H  aspirin, 81 mg, Oral, Daily  digoxin, 125 mcg, Intravenous, Once  enoxaparin, 1 mg/kg, Subcutaneous, Q24H  finasteride, 5 mg, Oral, Daily  guaiFENesin, 600 mg, Oral, Q12H  midodrine, 5 mg, Oral, TID AC  sodium bicarbonate, 1,300 mg, Oral, TID  sodium chloride, 3 mL, Intravenous, Q12H      Continuous Infusions:DOBUTamine, 2.5 mcg/kg/min, Last Rate: Stopped (05/11/22 0552)  furosemide (LASIX) 100 mg in 100mL NS infusion, 20 mg/hr, Last Rate: 20 mg/hr (05/11/22 1341)  Pharmacy to Dose enoxaparin (LOVENOX),       PRN Meds:.ipratropium-albuterol  •  nitroglycerin  •  ondansetron  •  oxyCODONE  •  Pharmacy to Dose enoxaparin (LOVENOX)  •  [COMPLETED] Insert peripheral IV **AND** sodium chloride  •  sodium chloride    Labs:    CBC    Results from last 7 days   Lab Units 05/11/22  0429 05/10/22  0611 05/09/22  0522 05/08/22  0027 05/07/22  0234 05/06/22  0133 05/05/22  0325   WBC 10*3/mm3 9.10 8.70 11.10* 10.60 8.30 9.60 7.40    HEMOGLOBIN g/dL 13.3 15.0 14.0 13.8 13.2 13.9 13.5   PLATELETS 10*3/mm3 175 193 198 208 208 220 208     BMP   Results from last 7 days   Lab Units 05/11/22  0429 05/10/22  1216 05/10/22  0611 05/09/22  0522 05/08/22  0026 05/07/22  0428 05/07/22  0234 05/06/22  0421 05/05/22  0325   SODIUM mmol/L 137  --  139 135* 136 143  --  139 137   POTASSIUM mmol/L 4.1 4.6 5.4* 5.1 4.8 4.2  --  4.9 4.0   CHLORIDE mmol/L 97*  --  94* 98 99 103  --  100 96*   CO2 mmol/L 24.0  --  25.0 16.0* 20.0* 25.0  --  21.0* 24.0   BUN mg/dL 99*  --  100* 91* 91* 75*  --  78* 64*   CREATININE mg/dL 2.04*  --  2.53* 2.16* 2.36* 1.85*  --  1.94* 1.74*   GLUCOSE mg/dL 114*  --  116* 152* 156* 99  --  125* 122*   MAGNESIUM mg/dL  --   --   --  2.7* 2.6*  --  2.5*  --   --    PHOSPHORUS mg/dL  --   --   --  5.7* 5.0*  --  5.0*  --   --      Cr Clearance Estimated Creatinine Clearance: 19 mL/min (A) (by C-G formula based on SCr of 2.04 mg/dL (H)).  Coag   Results from last 7 days   Lab Units 05/11/22 0429   INR  1.31*     HbA1C No results found for: HGBA1C  Blood Glucose   Glucose   Date/Time Value Ref Range Status   05/11/2022 1115 181 (H) 70 - 105 mg/dL Final     Comment:     Serial Number: [id number]:  [id number]   05/11/2022 0736 99 70 - 105 mg/dL Final     Comment:     Serial Number: [id number]:  [id number]   05/10/2022 2154 215 (H) 70 - 105 mg/dL Final     Comment:     Serial Number: [id number]:  [id number]   05/10/2022 1624 118 (H) 70 - 105 mg/dL Final     Comment:     Serial Number: [id number]:  [id number]   05/10/2022 1206 133 (H) 70 - 105 mg/dL Final     Comment:     Serial Number: [id number]:  [id number]   05/10/2022 0718 104 70 - 105 mg/dL Final     Comment:     Serial Number: [id number]:  [id number]   05/09/2022 1915 152 (H) 70 - 105 mg/dL Final     Comment:     Serial Number: [id number]:  [id number]   05/09/2022 1647 153 (H) 70 - 105 mg/dL Final     Comment:     Serial Number:  [id number]:  [id number]     Infection     CMP   Results from last 7 days   Lab Units 05/11/22  0429 05/10/22  1216 05/10/22  0611 05/09/22 0522 05/08/22  0026 05/07/22  0428 05/06/22  0421 05/05/22  0325   SODIUM mmol/L 137  --  139 135* 136 143 139 137   POTASSIUM mmol/L 4.1 4.6 5.4* 5.1 4.8 4.2 4.9 4.0   CHLORIDE mmol/L 97*  --  94* 98 99 103 100 96*   CO2 mmol/L 24.0  --  25.0 16.0* 20.0* 25.0 21.0* 24.0   BUN mg/dL 99*  --  100* 91* 91* 75* 78* 64*   CREATININE mg/dL 2.04*  --  2.53* 2.16* 2.36* 1.85* 1.94* 1.74*   GLUCOSE mg/dL 114*  --  116* 152* 156* 99 125* 122*   ALBUMIN g/dL 3.50  --  3.80 3.60  --  3.20*  --   --    BILIRUBIN mg/dL 0.8  --  0.8 0.8  --  0.7  --   --    ALK PHOS U/L 309*  --  321* 230*  --  120*  --   --    AST (SGOT) U/L 214*  --  459* 353*  --  269*  --   --    ALT (SGPT) U/L 616*  --  968* 756*  --  386*  --   --      UA      Radiology(recent) XR Chest 1 View    Result Date: 5/9/2022  1.Mildly increased right upper lung airspace opacities, which may be due to atelectasis and/or pneumonia. 2.Small left pleural effusion.  Electronically Signed By-Hoa Bonilla MD On:5/9/2022 3:13 PM This report was finalized on [id number] by  Hoa Bonilla MD.     Assessment:    Acute hepatitis  Acute shortness of breath  Acute pulmonary edema  Acute metabolic acidosis  Acute hyperkalemia  Hepatic transaminase derangement likely secondary to shock liver  History of sick sinus syndrome  Acute exacerbation of chronic systolic congestive heart failure  Acute renal failure superimposed upon chronic kidney disease stage IIIb  Acute kidney injury  Right upper lobe pulmonary nodule  History of pacemaker placement with replacement of generator 1/22  Atherosclerotic heart disease of native coronary arteries with angina pectoris  History of coronary artery bypass grafting in 1993  HFrEF 35%  Chronic atrial fibrillation, persistent intermittent  Hypercoagulable state secondary to atrial  fibrillation  Cerebrovascular disease with history of CVA  History of carotid occlusive disease  Vitamin D deficiency  Hypertension associated chronic kidney disease stage IIIb  Degenerative joint disease  Hypokalemia  Bilateral upper lobe pulmonary nodularities  Gastroesophageal reflux disease without esophagitis    Plan:  CPM//supportive care        Antony Lawson MD  05/11/22  14:27 EDT

## 2022-05-12 ENCOUNTER — INPATIENT HOSPITAL (AMBULATORY)
Dept: URBAN - METROPOLITAN AREA HOSPITAL 84 | Facility: HOSPITAL | Age: 87
End: 2022-05-12

## 2022-05-12 DIAGNOSIS — I25.10 ATHEROSCLEROTIC HEART DISEASE OF NATIVE CORONARY ARTERY WITH: ICD-10-CM

## 2022-05-12 DIAGNOSIS — R06.02 SHORTNESS OF BREATH: ICD-10-CM

## 2022-05-12 DIAGNOSIS — R94.5 ABNORMAL RESULTS OF LIVER FUNCTION STUDIES: ICD-10-CM

## 2022-05-12 DIAGNOSIS — N18.30 CHRONIC KIDNEY DISEASE, STAGE 3 UNSPECIFIED: ICD-10-CM

## 2022-05-12 DIAGNOSIS — Z95.0 PRESENCE OF CARDIAC PACEMAKER: ICD-10-CM

## 2022-05-12 DIAGNOSIS — R10.13 EPIGASTRIC PAIN: ICD-10-CM

## 2022-05-12 PROCEDURE — 99231 SBSQ HOSP IP/OBS SF/LOW 25: CPT | Mod: FS | Performed by: NURSE PRACTITIONER

## 2022-05-12 NOTE — PROGRESS NOTES
Daily Progress Note        Dyspnea, unspecified type    Moderate malnutrition (HCC)           Assessment:     dyspnea improved with diuretics  As well as interstitial markings with diuretics unlikely interstitial lung disease  Presentation suggestive of Pulmonary edema  CHF EF 35%  Coronary artery disease  A. fib  CKD           Recommendations:      Diuresis for cardiomyopathy EF 25%    CPAP at bedtime/as needed  Completed prednisone  Mucinex  Sodium bicarb 1300 mg 3 times a day  Midodrine  DuoNebs as needed    Repeat CT scan of the chest after 3 months to follow-up on right upper lobe nodule     5/7/2022        LOS: 7 days     Subjective         Objective     Vital signs for last 24 hours:  Vitals:    05/11/22 1419 05/11/22 1635 05/11/22 1759 05/11/22 1800   BP: 141/62  130/64 130/64   BP Location: Left arm  Left arm    Patient Position: Sitting  Sitting    Pulse: 100 92 92 97   Resp: 22  24    Temp: 97.8 °F (36.6 °C)  97.6 °F (36.4 °C)    TempSrc: Oral  Oral    SpO2: 100% 100% 100% 95%   Weight:       Height:           Intake/Output last 3 shifts:  I/O last 3 completed shifts:  In: 730 [P.O.:730]  Out: 1250 [Urine:1250]  Intake/Output this shift:  No intake/output data recorded.      Radiology  Imaging Results (Last 24 Hours)     ** No results found for the last 24 hours. **          Labs:  Results from last 7 days   Lab Units 05/11/22  0429   WBC 10*3/mm3 9.10   HEMOGLOBIN g/dL 13.3   HEMATOCRIT % 41.4   PLATELETS 10*3/mm3 175     Results from last 7 days   Lab Units 05/11/22  0429   SODIUM mmol/L 137   POTASSIUM mmol/L 4.1   CHLORIDE mmol/L 97*   CO2 mmol/L 24.0   BUN mg/dL 99*   CREATININE mg/dL 2.04*   CALCIUM mg/dL 8.6   BILIRUBIN mg/dL 0.8   ALK PHOS U/L 309*   ALT (SGPT) U/L 616*   AST (SGOT) U/L 214*   GLUCOSE mg/dL 114*     Results from last 7 days   Lab Units 05/06/22  1412   PH, ARTERIAL pH units 7.538*   PO2 ART mm Hg 328.6*   PCO2, ARTERIAL mm Hg 19.3*   HCO3 ART mmol/L 16.4*     Results from last 7  days   Lab Units 05/11/22  0429 05/10/22  0611 05/09/22  0522   ALBUMIN g/dL 3.50 3.80 3.60     Results from last 7 days   Lab Units 05/08/22  0026   CK TOTAL U/L 95     Results from last 7 days   Lab Units 05/09/22  1021   MED  Negative     Results from last 7 days   Lab Units 05/09/22  0522   MAGNESIUM mg/dL 2.7*     Results from last 7 days   Lab Units 05/11/22  0429   INR  1.31*               Meds:   SCHEDULE  amiodarone, 300 mg, Oral, Q12H  aspirin, 81 mg, Oral, Daily  enoxaparin, 1 mg/kg, Subcutaneous, Q24H  finasteride, 5 mg, Oral, Daily  guaiFENesin, 600 mg, Oral, Q12H  midodrine, 5 mg, Oral, TID AC  sodium bicarbonate, 1,300 mg, Oral, TID  sodium chloride, 3 mL, Intravenous, Q12H      Infusions  DOBUTamine, 2.5 mcg/kg/min, Last Rate: Stopped (05/11/22 0552)  furosemide (LASIX) 100 mg in 100mL NS infusion, 20 mg/hr, Last Rate: 20 mg/hr (05/11/22 2027)  Pharmacy to Dose enoxaparin (LOVENOX),       PRNs  ipratropium-albuterol  •  nitroglycerin  •  ondansetron  •  oxyCODONE  •  Pharmacy to Dose enoxaparin (LOVENOX)  •  [COMPLETED] Insert peripheral IV **AND** sodium chloride  •  sodium chloride    Physical Exam:  Physical Exam  Cardiovascular:      Heart sounds: Murmur heard.   Pulmonary:      Effort: Pulmonary effort is normal.      Breath sounds: Examination of the right-lower field reveals decreased breath sounds. Examination of the left-lower field reveals decreased breath sounds. Decreased breath sounds and rales present.         ROS  Review of Systems   Respiratory: Positive for cough and shortness of breath.              I have reviewed current clinicals.     Electronically signed by CARMEN Narayan, 05/09/22, 12:12 PM EDT.     The NP scribed the note for me, I have examined the patient and reviewed and verified the above findings and and I formulated the assessment and plan as documented

## 2022-05-12 NOTE — PROGRESS NOTES
LOS: 8 days   Patient Care Team:  Antony Lawson MD as PCP - General  Tam Gonzalez MD as Consulting Physician (Nephrology)  Minh Kendrick MD as Consulting Physician (Cardiology)  Chris Romero MD as Consulting Physician (Cardiology)      Subjective     Interval History:     Subjective: Patient states he is feeling very well and is tolerating his breakfast.  Denies abdominal pain, nausea, vomiting.      ROS:   No chest pain, shortness of breath, or cough.       Medication Review:     Current Facility-Administered Medications:   •  aspirin EC tablet 81 mg, 81 mg, Oral, Daily, Kristen Crowell MD, 81 mg at 05/12/22 0916  •  DOBUTamine (DOBUTREX) 1 mg/mL infusion, 2.5 mcg/kg/min, Intravenous, Titrated, Chris Romero MD, Last Rate: 17.16 mL/hr at 05/12/22 0600, 5 mcg/kg/min at 05/12/22 0600  •  Enoxaparin Sodium (LOVENOX) syringe 60 mg, 1 mg/kg, Subcutaneous, Q24H, Emily Wu MD, 60 mg at 05/11/22 1604  •  finasteride (PROSCAR) tablet 5 mg, 5 mg, Oral, Daily, Kristen Crowell MD, 5 mg at 05/12/22 0916  •  furosemide (LASIX) 100 mg in sodium chloride 0.9 % 100 mL infusion, 20 mg/hr, Intravenous, Continuous, Tam Gonzalez MD, Last Rate: 20 mL/hr at 05/12/22 1059, 20 mg/hr at 05/12/22 1059  •  guaiFENesin (MUCINEX) 12 hr tablet 600 mg, 600 mg, Oral, Q12H, Leann Short APRN, 600 mg at 05/12/22 0916  •  ipratropium-albuterol (DUO-NEB) nebulizer solution 3 mL, 3 mL, Nebulization, Q4H PRN, Emily Wu MD, 3 mL at 05/06/22 1330  •  magnesium sulfate 2g/50 mL (PREMIX) infusion, 2 g, Intravenous, Once, Chris Romero MD  •  midodrine (PROAMATINE) tablet 5 mg, 5 mg, Oral, TID AC, Tam Gonzalez MD, 5 mg at 05/12/22 1100  •  nitroglycerin (NITROSTAT) SL tablet 0.4 mg, 0.4 mg, Sublingual, Q5 Min PRN, Kristen Crowell MD  •  ondansetron (ZOFRAN) injection 4 mg, 4 mg, Intravenous, Q6H PRN, Kristen Crowell MD  •  oxyCODONE (ROXICODONE) immediate release tablet 5 mg, 5 mg, Oral, Q4H  PRN, Leann Short APRN, 5 mg at 05/10/22 1108  •  Pharmacy to Dose enoxaparin (LOVENOX), , Does not apply, Continuous PRN, Emily Wu MD  •  potassium chloride (K-DUR,KLOR-CON) CR tablet 40 mEq, 40 mEq, Oral, PRN, Chris Romero MD, 40 mEq at 05/12/22 1100  •  sodium bicarbonate tablet 1,300 mg, 1,300 mg, Oral, TID, Georgina Villa MD, 1,300 mg at 05/12/22 0916  •  [COMPLETED] Insert peripheral IV, , , Once **AND** sodium chloride 0.9 % flush 10 mL, 10 mL, Intravenous, PRN, Kristen Crowell MD  •  sodium chloride 0.9 % flush 3 mL, 3 mL, Intravenous, Q12H, Kristen Crowell MD, 3 mL at 05/12/22 0919  •  sodium chloride 0.9 % flush 3-10 mL, 3-10 mL, Intravenous, PRN, Kristen Crowell MD      Objective     Vital Signs  Vitals:    05/12/22 0500 05/12/22 0916 05/12/22 1002 05/12/22 1100   BP: 131/43 139/51 138/47 134/48   BP Location: Left arm  Left arm    Patient Position: Lying  Sitting    Pulse: 79 82 86 81   Resp: 16  20    Temp: 97.8 °F (36.6 °C)  97.8 °F (36.6 °C)    TempSrc: Oral  Oral    SpO2: 100%      Weight: 57 kg (125 lb 10.6 oz)      Height:           Physical Exam:     General Appearance:    Awake and alert, in no acute distress   Head:    Normocephalic, without obvious abnormality   Eyes:          Conjunctivae normal, anicteric sclera   Throat:   No oral lesions, no thrush, oral mucosa moist   Neck:   No adenopathy, supple, no JVD   Lungs:     respirations regular, even and unlabored   Abdomen:     Soft, non-tender, no rebound or guarding, non-distended, no hepatosplenomegaly   Rectal:     Deferred   Extremities:   No edema, no cyanosis   Skin:   No bruising or rash, no jaundice        Results Review:    CBC    Results from last 7 days   Lab Units 05/11/22  0167 05/11/22  0429 05/10/22  0611 05/09/22  0522 05/08/22  0027 05/07/22  0234 05/06/22  0133   WBC 10*3/mm3 10.10 9.10 8.70 11.10* 10.60 8.30 9.60   HEMOGLOBIN g/dL 13.8 13.3 15.0 14.0 13.8 13.2 13.9   PLATELETS 10*3/mm3 179 175 193  198 208 208 220     CMP   Results from last 7 days   Lab Units 05/12/22  0637 05/11/22  0429 05/10/22  1216 05/10/22  0611 05/09/22 0522 05/08/22  0026 05/07/22  0428 05/07/22  0234 05/06/22 0421   SODIUM mmol/L 140 137  --  139 135* 136 143  --  139   POTASSIUM mmol/L 3.5 4.1 4.6 5.4* 5.1 4.8 4.2  --  4.9   CHLORIDE mmol/L 96* 97*  --  94* 98 99 103  --  100   CO2 mmol/L 30.0* 24.0  --  25.0 16.0* 20.0* 25.0  --  21.0*   BUN mg/dL 85* 99*  --  100* 91* 91* 75*  --  78*   CREATININE mg/dL 1.80* 2.04*  --  2.53* 2.16* 2.36* 1.85*  --  1.94*   GLUCOSE mg/dL 94 114*  --  116* 152* 156* 99  --  125*   ALBUMIN g/dL 3.50 3.50  --  3.80 3.60  --  3.20*  --   --    BILIRUBIN mg/dL 1.2 0.8  --  0.8 0.8  --  0.7  --   --    ALK PHOS U/L 284* 309*  --  321* 230*  --  120*  --   --    AST (SGOT) U/L 128* 214*  --  459* 353*  --  269*  --   --    ALT (SGPT) U/L 521* 616*  --  968* 756*  --  386*  --   --    MAGNESIUM mg/dL 2.4*  --   --   --  2.7* 2.6*  --  2.5*  --    PHOSPHORUS mg/dL  --   --   --   --  5.7* 5.0*  --  5.0*  --      Cr Clearance Estimated Creatinine Clearance: 21.6 mL/min (A) (by C-G formula based on SCr of 1.8 mg/dL (H)).  Coag   Results from last 7 days   Lab Units 05/11/22 0429   INR  1.31*     HbA1C No results found for: HGBA1C  Blood Glucose   Glucose   Date/Time Value Ref Range Status   05/12/2022 1051 168 (H) 70 - 105 mg/dL Final     Comment:     Serial Number: [id number]:  [id number]   05/12/2022 0736 88 70 - 105 mg/dL Final     Comment:     Serial Number: [id number]:  [id number]   05/11/2022 1923 137 (H) 70 - 105 mg/dL Final     Comment:     Serial Number: [id number]:  [id number]   05/11/2022 1619 125 (H) 70 - 105 mg/dL Final     Comment:     Serial Number: [id number]:  [id number]   05/11/2022 1115 181 (H) 70 - 105 mg/dL Final     Comment:     Serial Number: [id number]:  [id number]   05/11/2022 0736 99 70 - 105 mg/dL Final     Comment:     Serial Number:  [id number]:  [id number]   05/10/2022 2154 215 (H) 70 - 105 mg/dL Final     Comment:     Serial Number: [id number]:  [id number]   05/10/2022 1624 118 (H) 70 - 105 mg/dL Final     Comment:     Serial Number: [id number]:  [id number]     Infection     UA      Radiology(recent) No radiology results for the last day       Assessment & Plan   ASSESSMENT  -Elevated LFTs -improving   -Epigastric pain - improved  -Shortness of air -likely secondary to CHF, improved  -CAD  -Pacemaker  -CHF  -CKD stage III (Cr 2.53)     PLAN:  89-year-old male presented 5/3 with shortness of air.    GI has been consulted for elevated LFTs which have been normal in the past.  Ultrasound was normal other than gallbladder thickening and uncomplicated cholelithiasis.   He received Acetadote.   Liver serologies negative.  Lyme IgG negative.  GGT and LDH elevated.  Suspect shock liver.  LFTs continue to trend down and anticipate that they will keep doing so with adequate blood pressure.  Recommend follow-up with PCP after discharge and recheck LFTs.  Can follow-up with us at our office as needed.  Okay for discharge home from GI standpoint as long as medically stable otherwise.    I discussed the patients findings and my recommendations with the patient.  Electronically signed by CARMEN Ken, 05/12/22, 11:57 AM EDT.

## 2022-05-12 NOTE — CONSULTS
Nutrition Services    Patient Name: Shon Syed  YOB: 1930  MRN: [id number]  Admission date: 5/3/2022    Comment:  -- Novasource Renal BID (Provides 950 kcals, 43 g protein if consumed)         PPE Documentation        PPE Worn By Provider mask, gloves and eye protection   PPE Worn By Patient  None      CLINICAL NUTRITION ASSESSMENT      Reason for Assessment Follow up  5/5: BMI less than 19     H&P  89 y.o. male with valvular heart disease and severe right ventricular dysfunction who presents with progressive shortness of breath and orthopnea for 2 weeks, worse over the last 2 days    Past Medical History:   Diagnosis Date   • Atrial flutter (HCC)    • CHF (congestive heart failure) ([facility])    • Chronic kidney disease    • Coronary artery disease    • Coronary artery disease involving native coronary artery of native heart 8/6/2019    Status post multivessel CABG 1993   • Diabetes mellitus ([facility])    • Essential hypertension 8/6/2019   • HFrEF (heart failure with reduced ejection fraction) ([facility])    • Hyperlipidemia    • Hyperlipidemia, mixed 8/6/2019   • Hypertension    • Presence of cardiac pacemaker 8/6/2019    S/P dual-chamber pacemaker implanted 2009   • Sick sinus syndrome (HCC) 8/6/2019   • Stroke ([facility])        Past Surgical History:   Procedure Laterality Date   • CARDIAC CATHETERIZATION     • CARDIAC ELECTROPHYSIOLOGY PROCEDURE N/A 1/17/2022    Procedure: Gen change-[place] aware;  Surgeon: Minh Kendrick MD;  Location: [facility] INVASIVE LOCATION;  Service: Cardiology;  Laterality: N/A;   • CORONARY ARTERY BYPASS GRAFT  1993   • CYSTOSCOPY     • HERNIA REPAIR     • INSERT / REPLACE / REMOVE PACEMAKER  2010    BS insertion   • PACEMAKER REPLACEMENT  01/17/2022    [facility]   • VENA CAVA FILTER PLACEMENT  04/2016        Current Problems   Dyspnea    Acute pulmonary edema  -Cardiology following    Cardiac stenosis    Sick sinus syndrome    HLD    CAD    CKD  -Nephrology following    "    Encounter Information        Trending Narrative     5/12: Visited patient in room for follow up, family at bedside. Pt states he is doing pretty good with his meals and drinking the Novasource Renal supplement 2x/day. Reports he is having some issues with his taste, but otherwise doing ok with his meal choices.     5/5: RD visited patient at bedside.  Patient states 150-160# UBW, has been on diuretics recently which may account for recent weight loss.  No N/V, no chewing/swallowing issues noted.  Spoke also with patients son who was visiting.  Patient and son looking at lunch menu during patient visit.       Anthropometrics        Current Height, Weight Height: 172.7 cm (68\")  Weight: 57 kg (125 lb 10.6 oz) (05/12/22 0500)       Ideal Body Weight (IBW) 148#    Usual Body Weight (UBW) 150-160# range        Trending Weight Hx     This admission: 5/12: current weight up 4lb from last assessment, noted with vol OL  5/5: 121-128# range              PTA: 7.6% weight loss x 4 months   12.3% weight loss x 1 year     Wt Readings from Last 30 Encounters:   05/12/22 0500 57 kg (125 lb 10.6 oz)   05/11/22 0535 57 kg (125 lb 9.6 oz)   05/10/22 1119 57.2 kg (126 lb)   05/10/22 0309 57.2 kg (126 lb)   05/09/22 0429 56 kg (123 lb 7.3 oz)   05/07/22 0452 59.3 kg (130 lb 11.4 oz)   05/06/22 1912 56.3 kg (124 lb 1.6 oz)   05/06/22 0311 56.1 kg (123 lb 11.2 oz)   05/05/22 0357 55.2 kg (121 lb 11.1 oz)   05/04/22 0338 55.1 kg (121 lb 7.6 oz)   05/03/22 1114 58.2 kg (128 lb 4.9 oz)   03/26/22 1435 58.5 kg (129 lb)   03/26/22 0546 58.9 kg (129 lb 13.6 oz)   03/25/22 0500 59.2 kg (130 lb 8.2 oz)   03/24/22 0915 61.3 kg (135 lb 2.3 oz)   01/17/22 1413 59.7 kg (131 lb 9.8 oz)   12/21/21 1022 63.3 kg (139 lb 8 oz)   11/24/21 1350 62.3 kg (137 lb 6.4 oz)   07/09/21 1035 59.9 kg (132 lb)   06/08/21 0943 59 kg (130 lb)   05/31/21 0410 59.1 kg (130 lb 4.7 oz)   05/30/21 0422 63.4 kg (139 lb 12.7 oz)   05/29/21 0917 63.4 kg (139 lb 12.4 oz) "   05/17/21 1538 62.6 kg (138 lb)   02/23/21 1120 62.1 kg (137 lb)   08/06/20 0955 63 kg (139 lb)   03/27/20 1341 63.2 kg (139 lb 5.3 oz)   02/06/20 1107 64.7 kg (142 lb 9.6 oz)   08/06/19 1356 65 kg (143 lb 6.4 oz)   02/05/19 0752 65.3 kg (144 lb)   08/07/18 0951 63 kg (139 lb)   02/08/18 0654 66.7 kg (147 lb)   08/08/17 0804 64 kg (141 lb)   07/17/17 1449 62.2 kg (137 lb 3.2 oz)   02/06/17 0832 66.2 kg (146 lb)   05/03/16 0816 66.2 kg (146 lb)   02/02/16 1331 67.6 kg (149 lb)      BMI kg/m2 Body mass index is 19.11 kg/m².       Labs        Pertinent Labs    Results from last 7 days   Lab Units 05/12/22  0637 05/11/22  0429 05/10/22  1216 05/10/22  0611   SODIUM mmol/L 140 137  --  139   POTASSIUM mmol/L 3.5 4.1 4.6 5.4*   CHLORIDE mmol/L 96* 97*  --  94*   CO2 mmol/L 30.0* 24.0  --  25.0   BUN mg/dL 85* 99*  --  100*   CREATININE mg/dL 1.80* 2.04*  --  2.53*   CALCIUM mg/dL 8.8 8.6  --  9.7*   BILIRUBIN mg/dL 1.2 0.8  --  0.8   ALK PHOS U/L 284* 309*  --  321*   ALT (SGPT) U/L 521* 616*  --  968*   AST (SGOT) U/L 128* 214*  --  459*   GLUCOSE mg/dL 94 114*  --  116*     Results from last 7 days   Lab Units 05/11/22  2348 05/10/22  0611 05/09/22  0522 05/08/22  0027 05/08/22  0026 05/07/22  0234   MAGNESIUM mg/dL  --   --  2.7*  --  2.6* 2.5*   PHOSPHORUS mg/dL  --   --  5.7*  --  5.0* 5.0*   HEMOGLOBIN g/dL 13.8   < > 14.0   < >  --  13.2   HEMATOCRIT % 42.4   < > 43.4   < >  --  40.6    < > = values in this interval not displayed.     COVID19   Date Value Ref Range Status   05/03/2022 Not Detected Not Detected - Ref. Range Final     No results found for: HGBA1C     Medications    Scheduled Medications aspirin, 81 mg, Oral, Daily  enoxaparin, 1 mg/kg, Subcutaneous, Q24H  finasteride, 5 mg, Oral, Daily  guaiFENesin, 600 mg, Oral, Q12H  magnesium sulfate, 2 g, Intravenous, Once  midodrine, 5 mg, Oral, TID AC  sodium bicarbonate, 1,300 mg, Oral, TID  sodium chloride, 3 mL, Intravenous, Q12H        Infusions  DOBUTamine, 2.5 mcg/kg/min, Last Rate: 5 mcg/kg/min (05/12/22 0600)  furosemide (LASIX) 100 mg in 100mL NS infusion, 20 mg/hr, Last Rate: 20 mg/hr (05/12/22 1059)  Pharmacy to Dose enoxaparin (LOVENOX),         PRN Medications ipratropium-albuterol  •  nitroglycerin  •  ondansetron  •  oxyCODONE  •  Pharmacy to Dose enoxaparin (LOVENOX)  •  potassium chloride  •  [COMPLETED] Insert peripheral IV **AND** sodium chloride  •  sodium chloride     Physical Findings        Trending Physical   Appearance, NFPE 5/12: NFPE completed, appears consistent with last assessment. Some areas (BLE) may be borderline severe, but may be exacerbated due to advanced age.    5/5: NFPE completed, consistent with nutrition diagnosis of malnutrition using AND/ASPEN criteria. See MSA below.    --  Edema  trace     Bowel Function Last BM 5/11     Tubes No feeding tube      Chewing/Swallowing No issues per patient      Skin Intact      --  Current Nutrition Orders & Evaluation of Intake       Oral Nutrition     Food Allergies NKFA   Current PO Diet Diet Cardiac, Diabetic/Consistent Carbs, Renal; Healthy Heart; Diabetic - Consistent Carb; 2gm K+; Fluid Restriction; 1500cc/day   Supplement None ordered    PO Evaluation     Trending % PO Intake 5/12: 58% last few days   5/5: 38% average po intakes since admission    --  Nutritional Risk Screening        NRS-2002 Score          Nutrition Diagnosis         Nutrition Dx Problem 1 Moderate chronic disease related malnutrition related to past medical history including CAD, HLD, DM, fluid retention as evidenced by fat/muscle loss per physical exam.        Nutrition Dx Problem 2        Intervention Goal         Intervention Goal(s) Accept ONS  PO intakes at least 65%     Nutrition Intervention        RD Action Continue ONS  Remove Low K diet restriction     Nutrition Prescription          Diet Prescription Consistent CHO/Heart Healthy 1500mL FR   Supplement Prescription Novasource Renal BID (Provides  950 kcals, 43 g protein if consumed)      --  Monitor/Evaluation        Monitor Per protocol, I&O, PO intake, Supplement intake, Pertinent labs, Weight, Skin status, GI status, Symptoms, POC/GOC, Swallow function     Malnutrition Severity Assessment      Patient meets criteria for : Moderate (non-severe) Malnutrition       Electronically signed by:  Yanci Hernández RD  05/12/22 11:15 EDT;

## 2022-05-12 NOTE — PROGRESS NOTES
"NEPHROLOGY PROGRESS NOTE------[facility]    [facility]  [phone]  Tam Gonzlaez MD      Patient Care Team:  Antony Lawson MD as PCP - General  Tam oGnzalez MD as Consulting Physician (Nephrology)  Minh Kendrick MD as Consulting Physician (Cardiology)  Chris Romero MD as Consulting Physician (Cardiology)      Provider:  Tam Gonzalez MD  Patient Name: Shon ZAYAS Kunal  :  1930    SUBJECTIVE:  F/U CKD  No chest pain, breathing better      Medication:  aspirin, 81 mg, Oral, Daily  enoxaparin, 1 mg/kg, Subcutaneous, Q24H  finasteride, 5 mg, Oral, Daily  guaiFENesin, 600 mg, Oral, Q12H  magnesium sulfate, 2 g, Intravenous, Once  midodrine, 5 mg, Oral, TID AC  sodium bicarbonate, 1,300 mg, Oral, TID  sodium chloride, 3 mL, Intravenous, Q12H      DOBUTamine, 2.5 mcg/kg/min, Last Rate: 5 mcg/kg/min (22 0600)  furosemide (LASIX) 100 mg in 100mL NS infusion, 20 mg/hr, Last Rate: 20 mg/hr (22 1059)  Pharmacy to Dose enoxaparin (LOVENOX),         OBJECTIVE    Vital Sign Min/Max for last 24 hours  Temp  Min: 97.3 °F (36.3 °C)  Max: 97.8 °F (36.6 °C)   BP  Min: 116/43  Max: 142/52   Pulse  Min: 69  Max: 100   Resp  Min: 16  Max: 26   SpO2  Min: 81 %  Max: 100 %   No data recorded   Weight  Min: 57 kg (125 lb 10.6 oz)  Max: 57 kg (125 lb 10.6 oz)     Flowsheet Rows    Flowsheet Row First Filed Value   Admission Height 172.7 cm (68\") Documented at 2022 1114   Admission Weight 58.2 kg (128 lb 4.9 oz) Documented at 2022 1114          I/O this shift:  In: 240 [P.O.:240]  Out: 300 [Urine:300]  I/O last 3 completed shifts:  In: 720 [P.O.:720]  Out: 4600 [Urine:4600]    Physical Exam:  General Appearance: alert, appears stated age and cooperative  Head: normocephalic, without obvious abnormality and atraumatic  Eyes: conjunctivae and sclerae normal and no icterus  Neck: supple and +JVD  Lungs: diminished breath " sounds  Heart: regular rhythm & normal rate and normal S1, S2  Chest: Wall no abnormalities observed  Abdomen: normal bowel sounds and soft non-tender  Extremities: moves extremities well, no edema, no cyanosis and no redness  Skin: no bleeding, bruising or rash, turgor normal, color normal and no lesions noted  Neurologic: Alert, and oriented. No focal deficits    Labs:    WBC WBC   Date Value Ref Range Status   05/11/2022 10.10 3.40 - 10.80 10*3/mm3 Final   05/11/2022 9.10 3.40 - 10.80 10*3/mm3 Final   05/10/2022 8.70 3.40 - 10.80 10*3/mm3 Final      HGB Hemoglobin   Date Value Ref Range Status   05/11/2022 13.8 13.0 - 17.7 g/dL Final   05/11/2022 13.3 13.0 - 17.7 g/dL Final   05/10/2022 15.0 13.0 - 17.7 g/dL Final      HCT Hematocrit   Date Value Ref Range Status   05/11/2022 42.4 37.5 - 51.0 % Final   05/11/2022 41.4 37.5 - 51.0 % Final   05/10/2022 45.5 37.5 - 51.0 % Final      Platlets No results found for: LABPLAT   MCV MCV   Date Value Ref Range Status   05/11/2022 100.8 (H) 79.0 - 97.0 fL Final   05/11/2022 99.6 (H) 79.0 - 97.0 fL Final   05/10/2022 99.9 (H) 79.0 - 97.0 fL Final          Sodium Sodium   Date Value Ref Range Status   05/12/2022 140 136 - 145 mmol/L Final   05/11/2022 137 136 - 145 mmol/L Final   05/10/2022 139 136 - 145 mmol/L Final      Potassium Potassium   Date Value Ref Range Status   05/12/2022 3.5 3.5 - 5.2 mmol/L Final   05/11/2022 4.1 3.5 - 5.2 mmol/L Final   05/10/2022 4.6 3.5 - 5.2 mmol/L Final     Comment:     Slight hemolysis detected by analyzer. Results may be affected.   05/10/2022 5.4 (H) 3.5 - 5.2 mmol/L Final      Chloride Chloride   Date Value Ref Range Status   05/12/2022 96 (L) 98 - 107 mmol/L Final   05/11/2022 97 (L) 98 - 107 mmol/L Final   05/10/2022 94 (L) 98 - 107 mmol/L Final      CO2 CO2   Date Value Ref Range Status   05/12/2022 30.0 (H) 22.0 - 29.0 mmol/L Final   05/11/2022 24.0 22.0 - 29.0 mmol/L Final   05/10/2022 25.0 22.0 - 29.0 mmol/L Final      BUN BUN    Date Value Ref Range Status   05/12/2022 85 (H) 8 - 23 mg/dL Final   05/11/2022 99 (H) 8 - 23 mg/dL Final   05/10/2022 100 (H) 8 - 23 mg/dL Final      Creatinine Creatinine   Date Value Ref Range Status   05/12/2022 1.80 (H) 0.76 - 1.27 mg/dL Final   05/11/2022 2.04 (H) 0.76 - 1.27 mg/dL Final   05/10/2022 2.53 (H) 0.76 - 1.27 mg/dL Final      Calcium Calcium   Date Value Ref Range Status   05/12/2022 8.8 8.2 - 9.6 mg/dL Final   05/11/2022 8.6 8.2 - 9.6 mg/dL Final   05/10/2022 9.7 (H) 8.2 - 9.6 mg/dL Final      PO4 No components found for: PO4   Albumin Albumin   Date Value Ref Range Status   05/12/2022 3.50 3.50 - 5.20 g/dL Final   05/11/2022 3.50 3.50 - 5.20 g/dL Final   05/10/2022 3.80 3.50 - 5.20 g/dL Final      Magnesium Magnesium   Date Value Ref Range Status   05/12/2022 2.4 (H) 1.7 - 2.3 mg/dL Final      Uric Acid No components found for: URIC ACID     Imaging Results (Last 72 Hours)     Procedure Component Value Units Date/Time    XR Chest 1 View [[id number]] Collected: 05/09/22 1510     Updated: 05/09/22 1515    Narrative:      DATE OF EXAM:  5/9/2022 3:02 PM     PROCEDURE:  XR CHEST 1 VW-     INDICATIONS:  Dyspnea; R06.00-Dyspnea, unspecified; I50.9-Heart failure, unspecified;  N17.9-Acute kidney failure, unspecified     COMPARISON:  CT chest high-resolution 05/07/2022, AP chest x-ray 05/06/2022.     TECHNIQUE:   Single radiographic AP view of the chest was obtained.     FINDINGS:  The patient's chin partially obscures evaluation of the upper chest.  Allowing for this limitation, no pneumothorax is seen. Cardiac  silhouette remains mildly enlarged. Changes are redemonstrated from  CABG. Pacemaker leads are stable. There are mildly increased right upper  lung airspace opacities. Blunting of the left lateral costophrenic angle  appears stable. Left lung appears grossly clear.        Impression:      1.Mildly increased right upper lung airspace opacities, which may be due  to atelectasis and/or  pneumonia.  2.Small left pleural effusion.     Electronically Signed By-Hoa Bonilla MD On:5/9/2022 3:13 PM  This report was finalized on [id number] by  Hoa Bonilla MD.          Results for orders placed during the hospital encounter of 05/03/22    XR Chest 1 View    Narrative  DATE OF EXAM:  5/9/2022 3:02 PM    PROCEDURE:  XR CHEST 1 VW-    INDICATIONS:  Dyspnea; R06.00-Dyspnea, unspecified; I50.9-Heart failure, unspecified;  N17.9-Acute kidney failure, unspecified    COMPARISON:  CT chest high-resolution 05/07/2022, AP chest x-ray 05/06/2022.    TECHNIQUE:  Single radiographic AP view of the chest was obtained.    FINDINGS:  The patient's chin partially obscures evaluation of the upper chest.  Allowing for this limitation, no pneumothorax is seen. Cardiac  silhouette remains mildly enlarged. Changes are redemonstrated from  CABG. Pacemaker leads are stable. There are mildly increased right upper  lung airspace opacities. Blunting of the left lateral costophrenic angle  appears stable. Left lung appears grossly clear.    Impression  1.Mildly increased right upper lung airspace opacities, which may be due  to atelectasis and/or pneumonia.  2.Small left pleural effusion.    Electronically Signed By-Hoa Bonilla MD On:5/9/2022 3:13 PM  This report was finalized on [id number] by  Hoa Bonilla MD.      XR Chest 1 View    Narrative  DATE OF EXAM:  5/6/2022 4:26 PM    PROCEDURE:  XR CHEST 1 VW-    INDICATIONS:  Severe SOA; R06.00-Dyspnea, unspecified; I50.9-Heart failure,  unspecified; N17.9-Acute kidney failure, unspecified    COMPARISON:  Chest radiograph 05/03/2022    TECHNIQUE:  Single radiographic view of the chest was obtained.    FINDINGS:  Patient is rotated this limits evaluation of the right lung. There is  cardiomegaly. Dual lead transvenous pacemaker device. Blunting the  costophrenic angles. Left lower lobe airspace opacity. Background  chronic interstitial lung disease.    Impression  Since  previous exam there is been development of probable small pleural  effusions. There is left lower lobe airspace opacity atelectasis versus  pneumonia.    Electronically Signed By-Marianna Flowers MD On:5/6/2022 4:32 PM  This report was finalized on [id number] by  Marianna Flowers MD.      XR Chest 1 View    Narrative  DATE OF EXAM:  5/3/2022 12:07 PM    PROCEDURE:  XR CHEST 1 VW-    INDICATIONS:  Shortness of breath. Cough for 4 months.    COMPARISON:  Chest radiographs 3/24/2022, 5/29/2021, and 5/27/2021. CT chest  3/24/2022    TECHNIQUE:  Single radiographic AP view of the chest was obtained.    FINDINGS:  Lordotic positioning. Overlying artifacts. Stable sternotomy wires,  postoperative changes from CABG, left chest wall cardiac pacer device.  Stable elevation of the right hemidiaphragm with improved aeration of  both lungs. Mild linear right infrahilar and left basilar segmental  atelectasis and/or scarring with mild interstitial thickening in the  lung bases. No focal lung consolidation. No pneumothorax. Stable  cardiomegaly, likely accentuated by technique. Calcified atherosclerotic  disease in the thoracic aorta. Chronic changes in both shoulders. No  acute osseous normality is identified.    Impression  Stable cardiomegaly with improved aeration of both lungs. Multifocal  bibasilar subsegmental atelectasis and/or scarring with mild  interstitial thickening in the lung bases that could represent mild  pulmonary vascular congestion/edema, chronic lung disease, or less  likely atypical pneumonia.    Electronically Signed By-Lance Mendes MD On:5/3/2022 12:32 PM  This report was finalized on [id number] by  Lance Mendes MD.      Results for orders placed during the hospital encounter of 03/24/22    Duplex Carotid Ultrasound CAR    Interpretation Summary  · Proximal right internal carotid artery moderate stenosis.  · Proximal left internal carotid artery moderate stenosis.        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    · CHF- EF 25-30%. Severe TR, high RVSP. cautiously diurese.   · Hypertension  · Atrial fibrillation  · History coronary disease  · Diabetes  · Elevated LFTs--GI following. Suspect related to congestive hepatomegaly     CR and LFTs better  Continue lasix infusion/ dobutamine  Diamox and KCl today  Monitor renal function fluid status electrolytes      Tam Gonzalez MD  [facility]  [phone]  05/12/22  11:33 EDT

## 2022-05-12 NOTE — PROGRESS NOTES
Daily Progress Note        Dyspnea, unspecified type    Moderate malnutrition (HCC)           Assessment:     dyspnea improved with diuretics  As well as interstitial markings with diuretics unlikely interstitial lung disease  Presentation suggestive of Pulmonary edema  CHF EF 35%  Coronary artery disease  A. fib  CKD           Recommendations:      Diuresis for cardiomyopathy EF 25%    CPAP at bedtime/as needed  - Family reports he has not been wearing it at night    Mucinex  Sodium bicarb 1300 mg 3 times a day  Midodrine  DuoNebs as needed    Repeat CT scan of the chest after 3 months to follow-up on right upper lobe nodule     5/7/2022        LOS: 8 days     Subjective         Objective     Vital signs for last 24 hours:  Vitals:    05/12/22 0916 05/12/22 1002 05/12/22 1100 05/12/22 1300   BP: 139/51 138/47 134/48 139/53   BP Location:  Left arm     Patient Position:  Sitting     Pulse: 82 86 81 79   Resp:  20     Temp:  97.8 °F (36.6 °C)     TempSrc:  Oral     SpO2:       Weight:       Height:           Intake/Output last 3 shifts:  I/O last 3 completed shifts:  In: 720 [P.O.:720]  Out: 4600 [Urine:4600]  Intake/Output this shift:  I/O this shift:  In: 240 [P.O.:240]  Out: 700 [Urine:700]      Radiology  Imaging Results (Last 24 Hours)     ** No results found for the last 24 hours. **          Labs:  Results from last 7 days   Lab Units 05/11/22  2348   WBC 10*3/mm3 10.10   HEMOGLOBIN g/dL 13.8   HEMATOCRIT % 42.4   PLATELETS 10*3/mm3 179     Results from last 7 days   Lab Units 05/12/22  0637   SODIUM mmol/L 140   POTASSIUM mmol/L 3.5   CHLORIDE mmol/L 96*   CO2 mmol/L 30.0*   BUN mg/dL 85*   CREATININE mg/dL 1.80*   CALCIUM mg/dL 8.8   BILIRUBIN mg/dL 1.2   ALK PHOS U/L 284*   ALT (SGPT) U/L 521*   AST (SGOT) U/L 128*   GLUCOSE mg/dL 94     Results from last 7 days   Lab Units 05/06/22  1412   PH, ARTERIAL pH units 7.538*   PO2 ART mm Hg 328.6*   PCO2, ARTERIAL mm Hg 19.3*   HCO3 ART mmol/L 16.4*     Results  from last 7 days   Lab Units 05/12/22  0637 05/11/22  0429 05/10/22  0611   ALBUMIN g/dL 3.50 3.50 3.80     Results from last 7 days   Lab Units 05/08/22  0026   CK TOTAL U/L 95     Results from last 7 days   Lab Units 05/09/22  1021   MED  Negative     Results from last 7 days   Lab Units 05/12/22  0637   MAGNESIUM mg/dL 2.4*     Results from last 7 days   Lab Units 05/11/22  0429   INR  1.31*               Meds:   SCHEDULE  aspirin, 81 mg, Oral, Daily  finasteride, 5 mg, Oral, Daily  guaiFENesin, 600 mg, Oral, Q12H  heparin (porcine), 5,000 Units, Subcutaneous, Q12H  magnesium sulfate, 2 g, Intravenous, Once  midodrine, 5 mg, Oral, TID AC  potassium chloride, 40 mEq, Oral, Once  sodium bicarbonate, 1,300 mg, Oral, TID  sodium chloride, 3 mL, Intravenous, Q12H      Infusions  DOBUTamine, 2.5 mcg/kg/min, Last Rate: 2.5 mcg/kg/min (05/12/22 1300)  furosemide (LASIX) 100 mg in 100mL NS infusion, 20 mg/hr, Last Rate: 20 mg/hr (05/12/22 1059)      PRNs  ipratropium-albuterol  •  nitroglycerin  •  ondansetron  •  oxyCODONE  •  potassium chloride  •  [COMPLETED] Insert peripheral IV **AND** sodium chloride  •  sodium chloride    Physical Exam:  Physical Exam  Cardiovascular:      Heart sounds: Murmur heard.   Pulmonary:      Effort: Pulmonary effort is normal.      Breath sounds: Examination of the right-lower field reveals decreased breath sounds. Examination of the left-lower field reveals decreased breath sounds. Decreased breath sounds present.      Comments: Slightly diminished bilateral bases        ROS  Review of Systems   Respiratory: Positive for cough and shortness of breath.              I have reviewed current clinicals.     Electronically signed by CARMEN Narayan, 05/12/22, 1:58 PM EDT.     The NP scribed the note for me, I have examined the patient and reviewed and verified the above findings and and I formulated the assessment and plan as documented       negative...

## 2022-05-12 NOTE — PLAN OF CARE
Goal Outcome Evaluation:                   89 y/o M admitted for dyspnea, mod malnourished. DNR. Pt is A/O x 4. Pt has difficulty swallowing pills. Dr. Romero ordered K+ to be given- x2 4 hrs apart since Pt is on Lasix gtt. Pt also on Dobutamine gtt. Vitals have remained stable throughout entire shift. Pt sits in recliner and transfers to bed w/ 1 assist. Crushed the big pills but he can swallow the small pills. Plan is to DC home w/ family w/ home health. Will cont to monitor.      Problem: Fluid Volume Excess  Goal: Fluid Balance  Outcome: Ongoing, Progressing  Intervention: Monitor and Manage Hypervolemia  Recent Flowsheet Documentation  Taken 5/12/2022 1200 by Kristofer Blanco RN  Skin Protection:   adhesive use limited   incontinence pads utilized   skin-to-device areas padded   tubing/devices free from skin contact  Taken 5/12/2022 0800 by Kristofer Blanco RN  Skin Protection:   adhesive use limited   incontinence pads utilized   skin-to-device areas padded   tubing/devices free from skin contact     Problem: Adult Inpatient Plan of Care  Goal: Plan of Care Review  Outcome: Ongoing, Progressing  Goal: Patient-Specific Goal (Individualized)  Outcome: Ongoing, Progressing  Goal: Absence of Hospital-Acquired Illness or Injury  Outcome: Ongoing, Progressing  Intervention: Identify and Manage Fall Risk  Recent Flowsheet Documentation  Taken 5/12/2022 1400 by Krsitofer Blanco RN  Safety Promotion/Fall Prevention:   safety round/check completed   room organization consistent   nonskid shoes/slippers when out of bed   lighting adjusted   fall prevention program maintained   clutter free environment maintained   assistive device/personal items within reach   activity supervised  Taken 5/12/2022 1200 by Kristofer Blanco RN  Safety Promotion/Fall Prevention:   safety round/check completed   room organization consistent   nonskid shoes/slippers when out of bed   lighting adjusted   fall prevention program maintained    clutter free environment maintained   assistive device/personal items within reach   activity supervised  Taken 5/12/2022 1000 by Kristofer Blanco RN  Safety Promotion/Fall Prevention:   safety round/check completed   room organization consistent   nonskid shoes/slippers when out of bed   lighting adjusted   fall prevention program maintained   activity supervised   assistive device/personal items within reach   clutter free environment maintained  Taken 5/12/2022 0800 by Kristofer Blanco RN  Safety Promotion/Fall Prevention:   safety round/check completed   room organization consistent   nonskid shoes/slippers when out of bed   lighting adjusted   fall prevention program maintained   clutter free environment maintained   assistive device/personal items within reach   activity supervised  Intervention: Prevent Skin Injury  Recent Flowsheet Documentation  Taken 5/12/2022 1200 by Kristofer Blanco RN  Skin Protection:   adhesive use limited   incontinence pads utilized   skin-to-device areas padded   tubing/devices free from skin contact  Taken 5/12/2022 0800 by Kristofer Blanco RN  Skin Protection:   adhesive use limited   incontinence pads utilized   skin-to-device areas padded   tubing/devices free from skin contact  Intervention: Prevent and Manage VTE (Venous Thromboembolism) Risk  Recent Flowsheet Documentation  Taken 5/12/2022 1200 by Kristofer Blanco RN  VTE Prevention/Management:   bilateral   sequential compression devices off   other (see comments)  Taken 5/12/2022 0800 by Kristofer Blanco RN  VTE Prevention/Management: (Sq Heparin)   bilateral   sequential compression devices off   other (see comments)  Intervention: Prevent Infection  Recent Flowsheet Documentation  Taken 5/12/2022 1400 by Kristofer Blanco RN  Infection Prevention:   hand hygiene promoted   personal protective equipment utilized   rest/sleep promoted  Taken 5/12/2022 1200 by Kristofer Blanco RN  Infection Prevention:   hand hygiene  promoted   personal protective equipment utilized   rest/sleep promoted  Taken 5/12/2022 1000 by Kristofer Blanco RN  Infection Prevention:   hand hygiene promoted   personal protective equipment utilized   rest/sleep promoted  Taken 5/12/2022 0800 by Kristofer Blanco RN  Infection Prevention:   hand hygiene promoted   personal protective equipment utilized   rest/sleep promoted  Goal: Optimal Comfort and Wellbeing  Outcome: Ongoing, Progressing  Intervention: Provide Person-Centered Care  Recent Flowsheet Documentation  Taken 5/12/2022 1200 by Kristofer Blanco RN  Trust Relationship/Rapport:   care explained   choices provided   emotional support provided   empathic listening provided   reassurance provided   questions encouraged   thoughts/feelings acknowledged   questions answered  Taken 5/12/2022 0800 by Kristofer Blanco RN  Trust Relationship/Rapport:   care explained   choices provided   emotional support provided   empathic listening provided   questions answered   questions encouraged   reassurance provided   thoughts/feelings acknowledged  Goal: Readiness for Transition of Care  Outcome: Ongoing, Progressing     Problem: Adjustment to Illness (Heart Failure)  Goal: Optimal Coping  Outcome: Ongoing, Progressing  Intervention: Support Psychosocial Response  Recent Flowsheet Documentation  Taken 5/12/2022 1200 by Kristofer Blanco RN  Supportive Measures: active listening utilized  Family/Support System Care: support provided  Taken 5/12/2022 0800 by Kristofer Blanco RN  Family/Support System Care: support provided     Problem: Cardiac Output Decreased (Heart Failure)  Goal: Optimal Cardiac Output  Outcome: Ongoing, Progressing  Intervention: Optimize Cardiac Output  Recent Flowsheet Documentation  Taken 5/12/2022 1200 by Kristofer Blanco RN  Environmental Support: calm environment promoted  Taken 5/12/2022 0800 by Kristofer Blanco RN  Environmental Support: calm environment promoted     Problem:  Dysrhythmia (Heart Failure)  Goal: Stable Heart Rate and Rhythm  Outcome: Ongoing, Progressing     Problem: Fluid Imbalance (Heart Failure)  Goal: Fluid Balance  Outcome: Ongoing, Progressing     Problem: Functional Ability Impaired (Heart Failure)  Goal: Optimal Functional Ability  Outcome: Ongoing, Progressing     Problem: Oral Intake Inadequate (Heart Failure)  Goal: Optimal Nutrition Intake  Outcome: Ongoing, Progressing     Problem: Respiratory Compromise (Heart Failure)  Goal: Effective Oxygenation and Ventilation  Outcome: Ongoing, Progressing  Intervention: Promote Airway Secretion Clearance  Recent Flowsheet Documentation  Taken 5/12/2022 1200 by Kristofer Blanco RN  Breathing Techniques/Airway Clearance: deep/controlled cough encouraged  Taken 5/12/2022 0800 by Kristofer Blanco RN  Breathing Techniques/Airway Clearance: deep/controlled cough encouraged     Problem: Sleep Disordered Breathing (Heart Failure)  Goal: Effective Breathing Pattern During Sleep  Outcome: Ongoing, Progressing     Problem: Malnutrition  Goal: Improved Nutritional Intake  Outcome: Ongoing, Progressing     Problem: Fall Injury Risk  Goal: Absence of Fall and Fall-Related Injury  Outcome: Ongoing, Progressing  Intervention: Identify and Manage Contributors  Recent Flowsheet Documentation  Taken 5/12/2022 1400 by Kristofer Blanco RN  Medication Review/Management: medications reviewed  Taken 5/12/2022 1200 by Krisotfer Blanco RN  Medication Review/Management: medications reviewed  Taken 5/12/2022 1000 by Kristofer Blanco RN  Medication Review/Management: medications reviewed  Taken 5/12/2022 0800 by Kristofer Blanco RN  Medication Review/Management: medications reviewed  Intervention: Promote Injury-Free Environment  Recent Flowsheet Documentation  Taken 5/12/2022 1400 by Kristofer Blanco RN  Safety Promotion/Fall Prevention:   safety round/check completed   room organization consistent   nonskid shoes/slippers when out of bed    lighting adjusted   fall prevention program maintained   clutter free environment maintained   assistive device/personal items within reach   activity supervised  Taken 5/12/2022 1200 by Kristofer Blanco RN  Safety Promotion/Fall Prevention:   safety round/check completed   room organization consistent   nonskid shoes/slippers when out of bed   lighting adjusted   fall prevention program maintained   clutter free environment maintained   assistive device/personal items within reach   activity supervised  Taken 5/12/2022 1000 by Kristofer Blanco RN  Safety Promotion/Fall Prevention:   safety round/check completed   room organization consistent   nonskid shoes/slippers when out of bed   lighting adjusted   fall prevention program maintained   activity supervised   assistive device/personal items within reach   clutter free environment maintained  Taken 5/12/2022 0800 by Kristofer Blanco RN  Safety Promotion/Fall Prevention:   safety round/check completed   room organization consistent   nonskid shoes/slippers when out of bed   lighting adjusted   fall prevention program maintained   clutter free environment maintained   assistive device/personal items within reach   activity supervised     Problem: Skin Injury Risk Increased  Goal: Skin Health and Integrity  Outcome: Ongoing, Progressing  Intervention: Optimize Skin Protection  Recent Flowsheet Documentation  Taken 5/12/2022 1200 by Kristofer Blanco RN  Pressure Reduction Techniques:   frequent weight shift encouraged   positioned off wounds   pressure points protected   weight shift assistance provided  Pressure Reduction Devices:   positioning supports utilized   pressure-redistributing mattress utilized  Skin Protection:   adhesive use limited   incontinence pads utilized   skin-to-device areas padded   tubing/devices free from skin contact  Taken 5/12/2022 0800 by Kristofer Blanco RN  Pressure Reduction Techniques:   frequent weight shift encouraged   weight  shift assistance provided   heels elevated off bed   positioned off wounds  Pressure Reduction Devices:   positioning supports utilized   pressure-redistributing mattress utilized  Skin Protection:   adhesive use limited   incontinence pads utilized   skin-to-device areas padded   tubing/devices free from skin contact

## 2022-05-12 NOTE — PROGRESS NOTES
Cardiology [place]        LOS:  LOS: 8 days   Patient Name: Shon ZAYAS Kunal  Age/Sex: 89 y.o. male  : 1930  MRN: [id number]    Day of Service: 22   Length of Stay: 8  Encounter Provider: Chris Romero MD  Place of Service: [facility] CARDIOLOGY  Patient Care Team:  Antony Lawson MD as PCP - Tam Monroe MD as Consulting Physician (Nephrology)  Minh Kendrick MD as Consulting Physician (Cardiology)  Chris Romero MD as Consulting Physician (Cardiology)    Subjective:     Chief Complaint: shortness of breath, MERA    Subjective:   Seen and examined  Maintained on dobutamine drip  Some ventricular ectopy, rates are better controlled on digoxin, amiodarone stopped secondary to LFTs  Systolics of 110s to 130s  Controlled atrial fibrillation, backup pacing rate at 75  Remains short of breath    Complex condition overall  Remains with volume overload and high filling pressures on both sides  LV function is at 25% with severe MR and severe left atrial enlargement also severe pulmonary hypertension secondary to left-sided filling pressures chronically  RV is moderate to severely hypokinetic, overwhelmed by pulmonary hypertension as well as volume, IVC is distended at 2.6-2.7 indicative of high filling pressures along with severe TR and high filling pressures on the right side with hepatic congestion and renal congestion with cardiopulmonary syndrome    Treatment will consist of further volume reduction  Stopping beta-blockers  Controlling atrial fibrillation with digoxin which will be renally dosed and dosed per level  Off amiodarone  Dobutamine drip for inotropic assisted to assist RV function and hopefully will be able to restart diuretics gently once rates are controlled and can tolerate dobutamine and further diuretics  Pacemaker rate increased to a backup rate of at least 75 rather than 60 to promote better cardiac output  Well out  atrial fibrillation in the 90s but avoid in the 130s to 140s  Digoxin for inotropic assistance    Patient is not a surgical candidate and 89 years old with severe MR, severe TR, severe pulm hypertension, severely reduced LV and RV systolic function and multiorgan dysfunction    Interventions above have resulted in improvement in creatinine and improvement in LFTs with better heart rates, gentle inotropic assistance overnight    Current Medications:   Scheduled Meds:aspirin, 81 mg, Oral, Daily  finasteride, 5 mg, Oral, Daily  guaiFENesin, 600 mg, Oral, Q12H  heparin (porcine), 5,000 Units, Subcutaneous, Q12H  magnesium sulfate, 2 g, Intravenous, Once  midodrine, 5 mg, Oral, TID AC  potassium chloride, 40 mEq, Oral, Once  sodium bicarbonate, 1,300 mg, Oral, TID  sodium chloride, 3 mL, Intravenous, Q12H      Continuous Infusions:DOBUTamine, 2.5 mcg/kg/min, Last Rate: 2.5 mcg/kg/min (05/12/22 1300)  furosemide (LASIX) 100 mg in 100mL NS infusion, 20 mg/hr, Last Rate: 20 mg/hr (05/12/22 1059)        Allergies:  Allergies   Allergen Reactions   • Penicillin G Rash   • Vancomycin Rash       Review of Systems   Constitutional: Positive for malaise/fatigue. Negative for chills and diaphoresis.   Cardiovascular: Negative for chest pain, dyspnea on exertion, irregular heartbeat, leg swelling, near-syncope, orthopnea, palpitations, paroxysmal nocturnal dyspnea and syncope.   Respiratory: Positive for shortness of breath. Negative for cough, sleep disturbances due to breathing and sputum production.    Gastrointestinal: Negative for change in bowel habit.   Genitourinary: Negative for urgency.   Neurological: Negative for dizziness and headaches.   Psychiatric/Behavioral: Negative for altered mental status.         Objective:     Temp:  [97.3 °F (36.3 °C)-97.9 °F (36.6 °C)] 97.9 °F (36.6 °C)  Heart Rate:  [69-98] 78  Resp:  [16-26] 22  BP: (116-146)/(42-64) 146/59     Intake/Output Summary (Last 24 hours) at 5/12/2022 1500  Last  data filed at 5/12/2022 1357  Gross per 24 hour   Intake 240 ml   Output 5350 ml   Net -5110 ml     Body mass index is 19.11 kg/m².      05/10/22  1119 05/11/22  0535 05/12/22  0500   Weight: 57.2 kg (126 lb) 57 kg (125 lb 9.6 oz) 57 kg (125 lb 10.6 oz)         General Appearance:    Alert, cooperative, in no acute distress                                Head: Atraumatic, normocephalic, PERRLA               Neck:   supple, no JVD   Lungs:     2L NC, dim bases    Heart:    Regular rhythm and normal rate, normal S1 and S2, 1/6 murmur   Abdomen:     Normal bowel sounds, no masses, no organomegaly, soft  non-tender, non-distended, no guarding, no rebound  tenderness   Extremities:   Moves all extremities well, no edema, no cyanosis, no  redness   Pulses:   Pulses palpable and equal bilaterally   Skin:   No bleeding, bruising or rash   Neurologic:   Awake, alert, oriented x3   Agree with exam as discussed with nurse practitioner after my face-to-face encounter with the patient      Lab Review:   Results from last 7 days   Lab Units 05/12/22  0637 05/11/22  0429   SODIUM mmol/L 140 137   POTASSIUM mmol/L 3.5 4.1   CHLORIDE mmol/L 96* 97*   CO2 mmol/L 30.0* 24.0   BUN mg/dL 85* 99*   CREATININE mg/dL 1.80* 2.04*   GLUCOSE mg/dL 94 114*   CALCIUM mg/dL 8.8 8.6   AST (SGOT) U/L 128* 214*   ALT (SGPT) U/L 521* 616*     Results from last 7 days   Lab Units 05/08/22  0026   CK TOTAL U/L 95     Results from last 7 days   Lab Units 05/11/22  2348 05/11/22  0429   WBC 10*3/mm3 10.10 9.10   HEMOGLOBIN g/dL 13.8 13.3   HEMATOCRIT % 42.4 41.4   PLATELETS 10*3/mm3 179 175     Results from last 7 days   Lab Units 05/11/22 0429   INR  1.31*     Results from last 7 days   Lab Units 05/12/22  0637 05/09/22  0522   MAGNESIUM mg/dL 2.4* 2.7*           Invalid input(s): LDLCALC  Results from last 7 days   Lab Units 05/10/22  0611   PROBNP pg/mL >70,000.0*           Recent Radiology:  Imaging Results (Most Recent)     Procedure Component  Value Units Date/Time    XR Chest 1 View [[id number]] Collected: 05/09/22 1510     Updated: 05/09/22 1515    Narrative:      DATE OF EXAM:  5/9/2022 3:02 PM     PROCEDURE:  XR CHEST 1 VW-     INDICATIONS:  Dyspnea; R06.00-Dyspnea, unspecified; I50.9-Heart failure, unspecified;  N17.9-Acute kidney failure, unspecified     COMPARISON:  CT chest high-resolution 05/07/2022, AP chest x-ray 05/06/2022.     TECHNIQUE:   Single radiographic AP view of the chest was obtained.     FINDINGS:  The patient's chin partially obscures evaluation of the upper chest.  Allowing for this limitation, no pneumothorax is seen. Cardiac  silhouette remains mildly enlarged. Changes are redemonstrated from  CABG. Pacemaker leads are stable. There are mildly increased right upper  lung airspace opacities. Blunting of the left lateral costophrenic angle  appears stable. Left lung appears grossly clear.        Impression:      1.Mildly increased right upper lung airspace opacities, which may be due  to atelectasis and/or pneumonia.  2.Small left pleural effusion.     Electronically Signed By-Hoa Bonilla MD On:5/9/2022 3:13 PM  This report was finalized on [id number] by  Hoa Bonilla MD.    US Liver [[id number]] Collected: 05/09/22 0920     Updated: 05/09/22 0924    Narrative:      DATE OF EXAM:  5/9/2022 8:54 AM     PROCEDURE:  US LIVER-     INDICATIONS:  Hepatic transaminase derangement; R06.00-Dyspnea, unspecified;  I50.9-Heart failure, unspecified; N17.9-Acute kidney failure,  unspecified     COMPARISON:  No Comparisons Available     TECHNIQUE:   Grayscale and color Doppler ultrasound evaluation of the limited abdomen  was performed.     FINDINGS:  The liver size is normal, 12.8 cm in length. The liver maintains normal  homogeneous echotexture without focal or suspicious abnormality. There  is no ascites. The portal vein is patent with hepatopedal flow. Imaged  hepatic veins are patent. The common duct measures 2 mm. No  intrahepatic  biliary dilation is seen. Incidental note is made of a 1.8 cm shadowing  gallstone, and gallbladder adenomyomatosis. The gallbladder wall does  not appear grossly thickened, and no pericholecystic fluid is seen.        Impression:         1. Normal sonographic appearance of the liver.  2. Uncomplicated cholelithiasis.  3. Gallbladder adenomyomatosis.     Electronically Signed By-Shahrzad Biggs MD On:5/9/2022 9:22 AM  This report was finalized on [id number] by  Shahrzad Biggs MD.    CT Chest Hi Resolution Diagnostic [[id number]] Collected: 05/07/22 1653     Updated: 05/07/22 1701    Narrative:         DATE OF EXAM:   5/7/2022 4:28 PM     PROCEDURE:   CT CHEST HI RESOLUTION DIAGNOSTIC-     INDICATIONS:   Interstitial lung disease; R06.00-Dyspnea, unspecified; I50.9-Heart  failure, unspecified; N17.9-Acute kidney failure, unspecified     COMPARISON:  03/24/2022.     TECHNIQUE:   Routine transaxial slices were obtained through the chest without the  administration of intravenous contrast. The study was performed  utilizing our high resolution CT protocol which includes supine  inspiratory and expiratory imaging as well as prone inspiratory imaging.  Reconstructed coronal and sagittal images were also obtained. Automated  exposure control and iterative reconstruction methods were used.     FINDINGS:   Interval resolution of previous identified pulmonary edema. There is  nearly completely resolved small bilateral pleural effusions. There is  mild interlobular septal thickening, which may represent mild residual  interstitial edema or mild component of chronic disease. There is  scarred consolidation in the right lung base. Many of the small nodules  identified in the upper lobes of either resolved or decreased in size  compared with the prior study. There are persistent clusters of nodules  in both upper lungs, right greater than left with the largest occurring  in the right upper lobe on axial image 41  measuring up to 10 mm  diameter. There is decreased peribronchial thickening. No new lung  nodules.     There is severe aortic and aortic branch vessel atherosclerosis. There  are severe native coronary artery calcifications status post CABG. There  is moderate cardiomegaly. No pericardial effusion. There is no  pathologic mediastinal lymphadenopathy following size criteria. There  are dense aortic annular and valvular calcifications.     There is a left-sided pacemaker/ICD in place. No acute findings below  the diaphragm. There is a stable low-attenuation nodule at the left  adrenal gland, likely adenoma. There is S-shaped scoliosis of the  thoracolumbar spine. There are moderate lower thoracic and upper lumbar  degenerative changes.        Impression:      IMPRESSION :      1. Near complete resolution of previous identified interstitial  pulmonary edema and near complete resolution of now small bilateral  pleural effusions.  2. There may be a small component of chronic subpleural interstitial  disease, but the majority of the findings on the prior study are favored  to reflect pulmonary edema.  3. Improved nodular disease in the upper lungs, right greater than left  with significant residual nodularity. Recommend 6 month follow-up chest  CT. The course of disease on imaging would suggest an infectious or  inflammatory process.  4. Cardiomegaly and coronary artery disease status post CABG and  pacemaker/ICD placement.  5. Scoliosis and spinal degenerative changes.     Electronically Signed By-Hudson Walker MD On:5/7/2022 4:59 PM  This report was finalized on [id number] by  Husdon Walker MD.    XR Chest 1 View [[id number]] Collected: 05/06/22 1631     Updated: 05/06/22 1634    Narrative:      DATE OF EXAM:  5/6/2022 4:26 PM     PROCEDURE:  XR CHEST 1 VW-     INDICATIONS:  Severe SOA; R06.00-Dyspnea, unspecified; I50.9-Heart failure,  unspecified; N17.9-Acute kidney failure, unspecified     COMPARISON:  Chest  radiograph 05/03/2022     TECHNIQUE:   Single radiographic view of the chest was obtained.     FINDINGS:  Patient is rotated this limits evaluation of the right lung. There is  cardiomegaly. Dual lead transvenous pacemaker device. Blunting the  costophrenic angles. Left lower lobe airspace opacity. Background  chronic interstitial lung disease.       Impression:      Since previous exam there is been development of probable small pleural  effusions. There is left lower lobe airspace opacity atelectasis versus  pneumonia.     Electronically Signed By-Marianna Flowers MD On:5/6/2022 4:32 PM  This report was finalized on [id number] by  Marianna Flowers MD.    XR Chest 1 View [[id number]] Collected: 05/03/22 1229     Updated: 05/03/22 1234    Narrative:      DATE OF EXAM:  5/3/2022 12:07 PM     PROCEDURE:  XR CHEST 1 VW-     INDICATIONS:  Shortness of breath. Cough for 4 months.     COMPARISON:  Chest radiographs 3/24/2022, 5/29/2021, and 5/27/2021. CT chest  3/24/2022     TECHNIQUE:   Single radiographic AP view of the chest was obtained.     FINDINGS:  Lordotic positioning. Overlying artifacts. Stable sternotomy wires,  postoperative changes from CABG, left chest wall cardiac pacer device.  Stable elevation of the right hemidiaphragm with improved aeration of  both lungs. Mild linear right infrahilar and left basilar segmental  atelectasis and/or scarring with mild interstitial thickening in the  lung bases. No focal lung consolidation. No pneumothorax. Stable  cardiomegaly, likely accentuated by technique. Calcified atherosclerotic  disease in the thoracic aorta. Chronic changes in both shoulders. No  acute osseous normality is identified.        Impression:      Stable cardiomegaly with improved aeration of both lungs. Multifocal  bibasilar subsegmental atelectasis and/or scarring with mild  interstitial thickening in the lung bases that could represent mild  pulmonary vascular congestion/edema, chronic lung disease, or  less  likely atypical pneumonia.     Electronically Signed By-Lance Mendes MD On:5/3/2022 12:32 PM  This report was finalized on [id number] by  Lance Mendes MD.          ECHOCARDIOGRAM:    Results for orders placed during the hospital encounter of 05/03/22    Adult Transthoracic Echo Complete W/ Cont if Necessary Per Protocol    Interpretation Summary  · Left atrial volume is severely increased.  · Abnormal mitral valve structure consistent with dilated annulus.  · Moderate to severe tricuspid valve regurgitation is present.  · Estimated right ventricular systolic pressure from tricuspid regurgitation is markedly elevated (>55 mmHg).  · Moderate pulmonic valve regurgitation is present.  · The left ventricular cavity is moderately dilated.  · Estimated left ventricular EF was in agreement with the calculated left ventricular EF. Left ventricular ejection fraction appears to be 26 - 30%. Left ventricular systolic function is severely decreased.  · Left ventricular diastolic function is consistent with (grade II w/high LAP) pseudonormalization.  · The right ventricular cavity is moderately dilated.  · Severely reduced right ventricular systolic function noted.  · Severe mitral valve regurgitation is present with a centrally-directed jet noted.  · Moderate aortic valve regurgitation is present.  · Moderate aortic valve stenosis is present.    Severe LV systolic dysfunction EF of 25%, moderately dilated LV  Severe MR without stenosis  Moderate AR with moderate aortic valve stenosis  Severe TR without stenosis  PA systolic pressure severely elevated 60-65 with severe pulm hypertension  Grade 2-3 diastolic dysfunction  Right atrial pressure estimated greater than 20 with severely dilated IVC  No masses or effusion seen  RV is moderate to severely hypokinetic, moderately increased in size        I reviewed the patient's new clinical results.    EKG:      Assessment:       Dyspnea, unspecified type    Moderate  malnutrition (HCC)    1. Shortness of breath / Acute on chronic systolic and diastolic CHF  - LVEF 35%, Dual-chamber pacemaker well-functioning-No ICD upgrade at family discretion previously  -Lasix gently, nephrology on board appreciate recommendations  -Off Aldactone at this time  -Blood pressure will likely not tolerate ARB as well as kidney function    2. HARMAN / CKD  - creatinine varies, 2.1 today  - on oral lasix    3. HTN    4. Paroxysmal Atrial fibrillation  - not on a/c, CHADS VAsc at least 3, on ASA      5. SSS s/p PPM    6. Elevated LFTs  - GI following  - AST / /756    Plan:   Extensively described above    Allowing better heart rates as well as gentle dobutamine promoting diuresis has improved creatinine as well as LFTs with both LV and RV unloading from EDP standpoint and congestion    Continue present treatment  Replace electrolytes for potassium greater than 4 magnesium greater than 2    See how his course goes over the next 24 hours and adjust from there    Chris Romero MD, PhD    Chris Romero MD  05/12/22  15:00 EDT

## 2022-05-13 NOTE — PLAN OF CARE
89 y/o M admitted for dyspnea. DNR. Pt is on 2 drips- Lasix @ 20 ml/hr, Dobutamine @ 3.75 mcg/kg/hr. Dr. Romero stated it would be ok to try to get midline from PICC team. PICC team notified RN they needed Nephrology clearance if Pt had Neph on case. Dr. Gonzalez was messaged @ the midline placement- PICC team left @ 1900 this evening- PICC team will have to try tomorrow if Dr. Gonzalez is Ok with it. Pt is A/Ox 4. Daughter @ bedside. HR is tachy with frequent PVCs-paced rhythm- several V-tach runs according to the monitor- called monitoring- asked them to fax up some strips. Family wants to full interventions and to DC home w/ HH possible hospice sometime soon down the road. Family does not like to speak @ Hospice @ bedside.Will cont to monitor closely.      Problem: Fluid Volume Excess  Goal: Fluid Balance  Outcome: Ongoing, Progressing  Intervention: Monitor and Manage Hypervolemia  Recent Flowsheet Documentation  Taken 5/13/2022 1200 by Kristofer Blanco RN  Skin Protection:   adhesive use limited   skin-to-device areas padded   tubing/devices free from skin contact   protective footwear used  Taken 5/13/2022 0800 by Kristofer Blanco RN  Skin Protection:   adhesive use limited   skin-to-skin areas padded   incontinence pads utilized   skin-to-device areas padded     Problem: Adult Inpatient Plan of Care  Goal: Plan of Care Review  Outcome: Ongoing, Progressing  Goal: Patient-Specific Goal (Individualized)  Outcome: Ongoing, Progressing  Goal: Absence of Hospital-Acquired Illness or Injury  Outcome: Ongoing, Progressing  Intervention: Identify and Manage Fall Risk  Recent Flowsheet Documentation  Taken 5/13/2022 1200 by Kristofer Blanco, RN  Safety Promotion/Fall Prevention:   safety round/check completed   room organization consistent   nonskid shoes/slippers when out of bed   fall prevention program maintained   assistive device/personal items within reach   clutter free environment maintained   lighting  adjusted   activity supervised  Taken 5/13/2022 1000 by Kristofer Blanco RN  Safety Promotion/Fall Prevention:   safety round/check completed   room organization consistent   nonskid shoes/slippers when out of bed   fall prevention program maintained   clutter free environment maintained   assistive device/personal items within reach  Intervention: Prevent Skin Injury  Recent Flowsheet Documentation  Taken 5/13/2022 1200 by Kristofer Blanco RN  Skin Protection:   adhesive use limited   skin-to-device areas padded   tubing/devices free from skin contact   protective footwear used  Taken 5/13/2022 0800 by Kristofer Blanco RN  Skin Protection:   adhesive use limited   skin-to-skin areas padded   incontinence pads utilized   skin-to-device areas padded  Intervention: Prevent and Manage VTE (Venous Thromboembolism) Risk  Recent Flowsheet Documentation  Taken 5/13/2022 1200 by Kristofer Blanco RN  Activity Management: activity adjusted per tolerance  Intervention: Prevent Infection  Recent Flowsheet Documentation  Taken 5/13/2022 1200 by Kristofer Blanco RN  Infection Prevention:   personal protective equipment utilized   hand hygiene promoted   rest/sleep promoted  Taken 5/13/2022 1000 by Kristofer Blanco RN  Infection Prevention:   hand hygiene promoted   personal protective equipment utilized   rest/sleep promoted  Goal: Optimal Comfort and Wellbeing  Outcome: Ongoing, Progressing  Intervention: Provide Person-Centered Care  Recent Flowsheet Documentation  Taken 5/13/2022 1200 by Kristofer Blanco RN  Trust Relationship/Rapport:   care explained   choices provided   emotional support provided   questions answered   empathic listening provided   questions encouraged   reassurance provided   thoughts/feelings acknowledged  Goal: Readiness for Transition of Care  Outcome: Ongoing, Progressing     Problem: Adjustment to Illness (Heart Failure)  Goal: Optimal Coping  Outcome: Ongoing, Progressing  Intervention:  Support Psychosocial Response  Recent Flowsheet Documentation  Taken 5/13/2022 1200 by Kristofer Blanco RN  Supportive Measures: active listening utilized  Family/Support System Care:   self-care encouraged   support provided     Problem: Cardiac Output Decreased (Heart Failure)  Goal: Optimal Cardiac Output  Outcome: Ongoing, Progressing  Intervention: Optimize Cardiac Output  Recent Flowsheet Documentation  Taken 5/13/2022 1200 by Kristofer Blanco RN  Environmental Support: calm environment promoted     Problem: Dysrhythmia (Heart Failure)  Goal: Stable Heart Rate and Rhythm  Outcome: Ongoing, Progressing     Problem: Fluid Imbalance (Heart Failure)  Goal: Fluid Balance  Outcome: Ongoing, Progressing     Problem: Functional Ability Impaired (Heart Failure)  Goal: Optimal Functional Ability  Outcome: Ongoing, Progressing  Intervention: Optimize Functional Ability  Recent Flowsheet Documentation  Taken 5/13/2022 1200 by Kristofer Blanco RN  Activity Management: activity adjusted per tolerance     Problem: Oral Intake Inadequate (Heart Failure)  Goal: Optimal Nutrition Intake  Outcome: Ongoing, Progressing     Problem: Respiratory Compromise (Heart Failure)  Goal: Effective Oxygenation and Ventilation  Outcome: Ongoing, Progressing  Intervention: Promote Airway Secretion Clearance  Recent Flowsheet Documentation  Taken 5/13/2022 1200 by Kristofer Blanco RN  Breathing Techniques/Airway Clearance: deep/controlled cough encouraged  Intervention: Optimize Oxygenation and Ventilation  Recent Flowsheet Documentation  Taken 5/13/2022 1200 by Kristofer Blanco RN  Airway/Ventilation Management: airway patency maintained     Problem: Sleep Disordered Breathing (Heart Failure)  Goal: Effective Breathing Pattern During Sleep  Outcome: Ongoing, Progressing     Problem: Malnutrition  Goal: Improved Nutritional Intake  Outcome: Ongoing, Progressing     Problem: Fall Injury Risk  Goal: Absence of Fall and Fall-Related  Injury  Outcome: Ongoing, Progressing  Intervention: Identify and Manage Contributors  Recent Flowsheet Documentation  Taken 5/13/2022 1200 by Kristofer Blanco RN  Medication Review/Management: medications reviewed  Taken 5/13/2022 1000 by Kristofer Blanco RN  Medication Review/Management: medications reviewed  Intervention: Promote Injury-Free Environment  Recent Flowsheet Documentation  Taken 5/13/2022 1200 by Kristofer Blanco RN  Safety Promotion/Fall Prevention:   safety round/check completed   room organization consistent   nonskid shoes/slippers when out of bed   fall prevention program maintained   assistive device/personal items within reach   clutter free environment maintained   lighting adjusted   activity supervised  Taken 5/13/2022 1000 by Kristofer Blanco RN  Safety Promotion/Fall Prevention:   safety round/check completed   room organization consistent   nonskid shoes/slippers when out of bed   fall prevention program maintained   clutter free environment maintained   assistive device/personal items within reach     Problem: Skin Injury Risk Increased  Goal: Skin Health and Integrity  Outcome: Ongoing, Progressing  Intervention: Optimize Skin Protection  Recent Flowsheet Documentation  Taken 5/13/2022 1200 by Kristofer Blanco RN  Pressure Reduction Techniques:   frequent weight shift encouraged   positioned off wounds   pressure points protected  Pressure Reduction Devices:   positioning supports utilized   pressure-redistributing mattress utilized  Skin Protection:   adhesive use limited   skin-to-device areas padded   tubing/devices free from skin contact   protective footwear used  Taken 5/13/2022 0800 by Kristofer Blanco RN  Pressure Reduction Techniques:   frequent weight shift encouraged   positioned off wounds   pressure points protected  Pressure Reduction Devices:   positioning supports utilized   pressure-redistributing mattress utilized  Skin Protection:   adhesive use limited    skin-to-skin areas padded   incontinence pads utilized   skin-to-device areas padded   Goal Outcome Evaluation:

## 2022-05-13 NOTE — PLAN OF CARE
Goal Outcome Evaluation:     Problem: Adult Inpatient Plan of Care  Goal: Plan of Care Review  Outcome: Ongoing, Progressing  Goal: Patient-Specific Goal (Individualized)  Outcome: Ongoing, Progressing  Goal: Absence of Hospital-Acquired Illness or Injury  Outcome: Ongoing, Progressing  Intervention: Identify and Manage Fall Risk  Recent Flowsheet Documentation  Taken 5/13/2022 0353 by Kaylee Fermin RN  Safety Promotion/Fall Prevention:   assistive device/personal items within reach   clutter free environment maintained   fall prevention program maintained   nonskid shoes/slippers when out of bed   room organization consistent   safety round/check completed  Taken 5/13/2022 0200 by Kaylee Fermin RN  Safety Promotion/Fall Prevention:   assistive device/personal items within reach   clutter free environment maintained   fall prevention program maintained   nonskid shoes/slippers when out of bed   room organization consistent   safety round/check completed  Taken 5/13/2022 0000 by Kaylee Fermin RN  Safety Promotion/Fall Prevention:   assistive device/personal items within reach   clutter free environment maintained   fall prevention program maintained   nonskid shoes/slippers when out of bed   room organization consistent   safety round/check completed  Taken 5/12/2022 2200 by Kyalee Fermin RN  Safety Promotion/Fall Prevention:   assistive device/personal items within reach   clutter free environment maintained   fall prevention program maintained   nonskid shoes/slippers when out of bed   room organization consistent   safety round/check completed  Taken 5/12/2022 2050 by Kaylee Fermin RN  Safety Promotion/Fall Prevention:   assistive device/personal items within reach   clutter free environment maintained   fall prevention program maintained   nonskid shoes/slippers when out of bed   safety round/check completed   room organization consistent  Intervention: Prevent Skin Injury  Recent  Flowsheet Documentation  Taken 5/13/2022 0353 by Kaylee Fermin RN  Body Position: weight shifting  Skin Protection:   adhesive use limited   tubing/devices free from skin contact  Taken 5/13/2022 0000 by Kaylee Fermin RN  Body Position:   weight shifting   turned   side-lying   left  Skin Protection:   adhesive use limited   tubing/devices free from skin contact  Taken 5/12/2022 2050 by Kaylee Fermin RN  Body Position: (pt up in chair)   weight shifting   other (see comments)  Skin Protection:   adhesive use limited   tubing/devices free from skin contact  Intervention: Prevent and Manage VTE (Venous Thromboembolism) Risk  Recent Flowsheet Documentation  Taken 5/13/2022 0353 by Kaylee Fermin RN  Activity Management: activity adjusted per tolerance  Taken 5/13/2022 0000 by Kaylee Fermin RN  Activity Management: activity adjusted per tolerance  Taken 5/12/2022 2050 by Kaylee Fermin RN  Activity Management: activity adjusted per tolerance  VTE Prevention/Management: (SQ heparin) other (see comments)  Intervention: Prevent Infection  Recent Flowsheet Documentation  Taken 5/13/2022 0353 by Kaylee Fermin RN  Infection Prevention:   hand hygiene promoted   personal protective equipment utilized   rest/sleep promoted  Taken 5/13/2022 0200 by Kaylee Fermin RN  Infection Prevention:   hand hygiene promoted   rest/sleep promoted   personal protective equipment utilized  Taken 5/13/2022 0000 by Kaylee Fermin RN  Infection Prevention:   hand hygiene promoted   personal protective equipment utilized   rest/sleep promoted  Taken 5/12/2022 2200 by Kaylee Fermin RN  Infection Prevention:   hand hygiene promoted   personal protective equipment utilized   rest/sleep promoted  Taken 5/12/2022 2050 by Kayele Fermin RN  Infection Prevention:   hand hygiene promoted   rest/sleep promoted   personal protective equipment utilized  Goal: Optimal Comfort and Wellbeing  Outcome:  Ongoing, Progressing  Intervention: Provide Person-Centered Care  Recent Flowsheet Documentation  Taken 5/13/2022 0353 by Kaylee Fermin RN  Trust Relationship/Rapport: care explained  Taken 5/13/2022 0000 by Kaylee Fermin RN  Trust Relationship/Rapport: care explained  Taken 5/12/2022 2050 by Kaylee Fermin RN  Trust Relationship/Rapport:   care explained   choices provided   emotional support provided   empathic listening provided   questions answered   thoughts/feelings acknowledged  Goal: Readiness for Transition of Care  Outcome: Ongoing, Progressing     Problem: Cardiac Output Decreased (Heart Failure)  Goal: Optimal Cardiac Output  Outcome: Ongoing, Progressing  Intervention: Optimize Cardiac Output  Recent Flowsheet Documentation  Taken 5/12/2022 2050 by Kaylee Fermin RN  Environmental Support: calm environment promoted     Problem: Fall Injury Risk  Goal: Absence of Fall and Fall-Related Injury  Outcome: Ongoing, Progressing  Intervention: Identify and Manage Contributors  Recent Flowsheet Documentation  Taken 5/13/2022 0353 by Kaylee Fermin RN  Medication Review/Management: medications reviewed  Taken 5/13/2022 0200 by Kaylee Fermin RN  Medication Review/Management: medications reviewed  Taken 5/13/2022 0000 by Kaylee Fermin RN  Medication Review/Management: medications reviewed  Taken 5/12/2022 2200 by Kaylee Fermin RN  Medication Review/Management: medications reviewed  Taken 5/12/2022 2050 by Kaylee Fermin RN  Medication Review/Management: medications reviewed  Intervention: Promote Injury-Free Environment  Recent Flowsheet Documentation  Taken 5/13/2022 0353 by Kaylee Fermin RN  Safety Promotion/Fall Prevention:   assistive device/personal items within reach   clutter free environment maintained   fall prevention program maintained   nonskid shoes/slippers when out of bed   room organization consistent   safety round/check completed  Taken  5/13/2022 0200 by Kaylee Fermin, RN  Safety Promotion/Fall Prevention:   assistive device/personal items within reach   clutter free environment maintained   fall prevention program maintained   nonskid shoes/slippers when out of bed   room organization consistent   safety round/check completed  Taken 5/13/2022 0000 by Kaylee Fermin, RN  Safety Promotion/Fall Prevention:   assistive device/personal items within reach   clutter free environment maintained   fall prevention program maintained   nonskid shoes/slippers when out of bed   room organization consistent   safety round/check completed  Taken 5/12/2022 2200 by Kaylee Fermin, RN  Safety Promotion/Fall Prevention:   assistive device/personal items within reach   clutter free environment maintained   fall prevention program maintained   nonskid shoes/slippers when out of bed   room organization consistent   safety round/check completed  Taken 5/12/2022 2050 by Kaylee Fermin, RN  Safety Promotion/Fall Prevention:   assistive device/personal items within reach   clutter free environment maintained   fall prevention program maintained   nonskid shoes/slippers when out of bed   safety round/check completed   room organization consistent

## 2022-05-13 NOTE — CASE MANAGEMENT/SOCIAL WORK
Continued Stay Note   [facility]     Patient Name: Shon ZAYAS Kunal  MRN: [id number]  Today's Date: 5/13/2022    Admit Date: 5/3/2022     Discharge Plan     Row Name 05/13/22 0849       Plan    Plan DC Home w/[facility] (accepted). Lives w/spouse. Barrier: [place] gtt.    Plan Comments Home w/[facility] & spouse.                Expected Discharge Date and Time     Expected Discharge Date Expected Discharge Time    May 16, 2022         Chely Escobar RN, MSN  Care Manager  [phone]

## 2022-05-13 NOTE — PROGRESS NOTES
LOS: 8 days   Patient Care Team:  Antony Lawson MD as PCP - General  Tam Gonzalez MD as Consulting Physician (Nephrology)  Minh Kendrick MD as Consulting Physician (Cardiology)  Chris Romero MD as Consulting Physician (Cardiology)  Objective:   afebrile      Review of Systems:   Review of Systems   Constitutional: Positive for activity change.   Genitourinary: Negative.            Vital Signs  Temp:  [97.4 °F (36.3 °C)-97.9 °F (36.6 °C)] 97.4 °F (36.3 °C)  Heart Rate:  [70-98] 75  Resp:  [11-23] 12  BP: (118-146)/(42-60) 146/60    Physical Exam:  Physical Exam  Constitutional:       Appearance: Normal appearance.   Neurological:      Mental Status: He is alert.          Radiology:  US Liver    Result Date: 5/9/2022   1. Normal sonographic appearance of the liver. 2. Uncomplicated cholelithiasis. 3. Gallbladder adenomyomatosis.  Electronically Signed By-Shahrzad Biggs MD On:5/9/2022 9:22 AM This report was finalized on [id number] by  Shahrzad Biggs MD.    XR Chest 1 View    Result Date: 5/9/2022  1.Mildly increased right upper lung airspace opacities, which may be due to atelectasis and/or pneumonia. 2.Small left pleural effusion.  Electronically Signed By-Hoa Bonilla MD On:5/9/2022 3:13 PM This report was finalized on [id number] by  Hoa Bonilla MD.    XR Chest 1 View    Result Date: 5/6/2022  Since previous exam there is been development of probable small pleural effusions. There is left lower lobe airspace opacity atelectasis versus pneumonia.  Electronically Signed By-Marianna Flowers MD On:5/6/2022 4:32 PM This report was finalized on [id number] by  Marianna Flowers MD.    CT Chest Hi Resolution Diagnostic    Result Date: 5/7/2022  IMPRESSION :  1. Near complete resolution of previous identified interstitial pulmonary edema and near complete resolution of now small bilateral pleural effusions. 2. There may be a small component of chronic subpleural interstitial disease, but  the majority of the findings on the prior study are favored to reflect pulmonary edema. 3. Improved nodular disease in the upper lungs, right greater than left with significant residual nodularity. Recommend 6 month follow-up chest CT. The course of disease on imaging would suggest an infectious or inflammatory process. 4. Cardiomegaly and coronary artery disease status post CABG and pacemaker/ICD placement. 5. Scoliosis and spinal degenerative changes.  Electronically Signed By-Hudson Walker MD On:5/7/2022 4:59 PM This report was finalized on [id number] by  Hudson Walker MD.         Results Review:     I reviewed the patient's new clinical results.  I reviewed the patient's new imaging results and agree with the interpretation.    Medication Review:   Scheduled Meds:aspirin, 81 mg, Oral, Daily  finasteride, 5 mg, Oral, Daily  guaiFENesin, 600 mg, Oral, Q12H  heparin (porcine), 5,000 Units, Subcutaneous, Q12H  magnesium sulfate, 2 g, Intravenous, Once  midodrine, 5 mg, Oral, TID AC  potassium chloride, 40 mEq, Oral, Once  sodium bicarbonate, 1,300 mg, Oral, TID  sodium chloride, 3 mL, Intravenous, Q12H      Continuous Infusions:DOBUTamine, 2.5 mcg/kg/min, Last Rate: 2.5 mcg/kg/min (05/12/22 1300)  furosemide (LASIX) 100 mg in 100mL NS infusion, 20 mg/hr, Last Rate: 20 mg/hr (05/12/22 1938)      PRN Meds:.ipratropium-albuterol  •  nitroglycerin  •  ondansetron  •  oxyCODONE  •  potassium chloride  •  [COMPLETED] Insert peripheral IV **AND** sodium chloride  •  sodium chloride    Labs:    CBC    Results from last 7 days   Lab Units 05/11/22  2348 05/11/22  0429 05/10/22  0611 05/09/22  0522 05/08/22  0027 05/07/22  0234 05/06/22  0133   WBC 10*3/mm3 10.10 9.10 8.70 11.10* 10.60 8.30 9.60   HEMOGLOBIN g/dL 13.8 13.3 15.0 14.0 13.8 13.2 13.9   PLATELETS 10*3/mm3 179 175 193 198 208 208 220     BMP   Results from last 7 days   Lab Units 05/12/22  1857 05/12/22  0637 05/11/22  0429 05/10/22  1216 05/10/22  0611  05/09/22 0522 05/08/22  0026 05/07/22  0428 05/07/22  0234   SODIUM mmol/L 141 140 137  --  139 135* 136 143  --    POTASSIUM mmol/L 4.0 3.5 4.1 4.6 5.4* 5.1 4.8 4.2  --    CHLORIDE mmol/L 95* 96* 97*  --  94* 98 99 103  --    CO2 mmol/L 31.0* 30.0* 24.0  --  25.0 16.0* 20.0* 25.0  --    BUN mg/dL 76* 85* 99*  --  100* 91* 91* 75*  --    CREATININE mg/dL 1.84* 1.80* 2.04*  --  2.53* 2.16* 2.36* 1.85*  --    GLUCOSE mg/dL 141* 94 114*  --  116* 152* 156* 99  --    MAGNESIUM mg/dL 2.4* 2.4*  --   --   --  2.7* 2.6*  --  2.5*   PHOSPHORUS mg/dL  --   --   --   --   --  5.7* 5.0*  --  5.0*     Cr Clearance Estimated Creatinine Clearance: 21.1 mL/min (A) (by C-G formula based on SCr of 1.84 mg/dL (H)).  Coag   Results from last 7 days   Lab Units 05/11/22  0429   INR  1.31*     HbA1C No results found for: HGBA1C  Blood Glucose   Glucose   Date/Time Value Ref Range Status   05/12/2022 2005 146 (H) 70 - 105 mg/dL Final     Comment:     Serial Number: [id number]:  [id number]   05/12/2022 1616 146 (H) 70 - 105 mg/dL Final     Comment:     Serial Number: [id number]:  [id number]   05/12/2022 1051 168 (H) 70 - 105 mg/dL Final     Comment:     Serial Number: [id number]:  [id number]   05/12/2022 0736 88 70 - 105 mg/dL Final     Comment:     Serial Number: [id number]:  [id number]   05/11/2022 1923 137 (H) 70 - 105 mg/dL Final     Comment:     Serial Number: [id number]:  [id number]   05/11/2022 1619 125 (H) 70 - 105 mg/dL Final     Comment:     Serial Number: [id number]:  [id number]   05/11/2022 1115 181 (H) 70 - 105 mg/dL Final     Comment:     Serial Number: [id number]:  [id number]   05/11/2022 0736 99 70 - 105 mg/dL Final     Comment:     Serial Number: [id number]:  [id number]     Infection     CMP   Results from last 7 days   Lab Units 05/12/22  1857 05/12/22  0637 05/11/22  0429 05/10/22  1216 05/10/22  0611 05/09/22  0522 05/08/22  0026 05/07/22  0428   SODIUM mmol/L 141 140  137  --  139 135* 136 143   POTASSIUM mmol/L 4.0 3.5 4.1 4.6 5.4* 5.1 4.8 4.2   CHLORIDE mmol/L 95* 96* 97*  --  94* 98 99 103   CO2 mmol/L 31.0* 30.0* 24.0  --  25.0 16.0* 20.0* 25.0   BUN mg/dL 76* 85* 99*  --  100* 91* 91* 75*   CREATININE mg/dL 1.84* 1.80* 2.04*  --  2.53* 2.16* 2.36* 1.85*   GLUCOSE mg/dL 141* 94 114*  --  116* 152* 156* 99   ALBUMIN g/dL  --  3.50 3.50  --  3.80 3.60  --  3.20*   BILIRUBIN mg/dL  --  1.2 0.8  --  0.8 0.8  --  0.7   ALK PHOS U/L  --  284* 309*  --  321* 230*  --  120*   AST (SGOT) U/L  --  128* 214*  --  459* 353*  --  269*   ALT (SGPT) U/L  --  521* 616*  --  968* 756*  --  386*     UA      Radiology(recent) No radiology results for the last day   Assessment:       Acute hepatitis  Acute shortness of breath  Acute pulmonary edema  Acute metabolic acidosis  Acute hyperkalemia  Hepatic transaminase derangement likely secondary to shock liver  History of sick sinus syndrome  Acute exacerbation of chronic systolic congestive heart failure  Acute renal failure superimposed upon chronic kidney disease stage IIIb  Acute kidney injury  Right upper lobe pulmonary nodule  History of pacemaker placement with replacement of generator 1/22  Atherosclerotic heart disease of native coronary arteries with angina pectoris  History of coronary artery bypass grafting in 1993  HFrEF 35%  Chronic atrial fibrillation, persistent intermittent  Hypercoagulable state secondary to atrial fibrillation  Cerebrovascular disease with history of CVA  History of carotid occlusive disease  Vitamin D deficiency  Hypertension associated chronic kidney disease stage IIIb  Degenerative joint disease  Hypokalemia  Bilateral upper lobe pulmonary nodularities  Gastroesophageal reflux disease without esophagitis       Plan:  Continue present approach        Antony Lawson MD  05/12/22  23:50 EDT

## 2022-05-13 NOTE — PROGRESS NOTES
Daily Progress Note        Dyspnea, unspecified type    Moderate malnutrition (HCC)           Assessment:     dyspnea improved with diuretics  As well as interstitial markings with diuretics unlikely interstitial lung disease  Presentation suggestive of Pulmonary edema  CHF EF 35%  Coronary artery disease  A. fib  CKD           Recommendations:     Diuresis for cardiomyopathy EF 25%- Lasix drip  Dobutamine drip    Continue to titrate oxygen-currently on 2 L NC and CPAP at bedtime/as needed  - Family reports he has not been wearing it at night    Mucinex  Sodium bicarb 1300 mg 3 times a day  Midodrine  DuoNebs as needed    Repeat CT scan of the chest after 3 months to follow-up on right upper lobe nodule     5/7/2022        LOS: 9 days     Subjective         Objective     Vital signs for last 24 hours:  Vitals:    05/13/22 0530 05/13/22 0600 05/13/22 0929 05/13/22 1027   BP: 123/48 155/69 132/50 134/60   BP Location:  Left arm  Right arm   Patient Position:  Lying  Lying   Pulse: 90 100 103 103   Resp:  16  13   Temp:  98 °F (36.7 °C) 98.1 °F (36.7 °C) 98.1 °F (36.7 °C)   TempSrc:  Oral Axillary Axillary   SpO2: 100% 92%  100%   Weight:  57 kg (125 lb 10.6 oz)     Height:           Intake/Output last 3 shifts:  I/O last 3 completed shifts:  In: 660 [P.O.:660]  Out: 7250 [Urine:7250]  Intake/Output this shift:  I/O this shift:  In: 240 [P.O.:240]  Out: 250 [Urine:250]      Radiology  Imaging Results (Last 24 Hours)     ** No results found for the last 24 hours. **          Labs:  Results from last 7 days   Lab Units 05/13/22  0600   WBC 10*3/mm3 8.50   HEMOGLOBIN g/dL 14.7   HEMATOCRIT % 46.4   PLATELETS 10*3/mm3 192     Results from last 7 days   Lab Units 05/12/22  1857 05/12/22  0637   SODIUM mmol/L 141 140   POTASSIUM mmol/L 4.0 3.5   CHLORIDE mmol/L 95* 96*   CO2 mmol/L 31.0* 30.0*   BUN mg/dL 76* 85*   CREATININE mg/dL 1.84* 1.80*   CALCIUM mg/dL 9.1 8.8   BILIRUBIN mg/dL  --  1.2   ALK PHOS U/L  --  284*   ALT  (SGPT) U/L  --  521*   AST (SGOT) U/L  --  128*   GLUCOSE mg/dL 141* 94     Results from last 7 days   Lab Units 05/06/22  1412   PH, ARTERIAL pH units 7.538*   PO2 ART mm Hg 328.6*   PCO2, ARTERIAL mm Hg 19.3*   HCO3 ART mmol/L 16.4*     Results from last 7 days   Lab Units 05/12/22  0637 05/11/22  0429 05/10/22  0611   ALBUMIN g/dL 3.50 3.50 3.80     Results from last 7 days   Lab Units 05/08/22  0026   CK TOTAL U/L 95     Results from last 7 days   Lab Units 05/09/22  1021   MED  Negative     Results from last 7 days   Lab Units 05/12/22  1857   MAGNESIUM mg/dL 2.4*     Results from last 7 days   Lab Units 05/11/22  0429   INR  1.31*               Meds:   SCHEDULE  aspirin, 81 mg, Oral, Daily  finasteride, 5 mg, Oral, Daily  guaiFENesin, 600 mg, Oral, Q12H  heparin (porcine), 5,000 Units, Subcutaneous, Q12H  magnesium sulfate, 2 g, Intravenous, Once  midodrine, 5 mg, Oral, TID AC  potassium chloride, 40 mEq, Oral, Once  sodium bicarbonate, 1,300 mg, Oral, TID  sodium chloride, 3 mL, Intravenous, Q12H      Infusions  DOBUTamine, 2.5 mcg/kg/min, Last Rate: 5 mcg/kg/min (05/13/22 0929)  furosemide (LASIX) 100 mg in 100mL NS infusion, 20 mg/hr, Last Rate: 20 mg/hr (05/13/22 0709)      PRNs  ipratropium-albuterol  •  nitroglycerin  •  ondansetron  •  oxyCODONE  •  potassium chloride  •  [COMPLETED] Insert peripheral IV **AND** sodium chloride  •  sodium chloride    Physical Exam:  Physical Exam  Cardiovascular:      Heart sounds: Murmur heard.   Pulmonary:      Effort: Pulmonary effort is normal.      Breath sounds: Examination of the right-lower field reveals decreased breath sounds. Examination of the left-lower field reveals decreased breath sounds. Decreased breath sounds present.      Comments: Slightly diminished bilateral bases        ROS  Review of Systems   Respiratory: Positive for cough and shortness of breath.              I have reviewed current clinicals.     Electronically signed by Radha Dang,  CARMEN, 05/13/22, 12:13 PM EDT.     The NP scribed the note for me, I have examined the patient and reviewed and verified the above findings and and I formulated the assessment and plan as documented

## 2022-05-13 NOTE — PROGRESS NOTES
Cardiology [place]        LOS:  LOS: 9 days   Patient Name: Shon ZAYAS Kunal  Age/Sex: 89 y.o. male  : 1930  MRN: [id number]    Day of Service: 22   Length of Stay: 9  Encounter Provider: Chris Romero MD  Place of Service: [facility] CARDIOLOGY  Patient Care Team:  Antony Lawson MD as PCP - Tam Monroe MD as Consulting Physician (Nephrology)  Minh Kendrick MD as Consulting Physician (Cardiology)  Chris Romero MD as Consulting Physician (Cardiology)    Subjective:     Chief Complaint: shortness of breath, MERA    Subjective:   Seen and examined  Maintained on dobutamine drip  Heart rates 90s with atrial fibrillation  Some ventricular ectopy, rates are better controlled on digoxin, amiodarone stopped secondary to LFTs  Systolics 1 20-1 50s  Controlled atrial fibrillation, backup pacing rate at 75 set with his device  Overall appears improved    Complex condition overall  Remains with volume overload and high filling pressures on both sides  LV function is at 25% with severe MR and severe left atrial enlargement also severe pulmonary hypertension secondary to left-sided filling pressures chronically  RV is moderate to severely hypokinetic, overwhelmed by pulmonary hypertension as well as volume, IVC is distended at 2.6-2.7 indicative of high filling pressures along with severe TR and high filling pressures on the right side with hepatic congestion and renal congestion with cardiopulmonary syndrome    Treatment will consist of further volume reduction  Stopping beta-blockers  Controlling atrial fibrillation with digoxin which will be renally dosed and dosed per level  Off amiodarone  Dobutamine drip for inotropic assisted to assist RV function and hopefully will be able to restart diuretics gently once rates are controlled and can tolerate dobutamine and further diuretics  Pacemaker rate increased to a backup rate of at least 75  rather than 60 to promote better cardiac output  Well out atrial fibrillation in the 90s but avoid in the 130s to 140s  Digoxin for inotropic assistance    Patient is not a surgical candidate and 89 years old with severe MR, severe TR, severe pulm hypertension, severely reduced LV and RV systolic function and multiorgan dysfunction    Interventions above have resulted in improvement in creatinine and improvement in LFTs with better heart rates, gentle inotropic assistance overnight      Today  Continue dobutamine drip with good diuresis over the last 24 hours with stable creatinine and renal function and electrolytes  Repeat CMP tomorrow and titrate down dobutamine tomorrow try to get him to a dry weight, and for salt and fluid restriction  Ultimately discontinue dobutamine likely in 24 to 48 hours with downward titration over that period of time and see if he can maintain stable hemodynamic status, afterload reduction with nonnephrotoxic medications, hydralazine /nitrates, will avoid beta-blockers for some time    Current Medications:   Scheduled Meds:aspirin, 81 mg, Oral, Daily  finasteride, 5 mg, Oral, Daily  guaiFENesin, 600 mg, Oral, Q12H  heparin (porcine), 5,000 Units, Subcutaneous, Q12H  magnesium sulfate, 2 g, Intravenous, Once  midodrine, 5 mg, Oral, TID AC  potassium chloride, 40 mEq, Oral, Once  sodium bicarbonate, 1,300 mg, Oral, TID  sodium chloride, 3 mL, Intravenous, Q12H      Continuous Infusions:DOBUTamine, 2.5 mcg/kg/min, Last Rate: 2.5 mcg/kg/min (05/12/22 1300)  furosemide (LASIX) 100 mg in 100mL NS infusion, 20 mg/hr, Last Rate: 20 mg/hr (05/13/22 0709)        Allergies:  Allergies   Allergen Reactions   • Penicillin G Rash   • Vancomycin Rash       Review of Systems   Constitutional: Positive for malaise/fatigue. Negative for chills and diaphoresis.   Cardiovascular: Negative for chest pain, dyspnea on exertion, irregular heartbeat, leg swelling, near-syncope, orthopnea, palpitations,  paroxysmal nocturnal dyspnea and syncope.   Respiratory: Positive for shortness of breath. Negative for cough, sleep disturbances due to breathing and sputum production.    Gastrointestinal: Negative for change in bowel habit.   Genitourinary: Negative for urgency.   Neurological: Negative for dizziness and headaches.   Psychiatric/Behavioral: Negative for altered mental status.         Objective:     Temp:  [97.4 °F (36.3 °C)-98 °F (36.7 °C)] 98 °F (36.7 °C)  Heart Rate:  [] 100  Resp:  [11-23] 16  BP: (118-155)/(38-82) 155/69     Intake/Output Summary (Last 24 hours) at 5/13/2022 0926  Last data filed at 5/13/2022 0500  Gross per 24 hour   Intake 660 ml   Output 3900 ml   Net -3240 ml     Body mass index is 19.11 kg/m².      05/11/22  0535 05/12/22  0500 05/13/22  0600   Weight: 57 kg (125 lb 9.6 oz) 57 kg (125 lb 10.6 oz) 57 kg (125 lb 10.6 oz)         General Appearance:    Alert, cooperative, in no acute distress                                Head: Atraumatic, normocephalic, PERRLA               Neck:   supple, no JVD   Lungs:     2L NC, dim bases    Heart:    Regular rhythm and normal rate, normal S1 and S2, 1/6 murmur   Abdomen:     Normal bowel sounds, no masses, no organomegaly, soft  non-tender, non-distended, no guarding, no rebound  tenderness   Extremities:   Moves all extremities well, no edema, no cyanosis, no  redness   Pulses:   Pulses palpable and equal bilaterally   Skin:   No bleeding, bruising or rash   Neurologic:   Awake, alert, oriented x3   Agree with exam as discussed with nurse practitioner after my face-to-face encounter with the patient      Lab Review:   Results from last 7 days   Lab Units 05/12/22  1857 05/12/22  0637 05/11/22  0429   SODIUM mmol/L 141 140 137   POTASSIUM mmol/L 4.0 3.5 4.1   CHLORIDE mmol/L 95* 96* 97*   CO2 mmol/L 31.0* 30.0* 24.0   BUN mg/dL 76* 85* 99*   CREATININE mg/dL 1.84* 1.80* 2.04*   GLUCOSE mg/dL 141* 94 114*   CALCIUM mg/dL 9.1 8.8 8.6   AST (SGOT)  U/L  --  128* 214*   ALT (SGPT) U/L  --  521* 616*     Results from last 7 days   Lab Units 05/08/22  0026   CK TOTAL U/L 95     Results from last 7 days   Lab Units 05/13/22  0600 05/11/22  2348   WBC 10*3/mm3 8.50 10.10   HEMOGLOBIN g/dL 14.7 13.8   HEMATOCRIT % 46.4 42.4   PLATELETS 10*3/mm3 192 179     Results from last 7 days   Lab Units 05/11/22  0429   INR  1.31*     Results from last 7 days   Lab Units 05/12/22  1857 05/12/22  0637   MAGNESIUM mg/dL 2.4* 2.4*           Invalid input(s): LDLCALC  Results from last 7 days   Lab Units 05/10/22  0611   PROBNP pg/mL >70,000.0*           Recent Radiology:  Imaging Results (Most Recent)     Procedure Component Value Units Date/Time    XR Chest 1 View [[id number]] Collected: 05/09/22 1510     Updated: 05/09/22 1515    Narrative:      DATE OF EXAM:  5/9/2022 3:02 PM     PROCEDURE:  XR CHEST 1 VW-     INDICATIONS:  Dyspnea; R06.00-Dyspnea, unspecified; I50.9-Heart failure, unspecified;  N17.9-Acute kidney failure, unspecified     COMPARISON:  CT chest high-resolution 05/07/2022, AP chest x-ray 05/06/2022.     TECHNIQUE:   Single radiographic AP view of the chest was obtained.     FINDINGS:  The patient's chin partially obscures evaluation of the upper chest.  Allowing for this limitation, no pneumothorax is seen. Cardiac  silhouette remains mildly enlarged. Changes are redemonstrated from  CABG. Pacemaker leads are stable. There are mildly increased right upper  lung airspace opacities. Blunting of the left lateral costophrenic angle  appears stable. Left lung appears grossly clear.        Impression:      1.Mildly increased right upper lung airspace opacities, which may be due  to atelectasis and/or pneumonia.  2.Small left pleural effusion.     Electronically Signed By-Hoa Bonilla MD On:5/9/2022 3:13 PM  This report was finalized on [id number] by  Hoa Bonilla MD.     Liver [[id number]] Collected: 05/09/22 0920     Updated: 05/09/22 0924    Narrative:       DATE OF EXAM:  5/9/2022 8:54 AM     PROCEDURE:  US LIVER-     INDICATIONS:  Hepatic transaminase derangement; R06.00-Dyspnea, unspecified;  I50.9-Heart failure, unspecified; N17.9-Acute kidney failure,  unspecified     COMPARISON:  No Comparisons Available     TECHNIQUE:   Grayscale and color Doppler ultrasound evaluation of the limited abdomen  was performed.     FINDINGS:  The liver size is normal, 12.8 cm in length. The liver maintains normal  homogeneous echotexture without focal or suspicious abnormality. There  is no ascites. The portal vein is patent with hepatopedal flow. Imaged  hepatic veins are patent. The common duct measures 2 mm. No intrahepatic  biliary dilation is seen. Incidental note is made of a 1.8 cm shadowing  gallstone, and gallbladder adenomyomatosis. The gallbladder wall does  not appear grossly thickened, and no pericholecystic fluid is seen.        Impression:         1. Normal sonographic appearance of the liver.  2. Uncomplicated cholelithiasis.  3. Gallbladder adenomyomatosis.     Electronically Signed By-Shahrzad Biggs MD On:5/9/2022 9:22 AM  This report was finalized on [id number] by  Shahrzad Biggs MD.    CT Chest Hi Resolution Diagnostic [[id number]] Collected: 05/07/22 1653     Updated: 05/07/22 1701    Narrative:         DATE OF EXAM:   5/7/2022 4:28 PM     PROCEDURE:   CT CHEST HI RESOLUTION DIAGNOSTIC-     INDICATIONS:   Interstitial lung disease; R06.00-Dyspnea, unspecified; I50.9-Heart  failure, unspecified; N17.9-Acute kidney failure, unspecified     COMPARISON:  03/24/2022.     TECHNIQUE:   Routine transaxial slices were obtained through the chest without the  administration of intravenous contrast. The study was performed  utilizing our high resolution CT protocol which includes supine  inspiratory and expiratory imaging as well as prone inspiratory imaging.  Reconstructed coronal and sagittal images were also obtained. Automated  exposure control and iterative  reconstruction methods were used.     FINDINGS:   Interval resolution of previous identified pulmonary edema. There is  nearly completely resolved small bilateral pleural effusions. There is  mild interlobular septal thickening, which may represent mild residual  interstitial edema or mild component of chronic disease. There is  scarred consolidation in the right lung base. Many of the small nodules  identified in the upper lobes of either resolved or decreased in size  compared with the prior study. There are persistent clusters of nodules  in both upper lungs, right greater than left with the largest occurring  in the right upper lobe on axial image 41 measuring up to 10 mm  diameter. There is decreased peribronchial thickening. No new lung  nodules.     There is severe aortic and aortic branch vessel atherosclerosis. There  are severe native coronary artery calcifications status post CABG. There  is moderate cardiomegaly. No pericardial effusion. There is no  pathologic mediastinal lymphadenopathy following size criteria. There  are dense aortic annular and valvular calcifications.     There is a left-sided pacemaker/ICD in place. No acute findings below  the diaphragm. There is a stable low-attenuation nodule at the left  adrenal gland, likely adenoma. There is S-shaped scoliosis of the  thoracolumbar spine. There are moderate lower thoracic and upper lumbar  degenerative changes.        Impression:      IMPRESSION :      1. Near complete resolution of previous identified interstitial  pulmonary edema and near complete resolution of now small bilateral  pleural effusions.  2. There may be a small component of chronic subpleural interstitial  disease, but the majority of the findings on the prior study are favored  to reflect pulmonary edema.  3. Improved nodular disease in the upper lungs, right greater than left  with significant residual nodularity. Recommend 6 month follow-up chest  CT. The course of disease  on imaging would suggest an infectious or  inflammatory process.  4. Cardiomegaly and coronary artery disease status post CABG and  pacemaker/ICD placement.  5. Scoliosis and spinal degenerative changes.     Electronically Signed By-Hudson Walker MD On:5/7/2022 4:59 PM  This report was finalized on [id number] by  Hudson Walker MD.    XR Chest 1 View [[id number]] Collected: 05/06/22 1631     Updated: 05/06/22 1634    Narrative:      DATE OF EXAM:  5/6/2022 4:26 PM     PROCEDURE:  XR CHEST 1 VW-     INDICATIONS:  Severe SOA; R06.00-Dyspnea, unspecified; I50.9-Heart failure,  unspecified; N17.9-Acute kidney failure, unspecified     COMPARISON:  Chest radiograph 05/03/2022     TECHNIQUE:   Single radiographic view of the chest was obtained.     FINDINGS:  Patient is rotated this limits evaluation of the right lung. There is  cardiomegaly. Dual lead transvenous pacemaker device. Blunting the  costophrenic angles. Left lower lobe airspace opacity. Background  chronic interstitial lung disease.       Impression:      Since previous exam there is been development of probable small pleural  effusions. There is left lower lobe airspace opacity atelectasis versus  pneumonia.     Electronically Signed By-Marianna Flowers MD On:5/6/2022 4:32 PM  This report was finalized on [id number] by  Marianna Flowers MD.    XR Chest 1 View [[id number]] Collected: 05/03/22 1229     Updated: 05/03/22 1234    Narrative:      DATE OF EXAM:  5/3/2022 12:07 PM     PROCEDURE:  XR CHEST 1 VW-     INDICATIONS:  Shortness of breath. Cough for 4 months.     COMPARISON:  Chest radiographs 3/24/2022, 5/29/2021, and 5/27/2021. CT chest  3/24/2022     TECHNIQUE:   Single radiographic AP view of the chest was obtained.     FINDINGS:  Lordotic positioning. Overlying artifacts. Stable sternotomy wires,  postoperative changes from CABG, left chest wall cardiac pacer device.  Stable elevation of the right hemidiaphragm with improved aeration of  both lungs. Mild  linear right infrahilar and left basilar segmental  atelectasis and/or scarring with mild interstitial thickening in the  lung bases. No focal lung consolidation. No pneumothorax. Stable  cardiomegaly, likely accentuated by technique. Calcified atherosclerotic  disease in the thoracic aorta. Chronic changes in both shoulders. No  acute osseous normality is identified.        Impression:      Stable cardiomegaly with improved aeration of both lungs. Multifocal  bibasilar subsegmental atelectasis and/or scarring with mild  interstitial thickening in the lung bases that could represent mild  pulmonary vascular congestion/edema, chronic lung disease, or less  likely atypical pneumonia.     Electronically Signed By-Lance Mendes MD On:5/3/2022 12:32 PM  This report was finalized on [id number] by  Lance Mendes MD.          ECHOCARDIOGRAM:    Results for orders placed during the hospital encounter of 05/03/22    Adult Transthoracic Echo Complete W/ Cont if Necessary Per Protocol    Interpretation Summary  · Left atrial volume is severely increased.  · Abnormal mitral valve structure consistent with dilated annulus.  · Moderate to severe tricuspid valve regurgitation is present.  · Estimated right ventricular systolic pressure from tricuspid regurgitation is markedly elevated (>55 mmHg).  · Moderate pulmonic valve regurgitation is present.  · The left ventricular cavity is moderately dilated.  · Estimated left ventricular EF was in agreement with the calculated left ventricular EF. Left ventricular ejection fraction appears to be 26 - 30%. Left ventricular systolic function is severely decreased.  · Left ventricular diastolic function is consistent with (grade II w/high LAP) pseudonormalization.  · The right ventricular cavity is moderately dilated.  · Severely reduced right ventricular systolic function noted.  · Severe mitral valve regurgitation is present with a centrally-directed jet noted.  · Moderate aortic valve  regurgitation is present.  · Moderate aortic valve stenosis is present.    Severe LV systolic dysfunction EF of 25%, moderately dilated LV  Severe MR without stenosis  Moderate AR with moderate aortic valve stenosis  Severe TR without stenosis  PA systolic pressure severely elevated 60-65 with severe pulm hypertension  Grade 2-3 diastolic dysfunction  Right atrial pressure estimated greater than 20 with severely dilated IVC  No masses or effusion seen  RV is moderate to severely hypokinetic, moderately increased in size        I reviewed the patient's new clinical results.    EKG:      Assessment:       Dyspnea, unspecified type    Moderate malnutrition (HCC)    1. Shortness of breath / Acute on chronic systolic and diastolic CHF  - LVEF 35%, Dual-chamber pacemaker well-functioning-No ICD upgrade at family discretion previously  -Lasix gently, nephrology on board appreciate recommendations  -Off Aldactone at this time  -Blood pressure will likely not tolerate ARB as well as kidney function    2. HARMAN / CKD  - creatinine varies, 2.1 today  - on oral lasix    3. HTN    4. Paroxysmal Atrial fibrillation  - not on a/c, CHADS VAsc at least 3, on ASA      5. SSS s/p PPM    6. Elevated LFTs  - GI following  - AST / /756    Plan:   Extensively described above    Allowing better heart rates as well as gentle dobutamine promoting diuresis has improved creatinine as well as LFTs with both LV and RV unloading from EDP standpoint and congestion    Continue present treatment  Replace electrolytes for potassium greater than 4 magnesium greater than 2    See how his course goes over the next 24 hours and adjust from there    Chris Romero MD, PhD    Chris Romero MD  05/13/22  09:26 EDT

## 2022-05-13 NOTE — PROGRESS NOTES
"NEPHROLOGY PROGRESS NOTE------[facility]    [facility]  [phone]  Tam Gonzalez MD      Patient Care Team:  Antony Lawson MD as PCP - General  Tam Gonzalez MD as Consulting Physician (Nephrology)  Minh Kendrick MD as Consulting Physician (Cardiology)  Chris Romero MD as Consulting Physician (Cardiology)      Provider:  Tam Gonzalez MD  Patient Name: Shon ZAYAS Kunal  :  1930    SUBJECTIVE:  F/U CKD  No chest pain, breathing better      Medication:  aspirin, 81 mg, Oral, Daily  finasteride, 5 mg, Oral, Daily  guaiFENesin, 600 mg, Oral, Q12H  heparin (porcine), 5,000 Units, Subcutaneous, Q12H  magnesium sulfate, 2 g, Intravenous, Once  midodrine, 5 mg, Oral, TID AC  potassium chloride, 40 mEq, Oral, Once  sodium bicarbonate, 1,300 mg, Oral, TID  sodium chloride, 3 mL, Intravenous, Q12H      DOBUTamine, 2.5 mcg/kg/min, Last Rate: 5 mcg/kg/min (22 0929)  furosemide (LASIX) 100 mg in 100mL NS infusion, 20 mg/hr, Last Rate: 20 mg/hr (22 0709)        OBJECTIVE    Vital Sign Min/Max for last 24 hours  Temp  Min: 97.4 °F (36.3 °C)  Max: 98.1 °F (36.7 °C)   BP  Min: 118/49  Max: 155/69   Pulse  Min: 74  Max: 108   Resp  Min: 11  Max: 23   SpO2  Min: 86 %  Max: 100 %   No data recorded   Weight  Min: 57 kg (125 lb 10.6 oz)  Max: 57 kg (125 lb 10.6 oz)     Flowsheet Rows    Flowsheet Row First Filed Value   Admission Height 172.7 cm (68\") Documented at 2022 1114   Admission Weight 58.2 kg (128 lb 4.9 oz) Documented at 2022 1114          I/O this shift:  In: 240 [P.O.:240]  Out: 1150 [Urine:1150]  I/O last 3 completed shifts:  In: 660 [P.O.:660]  Out: 7250 [Urine:7250]    Physical Exam:  General Appearance: alert, appears stated age and cooperative  Head: normocephalic, without obvious abnormality and atraumatic  Eyes: conjunctivae and sclerae normal and no icterus  Neck: supple and +JVD  Lungs: diminished " breath sounds  Heart: regular rhythm & normal rate and normal S1, S2  Chest: Wall no abnormalities observed  Abdomen: normal bowel sounds and soft non-tender  Extremities: moves extremities well, no edema, no cyanosis and no redness  Skin: no bleeding, bruising or rash, turgor normal, color normal and no lesions noted  Neurologic: Alert, and oriented. No focal deficits    Labs:    WBC WBC   Date Value Ref Range Status   05/13/2022 8.50 3.40 - 10.80 10*3/mm3 Final   05/11/2022 10.10 3.40 - 10.80 10*3/mm3 Final   05/11/2022 9.10 3.40 - 10.80 10*3/mm3 Final      HGB Hemoglobin   Date Value Ref Range Status   05/13/2022 14.7 13.0 - 17.7 g/dL Final   05/11/2022 13.8 13.0 - 17.7 g/dL Final   05/11/2022 13.3 13.0 - 17.7 g/dL Final      HCT Hematocrit   Date Value Ref Range Status   05/13/2022 46.4 37.5 - 51.0 % Final   05/11/2022 42.4 37.5 - 51.0 % Final   05/11/2022 41.4 37.5 - 51.0 % Final      Platlets No results found for: LABPLAT   MCV MCV   Date Value Ref Range Status   05/13/2022 102.1 (H) 79.0 - 97.0 fL Final   05/11/2022 100.8 (H) 79.0 - 97.0 fL Final   05/11/2022 99.6 (H) 79.0 - 97.0 fL Final          Sodium Sodium   Date Value Ref Range Status   05/12/2022 141 136 - 145 mmol/L Final   05/12/2022 140 136 - 145 mmol/L Final   05/11/2022 137 136 - 145 mmol/L Final      Potassium Potassium   Date Value Ref Range Status   05/12/2022 4.0 3.5 - 5.2 mmol/L Final     Comment:     Slight hemolysis detected by analyzer. Results may be affected.   05/12/2022 3.5 3.5 - 5.2 mmol/L Final   05/11/2022 4.1 3.5 - 5.2 mmol/L Final      Chloride Chloride   Date Value Ref Range Status   05/12/2022 95 (L) 98 - 107 mmol/L Final   05/12/2022 96 (L) 98 - 107 mmol/L Final   05/11/2022 97 (L) 98 - 107 mmol/L Final      CO2 CO2   Date Value Ref Range Status   05/12/2022 31.0 (H) 22.0 - 29.0 mmol/L Final   05/12/2022 30.0 (H) 22.0 - 29.0 mmol/L Final   05/11/2022 24.0 22.0 - 29.0 mmol/L Final      BUN BUN   Date Value Ref Range Status    05/12/2022 76 (H) 8 - 23 mg/dL Final   05/12/2022 85 (H) 8 - 23 mg/dL Final   05/11/2022 99 (H) 8 - 23 mg/dL Final      Creatinine Creatinine   Date Value Ref Range Status   05/12/2022 1.84 (H) 0.76 - 1.27 mg/dL Final   05/12/2022 1.80 (H) 0.76 - 1.27 mg/dL Final   05/11/2022 2.04 (H) 0.76 - 1.27 mg/dL Final      Calcium Calcium   Date Value Ref Range Status   05/12/2022 9.1 8.2 - 9.6 mg/dL Final   05/12/2022 8.8 8.2 - 9.6 mg/dL Final   05/11/2022 8.6 8.2 - 9.6 mg/dL Final      PO4 No components found for: PO4   Albumin Albumin   Date Value Ref Range Status   05/12/2022 3.50 3.50 - 5.20 g/dL Final   05/11/2022 3.50 3.50 - 5.20 g/dL Final      Magnesium Magnesium   Date Value Ref Range Status   05/12/2022 2.4 (H) 1.7 - 2.3 mg/dL Final   05/12/2022 2.4 (H) 1.7 - 2.3 mg/dL Final      Uric Acid No components found for: URIC ACID     Imaging Results (Last 72 Hours)     ** No results found for the last 72 hours. **          Results for orders placed during the hospital encounter of 05/03/22    XR Chest 1 View    Narrative  DATE OF EXAM:  5/9/2022 3:02 PM    PROCEDURE:  XR CHEST 1 VW-    INDICATIONS:  Dyspnea; R06.00-Dyspnea, unspecified; I50.9-Heart failure, unspecified;  N17.9-Acute kidney failure, unspecified    COMPARISON:  CT chest high-resolution 05/07/2022, AP chest x-ray 05/06/2022.    TECHNIQUE:  Single radiographic AP view of the chest was obtained.    FINDINGS:  The patient's chin partially obscures evaluation of the upper chest.  Allowing for this limitation, no pneumothorax is seen. Cardiac  silhouette remains mildly enlarged. Changes are redemonstrated from  CABG. Pacemaker leads are stable. There are mildly increased right upper  lung airspace opacities. Blunting of the left lateral costophrenic angle  appears stable. Left lung appears grossly clear.    Impression  1.Mildly increased right upper lung airspace opacities, which may be due  to atelectasis and/or pneumonia.  2.Small left pleural  effusion.    Electronically Signed By-Hoa Bonilla MD On:5/9/2022 3:13 PM  This report was finalized on [id number] by  Hoa Bonilla MD.      XR Chest 1 View    Narrative  DATE OF EXAM:  5/6/2022 4:26 PM    PROCEDURE:  XR CHEST 1 VW-    INDICATIONS:  Severe SOA; R06.00-Dyspnea, unspecified; I50.9-Heart failure,  unspecified; N17.9-Acute kidney failure, unspecified    COMPARISON:  Chest radiograph 05/03/2022    TECHNIQUE:  Single radiographic view of the chest was obtained.    FINDINGS:  Patient is rotated this limits evaluation of the right lung. There is  cardiomegaly. Dual lead transvenous pacemaker device. Blunting the  costophrenic angles. Left lower lobe airspace opacity. Background  chronic interstitial lung disease.    Impression  Since previous exam there is been development of probable small pleural  effusions. There is left lower lobe airspace opacity atelectasis versus  pneumonia.    Electronically Signed By-Marianna Flowers MD On:5/6/2022 4:32 PM  This report was finalized on [id number] by  Marianna Flowers MD.      XR Chest 1 View    Narrative  DATE OF EXAM:  5/3/2022 12:07 PM    PROCEDURE:  XR CHEST 1 VW-    INDICATIONS:  Shortness of breath. Cough for 4 months.    COMPARISON:  Chest radiographs 3/24/2022, 5/29/2021, and 5/27/2021. CT chest  3/24/2022    TECHNIQUE:  Single radiographic AP view of the chest was obtained.    FINDINGS:  Lordotic positioning. Overlying artifacts. Stable sternotomy wires,  postoperative changes from CABG, left chest wall cardiac pacer device.  Stable elevation of the right hemidiaphragm with improved aeration of  both lungs. Mild linear right infrahilar and left basilar segmental  atelectasis and/or scarring with mild interstitial thickening in the  lung bases. No focal lung consolidation. No pneumothorax. Stable  cardiomegaly, likely accentuated by technique. Calcified atherosclerotic  disease in the thoracic aorta. Chronic changes in both shoulders. No  acute osseous  normality is identified.    Impression  Stable cardiomegaly with improved aeration of both lungs. Multifocal  bibasilar subsegmental atelectasis and/or scarring with mild  interstitial thickening in the lung bases that could represent mild  pulmonary vascular congestion/edema, chronic lung disease, or less  likely atypical pneumonia.    Electronically Signed By-Lance Mendes MD On:5/3/2022 12:32 PM  This report was finalized on [id number] by  Lance Mendes MD.      Results for orders placed during the hospital encounter of 03/24/22    Duplex Carotid Ultrasound CAR    Interpretation Summary  · Proximal right internal carotid artery moderate stenosis.  · Proximal left internal carotid artery moderate stenosis.        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    · CHF- EF 25-30%. Severe TR, high RVSP. cautiously diurese.   · Hypertension  · Atrial fibrillation  · History coronary disease  · Diabetes  · Elevated LFTs--GI following. Suspect related to congestive hepatomegaly     CR stable  Continue lasix infusion/ dobutamine  Monitor renal function fluid status electrolytes      Tam Gonzalez MD  [facility]  [phone]  05/13/22  12:57 EDT

## 2022-05-14 NOTE — PROGRESS NOTES
Daily Progress Note        Dyspnea, unspecified type    Moderate malnutrition (HCC)      Assessment    dyspnea improved with diuretics  As well as interstitial markings with diuretics unlikely interstitial lung disease  Presentation suggestive of Pulmonary edema  CHF EF 35%  Coronary artery disease  A. fib  CKD      Recommendations:     Diuresis for cardiomyopathy EF 25%- Lasix drip  Dobutamine drip     Continue to titrate oxygen-currently on 2 L NC and CPAP at bedtime/as needed  - Family reports he has not been wearing it at night     Mucinex  Sodium bicarb 1300 mg 3 times a day  Midodrine  DuoNebs as needed     Repeat CT scan of the chest after 3 months to follow-up on right upper lobe nodule     5/7/2022       LOS: 10 days     Subjective         Objective     Vital signs for last 24 hours:  Vitals:    05/13/22 1900 05/13/22 2100 05/14/22 0253 05/14/22 0523   BP: 141/61 141/63 124/49 110/40   BP Location:  Right arm Right arm Right arm   Patient Position:  Lying Lying Lying   Pulse: 105 95 102 110   Resp:  27 15 18   Temp:  98.1 °F (36.7 °C) 98 °F (36.7 °C) 97.8 °F (36.6 °C)   TempSrc:  Oral Axillary Axillary   SpO2: 100% 100% 98% 100%   Weight:       Height:           Intake/Output last 3 shifts:  I/O last 3 completed shifts:  In: 900 [P.O.:900]  Out: 5750 [Urine:5750]  Intake/Output this shift:  I/O this shift:  In: -   Out: 1750 [Urine:1750]      Radiology  Imaging Results (Last 24 Hours)     ** No results found for the last 24 hours. **          Labs:  Results from last 7 days   Lab Units 05/14/22  0151   WBC 10*3/mm3 7.70   HEMOGLOBIN g/dL 14.3   HEMATOCRIT % 44.1   PLATELETS 10*3/mm3 194     Results from last 7 days   Lab Units 05/14/22  0151   SODIUM mmol/L 141   POTASSIUM mmol/L 3.2*   CHLORIDE mmol/L 96*   CO2 mmol/L 30.0*   BUN mg/dL 61*   CREATININE mg/dL 1.44*   CALCIUM mg/dL 8.6   BILIRUBIN mg/dL 1.2   ALK PHOS U/L 223*   ALT (SGPT) U/L 308*   AST (SGOT) U/L 81*   GLUCOSE mg/dL 111*         Results  from last 7 days   Lab Units 05/14/22  0151 05/12/22  0637 05/11/22  0429   ALBUMIN g/dL 3.60 3.50 3.50     Results from last 7 days   Lab Units 05/08/22  0026   CK TOTAL U/L 95     Results from last 7 days   Lab Units 05/09/22  1021   MED  Negative     Results from last 7 days   Lab Units 05/12/22  1857   MAGNESIUM mg/dL 2.4*     Results from last 7 days   Lab Units 05/11/22  0429   INR  1.31*               Meds:   SCHEDULE  aspirin, 81 mg, Oral, Daily  finasteride, 5 mg, Oral, Daily  guaiFENesin, 600 mg, Oral, Q12H  heparin (porcine), 5,000 Units, Subcutaneous, Q12H  magnesium sulfate, 2 g, Intravenous, Once  midodrine, 5 mg, Oral, TID AC  potassium chloride, 40 mEq, Oral, Once  sodium bicarbonate, 1,300 mg, Oral, TID  sodium chloride, 3 mL, Intravenous, Q12H      Infusions  DOBUTamine, 2.5 mcg/kg/min, Last Rate: 2.5 mcg/kg/min (05/14/22 0326)  furosemide (LASIX) 100 mg in 100mL NS infusion, 20 mg/hr, Last Rate: 20 mg/hr (05/14/22 0109)      PRNs  ipratropium-albuterol  •  nitroglycerin  •  ondansetron  •  oxyCODONE  •  potassium chloride  •  [COMPLETED] Insert peripheral IV **AND** sodium chloride  •  sodium chloride    Physical Exam:  Physical Exam  Vitals reviewed.   Pulmonary:      Effort: Pulmonary effort is normal.      Breath sounds: Rhonchi present.   Skin:     General: Skin is warm and dry.   Neurological:      Mental Status: He is alert and oriented to person, place, and time.         ROS  Review of Systems   Respiratory: Positive for shortness of breath.    Neurological: Positive for weakness.       I have reviewed the patient's new clinical results    Electronically signed by CARMEN Sigala

## 2022-05-14 NOTE — PLAN OF CARE
Problem: Fluid Volume Excess  Goal: Fluid Balance  Outcome: Ongoing, Progressing  Intervention: Monitor and Manage Hypervolemia  Recent Flowsheet Documentation  Taken 5/13/2022 2000 by Mandi Milligan RN  Skin Protection: adhesive use limited     Problem: Adult Inpatient Plan of Care  Goal: Plan of Care Review  Outcome: Ongoing, Progressing  Goal: Patient-Specific Goal (Individualized)  Outcome: Ongoing, Progressing  Goal: Absence of Hospital-Acquired Illness or Injury  Outcome: Ongoing, Progressing  Intervention: Identify and Manage Fall Risk  Recent Flowsheet Documentation  Taken 5/14/2022 0400 by Mandi Milligan RN  Safety Promotion/Fall Prevention:   clutter free environment maintained   lighting adjusted   room organization consistent   safety round/check completed  Taken 5/14/2022 0000 by Mandi Milligan RN  Safety Promotion/Fall Prevention:   clutter free environment maintained   activity supervised   safety round/check completed  Taken 5/13/2022 2000 by Mandi Milligan RN  Safety Promotion/Fall Prevention:   activity supervised   fall prevention program maintained   safety round/check completed   nonskid shoes/slippers when out of bed   lighting adjusted  Intervention: Prevent Skin Injury  Recent Flowsheet Documentation  Taken 5/13/2022 2000 by Mandi Milligan RN  Skin Protection: adhesive use limited  Intervention: Prevent and Manage VTE (Venous Thromboembolism) Risk  Recent Flowsheet Documentation  Taken 5/14/2022 0400 by Mandi Milligan RN  VTE Prevention/Management: (sub q hep) other (see comments)  Taken 5/13/2022 2000 by Mandi Milligan RN  Activity Management: activity adjusted per tolerance  VTE Prevention/Management: (sub q heparin) --  Intervention: Prevent Infection  Recent Flowsheet Documentation  Taken 5/14/2022 0400 by Mandi Milligan RN  Infection Prevention: rest/sleep promoted  Taken 5/13/2022 2000 by Mandi Milligan RN  Infection Prevention: personal protective equipment  utilized  Goal: Optimal Comfort and Wellbeing  Outcome: Ongoing, Progressing  Intervention: Monitor Pain and Promote Comfort  Recent Flowsheet Documentation  Taken 5/14/2022 0400 by Mandi Milligan RN  Pain Management Interventions: care clustered  Taken 5/14/2022 0000 by Mandi Milligan RN  Pain Management Interventions: care clustered  Taken 5/13/2022 2000 by Mandi Milligan RN  Pain Management Interventions: breathing exercises  Intervention: Provide Person-Centered Care  Recent Flowsheet Documentation  Taken 5/14/2022 0400 by Mandi Milligan RN  Trust Relationship/Rapport:   care explained   choices provided  Taken 5/14/2022 0000 by Mandi Milligan RN  Trust Relationship/Rapport: care explained  Taken 5/13/2022 2000 by Mandi Milligan RN  Trust Relationship/Rapport:   care explained   choices provided  Goal: Readiness for Transition of Care  Outcome: Ongoing, Progressing     Problem: Cardiac Output Decreased (Heart Failure)  Goal: Optimal Cardiac Output  Outcome: Ongoing, Progressing  Intervention: Optimize Cardiac Output  Recent Flowsheet Documentation  Taken 5/14/2022 0400 by Mandi Milligan RN  Environmental Support: calm environment promoted  Taken 5/14/2022 0000 by Mandi Milligan RN  Environmental Support: calm environment promoted  Taken 5/13/2022 2000 by Mandi Milligan RN  Environmental Support: calm environment promoted     Problem: Sleep Disordered Breathing (Heart Failure)  Goal: Effective Breathing Pattern During Sleep  Outcome: Ongoing, Progressing     Problem: Respiratory Compromise (Heart Failure)  Goal: Effective Oxygenation and Ventilation  Outcome: Ongoing, Progressing  Intervention: Promote Airway Secretion Clearance  Recent Flowsheet Documentation  Taken 5/14/2022 0400 by Mandi Milligan RN  Breathing Techniques/Airway Clearance: deep/controlled cough encouraged  Taken 5/13/2022 2000 by Mandi Milligan RN  Cough And Deep Breathing: done independently per  patient  Intervention: Optimize Oxygenation and Ventilation  Recent Flowsheet Documentation  Taken 5/14/2022 0400 by Mandi Milligan, RN  Airway/Ventilation Management: airway patency maintained     Problem: Skin Injury Risk Increased  Goal: Skin Health and Integrity  Outcome: Ongoing, Progressing  Intervention: Optimize Skin Protection  Recent Flowsheet Documentation  Taken 5/13/2022 2000 by Mandi Milligan, RN  Pressure Reduction Techniques: frequent weight shift encouraged  Pressure Reduction Devices: positioning supports utilized  Skin Protection: adhesive use limited   Goal Outcome Evaluation:            Patient alert and oriented x 4, patient denies distress, patient on dobutamine  and lasix drip, wife at bedside.

## 2022-05-14 NOTE — PROGRESS NOTES
LOS: 10 days   Patient Care Team:  Antony Lawson MD as PCP - General  Tam Gonzalez MD as Consulting Physician (Nephrology)  Minh Kendrick MD as Consulting Physician (Cardiology)  Chris Romero MD as Consulting Physician (Cardiology)    Subjective:  Pleasant and perky    Objective:   Afebrile      Review of Systems:   Review of Systems   Constitutional: Positive for activity change.   Respiratory: Negative.    Cardiovascular: Negative.            Vital Signs  Temp:  [97.8 °F (36.6 °C)-98.1 °F (36.7 °C)] 97.8 °F (36.6 °C)  Heart Rate:  [] 110  Resp:  [13-27] 18  BP: (110-146)/(40-70) 110/40    Physical Exam:  Physical Exam  Vitals and nursing note reviewed.   Constitutional:       Appearance: Normal appearance.   Cardiovascular:      Rate and Rhythm: Rhythm irregular.      Heart sounds: Normal heart sounds.   Pulmonary:      Breath sounds: Normal breath sounds.   Skin:     General: Skin is warm.   Neurological:      General: No focal deficit present.      Mental Status: He is alert and oriented to person, place, and time.          Radiology:  US Liver    Result Date: 5/9/2022   1. Normal sonographic appearance of the liver. 2. Uncomplicated cholelithiasis. 3. Gallbladder adenomyomatosis.  Electronically Signed By-Shahrzad Biggs MD On:5/9/2022 9:22 AM This report was finalized on [id number] by  Shahrzad Biggs MD.    XR Chest 1 View    Result Date: 5/9/2022  1.Mildly increased right upper lung airspace opacities, which may be due to atelectasis and/or pneumonia. 2.Small left pleural effusion.  Electronically Signed By-Hoa Bonilla MD On:5/9/2022 3:13 PM This report was finalized on [id number] by  Hoa Bonilla MD.    XR Chest 1 View    Result Date: 5/6/2022  Since previous exam there is been development of probable small pleural effusions. There is left lower lobe airspace opacity atelectasis versus pneumonia.  Electronically Signed By-Marianna Flowers MD On:5/6/2022 4:32 PM This  report was finalized on [id number] by  Marianna Flowers MD.    CT Chest Hi Resolution Diagnostic    Result Date: 5/7/2022  IMPRESSION :  1. Near complete resolution of previous identified interstitial pulmonary edema and near complete resolution of now small bilateral pleural effusions. 2. There may be a small component of chronic subpleural interstitial disease, but the majority of the findings on the prior study are favored to reflect pulmonary edema. 3. Improved nodular disease in the upper lungs, right greater than left with significant residual nodularity. Recommend 6 month follow-up chest CT. The course of disease on imaging would suggest an infectious or inflammatory process. 4. Cardiomegaly and coronary artery disease status post CABG and pacemaker/ICD placement. 5. Scoliosis and spinal degenerative changes.  Electronically Signed By-Hudson Walker MD On:5/7/2022 4:59 PM This report was finalized on [id number] by  Hudson Walker MD.         Results Review:     I reviewed the patient's new clinical results.  I reviewed the patient's new imaging results and agree with the interpretation.    Medication Review:   Scheduled Meds:aspirin, 81 mg, Oral, Daily  finasteride, 5 mg, Oral, Daily  guaiFENesin, 600 mg, Oral, Q12H  heparin (porcine), 5,000 Units, Subcutaneous, Q12H  magnesium sulfate, 2 g, Intravenous, Once  midodrine, 5 mg, Oral, TID AC  potassium chloride, 40 mEq, Oral, Once  sodium bicarbonate, 1,300 mg, Oral, TID  sodium chloride, 3 mL, Intravenous, Q12H      Continuous Infusions:DOBUTamine, 2.5 mcg/kg/min, Last Rate: 2.5 mcg/kg/min (05/14/22 0326)  furosemide (LASIX) 100 mg in 100mL NS infusion, 20 mg/hr, Last Rate: 20 mg/hr (05/14/22 0109)      PRN Meds:.ipratropium-albuterol  •  nitroglycerin  •  ondansetron  •  oxyCODONE  •  potassium chloride  •  [COMPLETED] Insert peripheral IV **AND** sodium chloride  •  sodium chloride    Labs:    CBC    Results from last 7 days   Lab Units 05/14/22  1010  05/13/22  0600 05/11/22  2348 05/11/22  0429 05/10/22  0611 05/09/22  0522 05/08/22  0027   WBC 10*3/mm3 7.70 8.50 10.10 9.10 8.70 11.10* 10.60   HEMOGLOBIN g/dL 14.3 14.7 13.8 13.3 15.0 14.0 13.8   PLATELETS 10*3/mm3 194 192 179 175 193 198 208     BMP   Results from last 7 days   Lab Units 05/14/22  0151 05/12/22  1857 05/12/22  0637 05/11/22  0429 05/10/22  1216 05/10/22  0611 05/09/22  0522 05/08/22  0026   SODIUM mmol/L 141 141 140 137  --  139 135* 136   POTASSIUM mmol/L 3.2* 4.0 3.5 4.1 4.6 5.4* 5.1 4.8   CHLORIDE mmol/L 96* 95* 96* 97*  --  94* 98 99   CO2 mmol/L 30.0* 31.0* 30.0* 24.0  --  25.0 16.0* 20.0*   BUN mg/dL 61* 76* 85* 99*  --  100* 91* 91*   CREATININE mg/dL 1.44* 1.84* 1.80* 2.04*  --  2.53* 2.16* 2.36*   GLUCOSE mg/dL 111* 141* 94 114*  --  116* 152* 156*   MAGNESIUM mg/dL  --  2.4* 2.4*  --   --   --  2.7* 2.6*   PHOSPHORUS mg/dL  --   --   --   --   --   --  5.7* 5.0*     Cr Clearance Estimated Creatinine Clearance: 26.9 mL/min (A) (by C-G formula based on SCr of 1.44 mg/dL (H)).  Coag   Results from last 7 days   Lab Units 05/11/22 0429   INR  1.31*     HbA1C No results found for: HGBA1C  Blood Glucose   Glucose   Date/Time Value Ref Range Status   05/14/2022 0734 108 (H) 70 - 105 mg/dL Final     Comment:     Serial Number: [id number]:  [id number]   05/13/2022 2018 151 (H) 70 - 105 mg/dL Final     Comment:     Serial Number: [id number]:  [id number]   05/13/2022 1759 163 (H) 70 - 105 mg/dL Final     Comment:     Serial Number: [id number]:  [id number]   05/13/2022 1136 124 (H) 70 - 105 mg/dL Final     Comment:     Serial Number: [id number]:  [id number]   05/13/2022 0754 78 70 - 105 mg/dL Final     Comment:     Serial Number: [id number]:  [id number]   05/12/2022 2005 146 (H) 70 - 105 mg/dL Final     Comment:     Serial Number: [id number]:  [id number]   05/12/2022 1616 146 (H) 70 - 105 mg/dL Final     Comment:     Serial Number: [id number]:  [id number]    05/12/2022 1051 168 (H) 70 - 105 mg/dL Final     Comment:     Serial Number: [id number]:  [id number]     Infection     CMP   Results from last 7 days   Lab Units 05/14/22  0151 05/12/22  1857 05/12/22  0637 05/11/22  0429 05/10/22  1216 05/10/22  0611 05/09/22  0522 05/08/22  0026   SODIUM mmol/L 141 141 140 137  --  139 135* 136   POTASSIUM mmol/L 3.2* 4.0 3.5 4.1 4.6 5.4* 5.1 4.8   CHLORIDE mmol/L 96* 95* 96* 97*  --  94* 98 99   CO2 mmol/L 30.0* 31.0* 30.0* 24.0  --  25.0 16.0* 20.0*   BUN mg/dL 61* 76* 85* 99*  --  100* 91* 91*   CREATININE mg/dL 1.44* 1.84* 1.80* 2.04*  --  2.53* 2.16* 2.36*   GLUCOSE mg/dL 111* 141* 94 114*  --  116* 152* 156*   ALBUMIN g/dL 3.60  --  3.50 3.50  --  3.80 3.60  --    BILIRUBIN mg/dL 1.2  --  1.2 0.8  --  0.8 0.8  --    ALK PHOS U/L 223*  --  284* 309*  --  321* 230*  --    AST (SGOT) U/L 81*  --  128* 214*  --  459* 353*  --    ALT (SGPT) U/L 308*  --  521* 616*  --  968* 756*  --      UA      Radiology(recent) No radiology results for the last day   Assessment:    Acute hepatitis, congestive with possible iatrogenic component  Acute shortness of breath  Acute pulmonary edema  Acute metabolic acidosis  Acute hyperkalemia  Hepatic transaminase derangement likely secondary to shock liver  History of sick sinus syndrome  Acute exacerbation of chronic systolic congestive heart failure  Acute renal failure superimposed upon chronic kidney disease stage IIIb  Acute kidney injury  Severe mitral regurgitation  Severe tricuspid regurgitation  Acute cardiopulmonary syndrome  Right upper lobe pulmonary nodule  History of pacemaker placement with replacement of generator 1/22  Atherosclerotic heart disease of native coronary arteries with angina pectoris  History of coronary artery bypass grafting in 1993  HFrEF 35%  Severe pulmonary hypertension  Chronic atrial fibrillation, persistent intermittent  Hypercoagulable state secondary to atrial fibrillation  Cerebrovascular disease  with history of CVA  History of carotid occlusive disease  Vitamin D deficiency  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

## 2022-05-14 NOTE — PROGRESS NOTES
LOS: 9 days   Patient Care Team:  Antony Lawson MD as PCP - General  Tam Gonzalez MD as Consulting Physician (Nephrology)  Minh Kendrick MD as Consulting Physician (Cardiology)  Chris Romero MD as Consulting Physician (Cardiology)    Subjective:  Amiated//good spirits    Objective:   afebrile      Review of Systems:   Review of Systems   Constitutional: Positive for activity change.   HENT: Positive for sinus pressure.    Respiratory: Positive for shortness of breath.    Cardiovascular: Negative.    Neurological: Positive for weakness.           Vital Signs  Temp:  [97.4 °F (36.3 °C)-98.1 °F (36.7 °C)] 97.8 °F (36.6 °C)  Heart Rate:  [] 103  Resp:  [12-26] 26  BP: (118-155)/(38-82) 137/69    Physical Exam:  Physical Exam  Vitals and nursing note reviewed.   Cardiovascular:      Rate and Rhythm: Normal rate.   Skin:     General: Skin is warm.   Neurological:      Mental Status: He is alert.          Radiology:  US Liver    Result Date: 5/9/2022   1. Normal sonographic appearance of the liver. 2. Uncomplicated cholelithiasis. 3. Gallbladder adenomyomatosis.  Electronically Signed By-Shahrzad Biggs MD On:5/9/2022 9:22 AM This report was finalized on [id number] by  Shahrzad Biggs MD.    XR Chest 1 View    Result Date: 5/9/2022  1.Mildly increased right upper lung airspace opacities, which may be due to atelectasis and/or pneumonia. 2.Small left pleural effusion.  Electronically Signed By-Hoa Bonilla MD On:5/9/2022 3:13 PM This report was finalized on [id number] by  Hoa Bonilla MD.    XR Chest 1 View    Result Date: 5/6/2022  Since previous exam there is been development of probable small pleural effusions. There is left lower lobe airspace opacity atelectasis versus pneumonia.  Electronically Signed By-Marianna Flowers MD On:5/6/2022 4:32 PM This report was finalized on [id number] by  Marianna Flowers MD.    CT Chest Hi Resolution Diagnostic    Result Date:  5/7/2022  IMPRESSION :  1. Near complete resolution of previous identified interstitial pulmonary edema and near complete resolution of now small bilateral pleural effusions. 2. There may be a small component of chronic subpleural interstitial disease, but the majority of the findings on the prior study are favored to reflect pulmonary edema. 3. Improved nodular disease in the upper lungs, right greater than left with significant residual nodularity. Recommend 6 month follow-up chest CT. The course of disease on imaging would suggest an infectious or inflammatory process. 4. Cardiomegaly and coronary artery disease status post CABG and pacemaker/ICD placement. 5. Scoliosis and spinal degenerative changes.  Electronically Signed By-Hudson Walker MD On:5/7/2022 4:59 PM This report was finalized on [id number] by  Hudson Walker MD.         Results Review:     I reviewed the patient's new clinical results.  I reviewed the patient's new imaging results and agree with the interpretation.    Medication Review:   Scheduled Meds:aspirin, 81 mg, Oral, Daily  finasteride, 5 mg, Oral, Daily  guaiFENesin, 600 mg, Oral, Q12H  heparin (porcine), 5,000 Units, Subcutaneous, Q12H  magnesium sulfate, 2 g, Intravenous, Once  midodrine, 5 mg, Oral, TID AC  potassium chloride, 40 mEq, Oral, Once  sodium bicarbonate, 1,300 mg, Oral, TID  sodium chloride, 3 mL, Intravenous, Q12H      Continuous Infusions:DOBUTamine, 2.5 mcg/kg/min, Last Rate: 3.75 mcg/kg/min (05/13/22 1713)  furosemide (LASIX) 100 mg in 100mL NS infusion, 20 mg/hr, Last Rate: 20 mg/hr (05/13/22 1851)      PRN Meds:.ipratropium-albuterol  •  nitroglycerin  •  ondansetron  •  oxyCODONE  •  potassium chloride  •  [COMPLETED] Insert peripheral IV **AND** sodium chloride  •  sodium chloride    Labs:    CBC    Results from last 7 days   Lab Units 05/13/22  0600 05/11/22  2348 05/11/22  0429 05/10/22  0611 05/09/22  0522 05/08/22  0027 05/07/22  0234   WBC 10*3/mm3 8.50  10.10 9.10 8.70 11.10* 10.60 8.30   HEMOGLOBIN g/dL 14.7 13.8 13.3 15.0 14.0 13.8 13.2   PLATELETS 10*3/mm3 192 179 175 193 198 208 208     BMP   Results from last 7 days   Lab Units 05/12/22  1857 05/12/22  0637 05/11/22  0429 05/10/22  1216 05/10/22  0611 05/09/22 0522 05/08/22  0026 05/07/22  0428 05/07/22  0428 05/07/22  0234   SODIUM mmol/L 141 140 137  --  139 135* 136  --  143  --    POTASSIUM mmol/L 4.0 3.5 4.1 4.6 5.4* 5.1 4.8   < > 4.2  --    CHLORIDE mmol/L 95* 96* 97*  --  94* 98 99  --  103  --    CO2 mmol/L 31.0* 30.0* 24.0  --  25.0 16.0* 20.0*  --  25.0  --    BUN mg/dL 76* 85* 99*  --  100* 91* 91*  --  75*  --    CREATININE mg/dL 1.84* 1.80* 2.04*  --  2.53* 2.16* 2.36*  --  1.85*  --    GLUCOSE mg/dL 141* 94 114*  --  116* 152* 156*  --  99  --    MAGNESIUM mg/dL 2.4* 2.4*  --   --   --  2.7* 2.6*  --   --  2.5*   PHOSPHORUS mg/dL  --   --   --   --   --  5.7* 5.0*  --   --  5.0*    < > = values in this interval not displayed.     Cr Clearance Estimated Creatinine Clearance: 21.1 mL/min (A) (by C-G formula based on SCr of 1.84 mg/dL (H)).  Coag   Results from last 7 days   Lab Units 05/11/22 0429   INR  1.31*     HbA1C No results found for: HGBA1C  Blood Glucose   Glucose   Date/Time Value Ref Range Status   05/13/2022 2018 151 (H) 70 - 105 mg/dL Final     Comment:     Serial Number: [id number]:  [id number]   05/13/2022 1759 163 (H) 70 - 105 mg/dL Final     Comment:     Serial Number: [id number]:  [id number]   05/13/2022 1136 124 (H) 70 - 105 mg/dL Final     Comment:     Serial Number: [id number]:  [id number]   05/13/2022 0754 78 70 - 105 mg/dL Final     Comment:     Serial Number: [id number]:  [id number]   05/12/2022 2005 146 (H) 70 - 105 mg/dL Final     Comment:     Serial Number: [id number]:  [id number]   05/12/2022 1616 146 (H) 70 - 105 mg/dL Final     Comment:     Serial Number: [id number]:  [id number]   05/12/2022 1051 168 (H) 70 - 105 mg/dL Final      Comment:     Serial Number: [id number]:  [id number]   05/12/2022 0736 88 70 - 105 mg/dL Final     Comment:     Serial Number: [id number]:  [id number]     Infection     CMP   Results from last 7 days   Lab Units 05/12/22  1857 05/12/22  0637 05/11/22  0429 05/10/22  1216 05/10/22  0611 05/09/22  0522 05/08/22  0026 05/07/22  0428   SODIUM mmol/L 141 140 137  --  139 135* 136 143   POTASSIUM mmol/L 4.0 3.5 4.1 4.6 5.4* 5.1 4.8 4.2   CHLORIDE mmol/L 95* 96* 97*  --  94* 98 99 103   CO2 mmol/L 31.0* 30.0* 24.0  --  25.0 16.0* 20.0* 25.0   BUN mg/dL 76* 85* 99*  --  100* 91* 91* 75*   CREATININE mg/dL 1.84* 1.80* 2.04*  --  2.53* 2.16* 2.36* 1.85*   GLUCOSE mg/dL 141* 94 114*  --  116* 152* 156* 99   ALBUMIN g/dL  --  3.50 3.50  --  3.80 3.60  --  3.20*   BILIRUBIN mg/dL  --  1.2 0.8  --  0.8 0.8  --  0.7   ALK PHOS U/L  --  284* 309*  --  321* 230*  --  120*   AST (SGOT) U/L  --  128* 214*  --  459* 353*  --  269*   ALT (SGPT) U/L  --  521* 616*  --  968* 756*  --  386*     UA      Radiology(recent) No radiology results for the last day   Assessment:      Acute hepatitis  Acute shortness of breath  Acute pulmonary edema  Acute metabolic acidosis  Acute hyperkalemia  Hepatic transaminase derangement likely secondary to shock liver  History of sick sinus syndrome  Acute exacerbation of chronic systolic congestive heart failure  Acute renal failure superimposed upon chronic kidney disease stage IIIb  Acute kidney injury  Right upper lobe pulmonary nodule  History of pacemaker placement with replacement of generator 1/22  Atherosclerotic heart disease of native coronary arteries with angina pectoris  History of coronary artery bypass grafting in 1993  HFrEF 35%  Chronic atrial fibrillation, persistent intermittent  Hypercoagulable state secondary to atrial fibrillation  Cerebrovascular disease with history of CVA  History of carotid occlusive disease  Vitamin D deficiency  Hypertension associated chronic kidney  disease stage IIIb  Degenerative joint disease  Hypokalemia  Bilateral upper lobe pulmonary nodularities  Gastroesophageal reflux disease without esophagitis    Plan:  Supportive care        Antony Lawson MD  05/13/22  20:42 EDT

## 2022-05-14 NOTE — PROGRESS NOTES
"NEPHROLOGY PROGRESS NOTE------[facility]    [facility]  [phone]  Tam Gonzalez MD      Patient Care Team:  Antony Lawson MD as PCP - General  Tam Gonzalez MD as Consulting Physician (Nephrology)  Minh Kendrick MD as Consulting Physician (Cardiology)  Chris Romero MD as Consulting Physician (Cardiology)      Provider:  Tam Gonzalez MD  Patient Name: Shon ZAYAS Kunal  :  1930    SUBJECTIVE:  F/U CKD  No chest pain, breathing better      Medication:  aspirin, 81 mg, Oral, Daily  [START ON 5/15/2022] digoxin, 125 mcg, Oral, Q48H  finasteride, 5 mg, Oral, Daily  guaiFENesin, 600 mg, Oral, Q12H  heparin (porcine), 5,000 Units, Subcutaneous, Q12H  magnesium sulfate, 2 g, Intravenous, Once  midodrine, 5 mg, Oral, TID AC  potassium chloride, 40 mEq, Oral, Once  potassium chloride, 40 mEq, Oral, Once  potassium chloride, 40 mEq, Oral, Once  potassium chloride, 40 mEq, Oral, Once  sodium bicarbonate, 1,300 mg, Oral, TID  sodium chloride, 3 mL, Intravenous, Q12H      DOBUTamine, 2.5 mcg/kg/min, Last Rate: 2.5 mcg/kg/min (22 0326)  furosemide (LASIX) 100 mg in 100mL NS infusion, 20 mg/hr, Last Rate: 20 mg/hr (22 0916)        OBJECTIVE    Vital Sign Min/Max for last 24 hours  Temp  Min: 97.8 °F (36.6 °C)  Max: 98.1 °F (36.7 °C)   BP  Min: 110/40  Max: 146/63   Pulse  Min: 95  Max: 117   Resp  Min: 13  Max: 27   SpO2  Min: 98 %  Max: 100 %   No data recorded   No data recorded     Flowsheet Rows    Flowsheet Row First Filed Value   Admission Height 172.7 cm (68\") Documented at 2022 1114   Admission Weight 58.2 kg (128 lb 4.9 oz) Documented at 2022 1114          No intake/output data recorded.  I/O last 3 completed shifts:  In: 420 [P.O.:420]  Out: 6100 [Urine:6100]    Physical Exam:  General Appearance: alert, appears stated age and cooperative  Head: normocephalic, without obvious abnormality and " atraumatic  Eyes: conjunctivae and sclerae normal and no icterus  Neck: supple and +JVD  Lungs: diminished breath sounds  Heart: regular rhythm & normal rate and normal S1, S2  Chest: Wall no abnormalities observed  Abdomen: normal bowel sounds and soft non-tender  Extremities: moves extremities well, no edema, no cyanosis and no redness  Skin: no bleeding, bruising or rash, turgor normal, color normal and no lesions noted  Neurologic: Alert, and oriented. No focal deficits    Labs:    WBC WBC   Date Value Ref Range Status   05/14/2022 7.70 3.40 - 10.80 10*3/mm3 Final   05/13/2022 8.50 3.40 - 10.80 10*3/mm3 Final   05/11/2022 10.10 3.40 - 10.80 10*3/mm3 Final      HGB Hemoglobin   Date Value Ref Range Status   05/14/2022 14.3 13.0 - 17.7 g/dL Final   05/13/2022 14.7 13.0 - 17.7 g/dL Final   05/11/2022 13.8 13.0 - 17.7 g/dL Final      HCT Hematocrit   Date Value Ref Range Status   05/14/2022 44.1 37.5 - 51.0 % Final   05/13/2022 46.4 37.5 - 51.0 % Final   05/11/2022 42.4 37.5 - 51.0 % Final      Platlets No results found for: LABPLAT   MCV MCV   Date Value Ref Range Status   05/14/2022 100.2 (H) 79.0 - 97.0 fL Final   05/13/2022 102.1 (H) 79.0 - 97.0 fL Final   05/11/2022 100.8 (H) 79.0 - 97.0 fL Final          Sodium Sodium   Date Value Ref Range Status   05/14/2022 141 136 - 145 mmol/L Final   05/12/2022 141 136 - 145 mmol/L Final   05/12/2022 140 136 - 145 mmol/L Final      Potassium Potassium   Date Value Ref Range Status   05/14/2022 3.2 (L) 3.5 - 5.2 mmol/L Final   05/12/2022 4.0 3.5 - 5.2 mmol/L Final     Comment:     Slight hemolysis detected by analyzer. Results may be affected.   05/12/2022 3.5 3.5 - 5.2 mmol/L Final      Chloride Chloride   Date Value Ref Range Status   05/14/2022 96 (L) 98 - 107 mmol/L Final   05/12/2022 95 (L) 98 - 107 mmol/L Final   05/12/2022 96 (L) 98 - 107 mmol/L Final      CO2 CO2   Date Value Ref Range Status   05/14/2022 30.0 (H) 22.0 - 29.0 mmol/L Final   05/12/2022 31.0 (H) 22.0  - 29.0 mmol/L Final   05/12/2022 30.0 (H) 22.0 - 29.0 mmol/L Final      BUN BUN   Date Value Ref Range Status   05/14/2022 61 (H) 8 - 23 mg/dL Final   05/12/2022 76 (H) 8 - 23 mg/dL Final   05/12/2022 85 (H) 8 - 23 mg/dL Final      Creatinine Creatinine   Date Value Ref Range Status   05/14/2022 1.44 (H) 0.76 - 1.27 mg/dL Final   05/12/2022 1.84 (H) 0.76 - 1.27 mg/dL Final   05/12/2022 1.80 (H) 0.76 - 1.27 mg/dL Final      Calcium Calcium   Date Value Ref Range Status   05/14/2022 8.6 8.2 - 9.6 mg/dL Final   05/12/2022 9.1 8.2 - 9.6 mg/dL Final   05/12/2022 8.8 8.2 - 9.6 mg/dL Final      PO4 No components found for: PO4   Albumin Albumin   Date Value Ref Range Status   05/14/2022 3.60 3.50 - 5.20 g/dL Final   05/12/2022 3.50 3.50 - 5.20 g/dL Final      Magnesium Magnesium   Date Value Ref Range Status   05/12/2022 2.4 (H) 1.7 - 2.3 mg/dL Final   05/12/2022 2.4 (H) 1.7 - 2.3 mg/dL Final      Uric Acid No components found for: URIC ACID     Imaging Results (Last 72 Hours)     ** No results found for the last 72 hours. **          Results for orders placed during the hospital encounter of 05/03/22    XR Chest 1 View    Narrative  DATE OF EXAM:  5/9/2022 3:02 PM    PROCEDURE:  XR CHEST 1 VW-    INDICATIONS:  Dyspnea; R06.00-Dyspnea, unspecified; I50.9-Heart failure, unspecified;  N17.9-Acute kidney failure, unspecified    COMPARISON:  CT chest high-resolution 05/07/2022, AP chest x-ray 05/06/2022.    TECHNIQUE:  Single radiographic AP view of the chest was obtained.    FINDINGS:  The patient's chin partially obscures evaluation of the upper chest.  Allowing for this limitation, no pneumothorax is seen. Cardiac  silhouette remains mildly enlarged. Changes are redemonstrated from  CABG. Pacemaker leads are stable. There are mildly increased right upper  lung airspace opacities. Blunting of the left lateral costophrenic angle  appears stable. Left lung appears grossly clear.    Impression  1.Mildly increased right upper  lung airspace opacities, which may be due  to atelectasis and/or pneumonia.  2.Small left pleural effusion.    Electronically Signed By-Hoa Bonilla MD On:5/9/2022 3:13 PM  This report was finalized on [id number] by  Hoa Bonilla MD.      XR Chest 1 View    Narrative  DATE OF EXAM:  5/6/2022 4:26 PM    PROCEDURE:  XR CHEST 1 VW-    INDICATIONS:  Severe SOA; R06.00-Dyspnea, unspecified; I50.9-Heart failure,  unspecified; N17.9-Acute kidney failure, unspecified    COMPARISON:  Chest radiograph 05/03/2022    TECHNIQUE:  Single radiographic view of the chest was obtained.    FINDINGS:  Patient is rotated this limits evaluation of the right lung. There is  cardiomegaly. Dual lead transvenous pacemaker device. Blunting the  costophrenic angles. Left lower lobe airspace opacity. Background  chronic interstitial lung disease.    Impression  Since previous exam there is been development of probable small pleural  effusions. There is left lower lobe airspace opacity atelectasis versus  pneumonia.    Electronically Signed By-Marianna Flowers MD On:5/6/2022 4:32 PM  This report was finalized on [id number] by  Marianna Flowers MD.      XR Chest 1 View    Narrative  DATE OF EXAM:  5/3/2022 12:07 PM    PROCEDURE:  XR CHEST 1 VW-    INDICATIONS:  Shortness of breath. Cough for 4 months.    COMPARISON:  Chest radiographs 3/24/2022, 5/29/2021, and 5/27/2021. CT chest  3/24/2022    TECHNIQUE:  Single radiographic AP view of the chest was obtained.    FINDINGS:  Lordotic positioning. Overlying artifacts. Stable sternotomy wires,  postoperative changes from CABG, left chest wall cardiac pacer device.  Stable elevation of the right hemidiaphragm with improved aeration of  both lungs. Mild linear right infrahilar and left basilar segmental  atelectasis and/or scarring with mild interstitial thickening in the  lung bases. No focal lung consolidation. No pneumothorax. Stable  cardiomegaly, likely accentuated by technique. Calcified  atherosclerotic  disease in the thoracic aorta. Chronic changes in both shoulders. No  acute osseous normality is identified.    Impression  Stable cardiomegaly with improved aeration of both lungs. Multifocal  bibasilar subsegmental atelectasis and/or scarring with mild  interstitial thickening in the lung bases that could represent mild  pulmonary vascular congestion/edema, chronic lung disease, or less  likely atypical pneumonia.    Electronically Signed By-Lance Mendes MD On:5/3/2022 12:32 PM  This report was finalized on [id number] by  Lance Mendes MD.      Results for orders placed during the hospital encounter of 03/24/22    Duplex Carotid Ultrasound CAR    Interpretation Summary  · Proximal right internal carotid artery moderate stenosis.  · Proximal left internal carotid artery moderate stenosis.        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    · CHF- EF 25-30%. Severe TR, high RVSP. cautiously diurese.   · Hypertension  · Atrial fibrillation  · History coronary disease  · Diabetes  · Elevated LFTs--GI following. Suspect related to congestive hepatomegaly     Creatinine improved despite brisk diuresis  Replace potassium  Continue lasix infusion/ dobutamine- getting close to euvolemic state.    Stop sodium bicarb  Monitor renal function fluid status electrolytes      Tam Gonzalez MD  [facility]  [phone]  05/14/22  09:36 EDT

## 2022-05-14 NOTE — PROGRESS NOTES
[facility] [place]        LOS:  LOS: 10 days   Patient Name: Shon ZAYAS Kunal  Age/Sex: 89 y.o. male  : 1930  MRN: [id number]    Day of Service: 22   Length of Stay: 10  Encounter Provider: Chris Romero MD  Place of Service: [facility] CARDIOLOGY  Patient Care Team:  Antony Lawson MD as PCP - Tam Monroe MD as Consulting Physician (Nephrology)  Minh Kendrick MD as Consulting Physician (Cardiology)  Chris Romero MD as Consulting Physician (Cardiology)    Subjective:     Chief Complaint: shortness of breath, MERA    Subjective:   Seen and examined  Maintained on dobutamine drip  Some ectopy, remains with atrial fibrillation but rates are controlled  Dobutamine remains on board Next Lasix remains on board  Labs continue to improve with diminishing creatinine and LFTs  Heart rates 90s with atrial fibrillation  Digoxin level therapeutic at 1.1  No further dosing  Follow labs and level, dose for level 0.8-1.4      Complex condition overall  Remains with volume overload and high filling pressures on both sides  LV function is at 25% with severe MR and severe left atrial enlargement also severe pulmonary hypertension secondary to left-sided filling pressures chronically  RV is moderate to severely hypokinetic, overwhelmed by pulmonary hypertension as well as volume, IVC is distended at 2.6-2.7 indicative of high filling pressures along with severe TR and high filling pressures on the right side with hepatic congestion and renal congestion with cardiopulmonary syndrome    Treatment will consist of further volume reduction  Stopping beta-blockers altogether  Controlling atrial fibrillation with digoxin which will be renally dosed and dosed per level  Off amiodarone  Dobutamine drip for inotropic assisted to assist RV function and hopefully will be able to restart diuretics gently once rates are controlled and can tolerate dobutamine and  further diuretics  Pacemaker rate increased to a backup rate of at least 75 rather than 60 to promote better cardiac output  Well out atrial fibrillation in the 90s but avoid in the 130s to 140s  Digoxin for inotropic assistance    Patient is not a surgical candidate and 89 years old with severe MR, severe TR, severe pulm hypertension, severely reduced LV and RV systolic function and multiorgan dysfunction    Interventions above have resulted in improvement in creatinine and improvement in LFTs with better heart rates, gentle inotropic assistance overnight      Today  Continue dobutamine drip with good diuresis over the last 24 hours with stable creatinine and renal function and electrolytes, this continues to improve  Repeat CMP tomorrow and titrate down dobutamine tomorrow try to get him to a dry weight, and for salt and fluid restriction  Ultimately try to down titrate dobutamine likely in 24 to 48 hours with downward titration over that period of time and see if he can maintain stable hemodynamic status, afterload reduction with nonnephrotoxic medications, hydralazine /nitrates, will avoid beta-blockers for some time    Current Medications:   Scheduled Meds:aspirin, 81 mg, Oral, Daily  finasteride, 5 mg, Oral, Daily  guaiFENesin, 600 mg, Oral, Q12H  heparin (porcine), 5,000 Units, Subcutaneous, Q12H  magnesium sulfate, 2 g, Intravenous, Once  midodrine, 5 mg, Oral, TID AC  potassium chloride, 40 mEq, Oral, Once  potassium chloride, 40 mEq, Oral, Once  potassium chloride, 40 mEq, Oral, Once  potassium chloride, 40 mEq, Oral, Once  sodium bicarbonate, 1,300 mg, Oral, TID  sodium chloride, 3 mL, Intravenous, Q12H      Continuous Infusions:DOBUTamine, 2.5 mcg/kg/min, Last Rate: 2.5 mcg/kg/min (05/14/22 0326)  furosemide (LASIX) 100 mg in 100mL NS infusion, 20 mg/hr, Last Rate: 20 mg/hr (05/14/22 0109)        Allergies:  Allergies   Allergen Reactions   • Penicillin G Rash   • Vancomycin Rash       Review of Systems    Constitutional: Positive for malaise/fatigue. Negative for chills and diaphoresis.   Cardiovascular: Negative for chest pain, dyspnea on exertion, irregular heartbeat, leg swelling, near-syncope, orthopnea, palpitations, paroxysmal nocturnal dyspnea and syncope.   Respiratory: Positive for shortness of breath. Negative for cough, sleep disturbances due to breathing and sputum production.    Gastrointestinal: Negative for change in bowel habit.   Genitourinary: Negative for urgency.   Neurological: Negative for dizziness and headaches.   Psychiatric/Behavioral: Negative for altered mental status.         Objective:     Temp:  [97.8 °F (36.6 °C)-98.1 °F (36.7 °C)] 97.8 °F (36.6 °C)  Heart Rate:  [] 110  Resp:  [13-27] 18  BP: (110-146)/(40-70) 110/40     Intake/Output Summary (Last 24 hours) at 5/14/2022 0908  Last data filed at 5/14/2022 0400  Gross per 24 hour   Intake 240 ml   Output 3600 ml   Net -3360 ml     Body mass index is 19.11 kg/m².      05/11/22  0535 05/12/22  0500 05/13/22  0600   Weight: 57 kg (125 lb 9.6 oz) 57 kg (125 lb 10.6 oz) 57 kg (125 lb 10.6 oz)         General Appearance:    Alert, cooperative, in no acute distress                                Head: Atraumatic, normocephalic, PERRLA               Neck:   supple, minimal JVD   Lungs:     2L NC, dim bases    Heart:    Regular rhythm and normal rate, normal S1 and S2, 1/6 murmur   Abdomen:     Normal bowel sounds, no masses, no organomegaly, soft  non-tender, non-distended, no guarding, no rebound  tenderness   Extremities:   Moves all extremities well, no edema, no cyanosis, no  redness   Pulses:   Pulses palpable and equal bilaterally   Skin:   No bleeding, bruising or rash   Neurologic:   Awake, alert, oriented x3   Agree with exam as discussed with nurse practitioner after my face-to-face encounter with the patient  Unchanged from prior encounter  Electrolytes replaced    Lab Review:   Results from last 7 days   Lab Units  05/14/22  0151 05/12/22  1857 05/12/22  0637   SODIUM mmol/L 141 141 140   POTASSIUM mmol/L 3.2* 4.0 3.5   CHLORIDE mmol/L 96* 95* 96*   CO2 mmol/L 30.0* 31.0* 30.0*   BUN mg/dL 61* 76* 85*   CREATININE mg/dL 1.44* 1.84* 1.80*   GLUCOSE mg/dL 111* 141* 94   CALCIUM mg/dL 8.6 9.1 8.8   AST (SGOT) U/L 81*  --  128*   ALT (SGPT) U/L 308*  --  521*     Results from last 7 days   Lab Units 05/08/22  0026   CK TOTAL U/L 95     Results from last 7 days   Lab Units 05/14/22  0151 05/13/22  0600   WBC 10*3/mm3 7.70 8.50   HEMOGLOBIN g/dL 14.3 14.7   HEMATOCRIT % 44.1 46.4   PLATELETS 10*3/mm3 194 192     Results from last 7 days   Lab Units 05/11/22  0429   INR  1.31*     Results from last 7 days   Lab Units 05/12/22  1857 05/12/22  0637   MAGNESIUM mg/dL 2.4* 2.4*           Invalid input(s): LDLCALC  Results from last 7 days   Lab Units 05/10/22  0611   PROBNP pg/mL >70,000.0*           Recent Radiology:  Imaging Results (Most Recent)     Procedure Component Value Units Date/Time    XR Chest 1 View [[id number]] Collected: 05/09/22 1510     Updated: 05/09/22 1515    Narrative:      DATE OF EXAM:  5/9/2022 3:02 PM     PROCEDURE:  XR CHEST 1 VW-     INDICATIONS:  Dyspnea; R06.00-Dyspnea, unspecified; I50.9-Heart failure, unspecified;  N17.9-Acute kidney failure, unspecified     COMPARISON:  CT chest high-resolution 05/07/2022, AP chest x-ray 05/06/2022.     TECHNIQUE:   Single radiographic AP view of the chest was obtained.     FINDINGS:  The patient's chin partially obscures evaluation of the upper chest.  Allowing for this limitation, no pneumothorax is seen. Cardiac  silhouette remains mildly enlarged. Changes are redemonstrated from  CABG. Pacemaker leads are stable. There are mildly increased right upper  lung airspace opacities. Blunting of the left lateral costophrenic angle  appears stable. Left lung appears grossly clear.        Impression:      1.Mildly increased right upper lung airspace opacities, which may be  due  to atelectasis and/or pneumonia.  2.Small left pleural effusion.     Electronically Signed By-Hoa Bonilla MD On:5/9/2022 3:13 PM  This report was finalized on [id number] by  Hoa Bonilla MD.    US Liver [[id number]] Collected: 05/09/22 0920     Updated: 05/09/22 0924    Narrative:      DATE OF EXAM:  5/9/2022 8:54 AM     PROCEDURE:  US LIVER-     INDICATIONS:  Hepatic transaminase derangement; R06.00-Dyspnea, unspecified;  I50.9-Heart failure, unspecified; N17.9-Acute kidney failure,  unspecified     COMPARISON:  No Comparisons Available     TECHNIQUE:   Grayscale and color Doppler ultrasound evaluation of the limited abdomen  was performed.     FINDINGS:  The liver size is normal, 12.8 cm in length. The liver maintains normal  homogeneous echotexture without focal or suspicious abnormality. There  is no ascites. The portal vein is patent with hepatopedal flow. Imaged  hepatic veins are patent. The common duct measures 2 mm. No intrahepatic  biliary dilation is seen. Incidental note is made of a 1.8 cm shadowing  gallstone, and gallbladder adenomyomatosis. The gallbladder wall does  not appear grossly thickened, and no pericholecystic fluid is seen.        Impression:         1. Normal sonographic appearance of the liver.  2. Uncomplicated cholelithiasis.  3. Gallbladder adenomyomatosis.     Electronically Signed By-Shahrzad Biggs MD On:5/9/2022 9:22 AM  This report was finalized on [id number] by  Shahrzad Biggs MD.    CT Chest Hi Resolution Diagnostic [[id number]] Collected: 05/07/22 1653     Updated: 05/07/22 1701    Narrative:         DATE OF EXAM:   5/7/2022 4:28 PM     PROCEDURE:   CT CHEST HI RESOLUTION DIAGNOSTIC-     INDICATIONS:   Interstitial lung disease; R06.00-Dyspnea, unspecified; I50.9-Heart  failure, unspecified; N17.9-Acute kidney failure, unspecified     COMPARISON:  03/24/2022.     TECHNIQUE:   Routine transaxial slices were obtained through the chest without the  administration of  intravenous contrast. The study was performed  utilizing our high resolution CT protocol which includes supine  inspiratory and expiratory imaging as well as prone inspiratory imaging.  Reconstructed coronal and sagittal images were also obtained. Automated  exposure control and iterative reconstruction methods were used.     FINDINGS:   Interval resolution of previous identified pulmonary edema. There is  nearly completely resolved small bilateral pleural effusions. There is  mild interlobular septal thickening, which may represent mild residual  interstitial edema or mild component of chronic disease. There is  scarred consolidation in the right lung base. Many of the small nodules  identified in the upper lobes of either resolved or decreased in size  compared with the prior study. There are persistent clusters of nodules  in both upper lungs, right greater than left with the largest occurring  in the right upper lobe on axial image 41 measuring up to 10 mm  diameter. There is decreased peribronchial thickening. No new lung  nodules.     There is severe aortic and aortic branch vessel atherosclerosis. There  are severe native coronary artery calcifications status post CABG. There  is moderate cardiomegaly. No pericardial effusion. There is no  pathologic mediastinal lymphadenopathy following size criteria. There  are dense aortic annular and valvular calcifications.     There is a left-sided pacemaker/ICD in place. No acute findings below  the diaphragm. There is a stable low-attenuation nodule at the left  adrenal gland, likely adenoma. There is S-shaped scoliosis of the  thoracolumbar spine. There are moderate lower thoracic and upper lumbar  degenerative changes.        Impression:      IMPRESSION :      1. Near complete resolution of previous identified interstitial  pulmonary edema and near complete resolution of now small bilateral  pleural effusions.  2. There may be a small component of chronic subpleural  interstitial  disease, but the majority of the findings on the prior study are favored  to reflect pulmonary edema.  3. Improved nodular disease in the upper lungs, right greater than left  with significant residual nodularity. Recommend 6 month follow-up chest  CT. The course of disease on imaging would suggest an infectious or  inflammatory process.  4. Cardiomegaly and coronary artery disease status post CABG and  pacemaker/ICD placement.  5. Scoliosis and spinal degenerative changes.     Electronically Signed By-Hudson Walker MD On:5/7/2022 4:59 PM  This report was finalized on [id number] by  Hudson Walker MD.    XR Chest 1 View [[id number]] Collected: 05/06/22 1631     Updated: 05/06/22 1634    Narrative:      DATE OF EXAM:  5/6/2022 4:26 PM     PROCEDURE:  XR CHEST 1 VW-     INDICATIONS:  Severe SOA; R06.00-Dyspnea, unspecified; I50.9-Heart failure,  unspecified; N17.9-Acute kidney failure, unspecified     COMPARISON:  Chest radiograph 05/03/2022     TECHNIQUE:   Single radiographic view of the chest was obtained.     FINDINGS:  Patient is rotated this limits evaluation of the right lung. There is  cardiomegaly. Dual lead transvenous pacemaker device. Blunting the  costophrenic angles. Left lower lobe airspace opacity. Background  chronic interstitial lung disease.       Impression:      Since previous exam there is been development of probable small pleural  effusions. There is left lower lobe airspace opacity atelectasis versus  pneumonia.     Electronically Signed By-Marianna Flowers MD On:5/6/2022 4:32 PM  This report was finalized on [id number] by  Marianna Flowers MD.    XR Chest 1 View [[id number]] Collected: 05/03/22 1229     Updated: 05/03/22 1234    Narrative:      DATE OF EXAM:  5/3/2022 12:07 PM     PROCEDURE:  XR CHEST 1 VW-     INDICATIONS:  Shortness of breath. Cough for 4 months.     COMPARISON:  Chest radiographs 3/24/2022, 5/29/2021, and 5/27/2021. CT chest  3/24/2022     TECHNIQUE:   Single  radiographic AP view of the chest was obtained.     FINDINGS:  Lordotic positioning. Overlying artifacts. Stable sternotomy wires,  postoperative changes from CABG, left chest wall cardiac pacer device.  Stable elevation of the right hemidiaphragm with improved aeration of  both lungs. Mild linear right infrahilar and left basilar segmental  atelectasis and/or scarring with mild interstitial thickening in the  lung bases. No focal lung consolidation. No pneumothorax. Stable  cardiomegaly, likely accentuated by technique. Calcified atherosclerotic  disease in the thoracic aorta. Chronic changes in both shoulders. No  acute osseous normality is identified.        Impression:      Stable cardiomegaly with improved aeration of both lungs. Multifocal  bibasilar subsegmental atelectasis and/or scarring with mild  interstitial thickening in the lung bases that could represent mild  pulmonary vascular congestion/edema, chronic lung disease, or less  likely atypical pneumonia.     Electronically Signed By-Lance Mendes MD On:5/3/2022 12:32 PM  This report was finalized on [id number] by  Lance Mendes MD.          ECHOCARDIOGRAM:    Results for orders placed during the hospital encounter of 05/03/22    Adult Transthoracic Echo Complete W/ Cont if Necessary Per Protocol    Interpretation Summary  · Left atrial volume is severely increased.  · Abnormal mitral valve structure consistent with dilated annulus.  · Moderate to severe tricuspid valve regurgitation is present.  · Estimated right ventricular systolic pressure from tricuspid regurgitation is markedly elevated (>55 mmHg).  · Moderate pulmonic valve regurgitation is present.  · The left ventricular cavity is moderately dilated.  · Estimated left ventricular EF was in agreement with the calculated left ventricular EF. Left ventricular ejection fraction appears to be 26 - 30%. Left ventricular systolic function is severely decreased.  · Left ventricular diastolic function  is consistent with (grade II w/high LAP) pseudonormalization.  · The right ventricular cavity is moderately dilated.  · Severely reduced right ventricular systolic function noted.  · Severe mitral valve regurgitation is present with a centrally-directed jet noted.  · Moderate aortic valve regurgitation is present.  · Moderate aortic valve stenosis is present.    Severe LV systolic dysfunction EF of 25%, moderately dilated LV  Severe MR without stenosis  Moderate AR with moderate aortic valve stenosis  Severe TR without stenosis  PA systolic pressure severely elevated 60-65 with severe pulm hypertension  Grade 2-3 diastolic dysfunction  Right atrial pressure estimated greater than 20 with severely dilated IVC  No masses or effusion seen  RV is moderate to severely hypokinetic, moderately increased in size        I reviewed the patient's new clinical results.    EKG:      Assessment:       Dyspnea, unspecified type    Moderate malnutrition (HCC)    1. Shortness of breath / Acute on chronic systolic and diastolic CHF  - LVEF 35%, Dual-chamber pacemaker well-functioning-No ICD upgrade at family discretion previously  -Lasix gently, nephrology on board appreciate recommendations  -Off Aldactone at this time  -Blood pressure will likely not tolerate ARB as well as kidney function    2. HARMAN / CKD  - creatinine varies, 2.1 today  - on oral lasix    3. HTN    4. Paroxysmal Atrial fibrillation  - not on a/c, CHADS VAsc at least 3, on ASA      5. SSS s/p PPM    6. Elevated LFTs  - GI following  - AST / /756    Plan:   Extensively described above    Allowing better heart rates as well as gentle dobutamine promoting diuresis has improved creatinine as well as LFTs with both LV and RV unloading from EDP standpoint and congestion    Continue present treatment  Replace electrolytes for potassium greater than 4 magnesium greater than 2    See how his course goes over the next 24 hours and adjust from there    Chris Romero,  MD, PhD    Chris Romero MD  05/14/22  09:08 EDT

## 2022-05-15 NOTE — PROGRESS NOTES
"NEPHROLOGY PROGRESS NOTE------[facility]    [facility]  [phone]  aTm Gonzalez MD      Patient Care Team:  Antony Lawson MD as PCP - General  Tam Gonzalez MD as Consulting Physician (Nephrology)  Minh Kendrick MD as Consulting Physician (Cardiology)  Chris Romero MD as Consulting Physician (Cardiology)      Provider:  Tam Gonzalez MD  Patient Name: Shon ZAYAS Kunal  :  1930    SUBJECTIVE:  F/U CKD  No chest pain, breathing better  CR 1.6 today    Medication:  aspirin, 81 mg, Oral, Daily  digoxin, 125 mcg, Oral, Q48H  finasteride, 5 mg, Oral, Daily  guaiFENesin, 600 mg, Oral, Q12H  heparin (porcine), 5,000 Units, Subcutaneous, Q12H  magnesium sulfate, 2 g, Intravenous, Once  midodrine, 5 mg, Oral, TID AC  sodium chloride, 3 mL, Intravenous, Q12H      DOBUTamine, 2.5 mcg/kg/min, Last Rate: 1.5 mcg/kg/min (22 1254)  furosemide (LASIX) 100 mg in 100mL NS infusion, 20 mg/hr, Last Rate: 20 mg/hr (05/15/22 0553)        OBJECTIVE    Vital Sign Min/Max for last 24 hours  Temp  Min: 96.9 °F (36.1 °C)  Max: 97.5 °F (36.4 °C)   BP  Min: 84/60  Max: 142/58   Pulse  Min: 77  Max: 108   Resp  Min: 14  Max: 32   SpO2  Min: 69 %  Max: 100 %   No data recorded   Weight  Min: 57 kg (125 lb 10.6 oz)  Max: 57 kg (125 lb 10.6 oz)     Flowsheet Rows    Flowsheet Row First Filed Value   Admission Height 172.7 cm (68\") Documented at 2022 1114   Admission Weight 58.2 kg (128 lb 4.9 oz) Documented at 2022 1114          I/O this shift:  In: -   Out: 250 [Urine:250]  I/O last 3 completed shifts:  In: 600 [P.O.:600]  Out: 4300 [Urine:4300]    Physical Exam:  General Appearance: alert, appears stated age and cooperative  Head: normocephalic, without obvious abnormality and atraumatic  Eyes: conjunctivae and sclerae normal and no icterus  Neck: supple and no JVD  Lungs: diminished breath sounds  Heart: regular rhythm & normal rate " and normal S1, S2  Chest: Wall no abnormalities observed  Abdomen: normal bowel sounds and soft non-tender  Extremities: moves extremities well, no edema, no cyanosis and no redness  Skin: no bleeding, bruising or rash, turgor normal, color normal and no lesions noted  Neurologic: Alert, and oriented. No focal deficits    Labs:    WBC WBC   Date Value Ref Range Status   05/15/2022 12.40 (H) 3.40 - 10.80 10*3/mm3 Final   05/14/2022 7.70 3.40 - 10.80 10*3/mm3 Final   05/13/2022 8.50 3.40 - 10.80 10*3/mm3 Final      HGB Hemoglobin   Date Value Ref Range Status   05/15/2022 15.2 13.0 - 17.7 g/dL Final   05/14/2022 14.3 13.0 - 17.7 g/dL Final   05/13/2022 14.7 13.0 - 17.7 g/dL Final      HCT Hematocrit   Date Value Ref Range Status   05/15/2022 47.3 37.5 - 51.0 % Final   05/14/2022 44.1 37.5 - 51.0 % Final   05/13/2022 46.4 37.5 - 51.0 % Final      Platlets No results found for: LABPLAT   MCV MCV   Date Value Ref Range Status   05/15/2022 100.5 (H) 79.0 - 97.0 fL Final   05/14/2022 100.2 (H) 79.0 - 97.0 fL Final   05/13/2022 102.1 (H) 79.0 - 97.0 fL Final          Sodium Sodium   Date Value Ref Range Status   05/14/2022 141 136 - 145 mmol/L Final   05/12/2022 141 136 - 145 mmol/L Final      Potassium Potassium   Date Value Ref Range Status   05/14/2022 3.2 (L) 3.5 - 5.2 mmol/L Final   05/12/2022 4.0 3.5 - 5.2 mmol/L Final     Comment:     Slight hemolysis detected by analyzer. Results may be affected.      Chloride Chloride   Date Value Ref Range Status   05/14/2022 96 (L) 98 - 107 mmol/L Final   05/12/2022 95 (L) 98 - 107 mmol/L Final      CO2 CO2   Date Value Ref Range Status   05/14/2022 30.0 (H) 22.0 - 29.0 mmol/L Final   05/12/2022 31.0 (H) 22.0 - 29.0 mmol/L Final      BUN BUN   Date Value Ref Range Status   05/14/2022 61 (H) 8 - 23 mg/dL Final   05/12/2022 76 (H) 8 - 23 mg/dL Final      Creatinine Creatinine   Date Value Ref Range Status   05/14/2022 1.44 (H) 0.76 - 1.27 mg/dL Final   05/12/2022 1.84 (H) 0.76 -  1.27 mg/dL Final      Calcium Calcium   Date Value Ref Range Status   05/14/2022 8.6 8.2 - 9.6 mg/dL Final   05/12/2022 9.1 8.2 - 9.6 mg/dL Final      PO4 No components found for: PO4   Albumin Albumin   Date Value Ref Range Status   05/14/2022 3.60 3.50 - 5.20 g/dL Final      Magnesium Magnesium   Date Value Ref Range Status   05/12/2022 2.4 (H) 1.7 - 2.3 mg/dL Final      Uric Acid No components found for: URIC ACID     Imaging Results (Last 72 Hours)     ** No results found for the last 72 hours. **          Results for orders placed during the hospital encounter of 05/03/22    XR Chest 1 View    Narrative  DATE OF EXAM:  5/9/2022 3:02 PM    PROCEDURE:  XR CHEST 1 VW-    INDICATIONS:  Dyspnea; R06.00-Dyspnea, unspecified; I50.9-Heart failure, unspecified;  N17.9-Acute kidney failure, unspecified    COMPARISON:  CT chest high-resolution 05/07/2022, AP chest x-ray 05/06/2022.    TECHNIQUE:  Single radiographic AP view of the chest was obtained.    FINDINGS:  The patient's chin partially obscures evaluation of the upper chest.  Allowing for this limitation, no pneumothorax is seen. Cardiac  silhouette remains mildly enlarged. Changes are redemonstrated from  CABG. Pacemaker leads are stable. There are mildly increased right upper  lung airspace opacities. Blunting of the left lateral costophrenic angle  appears stable. Left lung appears grossly clear.    Impression  1.Mildly increased right upper lung airspace opacities, which may be due  to atelectasis and/or pneumonia.  2.Small left pleural effusion.    Electronically Signed By-Hoa Bonilla MD On:5/9/2022 3:13 PM  This report was finalized on [id number] by  Hoa Bonilla MD.      XR Chest 1 View    Narrative  DATE OF EXAM:  5/6/2022 4:26 PM    PROCEDURE:  XR CHEST 1 VW-    INDICATIONS:  Severe SOA; R06.00-Dyspnea, unspecified; I50.9-Heart failure,  unspecified; N17.9-Acute kidney failure, unspecified    COMPARISON:  Chest radiograph  05/03/2022    TECHNIQUE:  Single radiographic view of the chest was obtained.    FINDINGS:  Patient is rotated this limits evaluation of the right lung. There is  cardiomegaly. Dual lead transvenous pacemaker device. Blunting the  costophrenic angles. Left lower lobe airspace opacity. Background  chronic interstitial lung disease.    Impression  Since previous exam there is been development of probable small pleural  effusions. There is left lower lobe airspace opacity atelectasis versus  pneumonia.    Electronically Signed By-Marianna Flowers MD On:5/6/2022 4:32 PM  This report was finalized on [id number] by  Marianna Flowers MD.      XR Chest 1 View    Narrative  DATE OF EXAM:  5/3/2022 12:07 PM    PROCEDURE:  XR CHEST 1 VW-    INDICATIONS:  Shortness of breath. Cough for 4 months.    COMPARISON:  Chest radiographs 3/24/2022, 5/29/2021, and 5/27/2021. CT chest  3/24/2022    TECHNIQUE:  Single radiographic AP view of the chest was obtained.    FINDINGS:  Lordotic positioning. Overlying artifacts. Stable sternotomy wires,  postoperative changes from CABG, left chest wall cardiac pacer device.  Stable elevation of the right hemidiaphragm with improved aeration of  both lungs. Mild linear right infrahilar and left basilar segmental  atelectasis and/or scarring with mild interstitial thickening in the  lung bases. No focal lung consolidation. No pneumothorax. Stable  cardiomegaly, likely accentuated by technique. Calcified atherosclerotic  disease in the thoracic aorta. Chronic changes in both shoulders. No  acute osseous normality is identified.    Impression  Stable cardiomegaly with improved aeration of both lungs. Multifocal  bibasilar subsegmental atelectasis and/or scarring with mild  interstitial thickening in the lung bases that could represent mild  pulmonary vascular congestion/edema, chronic lung disease, or less  likely atypical pneumonia.    Electronically Signed By-Lance Mendes MD On:5/3/2022 12:32 PM  This  report was finalized on [id number] by  Lance Mendes MD.      Results for orders placed during the hospital encounter of 03/24/22    Duplex Carotid Ultrasound CAR    Interpretation Summary  · Proximal right internal carotid artery moderate stenosis.  · Proximal left internal carotid artery moderate stenosis.        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    · CHF- EF 25-30%. Severe TR, high RVSP. cautiously diurese.   · Hypertension  · Atrial fibrillation  · History coronary disease  · Diabetes  · Elevated LFTs--GI following. Suspect related to congestive hepatomegaly     Patient continues to diurese well  CR up today  Euvolemic  Stop lasix infusion  Will switch to oral bumex 1  Mg BID  Monitor renal function fluid status electrolytes      Tam Gonzalez MD  [facility]  [phone]  05/15/22  10:48 EDT

## 2022-05-15 NOTE — CASE MANAGEMENT/SOCIAL WORK
Continued Stay Note  [facility]     Patient Name: Shon ZAYAS Kunal  MRN: [id number]  Today's Date: 5/15/2022    Admit Date: 5/3/2022     Discharge Plan     Row Name 05/15/22 0928       Plan    Plan Plan for home with spouse and HHC; accepted. Will need order. Potential 02 needs.    Patient/Family in Agreement with Plan yes    Plan Comments D/C barriers: Lasix and dobutamine drips. On 2L NC.              Phone communication or documentation only - no physical contact with patient or family.    Faye Mcconnell RN  Weekend   [place] [id number]  Office: [phone]  Fax: [phone]  [email or web address]

## 2022-05-15 NOTE — PLAN OF CARE
Problem: Fluid Volume Excess  Goal: Fluid Balance  Outcome: Ongoing, Progressing  Intervention: Monitor and Manage Hypervolemia  Recent Flowsheet Documentation  Taken 5/14/2022 2000 by Diana Correa RN  Skin Protection:   adhesive use limited   incontinence pads utilized   tubing/devices free from skin contact     Problem: Adult Inpatient Plan of Care  Goal: Plan of Care Review  Outcome: Ongoing, Progressing  Flowsheets (Taken 5/15/2022 0252)  Plan of Care Reviewed With:   patient   family  Goal: Patient-Specific Goal (Individualized)  Outcome: Ongoing, Progressing  Goal: Absence of Hospital-Acquired Illness or Injury  Outcome: Ongoing, Progressing  Intervention: Identify and Manage Fall Risk  Recent Flowsheet Documentation  Taken 5/15/2022 0200 by Diana Correa RN  Safety Promotion/Fall Prevention:   assistive device/personal items within reach   clutter free environment maintained   fall prevention program maintained   lighting adjusted   nonskid shoes/slippers when out of bed   room organization consistent   safety round/check completed  Taken 5/15/2022 0000 by Diana Correa RN  Safety Promotion/Fall Prevention:   assistive device/personal items within reach   clutter free environment maintained   fall prevention program maintained   lighting adjusted   nonskid shoes/slippers when out of bed   room organization consistent   safety round/check completed  Taken 5/14/2022 2200 by Diana Correa RN  Safety Promotion/Fall Prevention:   clutter free environment maintained   assistive device/personal items within reach   fall prevention program maintained   lighting adjusted   nonskid shoes/slippers when out of bed   room organization consistent   safety round/check completed  Taken 5/14/2022 2000 by Diana Correa RN  Safety Promotion/Fall Prevention:   assistive device/personal items within reach   clutter free environment maintained   fall prevention program maintained   lighting adjusted   nonskid  shoes/slippers when out of bed   room organization consistent   safety round/check completed  Intervention: Prevent Skin Injury  Recent Flowsheet Documentation  Taken 5/14/2022 2000 by Diana Correa RN  Skin Protection:   adhesive use limited   incontinence pads utilized   tubing/devices free from skin contact  Intervention: Prevent Infection  Recent Flowsheet Documentation  Taken 5/15/2022 0200 by Diana Correa RN  Infection Prevention:   single patient room provided   personal protective equipment utilized   rest/sleep promoted   hand hygiene promoted  Taken 5/15/2022 0000 by Diana Correa RN  Infection Prevention:   single patient room provided   rest/sleep promoted   personal protective equipment utilized   hand hygiene promoted  Taken 5/14/2022 2200 by Diana Correa RN  Infection Prevention:   single patient room provided   rest/sleep promoted   personal protective equipment utilized   hand hygiene promoted  Taken 5/14/2022 2000 by Diana Correa RN  Infection Prevention:   single patient room provided   personal protective equipment utilized   hand hygiene promoted  Goal: Optimal Comfort and Wellbeing  Outcome: Ongoing, Progressing  Intervention: Provide Person-Centered Care  Recent Flowsheet Documentation  Taken 5/14/2022 2000 by Diana Correa RN  Trust Relationship/Rapport:   care explained   choices provided   questions answered   reassurance provided   questions encouraged   thoughts/feelings acknowledged  Goal: Readiness for Transition of Care  Outcome: Ongoing, Progressing     Problem: Adjustment to Illness (Heart Failure)  Goal: Optimal Coping  Outcome: Ongoing, Progressing     Problem: Cardiac Output Decreased (Heart Failure)  Goal: Optimal Cardiac Output  Outcome: Ongoing, Progressing     Problem: Dysrhythmia (Heart Failure)  Goal: Stable Heart Rate and Rhythm  Outcome: Ongoing, Progressing     Problem: Fluid Imbalance (Heart Failure)  Goal: Fluid Balance  Outcome: Ongoing,  Progressing     Problem: Functional Ability Impaired (Heart Failure)  Goal: Optimal Functional Ability  Outcome: Ongoing, Progressing     Problem: Oral Intake Inadequate (Heart Failure)  Goal: Optimal Nutrition Intake  Outcome: Ongoing, Progressing     Problem: Respiratory Compromise (Heart Failure)  Goal: Effective Oxygenation and Ventilation  Outcome: Ongoing, Progressing     Problem: Sleep Disordered Breathing (Heart Failure)  Goal: Effective Breathing Pattern During Sleep  Outcome: Ongoing, Progressing     Problem: Malnutrition  Goal: Improved Nutritional Intake  Outcome: Ongoing, Progressing     Problem: Fall Injury Risk  Goal: Absence of Fall and Fall-Related Injury  Outcome: Ongoing, Progressing  Intervention: Identify and Manage Contributors  Recent Flowsheet Documentation  Taken 5/14/2022 2000 by Diana Correa RN  Medication Review/Management: medications reviewed  Intervention: Promote Injury-Free Environment  Recent Flowsheet Documentation  Taken 5/15/2022 0200 by Diana Correa RN  Safety Promotion/Fall Prevention:   assistive device/personal items within reach   clutter free environment maintained   fall prevention program maintained   lighting adjusted   nonskid shoes/slippers when out of bed   room organization consistent   safety round/check completed  Taken 5/15/2022 0000 by Diana Correa RN  Safety Promotion/Fall Prevention:   assistive device/personal items within reach   clutter free environment maintained   fall prevention program maintained   lighting adjusted   nonskid shoes/slippers when out of bed   room organization consistent   safety round/check completed  Taken 5/14/2022 2200 by Diana Correa RN  Safety Promotion/Fall Prevention:   clutter free environment maintained   assistive device/personal items within reach   fall prevention program maintained   lighting adjusted   nonskid shoes/slippers when out of bed   room organization consistent   safety round/check completed  Taken  5/14/2022 2000 by Diana Correa, RN  Safety Promotion/Fall Prevention:   assistive device/personal items within reach   clutter free environment maintained   fall prevention program maintained   lighting adjusted   nonskid shoes/slippers when out of bed   room organization consistent   safety round/check completed     Problem: Skin Injury Risk Increased  Goal: Skin Health and Integrity  Outcome: Ongoing, Progressing  Intervention: Optimize Skin Protection  Recent Flowsheet Documentation  Taken 5/14/2022 2000 by Diana Correa RN  Pressure Reduction Techniques: frequent weight shift encouraged  Pressure Reduction Devices: pressure-redistributing mattress utilized  Skin Protection:   adhesive use limited   incontinence pads utilized   tubing/devices free from skin contact   Goal Outcome Evaluation:  Plan of Care Reviewed With: patient, family   PT and family very pleasant. Remains on the lasix gtt per orders. Safety precautions maintained, call light within reach.

## 2022-05-15 NOTE — PROGRESS NOTES
[facility]        LOS:  LOS: 11 days   Patient Name: Shon ZAYAS Kunal  Age/Sex: 89 y.o. male  : 1930  MRN: [id number]    Day of Service: 05/15/22   Length of Stay: 11  Encounter Provider: Chris Romero MD  Place of Service: [facility] CARDIOLOGY  Patient Care Team:  Antony Lawson MD as PCP - Tam Monroe MD as Consulting Physician (Nephrology)  Minh Kendrick MD as Consulting Physician (Cardiology)  Chris Romero MD as Consulting Physician (Cardiology)    Subjective:     Chief Complaint: shortness of breath, MERA    Subjective:   Seen and examined  Maintained on dobutamine drip, 3.75 mcg/kg/min  Heart rates are controlled  Some ectopy, remains with atrial fibrillation but rates are controlled  Creatinine up today, stopping Lasix drip agree, Bumex 1 mg twice a day per nephrology  Follow labs renal function replace electrolytes, continue digoxin, dose per level  Try to diet down titrate dobutamine tomorrow, blood creatinine will come down off Lasix drip, will provide further inotropic support as decreasing Lasix  Will try to decrease tomorrow and hopefully get off over the next couple days slowly        Complex condition overall  Remains with volume overload and high filling pressures on both sides  LV function is at 25% with severe MR and severe left atrial enlargement also severe pulmonary hypertension secondary to left-sided filling pressures chronically  RV is moderate to severely hypokinetic, overwhelmed by pulmonary hypertension as well as volume, IVC is distended at 2.6-2.7 indicative of high filling pressures along with severe TR and high filling pressures on the right side with hepatic congestion and renal congestion with cardiopulmonary syndrome    Treatment will consist of further volume reduction  Stopping beta-blockers altogether  Controlling atrial fibrillation with digoxin which will be renally dosed and dosed per  level  Off amiodarone  Dobutamine drip for inotropic assisted to assist RV function and hopefully will be able to restart diuretics gently once rates are controlled and can tolerate dobutamine and further diuretics  Pacemaker rate increased to a backup rate of at least 75 rather than 60 to promote better cardiac output  Well out atrial fibrillation in the 90s but avoid in the 130s to 140s  Digoxin for inotropic assistance    Patient is not a surgical candidate and 89 years old with severe MR, severe TR, severe pulm hypertension, severely reduced LV and RV systolic function and multiorgan dysfunction    Interventions above have resulted in improvement in creatinine and improvement in LFTs with better heart rates, gentle inotropic assistance overnight      Today  Continue dobutamine drip with good diuresis over the last 24 hours with higher creatinine and renal function and electrolytes, Lasix drip changed to Bumex bolus dosing twice daily agree    Repeat CMP tomorrow and titrate down dobutamine tomorrow try to get him to a dry weight, and for salt and fluid restriction  Ultimately try to down titrate dobutamine likely in 24 to 48 hours with downward titration over that period of time and see if he can maintain stable hemodynamic status, afterload reduction with nonnephrotoxic medications, hydralazine /nitrates, will avoid beta-blockers for some time    Current Medications:   Scheduled Meds:aspirin, 81 mg, Oral, Daily  bumetanide, 1 mg, Oral, BID  digoxin, 125 mcg, Oral, Q48H  finasteride, 5 mg, Oral, Daily  guaiFENesin, 600 mg, Oral, Q12H  heparin (porcine), 5,000 Units, Subcutaneous, Q12H  magnesium sulfate, 2 g, Intravenous, Once  midodrine, 5 mg, Oral, TID AC  sodium chloride, 3 mL, Intravenous, Q12H      Continuous Infusions:DOBUTamine, 2.5 mcg/kg/min, Last Rate: 1.5 mcg/kg/min (05/14/22 1254)        Allergies:  Allergies   Allergen Reactions   • Penicillin G Rash   • Vancomycin Rash       Review of Systems    Constitutional: Positive for malaise/fatigue. Negative for chills and diaphoresis.   Cardiovascular: Negative for chest pain, dyspnea on exertion, irregular heartbeat, leg swelling, near-syncope, orthopnea, palpitations, paroxysmal nocturnal dyspnea and syncope.   Respiratory: Positive for shortness of breath. Negative for cough, sleep disturbances due to breathing and sputum production.    Gastrointestinal: Negative for change in bowel habit.   Genitourinary: Negative for urgency.   Neurological: Negative for dizziness and headaches.   Psychiatric/Behavioral: Negative for altered mental status.         Objective:     Temp:  [96.9 °F (36.1 °C)-97.5 °F (36.4 °C)] 97.3 °F (36.3 °C)  Heart Rate:  [] 84  Resp:  [14-32] 22  BP: ()/(32-67) 127/32     Intake/Output Summary (Last 24 hours) at 5/15/2022 1212  Last data filed at 5/15/2022 0754  Gross per 24 hour   Intake 240 ml   Output 2400 ml   Net -2160 ml     Body mass index is 19.11 kg/m².      05/12/22  0500 05/13/22  0600 05/15/22  0500   Weight: 57 kg (125 lb 10.6 oz) 57 kg (125 lb 10.6 oz) 57 kg (125 lb 10.6 oz)         General Appearance:    Alert, cooperative, in no acute distress                                Head: Atraumatic, normocephalic, PERRLA               Neck:   supple, minimal JVD   Lungs:     2L NC, dim bases    Heart:    Regular rhythm and normal rate, normal S1 and S2, 1/6 murmur   Abdomen:     Normal bowel sounds, no masses, no organomegaly, soft  non-tender, non-distended, no guarding, no rebound  tenderness   Extremities:   Moves all extremities well, no edema, no cyanosis, no  redness   Pulses:   Pulses palpable and equal bilaterally   Skin:   No bleeding, bruising or rash   Neurologic:   Awake, alert, oriented x3   Agree with exam as discussed with nurse practitioner after my face-to-face encounter with the patient  Unchanged from prior encounter  Electrolytes replaced    Lab Review:   Results from last 7 days   Lab Units  05/15/22  0227 05/14/22  0151 05/12/22  1857 05/12/22  0637   SODIUM mmol/L 139 141   < > 140   POTASSIUM mmol/L 4.8 3.2*   < > 3.5   CHLORIDE mmol/L 98 96*   < > 96*   CO2 mmol/L 26.0 30.0*   < > 30.0*   BUN mg/dL 63* 61*   < > 85*   CREATININE mg/dL 1.77* 1.44*   < > 1.80*   GLUCOSE mg/dL 118* 111*   < > 94   CALCIUM mg/dL 9.0 8.6   < > 8.8   AST (SGOT) U/L  --  81*  --  128*   ALT (SGPT) U/L  --  308*  --  521*    < > = values in this interval not displayed.         Results from last 7 days   Lab Units 05/15/22  0227 05/14/22  0151   WBC 10*3/mm3 12.40* 7.70   HEMOGLOBIN g/dL 15.2 14.3   HEMATOCRIT % 47.3 44.1   PLATELETS 10*3/mm3 228 194     Results from last 7 days   Lab Units 05/11/22  0429   INR  1.31*     Results from last 7 days   Lab Units 05/15/22  0227 05/12/22  1857   MAGNESIUM mg/dL 2.3 2.4*           Invalid input(s): LDLCALC  Results from last 7 days   Lab Units 05/10/22  0611   PROBNP pg/mL >70,000.0*           Recent Radiology:  Imaging Results (Most Recent)     Procedure Component Value Units Date/Time    XR Chest 1 View [[id number]] Collected: 05/09/22 1510     Updated: 05/09/22 1515    Narrative:      DATE OF EXAM:  5/9/2022 3:02 PM     PROCEDURE:  XR CHEST 1 VW-     INDICATIONS:  Dyspnea; R06.00-Dyspnea, unspecified; I50.9-Heart failure, unspecified;  N17.9-Acute kidney failure, unspecified     COMPARISON:  CT chest high-resolution 05/07/2022, AP chest x-ray 05/06/2022.     TECHNIQUE:   Single radiographic AP view of the chest was obtained.     FINDINGS:  The patient's chin partially obscures evaluation of the upper chest.  Allowing for this limitation, no pneumothorax is seen. Cardiac  silhouette remains mildly enlarged. Changes are redemonstrated from  CABG. Pacemaker leads are stable. There are mildly increased right upper  lung airspace opacities. Blunting of the left lateral costophrenic angle  appears stable. Left lung appears grossly clear.        Impression:      1.Mildly increased right  upper lung airspace opacities, which may be due  to atelectasis and/or pneumonia.  2.Small left pleural effusion.     Electronically Signed By-Hoa Bonilla MD On:5/9/2022 3:13 PM  This report was finalized on [id number] by  Hoa Bonilla MD.    US Liver [[id number]] Collected: 05/09/22 0920     Updated: 05/09/22 0924    Narrative:      DATE OF EXAM:  5/9/2022 8:54 AM     PROCEDURE:  US LIVER-     INDICATIONS:  Hepatic transaminase derangement; R06.00-Dyspnea, unspecified;  I50.9-Heart failure, unspecified; N17.9-Acute kidney failure,  unspecified     COMPARISON:  No Comparisons Available     TECHNIQUE:   Grayscale and color Doppler ultrasound evaluation of the limited abdomen  was performed.     FINDINGS:  The liver size is normal, 12.8 cm in length. The liver maintains normal  homogeneous echotexture without focal or suspicious abnormality. There  is no ascites. The portal vein is patent with hepatopedal flow. Imaged  hepatic veins are patent. The common duct measures 2 mm. No intrahepatic  biliary dilation is seen. Incidental note is made of a 1.8 cm shadowing  gallstone, and gallbladder adenomyomatosis. The gallbladder wall does  not appear grossly thickened, and no pericholecystic fluid is seen.        Impression:         1. Normal sonographic appearance of the liver.  2. Uncomplicated cholelithiasis.  3. Gallbladder adenomyomatosis.     Electronically Signed By-Shahrzad Biggs MD On:5/9/2022 9:22 AM  This report was finalized on [id number] by  Shahrzad Biggs MD.    CT Chest Hi Resolution Diagnostic [[id number]] Collected: 05/07/22 1653     Updated: 05/07/22 1701    Narrative:         DATE OF EXAM:   5/7/2022 4:28 PM     PROCEDURE:   CT CHEST HI RESOLUTION DIAGNOSTIC-     INDICATIONS:   Interstitial lung disease; R06.00-Dyspnea, unspecified; I50.9-Heart  failure, unspecified; N17.9-Acute kidney failure, unspecified     COMPARISON:  03/24/2022.     TECHNIQUE:   Routine transaxial slices were obtained  through the chest without the  administration of intravenous contrast. The study was performed  utilizing our high resolution CT protocol which includes supine  inspiratory and expiratory imaging as well as prone inspiratory imaging.  Reconstructed coronal and sagittal images were also obtained. Automated  exposure control and iterative reconstruction methods were used.     FINDINGS:   Interval resolution of previous identified pulmonary edema. There is  nearly completely resolved small bilateral pleural effusions. There is  mild interlobular septal thickening, which may represent mild residual  interstitial edema or mild component of chronic disease. There is  scarred consolidation in the right lung base. Many of the small nodules  identified in the upper lobes of either resolved or decreased in size  compared with the prior study. There are persistent clusters of nodules  in both upper lungs, right greater than left with the largest occurring  in the right upper lobe on axial image 41 measuring up to 10 mm  diameter. There is decreased peribronchial thickening. No new lung  nodules.     There is severe aortic and aortic branch vessel atherosclerosis. There  are severe native coronary artery calcifications status post CABG. There  is moderate cardiomegaly. No pericardial effusion. There is no  pathologic mediastinal lymphadenopathy following size criteria. There  are dense aortic annular and valvular calcifications.     There is a left-sided pacemaker/ICD in place. No acute findings below  the diaphragm. There is a stable low-attenuation nodule at the left  adrenal gland, likely adenoma. There is S-shaped scoliosis of the  thoracolumbar spine. There are moderate lower thoracic and upper lumbar  degenerative changes.        Impression:      IMPRESSION :      1. Near complete resolution of previous identified interstitial  pulmonary edema and near complete resolution of now small bilateral  pleural effusions.  2.  There may be a small component of chronic subpleural interstitial  disease, but the majority of the findings on the prior study are favored  to reflect pulmonary edema.  3. Improved nodular disease in the upper lungs, right greater than left  with significant residual nodularity. Recommend 6 month follow-up chest  CT. The course of disease on imaging would suggest an infectious or  inflammatory process.  4. Cardiomegaly and coronary artery disease status post CABG and  pacemaker/ICD placement.  5. Scoliosis and spinal degenerative changes.     Electronically Signed By-Hudson Walker MD On:5/7/2022 4:59 PM  This report was finalized on [id number] by  Hudson Walker MD.    XR Chest 1 View [[id number]] Collected: 05/06/22 1631     Updated: 05/06/22 1634    Narrative:      DATE OF EXAM:  5/6/2022 4:26 PM     PROCEDURE:  XR CHEST 1 VW-     INDICATIONS:  Severe SOA; R06.00-Dyspnea, unspecified; I50.9-Heart failure,  unspecified; N17.9-Acute kidney failure, unspecified     COMPARISON:  Chest radiograph 05/03/2022     TECHNIQUE:   Single radiographic view of the chest was obtained.     FINDINGS:  Patient is rotated this limits evaluation of the right lung. There is  cardiomegaly. Dual lead transvenous pacemaker device. Blunting the  costophrenic angles. Left lower lobe airspace opacity. Background  chronic interstitial lung disease.       Impression:      Since previous exam there is been development of probable small pleural  effusions. There is left lower lobe airspace opacity atelectasis versus  pneumonia.     Electronically Signed By-Marianna Flowers MD On:5/6/2022 4:32 PM  This report was finalized on [id number] by  Marianna Flowers MD.    XR Chest 1 View [[id number]] Collected: 05/03/22 1229     Updated: 05/03/22 1234    Narrative:      DATE OF EXAM:  5/3/2022 12:07 PM     PROCEDURE:  XR CHEST 1 VW-     INDICATIONS:  Shortness of breath. Cough for 4 months.     COMPARISON:  Chest radiographs 3/24/2022, 5/29/2021, and  5/27/2021. CT chest  3/24/2022     TECHNIQUE:   Single radiographic AP view of the chest was obtained.     FINDINGS:  Lordotic positioning. Overlying artifacts. Stable sternotomy wires,  postoperative changes from CABG, left chest wall cardiac pacer device.  Stable elevation of the right hemidiaphragm with improved aeration of  both lungs. Mild linear right infrahilar and left basilar segmental  atelectasis and/or scarring with mild interstitial thickening in the  lung bases. No focal lung consolidation. No pneumothorax. Stable  cardiomegaly, likely accentuated by technique. Calcified atherosclerotic  disease in the thoracic aorta. Chronic changes in both shoulders. No  acute osseous normality is identified.        Impression:      Stable cardiomegaly with improved aeration of both lungs. Multifocal  bibasilar subsegmental atelectasis and/or scarring with mild  interstitial thickening in the lung bases that could represent mild  pulmonary vascular congestion/edema, chronic lung disease, or less  likely atypical pneumonia.     Electronically Signed By-Lance Mendes MD On:5/3/2022 12:32 PM  This report was finalized on [id number] by  Lance Mendes MD.          ECHOCARDIOGRAM:    Results for orders placed during the hospital encounter of 05/03/22    Adult Transthoracic Echo Complete W/ Cont if Necessary Per Protocol    Interpretation Summary  · Left atrial volume is severely increased.  · Abnormal mitral valve structure consistent with dilated annulus.  · Moderate to severe tricuspid valve regurgitation is present.  · Estimated right ventricular systolic pressure from tricuspid regurgitation is markedly elevated (>55 mmHg).  · Moderate pulmonic valve regurgitation is present.  · The left ventricular cavity is moderately dilated.  · Estimated left ventricular EF was in agreement with the calculated left ventricular EF. Left ventricular ejection fraction appears to be 26 - 30%. Left ventricular systolic function is  severely decreased.  · Left ventricular diastolic function is consistent with (grade II w/high LAP) pseudonormalization.  · The right ventricular cavity is moderately dilated.  · Severely reduced right ventricular systolic function noted.  · Severe mitral valve regurgitation is present with a centrally-directed jet noted.  · Moderate aortic valve regurgitation is present.  · Moderate aortic valve stenosis is present.    Severe LV systolic dysfunction EF of 25%, moderately dilated LV  Severe MR without stenosis  Moderate AR with moderate aortic valve stenosis  Severe TR without stenosis  PA systolic pressure severely elevated 60-65 with severe pulm hypertension  Grade 2-3 diastolic dysfunction  Right atrial pressure estimated greater than 20 with severely dilated IVC  No masses or effusion seen  RV is moderate to severely hypokinetic, moderately increased in size        I reviewed the patient's new clinical results.    EKG:      Assessment:       Dyspnea, unspecified type    Moderate malnutrition (HCC)    1. Shortness of breath / Acute on chronic systolic and diastolic CHF  - LVEF 35%, Dual-chamber pacemaker well-functioning-No ICD upgrade at family discretion previously  -Lasix gently, nephrology on board appreciate recommendations  -Off Aldactone at this time  -Blood pressure will likely not tolerate ARB as well as kidney function    2. HARMAN / CKD  - creatinine varies, 2.1 today  - on oral lasix    3. HTN    4. Paroxysmal Atrial fibrillation  - not on a/c, CHADS VAsc at least 3, on ASA      5. SSS s/p PPM    6. Elevated LFTs  - GI following  - AST / /756    Plan:   Extensively described above    Allowing better heart rates as well as gentle dobutamine promoting diuresis has improved creatinine as well as LFTs with both LV and RV unloading from EDP standpoint and congestion    Continue present treatment  Replace electrolytes for potassium greater than 4 magnesium greater than 2    See how his course goes over  the next 24 hours and adjust from there    Chris Romero MD, PhD    Chris Romero MD  05/15/22  12:12 EDT

## 2022-05-15 NOTE — PROGRESS NOTES
LOS: 11 days   Patient Care Team:  Antony Lawson MD as PCP - General  Tam Gonzalez MD as Consulting Physician (Nephrology)  Minh Kendrick MD as Consulting Physician (Cardiology)  Chris Romero MD as Consulting Physician (Cardiology)    Subjective:  Good spirits    Objective:   Afebrile      Review of Systems:   Review of Systems   Constitutional: Positive for activity change.   Respiratory: Positive for shortness of breath.    Cardiovascular: Positive for palpitations.           Vital Signs  Temp:  [96.9 °F (36.1 °C)-97.7 °F (36.5 °C)] 97.7 °F (36.5 °C)  Heart Rate:  [] 88  Resp:  [14-32] 26  BP: ()/(32-67) 131/54    Physical Exam:  Physical Exam  Vitals and nursing note reviewed.   Constitutional:       General: He is not in acute distress.     Appearance: Normal appearance.   Cardiovascular:      Rate and Rhythm: Rhythm irregular.      Heart sounds: Normal heart sounds.   Pulmonary:      Breath sounds: Normal breath sounds.   Skin:     General: Skin is warm.   Neurological:      Mental Status: He is alert.          Radiology:  US Liver    Result Date: 5/9/2022   1. Normal sonographic appearance of the liver. 2. Uncomplicated cholelithiasis. 3. Gallbladder adenomyomatosis.  Electronically Signed By-Shahrzad Biggs MD On:5/9/2022 9:22 AM This report was finalized on [id number] by  Shahrzad Biggs MD.    XR Chest 1 View    Result Date: 5/9/2022  1.Mildly increased right upper lung airspace opacities, which may be due to atelectasis and/or pneumonia. 2.Small left pleural effusion.  Electronically Signed By-Hoa Bonilla MD On:5/9/2022 3:13 PM This report was finalized on [id number] by  Hoa Bonilla MD.    CT Chest Hi Resolution Diagnostic    Result Date: 5/7/2022  IMPRESSION :  1. Near complete resolution of previous identified interstitial pulmonary edema and near complete resolution of now small bilateral pleural effusions. 2. There may be a small component of  chronic subpleural interstitial disease, but the majority of the findings on the prior study are favored to reflect pulmonary edema. 3. Improved nodular disease in the upper lungs, right greater than left with significant residual nodularity. Recommend 6 month follow-up chest CT. The course of disease on imaging would suggest an infectious or inflammatory process. 4. Cardiomegaly and coronary artery disease status post CABG and pacemaker/ICD placement. 5. Scoliosis and spinal degenerative changes.  Electronically Signed By-Hudson Walker MD On:5/7/2022 4:59 PM This report was finalized on [id number] by  Hudson Walker MD.         Results Review:     I reviewed the patient's new clinical results.  I reviewed the patient's new imaging results and agree with the interpretation.    Medication Review:   Scheduled Meds:aspirin, 81 mg, Oral, Daily  bumetanide, 1 mg, Oral, BID  digoxin, 125 mcg, Oral, Q48H  finasteride, 5 mg, Oral, Daily  guaiFENesin, 600 mg, Oral, Q12H  heparin (porcine), 5,000 Units, Subcutaneous, Q12H  magnesium sulfate, 2 g, Intravenous, Once  midodrine, 5 mg, Oral, TID AC  sodium chloride, 3 mL, Intravenous, Q12H      Continuous Infusions:DOBUTamine, 2.5 mcg/kg/min, Last Rate: 2.5 mcg/kg/min (05/15/22 1333)      PRN Meds:.ipratropium-albuterol  •  nitroglycerin  •  ondansetron  •  oxyCODONE  •  potassium chloride  •  [COMPLETED] Insert peripheral IV **AND** sodium chloride  •  sodium chloride    Labs:    CBC    Results from last 7 days   Lab Units 05/15/22  0227 05/14/22  0151 05/13/22  0600 05/11/22  2348 05/11/22  0429 05/10/22  0611 05/09/22  0522   WBC 10*3/mm3 12.40* 7.70 8.50 10.10 9.10 8.70 11.10*   HEMOGLOBIN g/dL 15.2 14.3 14.7 13.8 13.3 15.0 14.0   PLATELETS 10*3/mm3 228 194 192 179 175 193 198     BMP   Results from last 7 days   Lab Units 05/15/22  0227 05/14/22  0151 05/12/22  1857 05/12/22  0637 05/11/22  0429 05/10/22  1216 05/10/22  0611 05/09/22  0522   SODIUM mmol/L 139 141 141  140 137  --  139 135*   POTASSIUM mmol/L 4.8 3.2* 4.0 3.5 4.1 4.6 5.4* 5.1   CHLORIDE mmol/L 98 96* 95* 96* 97*  --  94* 98   CO2 mmol/L 26.0 30.0* 31.0* 30.0* 24.0  --  25.0 16.0*   BUN mg/dL 63* 61* 76* 85* 99*  --  100* 91*   CREATININE mg/dL 1.77* 1.44* 1.84* 1.80* 2.04*  --  2.53* 2.16*   GLUCOSE mg/dL 118* 111* 141* 94 114*  --  116* 152*   MAGNESIUM mg/dL 2.3  --  2.4* 2.4*  --   --   --  2.7*   PHOSPHORUS mg/dL  --   --   --   --   --   --   --  5.7*     Cr Clearance Estimated Creatinine Clearance: 21.9 mL/min (A) (by C-G formula based on SCr of 1.77 mg/dL (H)).  Coag   Results from last 7 days   Lab Units 05/11/22  0429   INR  1.31*     HbA1C No results found for: HGBA1C  Blood Glucose   Glucose   Date/Time Value Ref Range Status   05/15/2022 1154 131 (H) 70 - 105 mg/dL Final     Comment:     Serial Number: [id number]:  [id number]   05/15/2022 0745 116 (H) 70 - 105 mg/dL Final     Comment:     Serial Number: [id number]:  [id number]   05/14/2022 2201 145 (H) 70 - 105 mg/dL Final     Comment:     Serial Number: [id number]:  [id number]   05/14/2022 1747 136 (H) 70 - 105 mg/dL Final     Comment:     Serial Number: [id number]:  [id number]   05/14/2022 1232 119 (H) 70 - 105 mg/dL Final     Comment:     Serial Number: [id number]:  [id number]   05/14/2022 0734 108 (H) 70 - 105 mg/dL Final     Comment:     Serial Number: [id number]:  [id number]   05/13/2022 2018 151 (H) 70 - 105 mg/dL Final     Comment:     Serial Number: [id number]:  [id number]   05/13/2022 1759 163 (H) 70 - 105 mg/dL Final     Comment:     Serial Number: [id number]:  [id number]     Infection     CMP   Results from last 7 days   Lab Units 05/15/22  0227 05/14/22  0151 05/12/22  1857 05/12/22  0637 05/11/22  0429 05/10/22  1216 05/10/22  0611 05/09/22  0522   SODIUM mmol/L 139 141 141 140 137  --  139 135*   POTASSIUM mmol/L 4.8 3.2* 4.0 3.5 4.1 4.6 5.4* 5.1   CHLORIDE mmol/L 98 96* 95* 96* 97*  --   94* 98   CO2 mmol/L 26.0 30.0* 31.0* 30.0* 24.0  --  25.0 16.0*   BUN mg/dL 63* 61* 76* 85* 99*  --  100* 91*   CREATININE mg/dL 1.77* 1.44* 1.84* 1.80* 2.04*  --  2.53* 2.16*   GLUCOSE mg/dL 118* 111* 141* 94 114*  --  116* 152*   ALBUMIN g/dL  --  3.60  --  3.50 3.50  --  3.80 3.60   BILIRUBIN mg/dL  --  1.2  --  1.2 0.8  --  0.8 0.8   ALK PHOS U/L  --  223*  --  284* 309*  --  321* 230*   AST (SGOT) U/L  --  81*  --  128* 214*  --  459* 353*   ALT (SGPT) U/L  --  308*  --  521* 616*  --  968* 756*     UA      Radiology(recent) No radiology results for the last day   Assessment:      Acute hepatitis, congestive with possible iatrogenic component  Acute shortness of breath  Acute pulmonary edema  Acute metabolic acidosis  Acute hyperkalemia  Hepatic transaminase derangement likely secondary to shock liver  History of sick sinus syndrome  Acute exacerbation of chronic systolic congestive heart failure  Acute renal failure superimposed upon chronic kidney disease stage IIIb  Acute kidney injury  Severe mitral regurgitation  Severe tricuspid regurgitation  Acute cardiopulmonary syndrome  Right upper lobe pulmonary nodule  History of pacemaker placement with replacement of generator 1/22  Atherosclerotic heart disease of native coronary arteries with angina pectoris  History of coronary artery bypass grafting in 1993  HFrEF 35%  Severe pulmonary hypertension  Chronic atrial fibrillation, persistent intermittent  Hypercoagulable state secondary to atrial fibrillation  Cerebrovascular disease with history of CVA  History of carotid occlusive disease  Vitamin D deficiency  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

## 2022-05-15 NOTE — CONSULTS
After a procedure time out, a Power Glide midline was placed to the right upper arm under sterile technique and without difficulty. Pt tolerated the procedure well. No immediate complications noted. The primary nurse was notified that the line is good to use at this time.

## 2022-05-15 NOTE — PROGRESS NOTES
Daily Progress Note        Dyspnea, unspecified type    Moderate malnutrition (HCC)      Assessment    dyspnea improved with diuretics  As well as interstitial markings with diuretics unlikely interstitial lung disease  Presentation suggestive of Pulmonary edema  CHF EF 35%  Coronary artery disease  A. fib  CKD      Recommendations:     Diuresis for cardiomyopathy EF 25%  Dobutamine drip     Continue to titrate oxygen-currently on 2 L NC and CPAP at bedtime/as needed  - Family reports he has not been wearing it at night     Mucinex  Sodium bicarb 1300 mg 3 times a day  Midodrine  DuoNebs as needed     Repeat CT scan of the chest after 3 months to follow-up on right upper lobe nodule     5/7/2022       LOS: 11 days     Subjective         Objective     Vital signs for last 24 hours:  Vitals:    05/15/22 0530 05/15/22 0600 05/15/22 1034 05/15/22 1428   BP: 114/50 125/50 (!) 127/32 131/54   BP Location:   Left arm Left arm   Patient Position:   Lying Lying   Pulse: 89 81 84 88   Resp:   22 26   Temp:   97.3 °F (36.3 °C) 97.7 °F (36.5 °C)   TempSrc:   Axillary Oral   SpO2: 96% 98% 100% 100%   Weight:       Height:           Intake/Output last 3 shifts:  I/O last 3 completed shifts:  In: 600 [P.O.:600]  Out: 4300 [Urine:4300]  Intake/Output this shift:  I/O this shift:  In: 118 [P.O.:118]  Out: 250 [Urine:250]      Radiology  Imaging Results (Last 24 Hours)     ** No results found for the last 24 hours. **          Labs:  Results from last 7 days   Lab Units 05/15/22  0227   WBC 10*3/mm3 12.40*   HEMOGLOBIN g/dL 15.2   HEMATOCRIT % 47.3   PLATELETS 10*3/mm3 228     Results from last 7 days   Lab Units 05/15/22  0227 05/14/22  0151   SODIUM mmol/L 139 141   POTASSIUM mmol/L 4.8 3.2*   CHLORIDE mmol/L 98 96*   CO2 mmol/L 26.0 30.0*   BUN mg/dL 63* 61*   CREATININE mg/dL 1.77* 1.44*   CALCIUM mg/dL 9.0 8.6   BILIRUBIN mg/dL  --  1.2   ALK PHOS U/L  --  223*   ALT (SGPT) U/L  --  308*   AST (SGOT) U/L  --  81*   GLUCOSE mg/dL  118* 111*         Results from last 7 days   Lab Units 05/14/22  0151 05/12/22  0637 05/11/22  0429   ALBUMIN g/dL 3.60 3.50 3.50         Results from last 7 days   Lab Units 05/09/22  1021   MED  Negative     Results from last 7 days   Lab Units 05/15/22  0227   MAGNESIUM mg/dL 2.3     Results from last 7 days   Lab Units 05/11/22  0429   INR  1.31*               Meds:   SCHEDULE  aspirin, 81 mg, Oral, Daily  bumetanide, 1 mg, Oral, BID  digoxin, 125 mcg, Oral, Q48H  finasteride, 5 mg, Oral, Daily  guaiFENesin, 600 mg, Oral, Q12H  heparin (porcine), 5,000 Units, Subcutaneous, Q12H  magnesium sulfate, 2 g, Intravenous, Once  midodrine, 5 mg, Oral, TID AC  sodium chloride, 3 mL, Intravenous, Q12H      Infusions  DOBUTamine, 2.5 mcg/kg/min, Last Rate: 2.5 mcg/kg/min (05/15/22 1333)      PRNs  ipratropium-albuterol  •  nitroglycerin  •  ondansetron  •  oxyCODONE  •  potassium chloride  •  [COMPLETED] Insert peripheral IV **AND** sodium chloride  •  sodium chloride    Physical Exam:  Physical Exam  Vitals reviewed.   Pulmonary:      Effort: Pulmonary effort is normal.      Breath sounds: Rhonchi present.   Skin:     General: Skin is warm and dry.   Neurological:      Mental Status: He is alert and oriented to person, place, and time.         ROS  Review of Systems   Respiratory: Positive for shortness of breath.    Neurological: Positive for weakness.       I have reviewed the patient's new clinical results    Electronically signed by CARMEN Sigala

## 2022-05-15 NOTE — PLAN OF CARE
Goal Outcome Evaluation:   Patient Aox4 vitals stable currently on room air sats 96%. Patient bp 138/51 (80) with the dobutamine drip off. Furosamide drip still going at 20. No other complaints at this time. Will continue to monitor patient.

## 2022-05-16 NOTE — PROGRESS NOTES
LOS: 12 days   Patient Care Team:  Antony Lawson MD as PCP - General  Tam Gonzalez MD as Consulting Physician (Nephrology)  Minh Kendrick MD as Consulting Physician (Cardiology)  Chris Romero MD as Consulting Physician (Cardiology)    Subjective:  Better    Objective:   Less short of breath      Review of Systems:   Review of Systems   Constitutional: Positive for activity change.   Respiratory: Positive for shortness of breath.            Vital Signs  Temp:  [97.3 °F (36.3 °C)-97.7 °F (36.5 °C)] 97.6 °F (36.4 °C)  Heart Rate:  [77-88] 77  Resp:  [20-26] 21  BP: (115-135)/(32-89) 135/52    Physical Exam:  Physical Exam  Vitals reviewed.   Cardiovascular:      Rate and Rhythm: Normal rate. Rhythm irregular.      Heart sounds: Normal heart sounds.   Neurological:      Mental Status: He is alert.          Radiology:  US Liver    Result Date: 5/9/2022   1. Normal sonographic appearance of the liver. 2. Uncomplicated cholelithiasis. 3. Gallbladder adenomyomatosis.  Electronically Signed By-Shahrzad Biggs MD On:5/9/2022 9:22 AM This report was finalized on [id number] by  Shahrzad Biggs MD.    XR Chest 1 View    Result Date: 5/9/2022  1.Mildly increased right upper lung airspace opacities, which may be due to atelectasis and/or pneumonia. 2.Small left pleural effusion.  Electronically Signed By-Hoa Bonilla MD On:5/9/2022 3:13 PM This report was finalized on [id number] by  Hoa Bonilla MD.         Results Review:     I reviewed the patient's new clinical results.  I reviewed the patient's new imaging results and agree with the interpretation.    Medication Review:   Scheduled Meds:aspirin, 81 mg, Oral, Daily  bumetanide, 1 mg, Oral, BID  digoxin, 125 mcg, Oral, Q48H  finasteride, 5 mg, Oral, Daily  guaiFENesin, 600 mg, Oral, Q12H  heparin (porcine), 5,000 Units, Subcutaneous, Q12H  magnesium sulfate, 2 g, Intravenous, Once  midodrine, 5 mg, Oral, TID AC  sodium chloride, 10 mL,  Intravenous, Q12H  sodium chloride, 3 mL, Intravenous, Q12H      Continuous Infusions:DOBUTamine, 2.5 mcg/kg/min, Last Rate: 2.5 mcg/kg/min (05/15/22 1333)      PRN Meds:.ipratropium-albuterol  •  nitroglycerin  •  ondansetron  •  oxyCODONE  •  potassium chloride  •  [COMPLETED] Insert peripheral IV **AND** sodium chloride  •  sodium chloride  •  sodium chloride    Labs:    CBC    Results from last 7 days   Lab Units 05/16/22  0006 05/15/22  0227 05/14/22  0151 05/13/22  0600 05/11/22  2348 05/11/22  0429 05/10/22  0611   WBC 10*3/mm3 10.10 12.40* 7.70 8.50 10.10 9.10 8.70   HEMOGLOBIN g/dL 14.8 15.2 14.3 14.7 13.8 13.3 15.0   PLATELETS 10*3/mm3 214 228 194 192 179 175 193     BMP   Results from last 7 days   Lab Units 05/15/22  0227 05/14/22  0151 05/12/22  1857 05/12/22  0637 05/11/22  0429 05/10/22  1216 05/10/22  0611   SODIUM mmol/L 139 141 141 140 137  --  139   POTASSIUM mmol/L 4.8 3.2* 4.0 3.5 4.1 4.6 5.4*   CHLORIDE mmol/L 98 96* 95* 96* 97*  --  94*   CO2 mmol/L 26.0 30.0* 31.0* 30.0* 24.0  --  25.0   BUN mg/dL 63* 61* 76* 85* 99*  --  100*   CREATININE mg/dL 1.77* 1.44* 1.84* 1.80* 2.04*  --  2.53*   GLUCOSE mg/dL 118* 111* 141* 94 114*  --  116*   MAGNESIUM mg/dL 2.3  --  2.4* 2.4*  --   --   --      Cr Clearance Estimated Creatinine Clearance: 21.9 mL/min (A) (by C-G formula based on SCr of 1.77 mg/dL (H)).  Coag   Results from last 7 days   Lab Units 05/11/22  0429   INR  1.31*     HbA1C No results found for: HGBA1C  Blood Glucose   Glucose   Date/Time Value Ref Range Status   05/16/2022 0740 92 70 - 105 mg/dL Final     Comment:     Serial Number: [id number]:  [id number]   05/15/2022 1635 79 70 - 105 mg/dL Final     Comment:     Serial Number: [id number]:  [id number]   05/15/2022 1154 131 (H) 70 - 105 mg/dL Final     Comment:     Serial Number: [id number]:  [id number]   05/15/2022 0745 116 (H) 70 - 105 mg/dL Final     Comment:     Serial Number: [id number]:  [id number]    05/14/2022 2201 145 (H) 70 - 105 mg/dL Final     Comment:     Serial Number: [id number]:  [id number]   05/14/2022 1747 136 (H) 70 - 105 mg/dL Final     Comment:     Serial Number: [id number]:  [id number]   05/14/2022 1232 119 (H) 70 - 105 mg/dL Final     Comment:     Serial Number: [id number]:  [id number]   05/14/2022 0734 108 (H) 70 - 105 mg/dL Final     Comment:     Serial Number: [id number]:  [id number]     Infection     CMP   Results from last 7 days   Lab Units 05/15/22  0227 05/14/22  0151 05/12/22  1857 05/12/22  0637 05/11/22  0429 05/10/22  1216 05/10/22  0611   SODIUM mmol/L 139 141 141 140 137  --  139   POTASSIUM mmol/L 4.8 3.2* 4.0 3.5 4.1 4.6 5.4*   CHLORIDE mmol/L 98 96* 95* 96* 97*  --  94*   CO2 mmol/L 26.0 30.0* 31.0* 30.0* 24.0  --  25.0   BUN mg/dL 63* 61* 76* 85* 99*  --  100*   CREATININE mg/dL 1.77* 1.44* 1.84* 1.80* 2.04*  --  2.53*   GLUCOSE mg/dL 118* 111* 141* 94 114*  --  116*   ALBUMIN g/dL  --  3.60  --  3.50 3.50  --  3.80   BILIRUBIN mg/dL  --  1.2  --  1.2 0.8  --  0.8   ALK PHOS U/L  --  223*  --  284* 309*  --  321*   AST (SGOT) U/L  --  81*  --  128* 214*  --  459*   ALT (SGPT) U/L  --  308*  --  521* 616*  --  968*     UA      Radiology(recent) No radiology results for the last day   Assessment:  Moderate malnutrition  Acute hepatitis, congestive with possible iatrogenic component  Acute shortness of breath  Acute pulmonary edema  Acute metabolic acidosis  Acute hyperkalemia  Hepatic transaminase derangement likely secondary to shock liver  History of sick sinus syndrome  Acute exacerbation of chronic systolic congestive heart failure  Acute renal failure superimposed upon chronic kidney disease stage IIIb  Acute kidney injury  Severe mitral regurgitation  Severe tricuspid regurgitation  Acute cardiopulmonary syndrome  Right upper lobe pulmonary nodule  History of pacemaker placement with replacement of generator 1/22  Atherosclerotic heart disease of  native coronary arteries with angina pectoris  History of coronary artery bypass grafting in 1993  HFrEF 35%  Severe pulmonary hypertension  Chronic atrial fibrillation, persistent intermittent  Hypercoagulable state secondary to atrial fibrillation  Cerebrovascular disease with history of CVA  History of carotid occlusive disease  Vitamin D deficiency  Hypertension associated chronic kidney disease stage IIIb  Degenerative joint disease  Hypokalemia  Bilateral upper lobe pulmonary nodularities  Gastroesophageal reflux disease without esophagitis      Plan:  Taper gtt.        Antony Lawson MD  05/16/22  08:22 EDT

## 2022-05-16 NOTE — PROGRESS NOTES
Nutrition Services    Patient Name: Shon ZAYAS Kunal  YOB: 1930  MRN: [id number]  Admission date: 5/3/2022    PPE Documentation        PPE Worn By Provider Did not enter room for this encounter   PPE Worn By Patient  N/A     PROGRESS NOTE      Encounter Information: 5/16: Progress note to monitor po intake. Current intakes documented as good- mostly %. Weight is stable with last assessment. Potassium restriction removed from diet at last assessment, potassium has been low to normal.        PO Diet: Diet Cardiac, Diabetic/Consistent Carbs; Healthy Heart; Diabetic - Consistent Carb; Fluid Restriction; 1500cc/day   PO Supplements: Novasource Renal BID   PO Intake:  %       Nutrition support orders:    Nutrition support review:        Labs (reviewed below): BUN/Creat elev        GI Function:  Stool Output  Stool (mL): 0 mL (05/04/22 1500)  Stool Unmeasured Occurrence: 1 (05/15/22 0900)  Bowel Incontinence: No (05/15/22 0900)  Stool Amount: moderate (05/15/22 0900)             Nutrition Intervention: Continue current po diet with supplements as tolerated       Results from last 7 days   Lab Units 05/15/22  0227 05/14/22  0151 05/12/22  1857 05/12/22  0637 05/11/22  0429   SODIUM mmol/L 139 141 141 140 137   POTASSIUM mmol/L 4.8 3.2* 4.0 3.5 4.1   CHLORIDE mmol/L 98 96* 95* 96* 97*   CO2 mmol/L 26.0 30.0* 31.0* 30.0* 24.0   BUN mg/dL 63* 61* 76* 85* 99*   CREATININE mg/dL 1.77* 1.44* 1.84* 1.80* 2.04*   CALCIUM mg/dL 9.0 8.6 9.1 8.8 8.6   BILIRUBIN mg/dL  --  1.2  --  1.2 0.8   ALK PHOS U/L  --  223*  --  284* 309*   ALT (SGPT) U/L  --  308*  --  521* 616*   AST (SGOT) U/L  --  81*  --  128* 214*   GLUCOSE mg/dL 118* 111* 141* 94 114*     Results from last 7 days   Lab Units 05/16/22  0006 05/15/22  0227 05/13/22  0600 05/12/22  1857 05/12/22  0637   MAGNESIUM mg/dL  --  2.3  --  2.4* 2.4*   HEMOGLOBIN g/dL 14.8 15.2   < >  --   --    HEMATOCRIT % 45.9 47.3   < >  --   --     < > = values in  this interval not displayed.     COVID19   Date Value Ref Range Status   05/03/2022 Not Detected Not Detected - Ref. Range Final     No results found for: HGBA1C      RD to follow up per protocol.    Electronically signed by:  Yanci Hernández RD  05/16/22 10:43 EDT

## 2022-05-16 NOTE — PROGRESS NOTES
"PULMONARY CRITICAL CARE PROGRESS  NOTE      PATIENT IDENTIFICATION:  Name: Shon Syed  MRN: [id number]  :  1930     Age: 89 y.o.  Sex: male    DATE OF Note:  2022   Referring Physician: Kristen Crowell MD                  Subjective:   Feeling better, no new issue, no SOB, no chest pain, no nausea or vomiting, no change in bowel habit, no dysuria,  no new  skin rash or itching.      Objective:  tMax 24 hrs: Temp (24hrs), Av.6 °F (36.4 °C), Min:97.3 °F (36.3 °C), Max:97.7 °F (36.5 °C)      Vitals Ranges:   Temp:  [97.3 °F (36.3 °C)-97.7 °F (36.5 °C)] 97.6 °F (36.4 °C)  Heart Rate:  [77-88] 78  Resp:  [20-26] 21  BP: (115-135)/(32-89) 132/45    Intake and Output Last 3 Shifts:   I/O last 3 completed shifts:  In: 118 [P.O.:118]  Out: 2550 [Urine:2550]    Exam:  /45   Pulse 78   Temp 97.6 °F (36.4 °C) (Oral)   Resp 21   Ht 172.7 cm (68\")   Wt 57 kg (125 lb 10.6 oz)   SpO2 100%   BMI 19.11 kg/m²     General Appearance:     HEENT:  Normocephalic, without obvious abnormality. Conjunctivae/corneas clear.  Normal external ear canals. Nares normal, no drainage     Neck:  Supple, symmetrical, trachea midline. No JVD.  Lungs /Chest wall:   Bilateral basal rhonchi, respirations unlabored, symmetrical wall movement.     Heart:  Regular rate and rhythm, systolic murmur PMI left sternal border  Abdomen: Soft, nontender, no masses, no organomegaly.    Extremities: Trace edema, no clubbing or cyanosis        Medications:    Current Facility-Administered Medications:   •  aspirin EC tablet 81 mg, 81 mg, Oral, Daily, Kristen Crowell MD, 81 mg at 22 0913  •  bumetanide (BUMEX) tablet 1 mg, 1 mg, Oral, BID, Tam Gonzalez MD, 1 mg at 22 0913  •  digoxin (LANOXIN) tablet 125 mcg, 125 mcg, Oral, Q48H, Chris Romero MD, 125 mcg at 05/15/22 1152  •  DOBUTamine (DOBUTREX) 1 mg/mL infusion, 2.5 mcg/kg/min, Intravenous, Titrated, Chris Romero MD, Last Rate: 8.58 mL/hr at " 05/15/22 1333, 2.5 mcg/kg/min at 05/15/22 1333  •  finasteride (PROSCAR) tablet 5 mg, 5 mg, Oral, Daily, Kristen Crowell MD, 5 mg at 05/16/22 0913  •  guaiFENesin (MUCINEX) 12 hr tablet 600 mg, 600 mg, Oral, Q12H, Leann Short, APRN, 600 mg at 05/16/22 0913  •  heparin (porcine) 5000 UNIT/ML injection 5,000 Units, 5,000 Units, Subcutaneous, Q12H, Antony Lawson MD, 5,000 Units at 05/16/22 0912  •  ipratropium-albuterol (DUO-NEB) nebulizer solution 3 mL, 3 mL, Nebulization, Q4H PRN, Emily Wu MD, 3 mL at 05/06/22 1330  •  magnesium sulfate 2g/50 mL (PREMIX) infusion, 2 g, Intravenous, Once, Chris Romero MD, Held at 05/12/22 2000  •  midodrine (PROAMATINE) tablet 5 mg, 5 mg, Oral, BID AC, Chris Romero MD  •  nitroglycerin (NITROSTAT) SL tablet 0.4 mg, 0.4 mg, Sublingual, Q5 Min PRN, Kristen Crowell MD  •  ondansetron (ZOFRAN) injection 4 mg, 4 mg, Intravenous, Q6H PRN, Kristen Crowell MD  •  oxyCODONE (ROXICODONE) immediate release tablet 5 mg, 5 mg, Oral, Q4H PRN, Leann Short, APRN, 5 mg at 05/10/22 1108  •  potassium chloride (K-DUR,KLOR-CON) CR tablet 40 mEq, 40 mEq, Oral, PRN, Chris Romero MD, 40 mEq at 05/12/22 1938  •  [COMPLETED] Insert peripheral IV, , , Once **AND** sodium chloride 0.9 % flush 10 mL, 10 mL, Intravenous, PRN, Kristen Crowell MD  •  sodium chloride 0.9 % flush 10 mL, 10 mL, Intravenous, Q12H, Chris Romero MD, 10 mL at 05/16/22 0914  •  sodium chloride 0.9 % flush 10 mL, 10 mL, Intravenous, PRN, Chris Romero MD  •  sodium chloride 0.9 % flush 3 mL, 3 mL, Intravenous, Q12H, Kristen Crowell MD, 3 mL at 05/16/22 0914  •  sodium chloride 0.9 % flush 3-10 mL, 3-10 mL, Intravenous, PRN, Kristen Crowell MD    Data Review:  All labs (24hrs):   Recent Results (from the past 24 hour(s))   POC Glucose Once    Collection Time: 05/15/22 11:54 AM    Specimen: Blood   Result Value Ref Range    Glucose 131 (H) 70 - 105 mg/dL   POC Glucose Once     Collection Time: 05/15/22  4:35 PM    Specimen: Blood   Result Value Ref Range    Glucose 79 70 - 105 mg/dL   Digoxin Level    Collection Time: 05/16/22 12:06 AM    Specimen: Blood   Result Value Ref Range    Digoxin 1.10 0.60 - 1.20 ng/mL   CBC Auto Differential    Collection Time: 05/16/22 12:06 AM    Specimen: Blood   Result Value Ref Range    WBC 10.10 3.40 - 10.80 10*3/mm3    RBC 4.50 4.14 - 5.80 10*6/mm3    Hemoglobin 14.8 13.0 - 17.7 g/dL    Hematocrit 45.9 37.5 - 51.0 %    .2 (H) 79.0 - 97.0 fL    MCH 33.0 26.6 - 33.0 pg    MCHC 32.3 31.5 - 35.7 g/dL    RDW 15.5 (H) 12.3 - 15.4 %    RDW-SD 54.7 (H) 37.0 - 54.0 fl    MPV 10.2 6.0 - 12.0 fL    Platelets 214 140 - 450 10*3/mm3    Neutrophil % 66.8 42.7 - 76.0 %    Lymphocyte % 16.3 (L) 19.6 - 45.3 %    Monocyte % 13.8 (H) 5.0 - 12.0 %    Eosinophil % 2.8 0.3 - 6.2 %    Basophil % 0.3 0.0 - 1.5 %    Neutrophils, Absolute 6.70 1.70 - 7.00 10*3/mm3    Lymphocytes, Absolute 1.60 0.70 - 3.10 10*3/mm3    Monocytes, Absolute 1.40 (H) 0.10 - 0.90 10*3/mm3    Eosinophils, Absolute 0.30 0.00 - 0.40 10*3/mm3    Basophils, Absolute 0.00 0.00 - 0.20 10*3/mm3    nRBC 0.0 0.0 - 0.2 /100 WBC   POC Glucose Once    Collection Time: 05/16/22  7:40 AM    Specimen: Blood   Result Value Ref Range    Glucose 92 70 - 105 mg/dL        Imaging:  Adult Transthoracic Echo Complete W/ Cont if Necessary Per Protocol  · Left atrial volume is severely increased.  · Abnormal mitral valve structure consistent with dilated annulus.  · Moderate to severe tricuspid valve regurgitation is present.  · Estimated right ventricular systolic pressure from tricuspid   regurgitation is markedly elevated (>55 mmHg).  · Moderate pulmonic valve regurgitation is present.  · The left ventricular cavity is moderately dilated.  · Estimated left ventricular EF was in agreement with the calculated left   ventricular EF. Left ventricular ejection fraction appears to be 26 - 30%.   Left ventricular systolic  function is severely decreased.  · Left ventricular diastolic function is consistent with (grade II w/high   LAP) pseudonormalization.  · The right ventricular cavity is moderately dilated.  · Severely reduced right ventricular systolic function noted.  · Severe mitral valve regurgitation is present with a centrally-directed   jet noted.  · Moderate aortic valve regurgitation is present.  · Moderate aortic valve stenosis is present.     Severe LV systolic dysfunction EF of 25%, moderately dilated LV  Severe MR without stenosis  Moderate AR with moderate aortic valve stenosis  Severe TR without stenosis  PA systolic pressure severely elevated 60-65 with severe pulm hypertension  Grade 2-3 diastolic dysfunction  Right atrial pressure estimated greater than 20 with severely dilated IVC  No masses or effusion seen  RV is moderate to severely hypokinetic, moderately increased in size       ASSESSMENT:    Dyspnea, unspecified type  Presentation suggestive of Pulmonary edema  CHF EF 30%  Lung nodule  Secondary pulmonary hypertension  Coronary artery disease  A. fib  CKD     Moderate malnutrition (HCC)     PLAN:  In the work-up for lung nodule PET scan as outpatient  Continue with diuretics  PT OT  Bronchodilator  Inhaled corticosteroids  Electrolytes/ glycemic control  DVT and GI prophylaxis.    Total Critical care time in direct medical management (   ) minutes. This time specifically excludes time spent performing procedures.  Megan Soriano MD. D, ABSM.     5/16/2022  10:22 EDT

## 2022-05-16 NOTE — PLAN OF CARE
Problem: Fluid Volume Excess  Goal: Fluid Balance  Outcome: Ongoing, Progressing  Intervention: Monitor and Manage Hypervolemia  Description: Assess fluid requirements to determine goal-directed fluid therapy.  Keep accurate intake, output and daily weight; monitor trends.  Monitor laboratory value trends and need for treatment adjustment.  Evaluate causes and potential sources that may lead to imbalance, such as illness severity, organ failure, mental status, mobility limitations, swallowing difficulties, intravenous fluid and medication side effects.  Anticipate need for diuretic agent; monitor effects if administered (e.g., blood pressure change, dysrhythmia, electrolyte alteration).  Evaluate need for fluid restriction; monitor response.  Assess need for ongoing intravenous fluid therapy; encourage oral intake when able.  Monitor and maintain skin integrity; avoid constrictive devices and clothing if edema is present.  Maintain optimal position to relieve discomfort, breathlessness and ventilation-perfusion mismatch.  Recent Flowsheet Documentation  Taken 5/16/2022 0006 by Faith Gomez, RN  Skin Protection: adhesive use limited  Taken 5/15/2022 2215 by Faith Gomez, RN  Skin Protection: adhesive use limited     Problem: Adult Inpatient Plan of Care  Goal: Plan of Care Review  Outcome: Ongoing, Progressing  Flowsheets (Taken 5/16/2022 0328)  Progress: no change  Plan of Care Reviewed With: patient  Goal: Patient-Specific Goal (Individualized)  Outcome: Ongoing, Progressing  Goal: Absence of Hospital-Acquired Illness or Injury  Outcome: Ongoing, Progressing  Intervention: Identify and Manage Fall Risk  Description: Perform standard risk assessment on admission using a validated tool or comprehensive approach appropriate to the patient; reassess fall risk frequently, with change in status or transfer to another level of care.  Communicate fall injury risk to interprofessional healthcare team.  Determine  need for increased observation, equipment and environmental modification, such as low bed, signage and supportive, nonskid footwear.  Adjust safety measures to individual developmental age, stage and identified risk factors.  Reinforce the importance of safety and physical activity with patient and family.  Perform regular intentional rounding to assess need for position change, pain assessment and personal needs, including assistance with toileting.  Recent Flowsheet Documentation  Taken 5/15/2022 2215 by Faith Gomez RN  Safety Promotion/Fall Prevention:   safety round/check completed   room organization consistent   nonskid shoes/slippers when out of bed   lighting adjusted   clutter free environment maintained   assistive device/personal items within reach  Taken 5/15/2022 1917 by Faith Gomez RN  Safety Promotion/Fall Prevention:   assistive device/personal items within reach   fall prevention program maintained   clutter free environment maintained   safety round/check completed   room organization consistent   nonskid shoes/slippers when out of bed  Intervention: Prevent Skin Injury  Description: Perform a screening for skin injury risk, such as pressure or moisture associated skin damage on admission and at regular intervals throughout hospital stay.  Keep all areas of skin (especially folds) clean and dry.  Maintain adequate skin hydration.  Relieve and redistribute pressure and protect bony prominences; implement measures based on patient-specific risk factors.  Match turning and repositioning schedule to clinical condition.  Encourage weight shift frequently; assist with reposition if unable to complete independently.  Float heels off bed; avoid pressure on the Achilles tendon.  Keep skin free from extended contact with medical devices.  Encourage functional activity and mobility, as early as tolerated.  Use aids (e.g., slide boards, mechanical lift) during transfer.  Recent Flowsheet  Documentation  Taken 5/16/2022 0006 by Faith Gomez RN  Skin Protection: adhesive use limited  Taken 5/15/2022 2215 by Faith Gomez RN  Skin Protection: adhesive use limited  Taken 5/15/2022 1917 by Faith Gomez RN  Body Position:   weight shifting   position changed independently  Intervention: Prevent and Manage VTE (Venous Thromboembolism) Risk  Description: Assess for VTE (venous thromboembolism) risk.  Encourage and assist with early ambulation.  Initiate and maintain compression or other therapy, as indicated, based on identified risk in accordance with organizational protocol and provider order.  Encourage both active and passive leg exercises while in bed, if unable to ambulate.  Recent Flowsheet Documentation  Taken 5/16/2022 0006 by Faith Gomez RN  Activity Management: activity adjusted per tolerance  VTE Prevention/Management: (hEPARIN) other (see comments)  Taken 5/15/2022 1917 by Faith Gomez RN  Activity Management: activity adjusted per tolerance  VTE Prevention/Management: (Heparin) other (see comments)  Intervention: Prevent Infection  Description: Maintain skin and mucous membrane integrity; promote hand, oral and pulmonary hygiene.  Optimize fluid balance, nutrition, sleep and glycemic control to maximize infection resistance.  Identify potential sources of infection early to prevent or mitigate progression of infection (e.g., wound, lines, devices).  Evaluate ongoing need for invasive devices; remove promptly when no longer indicated.  Recent Flowsheet Documentation  Taken 5/15/2022 2215 by Faith Gomez RN  Infection Prevention:   single patient room provided   rest/sleep promoted   hand hygiene promoted   environmental surveillance performed  Taken 5/15/2022 1917 by Faith Gomez RN  Infection Prevention:   single patient room provided   rest/sleep promoted   hand hygiene promoted   environmental surveillance performed  Goal: Optimal Comfort and  Wellbeing  Outcome: Ongoing, Progressing  Intervention: Monitor Pain and Promote Comfort  Description: Assess pain level, treatment efficacy and patient response at regular intervals using a consistent pain scale.  Consider the presence and impact of preexisting chronic pain.  Encourage patient and caregiver involvement in pain assessment, interventions and safety measures.  Recent Flowsheet Documentation  Taken 5/16/2022 0006 by Faith Gomez RN  Pain Management Interventions:   see MAR   care clustered  Taken 5/15/2022 2215 by Faith Gomez RN  Pain Management Interventions:   care clustered   see MAR  Taken 5/15/2022 1917 by Faith Gomez RN  Pain Management Interventions: care clustered  Intervention: Provide Person-Centered Care  Description: Use a family-focused approach to care.  Develop trust and rapport by proactively providing information, encouraging questions, addressing concerns and offering reassurance.  Acknowledge emotional response to hospitalization.  Recognize and utilize personal coping strategies.  Honor spiritual and cultural preferences.  Recent Flowsheet Documentation  Taken 5/16/2022 0006 by Faith Gomez RN  Trust Relationship/Rapport: care explained  Taken 5/15/2022 2215 by Faith Gomez RN  Trust Relationship/Rapport: care explained  Taken 5/15/2022 1917 by Faith Gomez RN  Trust Relationship/Rapport: care explained  Goal: Readiness for Transition of Care  Outcome: Ongoing, Progressing     Problem: Adjustment to Illness (Heart Failure)  Goal: Optimal Coping  Outcome: Ongoing, Progressing  Intervention: Support Psychosocial Response  Description: Acknowledge, normalize and validate the emotional response to current condition and unpredictable nature of disease progression.  Assess and monitor patient and caregiver perspective on quality of life and personal wellbeing; consider palliative care consult to enhance symptom relief and quality of life.  Engage patient in  early and ongoing discussion about goals of care. Facilitate shared decision-making regarding goals and advanced care planning.  Assist patient and family with life transitions associated with heart failure (e.g., adherence to medical regimens, impact on family dynamics/roles, end-of-life care); acknowledge caregiver stress.  Recognize current coping strategies and assist in developing new strategies (problem-solving, mind-body techniques).  Assess and monitor for signs and symptoms of anxiety and depression.  Recent Flowsheet Documentation  Taken 5/16/2022 0006 by Faith Gomez RN  Supportive Measures: active listening utilized  Family/Support System Care: support provided  Taken 5/15/2022 2215 by Faith Gomez RN  Supportive Measures: active listening utilized  Family/Support System Care: support provided  Taken 5/15/2022 1917 by Faith Gomez RN  Supportive Measures: active listening utilized  Family/Support System Care: support provided     Problem: Cardiac Output Decreased (Heart Failure)  Goal: Optimal Cardiac Output  Outcome: Ongoing, Progressing  Intervention: Optimize Cardiac Output  Description: Closely monitor fluid balance; advocate for adjustment if balance is consistently positive.  Optimize preload, afterload and contractility with pharmacologic therapy (e.g., diuretic, angiotensin-converting enzyme inhibitor, angiotensin receptor blocker, beta-blocker, aldosterone antagonist, phosphodiesterase-5 inhibitor, inodilator, inotrope  Implement pharmacologic and nonpharmacologic interventions for pain, comfort and anxiety to avoid cardiac and respiratory compromise.  Maintain optimal position to promote venous return (e.g., elevate head of bed, avoid dependent extremity position).  Facilitate sleep/rest patterns that support or enhance activity tolerance.  Decrease energy expenditure (e.g., preplan activity, cluster care considering patient preference).  Maintain normothermia to enhance  oxygenation and perfusion.  Facilitate use of additional therapy, such as ultrafiltration, intra-aortic balloon pump, ventricular assist device or extracorporeal membrane oxygenation to improve perfusion and hemodynamic stability.  Anticipate surgical intervention, such as heart surgery or heart transplantation, with refractory symptoms.  Recent Flowsheet Documentation  Taken 5/16/2022 0006 by Faith Gomez RN  Environmental Support: rest periods encouraged  Taken 5/15/2022 2215 by Faith Gomez RN  Environmental Support: rest periods encouraged  Taken 5/15/2022 1917 by Faith Gomez RN  Environmental Support: rest periods encouraged     Problem: Dysrhythmia (Heart Failure)  Goal: Stable Heart Rate and Rhythm  Outcome: Ongoing, Progressing     Problem: Fluid Imbalance (Heart Failure)  Goal: Fluid Balance  Outcome: Ongoing, Progressing     Problem: Functional Ability Impaired (Heart Failure)  Goal: Optimal Functional Ability  Outcome: Ongoing, Progressing  Intervention: Optimize Functional Ability  Description: Assess functional ability and cognition; consider social history, including living environment, roles and social engagement, to identify risk or presence of functional decline; routinely reassess during hospitalization to identify any change.  Facilitate physical activity and exercise to improve functional ability, cognition and quality of life, as well as minimize functional decline associated with inactivity; encourage optimal functional mobility.  Consider NMES (neuromuscular electrical stimulation) of the lower extremities for patients with limitations in ability to participate in active exercise.  Monitor physiologic response to activity or exercise and adjust accordingly; provide a warm-up and cool-down with activity.  Cluster, coordinate and organize care schedule per patient preference, priorities and tolerance.  Preplan and pace activity; balance activity with periods of rest; incorporate  energy-conservation techniques.  Maintain an accessible environment to facilitate safe activity; position for optimal comfort and activity tolerance (e.g., sitting for self-care).  Encourage self-care performance to promote maximum independence in daily activity; provide adaptive equipment and rest periods if needed.  Offer personal aids, such as glasses, hearing aids and dentures; encourage familiar home items and neurosensory stimulation activities to prevent isolation and sensory deprivation.  Recent Flowsheet Documentation  Taken 5/16/2022 0006 by Faith Gomez RN  Activity Management: activity adjusted per tolerance  Taken 5/15/2022 1917 by Faith Gomez RN  Activity Management: activity adjusted per tolerance  Self-Care Promotion: independence encouraged     Problem: Oral Intake Inadequate (Heart Failure)  Goal: Optimal Nutrition Intake  Outcome: Ongoing, Progressing  Intervention: Promote and Optimize Nutrition Intake  Description: Perform a nutritional assessment; include a nutrition-focused physical exam.  Determine calorie, protein, vitamin, mineral and fluid requirements.  Assess for micronutrient deficiencies (e.g., iron, thiamin, Vitamin D); supplement if depleted.  Assess need and provide assistance with meal set-up and feeding.  Adjust diet or meal schedule based on preferences and tolerance.  Minimize unnecessary dietary restrictions to increase oral intake; sodium restriction should be individualized to the patient and clinical status.  Offer oral supplemental foods or drinks to enhance calorie and protein intake.  Establish bowel elimination program to increase comfort and appetite.  Provide and encourage oral care to enhance desire to eat.  Consider nutrition support if oral intake remains inadequate.  Recent Flowsheet Documentation  Taken 5/16/2022 0006 by Faith Gomez RN  Oral Nutrition Promotion: calorie-dense foods provided  Taken 5/15/2022 2215 by Faith Gomez RN  Oral  Nutrition Promotion: calorie-dense foods provided  Taken 5/15/2022 1917 by Faith Gomez RN  Oral Nutrition Promotion: calorie-dense foods provided     Problem: Respiratory Compromise (Heart Failure)  Goal: Effective Oxygenation and Ventilation  Outcome: Ongoing, Progressing  Intervention: Promote Airway Secretion Clearance  Description: Assess the effectiveness of pulmonary hygiene and ability to perform airway-clearance techniques.  Promote early mobility or ambulation; match activity to ability and tolerance.  Encourage deep breathing and lung expansion therapy to prevent atelectasis; adjust treatment to patient’s response.  Initiate cough-enhancement and airway-clearance techniques with instruction (e.g., active cycle breathing, positive expiratory pressure, suction).  Consider pharmacologic therapy that may improve mucus clearance, cough response and air flow.  Consider inspiratory muscle training to decrease the risk of pulmonary complications.  Recent Flowsheet Documentation  Taken 5/16/2022 0006 by Faith Gomez RN  Breathing Techniques/Airway Clearance: deep/controlled cough encouraged  Cough And Deep Breathing: done independently per patient  Taken 5/15/2022 2215 by Faith Gomez RN  Cough And Deep Breathing: done independently per patient  Taken 5/15/2022 1917 by Faith Gomez RN  Breathing Techniques/Airway Clearance: deep/controlled cough encouraged  Cough And Deep Breathing: done independently per patient  Intervention: Optimize Oxygenation and Ventilation  Description: Assess and monitor airway, breathing and circulation for effective oxygenation and ventilation; consider oxygenation and ventilation parameters and goal.  Anticipate noninvasive and invasive monitoring (e.g., pulse oximetry, end-tidal carbon dioxide, blood gases, cardiovascular).  Promote head of bed elevation with regular position changes to minimize ventilation/perfusion mismatch and breathlessness.  Provide oxygen  therapy judiciously to avoid hyperoxemia; adjust to achieve oxygenation goal.  Monitor fluid balance closely to minimize the risk of fluid overload.  Consider noninvasive or invasive positive pressure ventilation to enhance oxygenation and ventilation, as well as reduce work of breathing.  Recent Flowsheet Documentation  Taken 5/16/2022 0006 by Faith Gomez RN  Airway/Ventilation Management: airway patency maintained  Taken 5/15/2022 1917 by Faith Gomez RN  Airway/Ventilation Management: airway patency maintained     Problem: Sleep Disordered Breathing (Heart Failure)  Goal: Effective Breathing Pattern During Sleep  Outcome: Ongoing, Progressing     Problem: Malnutrition  Goal: Improved Nutritional Intake  Outcome: Ongoing, Progressing  Intervention: Promote and Optimize Oral Intake  Description: Assess need and provide assistance with meal set-up and feeding.  Adjust diet or meal schedule based on preferences and tolerance.  Minimize unnecessary dietary restrictions to increase oral intake.  Offer oral supplemental food or drinks to enhance calorie and protein intake.  Establish bowel elimination program to increase comfort and appetite.  Provide and encourage oral hygiene to enhance desire to eat.  Consider nutrition support if oral intake remains inadequate.  Recent Flowsheet Documentation  Taken 5/16/2022 0006 by Faith Gomez RN  Oral Nutrition Promotion: calorie-dense foods provided  Taken 5/15/2022 2215 by Faith Gomez RN  Oral Nutrition Promotion: calorie-dense foods provided  Taken 5/15/2022 1917 by Faith Gomez RN  Oral Nutrition Promotion: calorie-dense foods provided     Problem: Fall Injury Risk  Goal: Absence of Fall and Fall-Related Injury  Outcome: Ongoing, Progressing  Intervention: Identify and Manage Contributors  Description: Develop a fall prevention plan with the patient and caregiver/family.  Provide reorientation, appropriate sensory stimulation and routines with  changes in mental status to decrease risk of fall.  Promote use of personal vision and auditory aids.  Assess assistance level required for safe and effective self-care; provide support as needed, such as toileting, mobilization. For age 65 and older, implement timed toileting with assistance.  Encourage physical activity, such as performance of mobility and self-care at highest level of patient ability, multicomponent exercise program and provision of appropriate assistive devices.  If fall occurs, assess the severity of injury; implement fall injury protocol. Determine the cause and revise fall injury prevention plan.  Regularly review medication contribution to fall risk; adjust medication administration times to minimize risk of falling.  Consider risk related to polypharmacy and age.  Balance adequate pain management with potential for oversedation.  Recent Flowsheet Documentation  Taken 5/15/2022 2215 by Faith Gomez RN  Medication Review/Management: medications reviewed  Taken 5/15/2022 1917 by Faith Gomez RN  Medication Review/Management: medications reviewed  Self-Care Promotion: independence encouraged  Intervention: Promote Injury-Free Environment  Description: Provide a safe, barrier-free environment that encourages independent activity.  Keep care area uncluttered and well-lighted.  Determine need for increased observation or monitoring.  Avoid use of devices that minimize mobility, such as restraints or indwelling urinary catheter.  Recent Flowsheet Documentation  Taken 5/15/2022 2215 by Faith Gomez, RN  Safety Promotion/Fall Prevention:   safety round/check completed   room organization consistent   nonskid shoes/slippers when out of bed   lighting adjusted   clutter free environment maintained   assistive device/personal items within reach  Taken 5/15/2022 1917 by Faith Gomez, RN  Safety Promotion/Fall Prevention:   assistive device/personal items within reach   fall prevention  program maintained   clutter free environment maintained   safety round/check completed   room organization consistent   nonskid shoes/slippers when out of bed     Problem: Skin Injury Risk Increased  Goal: Skin Health and Integrity  Outcome: Ongoing, Progressing  Intervention: Promote and Optimize Oral Intake  Description: Perform a nutrition assessment; include a nutrition-focused physical exam.  Determine calorie, protein, vitamin, mineral and fluid requirements.  Assess for micronutrient deficiencies; supplement if depleted.  Assess need and assist with meal set-up and feeding.  Adjust diet or meal schedule based on preferences and tolerance.  Offer oral supplemental food or drinks to enhance calorie and protein intake.  Establish bowel elimination program to increase comfort and appetite.  Minimize unnecessary dietary restrictions to increase oral intake.  Provide and encourage oral hygiene to enhance desire to eat.  Consider enteral nutrition support if oral intake remains inadequate; provide parenteral nutrition if enteral is contraindicated.  Recent Flowsheet Documentation  Taken 5/16/2022 0006 by Faith Gomez RN  Oral Nutrition Promotion: calorie-dense foods provided  Taken 5/15/2022 2215 by Faith Gomez RN  Oral Nutrition Promotion: calorie-dense foods provided  Taken 5/15/2022 1917 by Faith Gomez RN  Oral Nutrition Promotion: calorie-dense foods provided  Intervention: Optimize Skin Protection  Description: Perform a full pressure injury risk assessment, as indicated by screening, upon admission to care unit.  Reassess skin (injury risk, full inspection) frequently (e.g., scheduled interval, with change in condition) to provide optimal early detection and prevention.  Maintain adequate tissue perfusion (e.g., encourage fluid balance; avoid crossing legs, constrictive clothing or devices) to promote tissue oxygenation.  Maintain head of bed at lowest degree of elevation tolerated,  considering medical condition and other restrictions.  Avoid positioning onto an area that remains reddened.  Minimize incontinence and moisture (e.g., toileting schedule; moisture-wicking pad, diaper or incontinence collection device; skin moisture barrier).  Cleanse skin promptly and gently when soiled utilizing a pH-balanced cleanser.  Relieve and redistribute pressure (e.g., scheduled position changes, weight shifts, use of support surface, medical device repositioning, protective dressing application, use of positioning device, microclimate control, use of pressure-injury-monitor  Encourage increased activity, such as sitting in a chair at the bedside or early mobilization, when able to tolerate.  Recent Flowsheet Documentation  Taken 5/16/2022 0006 by Faith Gomez RN  Pressure Reduction Techniques: frequent weight shift encouraged  Pressure Reduction Devices: pressure-redistributing mattress utilized  Skin Protection: adhesive use limited  Taken 5/15/2022 2215 by Faith Gomez RN  Pressure Reduction Techniques: frequent weight shift encouraged  Pressure Reduction Devices: pressure-redistributing mattress utilized  Skin Protection: adhesive use limited  Taken 5/15/2022 1917 by Faith Gomez RN  Pressure Reduction Techniques: frequent weight shift encouraged  Head of Bed (HOB) Positioning: HOB at 30 degrees  Pressure Reduction Devices: pressure-redistributing mattress utilized   Goal Outcome Evaluation:  Plan of Care Reviewed With: patient        Progress: no change

## 2022-05-16 NOTE — PLAN OF CARE
Problem: Fluid Volume Excess  Goal: Fluid Balance  Outcome: Ongoing, Progressing     Problem: Adult Inpatient Plan of Care  Goal: Plan of Care Review  Outcome: Ongoing, Progressing  Goal: Patient-Specific Goal (Individualized)  Outcome: Ongoing, Progressing  Goal: Absence of Hospital-Acquired Illness or Injury  Outcome: Ongoing, Progressing  Intervention: Identify and Manage Fall Risk  Recent Flowsheet Documentation  Taken 5/16/2022 0800 by Melissa Bond RN  Safety Promotion/Fall Prevention:   safety round/check completed   activity supervised   assistive device/personal items within reach   clutter free environment maintained   fall prevention program maintained   gait belt   nonskid shoes/slippers when out of bed  Intervention: Prevent and Manage VTE (Venous Thromboembolism) Risk  Recent Flowsheet Documentation  Taken 5/16/2022 0800 by Melissa Bond RN  Activity Management:   activity adjusted per tolerance   activity encouraged  VTE Prevention/Management: patient refused intervention  Intervention: Prevent Infection  Recent Flowsheet Documentation  Taken 5/16/2022 0800 by Melissa Bond RN  Infection Prevention:   hand hygiene promoted   rest/sleep promoted  Goal: Optimal Comfort and Wellbeing  Outcome: Ongoing, Progressing  Goal: Readiness for Transition of Care  Outcome: Ongoing, Progressing     Problem: Adjustment to Illness (Heart Failure)  Goal: Optimal Coping  Outcome: Ongoing, Progressing     Problem: Cardiac Output Decreased (Heart Failure)  Goal: Optimal Cardiac Output  Outcome: Ongoing, Progressing     Problem: Dysrhythmia (Heart Failure)  Goal: Stable Heart Rate and Rhythm  Outcome: Ongoing, Progressing     Problem: Fluid Imbalance (Heart Failure)  Goal: Fluid Balance  Outcome: Ongoing, Progressing   Goal Outcome Evaluation:  resting in bed denies needs at this time.

## 2022-05-16 NOTE — PROGRESS NOTES
"NEPHROLOGY PROGRESS NOTE------[facility]    [facility]  [phone]  Tam Gonzalez MD      Patient Care Team:  Antony Lawson MD as PCP - General  Tam Gonzalez MD as Consulting Physician (Nephrology)  Minh Kendrick MD as Consulting Physician (Cardiology)  Chris Romero MD as Consulting Physician (Cardiology)      Provider:  Tam Gonzalez MD  Patient Name: Shon ZAYAS Kunal  :  1930    SUBJECTIVE:  F/U CKD  No chest pain, breathing better  Labs pending    Medication:  aspirin, 81 mg, Oral, Daily  budesonide-formoterol, 2 puff, Inhalation, BID - RT  bumetanide, 1 mg, Oral, BID  digoxin, 125 mcg, Oral, Q48H  finasteride, 5 mg, Oral, Daily  guaiFENesin, 600 mg, Oral, Q12H  heparin (porcine), 5,000 Units, Subcutaneous, Q12H  magnesium sulfate, 2 g, Intravenous, Once  midodrine, 5 mg, Oral, BID AC  sodium chloride, 10 mL, Intravenous, Q12H  sodium chloride, 3 mL, Intravenous, Q12H      DOBUTamine, 2.5 mcg/kg/min, Last Rate: 2.5 mcg/kg/min (05/15/22 1333)        OBJECTIVE    Vital Sign Min/Max for last 24 hours  Temp  Min: 97.3 °F (36.3 °C)  Max: 97.7 °F (36.5 °C)   BP  Min: 115/57  Max: 135/52   Pulse  Min: 77  Max: 88   Resp  Min: 20  Max: 26   SpO2  Min: 100 %  Max: 100 %   No data recorded   No data recorded     Flowsheet Rows    Flowsheet Row First Filed Value   Admission Height 172.7 cm (68\") Documented at 2022 1114   Admission Weight 58.2 kg (128 lb 4.9 oz) Documented at 2022 1114          No intake/output data recorded.  I/O last 3 completed shifts:  In: 118 [P.O.:118]  Out: 2550 [Urine:2550]    Physical Exam:  General Appearance: alert, appears stated age and cooperative  Head: normocephalic, without obvious abnormality and atraumatic  Eyes: conjunctivae and sclerae normal and no icterus  Neck: supple and no JVD  Lungs: diminished breath sounds  Heart: regular rhythm & normal rate and normal S1, S2  Chest: Wall " no abnormalities observed  Abdomen: normal bowel sounds and soft non-tender  Extremities: moves extremities well, no edema, no cyanosis and no redness  Skin: no bleeding, bruising or rash, turgor normal, color normal and no lesions noted  Neurologic: Alert, and oriented. No focal deficits    Labs:    WBC WBC   Date Value Ref Range Status   05/16/2022 10.10 3.40 - 10.80 10*3/mm3 Final   05/15/2022 12.40 (H) 3.40 - 10.80 10*3/mm3 Final   05/14/2022 7.70 3.40 - 10.80 10*3/mm3 Final      HGB Hemoglobin   Date Value Ref Range Status   05/16/2022 14.8 13.0 - 17.7 g/dL Final   05/15/2022 15.2 13.0 - 17.7 g/dL Final   05/14/2022 14.3 13.0 - 17.7 g/dL Final      HCT Hematocrit   Date Value Ref Range Status   05/16/2022 45.9 37.5 - 51.0 % Final   05/15/2022 47.3 37.5 - 51.0 % Final   05/14/2022 44.1 37.5 - 51.0 % Final      Platlets No results found for: LABPLAT   MCV MCV   Date Value Ref Range Status   05/16/2022 102.2 (H) 79.0 - 97.0 fL Final   05/15/2022 100.5 (H) 79.0 - 97.0 fL Final   05/14/2022 100.2 (H) 79.0 - 97.0 fL Final          Sodium Sodium   Date Value Ref Range Status   05/15/2022 139 136 - 145 mmol/L Final   05/14/2022 141 136 - 145 mmol/L Final      Potassium Potassium   Date Value Ref Range Status   05/15/2022 4.8 3.5 - 5.2 mmol/L Final     Comment:     Slight hemolysis detected by analyzer. Results may be affected.   05/14/2022 3.2 (L) 3.5 - 5.2 mmol/L Final      Chloride Chloride   Date Value Ref Range Status   05/15/2022 98 98 - 107 mmol/L Final   05/14/2022 96 (L) 98 - 107 mmol/L Final      CO2 CO2   Date Value Ref Range Status   05/15/2022 26.0 22.0 - 29.0 mmol/L Final   05/14/2022 30.0 (H) 22.0 - 29.0 mmol/L Final      BUN BUN   Date Value Ref Range Status   05/15/2022 63 (H) 8 - 23 mg/dL Final   05/14/2022 61 (H) 8 - 23 mg/dL Final      Creatinine Creatinine   Date Value Ref Range Status   05/15/2022 1.77 (H) 0.76 - 1.27 mg/dL Final   05/14/2022 1.44 (H) 0.76 - 1.27 mg/dL Final      Calcium Calcium    Date Value Ref Range Status   05/15/2022 9.0 8.2 - 9.6 mg/dL Final   05/14/2022 8.6 8.2 - 9.6 mg/dL Final      PO4 No components found for: PO4   Albumin Albumin   Date Value Ref Range Status   05/14/2022 3.60 3.50 - 5.20 g/dL Final      Magnesium Magnesium   Date Value Ref Range Status   05/15/2022 2.3 1.7 - 2.3 mg/dL Final      Uric Acid No components found for: URIC ACID     Imaging Results (Last 72 Hours)     ** No results found for the last 72 hours. **          Results for orders placed during the hospital encounter of 05/03/22    XR Chest 1 View    Narrative  DATE OF EXAM:  5/9/2022 3:02 PM    PROCEDURE:  XR CHEST 1 VW-    INDICATIONS:  Dyspnea; R06.00-Dyspnea, unspecified; I50.9-Heart failure, unspecified;  N17.9-Acute kidney failure, unspecified    COMPARISON:  CT chest high-resolution 05/07/2022, AP chest x-ray 05/06/2022.    TECHNIQUE:  Single radiographic AP view of the chest was obtained.    FINDINGS:  The patient's chin partially obscures evaluation of the upper chest.  Allowing for this limitation, no pneumothorax is seen. Cardiac  silhouette remains mildly enlarged. Changes are redemonstrated from  CABG. Pacemaker leads are stable. There are mildly increased right upper  lung airspace opacities. Blunting of the left lateral costophrenic angle  appears stable. Left lung appears grossly clear.    Impression  1.Mildly increased right upper lung airspace opacities, which may be due  to atelectasis and/or pneumonia.  2.Small left pleural effusion.    Electronically Signed By-Hoa Bonilla MD On:5/9/2022 3:13 PM  This report was finalized on [id number] by  Hoa Bonilla MD.      XR Chest 1 View    Narrative  DATE OF EXAM:  5/6/2022 4:26 PM    PROCEDURE:  XR CHEST 1 VW-    INDICATIONS:  Severe SOA; R06.00-Dyspnea, unspecified; I50.9-Heart failure,  unspecified; N17.9-Acute kidney failure, unspecified    COMPARISON:  Chest radiograph 05/03/2022    TECHNIQUE:  Single radiographic view of the chest was  obtained.    FINDINGS:  Patient is rotated this limits evaluation of the right lung. There is  cardiomegaly. Dual lead transvenous pacemaker device. Blunting the  costophrenic angles. Left lower lobe airspace opacity. Background  chronic interstitial lung disease.    Impression  Since previous exam there is been development of probable small pleural  effusions. There is left lower lobe airspace opacity atelectasis versus  pneumonia.    Electronically Signed By-Marianna Flowers MD On:5/6/2022 4:32 PM  This report was finalized on [id number] by  Marianna Flowers MD.      XR Chest 1 View    Narrative  DATE OF EXAM:  5/3/2022 12:07 PM    PROCEDURE:  XR CHEST 1 VW-    INDICATIONS:  Shortness of breath. Cough for 4 months.    COMPARISON:  Chest radiographs 3/24/2022, 5/29/2021, and 5/27/2021. CT chest  3/24/2022    TECHNIQUE:  Single radiographic AP view of the chest was obtained.    FINDINGS:  Lordotic positioning. Overlying artifacts. Stable sternotomy wires,  postoperative changes from CABG, left chest wall cardiac pacer device.  Stable elevation of the right hemidiaphragm with improved aeration of  both lungs. Mild linear right infrahilar and left basilar segmental  atelectasis and/or scarring with mild interstitial thickening in the  lung bases. No focal lung consolidation. No pneumothorax. Stable  cardiomegaly, likely accentuated by technique. Calcified atherosclerotic  disease in the thoracic aorta. Chronic changes in both shoulders. No  acute osseous normality is identified.    Impression  Stable cardiomegaly with improved aeration of both lungs. Multifocal  bibasilar subsegmental atelectasis and/or scarring with mild  interstitial thickening in the lung bases that could represent mild  pulmonary vascular congestion/edema, chronic lung disease, or less  likely atypical pneumonia.    Electronically Signed By-Lance Mendes MD On:5/3/2022 12:32 PM  This report was finalized on [id number] by  Lance Mendes  MD.      Results for orders placed during the hospital encounter of 03/24/22    Duplex Carotid Ultrasound CAR    Interpretation Summary  · Proximal right internal carotid artery moderate stenosis.  · Proximal left internal carotid artery moderate stenosis.        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    · CHF- EF 25-30%. Severe TR, high RVSP. cautiously diurese.   · Hypertension  · Atrial fibrillation  · History coronary disease  · Diabetes  · Elevated LFTs--GI following. Suspect related to congestive hepatomegaly     Non oliguric.  Get stat BMP  Weaning dobutamine  Reduce midodrine  Monitor renal function fluid status electrolytes      Tam Gonzalez MD  [facility]  [phone]  05/16/22  10:47 EDT

## 2022-05-16 NOTE — CASE MANAGEMENT/SOCIAL WORK
Continued Stay Note  [facility]     Patient Name: Shon ZAYAS Kunal  MRN: [id number]  Today's Date: 5/16/2022    Admit Date: 5/3/2022     Discharge Plan     Row Name 05/16/22 1045       Plan    Plan Home w/[facility] (accepted). Lives w/spouse. Barrier: IV dobutamine.    Plan Comments Discharge home w/sp[facility] (Order & referral already in epic). Has glucometer.                    Expected Discharge Date and Time     Expected Discharge Date Expected Discharge Time    May 19, 2022           Chely Escobar RN, MSN  Care Manager  [phone]

## 2022-05-16 NOTE — PROGRESS NOTES
Cardiology [place]        LOS:  LOS: 12 days   Patient Name: Shon ZAYAS Kunal  Age/Sex: 89 y.o. male  : 1930  MRN: [id number]    Day of Service: 22   Length of Stay: 12  Encounter Provider: Chris Romero MD  Place of Service: [facility] CARDIOLOGY  Patient Care Team:  Antony Lawson MD as PCP - Tam Monroe MD as Consulting Physician (Nephrology)  Minh Kendrick MD as Consulting Physician (Cardiology)  Chris Romero MD as Consulting Physician (Cardiology)    Subjective:     Chief Complaint: shortness of breath, MERA    Subjective:   Seen and examined  Maintained on dobutamine drip, 2.5 mcg/kg/min  Lasix reduced to Bumex 1 twice daily  BMP is still pending today  Will examine creatinine and if stable along with renal function stable on stable dose of diuretics with even volume status we will try to taper dobutamine today ultimately to off over the next 24 hours  Try to taper off midodrine which is contraindicated in systolic heart failure    Follow endorgan function LFTs and creatinine  Avoid all beta-blockers at this point  Afterload reduction with hydralazine nitrates, not nephrotoxic medications  Heart rates are controlled        Complex condition overall  Remains with volume overload and high filling pressures on both sides  LV function is at 25% with severe MR and severe left atrial enlargement also severe pulmonary hypertension secondary to left-sided filling pressures chronically  RV is moderate to severely hypokinetic, overwhelmed by pulmonary hypertension as well as volume, IVC is distended at 2.6-2.7 indicative of high filling pressures along with severe TR and high filling pressures on the right side with hepatic congestion and renal congestion with cardiopulmonary syndrome  Patient is not a surgical candidate and 89 years old with severe MR, severe TR, severe pulm hypertension, severely reduced LV and RV systolic  function and multiorgan dysfunction    Interventions above have resulted in improvement in creatinine and improvement in LFTs with better heart rates, gentle inotropic assistance overnight    Current Medications:   Scheduled Meds:aspirin, 81 mg, Oral, Daily  bumetanide, 1 mg, Oral, BID  digoxin, 125 mcg, Oral, Q48H  finasteride, 5 mg, Oral, Daily  guaiFENesin, 600 mg, Oral, Q12H  heparin (porcine), 5,000 Units, Subcutaneous, Q12H  magnesium sulfate, 2 g, Intravenous, Once  midodrine, 5 mg, Oral, TID AC  sodium chloride, 10 mL, Intravenous, Q12H  sodium chloride, 3 mL, Intravenous, Q12H      Continuous Infusions:DOBUTamine, 2.5 mcg/kg/min, Last Rate: 2.5 mcg/kg/min (05/15/22 1333)        Allergies:  Allergies   Allergen Reactions   • Penicillin G Rash   • Vancomycin Rash       Review of Systems   Constitutional: Positive for malaise/fatigue. Negative for chills and diaphoresis.   Cardiovascular: Negative for chest pain, dyspnea on exertion, irregular heartbeat, leg swelling, near-syncope, orthopnea, palpitations, paroxysmal nocturnal dyspnea and syncope.   Respiratory: Positive for shortness of breath. Negative for cough, sleep disturbances due to breathing and sputum production.    Gastrointestinal: Negative for change in bowel habit.   Genitourinary: Negative for urgency.   Neurological: Negative for dizziness and headaches.   Psychiatric/Behavioral: Negative for altered mental status.         Objective:     Temp:  [97.3 °F (36.3 °C)-97.7 °F (36.5 °C)] 97.6 °F (36.4 °C)  Heart Rate:  [77-88] 78  Resp:  [20-26] 21  BP: (115-135)/(32-89) 132/45     Intake/Output Summary (Last 24 hours) at 5/16/2022 1003  Last data filed at 5/16/2022 0617  Gross per 24 hour   Intake 118 ml   Output 600 ml   Net -482 ml     Body mass index is 19.11 kg/m².      05/12/22  0500 05/13/22  0600 05/15/22  0500   Weight: 57 kg (125 lb 10.6 oz) 57 kg (125 lb 10.6 oz) 57 kg (125 lb 10.6 oz)         General Appearance:    Alert, cooperative, in  no acute distress                                Head: Atraumatic, normocephalic, PERRLA               Neck:   supple, minimal JVD   Lungs:     2L NC, dim bases    Heart:    Regular rhythm and normal rate, normal S1 and S2, 1/6 murmur   Abdomen:     Normal bowel sounds, no masses, no organomegaly, soft  non-tender, non-distended, no guarding, no rebound  tenderness   Extremities:   Moves all extremities well, no edema, no cyanosis, no  redness   Pulses:   Pulses palpable and equal bilaterally   Skin:   No bleeding, bruising or rash   Neurologic:   Awake, alert, oriented x3   Agree with exam as discussed with nurse practitioner after my face-to-face encounter with the patient  Unchanged from prior encounter  Electrolytes replaced    Lab Review:   Results from last 7 days   Lab Units 05/15/22  0227 05/14/22  0151 05/12/22 1857 05/12/22  0637   SODIUM mmol/L 139 141   < > 140   POTASSIUM mmol/L 4.8 3.2*   < > 3.5   CHLORIDE mmol/L 98 96*   < > 96*   CO2 mmol/L 26.0 30.0*   < > 30.0*   BUN mg/dL 63* 61*   < > 85*   CREATININE mg/dL 1.77* 1.44*   < > 1.80*   GLUCOSE mg/dL 118* 111*   < > 94   CALCIUM mg/dL 9.0 8.6   < > 8.8   AST (SGOT) U/L  --  81*  --  128*   ALT (SGPT) U/L  --  308*  --  521*    < > = values in this interval not displayed.         Results from last 7 days   Lab Units 05/16/22  0006 05/15/22  0227   WBC 10*3/mm3 10.10 12.40*   HEMOGLOBIN g/dL 14.8 15.2   HEMATOCRIT % 45.9 47.3   PLATELETS 10*3/mm3 214 228     Results from last 7 days   Lab Units 05/11/22  0429   INR  1.31*     Results from last 7 days   Lab Units 05/15/22  0227 05/12/22  1857   MAGNESIUM mg/dL 2.3 2.4*           Invalid input(s): LDLCALC  Results from last 7 days   Lab Units 05/10/22  0611   PROBNP pg/mL >70,000.0*           Recent Radiology:  Imaging Results (Most Recent)     Procedure Component Value Units Date/Time    XR Chest 1 View [[id number]] Collected: 05/09/22 1510     Updated: 05/09/22 1515    Narrative:      DATE OF  EXAM:  5/9/2022 3:02 PM     PROCEDURE:  XR CHEST 1 VW-     INDICATIONS:  Dyspnea; R06.00-Dyspnea, unspecified; I50.9-Heart failure, unspecified;  N17.9-Acute kidney failure, unspecified     COMPARISON:  CT chest high-resolution 05/07/2022, AP chest x-ray 05/06/2022.     TECHNIQUE:   Single radiographic AP view of the chest was obtained.     FINDINGS:  The patient's chin partially obscures evaluation of the upper chest.  Allowing for this limitation, no pneumothorax is seen. Cardiac  silhouette remains mildly enlarged. Changes are redemonstrated from  CABG. Pacemaker leads are stable. There are mildly increased right upper  lung airspace opacities. Blunting of the left lateral costophrenic angle  appears stable. Left lung appears grossly clear.        Impression:      1.Mildly increased right upper lung airspace opacities, which may be due  to atelectasis and/or pneumonia.  2.Small left pleural effusion.     Electronically Signed By-Hoa Bonilla MD On:5/9/2022 3:13 PM  This report was finalized on [id number] by  Hoa Bonilla MD.    US Liver [[id number]] Collected: 05/09/22 0920     Updated: 05/09/22 0924    Narrative:      DATE OF EXAM:  5/9/2022 8:54 AM     PROCEDURE:  US LIVER-     INDICATIONS:  Hepatic transaminase derangement; R06.00-Dyspnea, unspecified;  I50.9-Heart failure, unspecified; N17.9-Acute kidney failure,  unspecified     COMPARISON:  No Comparisons Available     TECHNIQUE:   Grayscale and color Doppler ultrasound evaluation of the limited abdomen  was performed.     FINDINGS:  The liver size is normal, 12.8 cm in length. The liver maintains normal  homogeneous echotexture without focal or suspicious abnormality. There  is no ascites. The portal vein is patent with hepatopedal flow. Imaged  hepatic veins are patent. The common duct measures 2 mm. No intrahepatic  biliary dilation is seen. Incidental note is made of a 1.8 cm shadowing  gallstone, and gallbladder adenomyomatosis. The gallbladder  wall does  not appear grossly thickened, and no pericholecystic fluid is seen.        Impression:         1. Normal sonographic appearance of the liver.  2. Uncomplicated cholelithiasis.  3. Gallbladder adenomyomatosis.     Electronically Signed By-Shahrzad Biggs MD On:5/9/2022 9:22 AM  This report was finalized on [id number] by  Shahrzad Biggs MD.    CT Chest Hi Resolution Diagnostic [[id number]] Collected: 05/07/22 1653     Updated: 05/07/22 1701    Narrative:         DATE OF EXAM:   5/7/2022 4:28 PM     PROCEDURE:   CT CHEST HI RESOLUTION DIAGNOSTIC-     INDICATIONS:   Interstitial lung disease; R06.00-Dyspnea, unspecified; I50.9-Heart  failure, unspecified; N17.9-Acute kidney failure, unspecified     COMPARISON:  03/24/2022.     TECHNIQUE:   Routine transaxial slices were obtained through the chest without the  administration of intravenous contrast. The study was performed  utilizing our high resolution CT protocol which includes supine  inspiratory and expiratory imaging as well as prone inspiratory imaging.  Reconstructed coronal and sagittal images were also obtained. Automated  exposure control and iterative reconstruction methods were used.     FINDINGS:   Interval resolution of previous identified pulmonary edema. There is  nearly completely resolved small bilateral pleural effusions. There is  mild interlobular septal thickening, which may represent mild residual  interstitial edema or mild component of chronic disease. There is  scarred consolidation in the right lung base. Many of the small nodules  identified in the upper lobes of either resolved or decreased in size  compared with the prior study. There are persistent clusters of nodules  in both upper lungs, right greater than left with the largest occurring  in the right upper lobe on axial image 41 measuring up to 10 mm  diameter. There is decreased peribronchial thickening. No new lung  nodules.     There is severe aortic and aortic branch  vessel atherosclerosis. There  are severe native coronary artery calcifications status post CABG. There  is moderate cardiomegaly. No pericardial effusion. There is no  pathologic mediastinal lymphadenopathy following size criteria. There  are dense aortic annular and valvular calcifications.     There is a left-sided pacemaker/ICD in place. No acute findings below  the diaphragm. There is a stable low-attenuation nodule at the left  adrenal gland, likely adenoma. There is S-shaped scoliosis of the  thoracolumbar spine. There are moderate lower thoracic and upper lumbar  degenerative changes.        Impression:      IMPRESSION :      1. Near complete resolution of previous identified interstitial  pulmonary edema and near complete resolution of now small bilateral  pleural effusions.  2. There may be a small component of chronic subpleural interstitial  disease, but the majority of the findings on the prior study are favored  to reflect pulmonary edema.  3. Improved nodular disease in the upper lungs, right greater than left  with significant residual nodularity. Recommend 6 month follow-up chest  CT. The course of disease on imaging would suggest an infectious or  inflammatory process.  4. Cardiomegaly and coronary artery disease status post CABG and  pacemaker/ICD placement.  5. Scoliosis and spinal degenerative changes.     Electronically Signed By-Hudson Walker MD On:5/7/2022 4:59 PM  This report was finalized on [id number] by  Hudson Walker MD.    XR Chest 1 View [[id number]] Collected: 05/06/22 1631     Updated: 05/06/22 1634    Narrative:      DATE OF EXAM:  5/6/2022 4:26 PM     PROCEDURE:  XR CHEST 1 VW-     INDICATIONS:  Severe SOA; R06.00-Dyspnea, unspecified; I50.9-Heart failure,  unspecified; N17.9-Acute kidney failure, unspecified     COMPARISON:  Chest radiograph 05/03/2022     TECHNIQUE:   Single radiographic view of the chest was obtained.     FINDINGS:  Patient is rotated this limits evaluation  of the right lung. There is  cardiomegaly. Dual lead transvenous pacemaker device. Blunting the  costophrenic angles. Left lower lobe airspace opacity. Background  chronic interstitial lung disease.       Impression:      Since previous exam there is been development of probable small pleural  effusions. There is left lower lobe airspace opacity atelectasis versus  pneumonia.     Electronically Signed By-Marianna Flowers MD On:5/6/2022 4:32 PM  This report was finalized on [id number] by  Marianna Flowers MD.    XR Chest 1 View [[id number]] Collected: 05/03/22 1229     Updated: 05/03/22 1234    Narrative:      DATE OF EXAM:  5/3/2022 12:07 PM     PROCEDURE:  XR CHEST 1 VW-     INDICATIONS:  Shortness of breath. Cough for 4 months.     COMPARISON:  Chest radiographs 3/24/2022, 5/29/2021, and 5/27/2021. CT chest  3/24/2022     TECHNIQUE:   Single radiographic AP view of the chest was obtained.     FINDINGS:  Lordotic positioning. Overlying artifacts. Stable sternotomy wires,  postoperative changes from CABG, left chest wall cardiac pacer device.  Stable elevation of the right hemidiaphragm with improved aeration of  both lungs. Mild linear right infrahilar and left basilar segmental  atelectasis and/or scarring with mild interstitial thickening in the  lung bases. No focal lung consolidation. No pneumothorax. Stable  cardiomegaly, likely accentuated by technique. Calcified atherosclerotic  disease in the thoracic aorta. Chronic changes in both shoulders. No  acute osseous normality is identified.        Impression:      Stable cardiomegaly with improved aeration of both lungs. Multifocal  bibasilar subsegmental atelectasis and/or scarring with mild  interstitial thickening in the lung bases that could represent mild  pulmonary vascular congestion/edema, chronic lung disease, or less  likely atypical pneumonia.     Electronically Signed By-Lance Mendes MD On:5/3/2022 12:32 PM  This report was finalized on [id number] by   Lance Mendes MD.          ECHOCARDIOGRAM:    Results for orders placed during the hospital encounter of 05/03/22    Adult Transthoracic Echo Complete W/ Cont if Necessary Per Protocol    Interpretation Summary  · Left atrial volume is severely increased.  · Abnormal mitral valve structure consistent with dilated annulus.  · Moderate to severe tricuspid valve regurgitation is present.  · Estimated right ventricular systolic pressure from tricuspid regurgitation is markedly elevated (>55 mmHg).  · Moderate pulmonic valve regurgitation is present.  · The left ventricular cavity is moderately dilated.  · Estimated left ventricular EF was in agreement with the calculated left ventricular EF. Left ventricular ejection fraction appears to be 26 - 30%. Left ventricular systolic function is severely decreased.  · Left ventricular diastolic function is consistent with (grade II w/high LAP) pseudonormalization.  · The right ventricular cavity is moderately dilated.  · Severely reduced right ventricular systolic function noted.  · Severe mitral valve regurgitation is present with a centrally-directed jet noted.  · Moderate aortic valve regurgitation is present.  · Moderate aortic valve stenosis is present.    Severe LV systolic dysfunction EF of 25%, moderately dilated LV  Severe MR without stenosis  Moderate AR with moderate aortic valve stenosis  Severe TR without stenosis  PA systolic pressure severely elevated 60-65 with severe pulm hypertension  Grade 2-3 diastolic dysfunction  Right atrial pressure estimated greater than 20 with severely dilated IVC  No masses or effusion seen  RV is moderate to severely hypokinetic, moderately increased in size        I reviewed the patient's new clinical results.    EKG:      Assessment:       Dyspnea, unspecified type    Moderate malnutrition (HCC)    1. Shortness of breath / Acute on chronic systolic and diastolic CHF  - LVEF 35%, Dual-chamber pacemaker well-functioning-No ICD upgrade  at family discretion previously  -Lasix gently, nephrology on board appreciate recommendations  -Off Aldactone at this time  -Blood pressure will likely not tolerate ARB as well as kidney function    2. HARMAN / CKD  - creatinine varies, 2.1 today  - on oral lasix    3. HTN    4. Paroxysmal Atrial fibrillation  - not on a/c, CHADS VAsc at least 3, on ASA      5. SSS s/p PPM    6. Elevated LFTs  - GI following  - AST / /756    Plan:   Extensively described above    Allowing better heart rates as well as gentle dobutamine promoting diuresis has improved creatinine as well as LFTs with both LV and RV unloading from EDP standpoint and congestion  Lasix adjusted to Bumex 1 twice a day  Examined BMP today with renal function electrolytes, LFTs as well  Taper dobutamine over the next 24 to 48 hours  Avoid all beta-blockers at this point  Try to taper off midodrine which is contraindicated systolic heart failure  If has room would add low-dose hydralazine for afterload reduction with severe pulm hypertension and MR with severely reduced EF, will evaluate on candidacy by hemodynamic response        Continue present treatment  Replace electrolytes for potassium greater than 4 magnesium greater than 2    See how his course goes over the next 24 hours and adjust from there    Chris Romero MD, PhD    Chris Romero MD  05/16/22  10:03 EDT   Private Vehicle

## 2022-05-17 NOTE — PROGRESS NOTES
"PULMONARY CRITICAL CARE PROGRESS  NOTE      PATIENT IDENTIFICATION:  Name: Shon Syed  MRN: [id number]  :  1930     Age: 89 y.o.  Sex: male    DATE OF Note:  2022   Referring Physician: Kristen Crowell MD                  Subjective:    no new issue, no SOB, no chest pain, no nausea or vomiting, no change in bowel habit, no dysuria,  no new  skin rash or itching.      Objective:  tMax 24 hrs: Temp (24hrs), Av.8 °F (36.6 °C), Min:97.3 °F (36.3 °C), Max:98.2 °F (36.8 °C)      Vitals Ranges:   Temp:  [97.3 °F (36.3 °C)-98.2 °F (36.8 °C)] 98.1 °F (36.7 °C)  Heart Rate:  [81-94] 85  Resp:  [18-27] 27  BP: (111-140)/(46-60) 132/46    Intake and Output Last 3 Shifts:   I/O last 3 completed shifts:  In: 462 [P.O.:462]  Out: 950 [Urine:950]    Exam:  /46 (BP Location: Left arm, Patient Position: Lying)   Pulse 85   Temp 98.1 °F (36.7 °C) (Oral)   Resp 27   Ht 172.7 cm (68\")   Wt 57 kg (125 lb 10.6 oz)   SpO2 99%   BMI 19.11 kg/m²     General Appearance:     HEENT:  Normocephalic, without obvious abnormality. Conjunctivae/corneas clear.  Normal external ear canals. Nares normal, no drainage     Neck:  Supple, symmetrical, trachea midline. No JVD.  Lungs /Chest wall:   Bilateral basal rhonchi, respirations unlabored, symmetrical wall movement.     Heart:  Regular rate and rhythm, systolic murmur PMI left sternal border  Abdomen: Soft, nontender, no masses, no organomegaly.    Extremities: Trace edema, no clubbing or cyanosis        Medications:    Current Facility-Administered Medications:   •  aspirin EC tablet 81 mg, 81 mg, Oral, Daily, Kristen Crowell MD, 81 mg at 22 0935  •  budesonide-formoterol (SYMBICORT) 160-4.5 MCG/ACT inhaler 2 puff, 2 puff, Inhalation, BID - RT, Megan Soriano MD, 2 puff at 22 0705  •  bumetanide (BUMEX) tablet 1 mg, 1 mg, Oral, BID, Tam Gonzalez MD, 1 mg at 22 0935  •  digoxin (LANOXIN) tablet 125 mcg, 125 mcg, Oral, Q48H, Chris Romero " MD Elie, 125 mcg at 05/17/22 1154  •  DOBUTamine (DOBUTREX) 1 mg/mL infusion, 2.5 mcg/kg/min, Intravenous, Titrated, Chris Romero MD, Stopped at 05/17/22 0937  •  finasteride (PROSCAR) tablet 5 mg, 5 mg, Oral, Daily, Kristen Crowell MD, 5 mg at 05/17/22 0934  •  guaiFENesin (MUCINEX) 12 hr tablet 600 mg, 600 mg, Oral, Q12H, Leann Short APRN, 600 mg at 05/17/22 0935  •  heparin (porcine) 5000 UNIT/ML injection 5,000 Units, 5,000 Units, Subcutaneous, Q12H, Antony Lawson MD, 5,000 Units at 05/17/22 0934  •  ipratropium-albuterol (DUO-NEB) nebulizer solution 3 mL, 3 mL, Nebulization, Q4H PRN, Emily Wu MD, 3 mL at 05/06/22 1330  •  magnesium sulfate 2g/50 mL (PREMIX) infusion, 2 g, Intravenous, Once, Chris Romero MD, Held at 05/12/22 2000  •  midodrine (PROAMATINE) tablet 5 mg, 5 mg, Oral, BID AC, Chris Romero MD, 5 mg at 05/16/22 1645  •  nitroglycerin (NITROSTAT) SL tablet 0.4 mg, 0.4 mg, Sublingual, Q5 Min PRN, Kristen Crowell MD  •  ondansetron (ZOFRAN) injection 4 mg, 4 mg, Intravenous, Q6H PRN, Kristen Crowell MD  •  potassium chloride (K-DUR,KLOR-CON) CR tablet 40 mEq, 40 mEq, Oral, PRN, Chris Romero MD, 40 mEq at 05/12/22 1938  •  [COMPLETED] Insert peripheral IV, , , Once **AND** sodium chloride 0.9 % flush 10 mL, 10 mL, Intravenous, PRN, Kristen Crowell MD  •  sodium chloride 0.9 % flush 10 mL, 10 mL, Intravenous, Q12H, Chris Romero, MD, 10 mL at 05/17/22 0936  •  sodium chloride 0.9 % flush 10 mL, 10 mL, Intravenous, PRAMANDA, Chris Romero MD  •  sodium chloride 0.9 % flush 3 mL, 3 mL, Intravenous, Q12H, Kristen Crowell MD, 3 mL at 05/17/22 0936  •  sodium chloride 0.9 % flush 3-10 mL, 3-10 mL, Intravenous, PRN, Kristen Crowell MD    Data Review:  All labs (24hrs):   Recent Results (from the past 24 hour(s))   POC Glucose Once    Collection Time: 05/16/22  4:36 PM    Specimen: Blood   Result Value Ref Range    Glucose 110 (H) 70 - 105 mg/dL   POC  Glucose Once    Collection Time: 05/16/22  8:21 PM    Specimen: Blood   Result Value Ref Range    Glucose 149 (H) 70 - 105 mg/dL   Digoxin Level    Collection Time: 05/16/22 11:56 PM    Specimen: Blood   Result Value Ref Range    Digoxin 0.80 0.60 - 1.20 ng/mL   Renal Function Panel    Collection Time: 05/16/22 11:56 PM    Specimen: Blood   Result Value Ref Range    Glucose 129 (H) 65 - 99 mg/dL    BUN 63 (H) 8 - 23 mg/dL    Creatinine 1.61 (H) 0.76 - 1.27 mg/dL    Sodium 136 136 - 145 mmol/L    Potassium 3.9 3.5 - 5.2 mmol/L    Chloride 96 (L) 98 - 107 mmol/L    CO2 26.0 22.0 - 29.0 mmol/L    Calcium 8.7 8.2 - 9.6 mg/dL    Albumin 3.40 (L) 3.50 - 5.20 g/dL    Phosphorus 3.2 2.5 - 4.5 mg/dL    Anion Gap 14.0 5.0 - 15.0 mmol/L    BUN/Creatinine Ratio 39.1 (H) 7.0 - 25.0    eGFR 40.1 (L) >60.0 mL/min/1.73   CBC Auto Differential    Collection Time: 05/16/22 11:56 PM    Specimen: Blood   Result Value Ref Range    WBC 10.40 3.40 - 10.80 10*3/mm3    RBC 4.39 4.14 - 5.80 10*6/mm3    Hemoglobin 14.4 13.0 - 17.7 g/dL    Hematocrit 43.6 37.5 - 51.0 %    MCV 99.3 (H) 79.0 - 97.0 fL    MCH 32.9 26.6 - 33.0 pg    MCHC 33.1 31.5 - 35.7 g/dL    RDW 14.6 12.3 - 15.4 %    RDW-SD 50.8 37.0 - 54.0 fl    MPV 10.1 6.0 - 12.0 fL    Platelets 220 140 - 450 10*3/mm3    Neutrophil % 63.3 42.7 - 76.0 %    Lymphocyte % 18.7 (L) 19.6 - 45.3 %    Monocyte % 14.8 (H) 5.0 - 12.0 %    Eosinophil % 2.4 0.3 - 6.2 %    Basophil % 0.8 0.0 - 1.5 %    Neutrophils, Absolute 6.60 1.70 - 7.00 10*3/mm3    Lymphocytes, Absolute 2.00 0.70 - 3.10 10*3/mm3    Monocytes, Absolute 1.50 (H) 0.10 - 0.90 10*3/mm3    Eosinophils, Absolute 0.20 0.00 - 0.40 10*3/mm3    Basophils, Absolute 0.10 0.00 - 0.20 10*3/mm3    nRBC 0.1 0.0 - 0.2 /100 WBC   POC Glucose Once    Collection Time: 05/17/22  7:45 AM    Specimen: Blood   Result Value Ref Range    Glucose 105 70 - 105 mg/dL   POC Glucose Once    Collection Time: 05/17/22 11:43 AM    Specimen: Blood   Result Value Ref  Range    Glucose 147 (H) 70 - 105 mg/dL        Imaging:  Adult Transthoracic Echo Complete W/ Cont if Necessary Per Protocol  · Left atrial volume is severely increased.  · Abnormal mitral valve structure consistent with dilated annulus.  · Moderate to severe tricuspid valve regurgitation is present.  · Estimated right ventricular systolic pressure from tricuspid   regurgitation is markedly elevated (>55 mmHg).  · Moderate pulmonic valve regurgitation is present.  · The left ventricular cavity is moderately dilated.  · Estimated left ventricular EF was in agreement with the calculated left   ventricular EF. Left ventricular ejection fraction appears to be 26 - 30%.   Left ventricular systolic function is severely decreased.  · Left ventricular diastolic function is consistent with (grade II w/high   LAP) pseudonormalization.  · The right ventricular cavity is moderately dilated.  · Severely reduced right ventricular systolic function noted.  · Severe mitral valve regurgitation is present with a centrally-directed   jet noted.  · Moderate aortic valve regurgitation is present.  · Moderate aortic valve stenosis is present.     Severe LV systolic dysfunction EF of 25%, moderately dilated LV  Severe MR without stenosis  Moderate AR with moderate aortic valve stenosis  Severe TR without stenosis  PA systolic pressure severely elevated 60-65 with severe pulm hypertension  Grade 2-3 diastolic dysfunction  Right atrial pressure estimated greater than 20 with severely dilated IVC  No masses or effusion seen  RV is moderate to severely hypokinetic, moderately increased in size       ASSESSMENT:    Dyspnea, unspecified type  Presentation suggestive of Pulmonary edema  CHF EF 30%  Lung nodule  Secondary pulmonary hypertension  Coronary artery disease  A. fib  CKD     Moderate malnutrition (HCC)     PLAN:  In the work-up for lung nodule PET scan as outpatient  Encouraged to use I-S flutter valve    PT OT  Bronchodilator  Inhaled  corticosteroids  Electrolytes/ glycemic control  DVT and GI prophylaxis.    Total Critical care time in direct medical management (   ) minutes. This time specifically excludes time spent performing procedures.  Megan Soriano MD. D, ABSM.     5/17/2022  13:45 EDT

## 2022-05-17 NOTE — PLAN OF CARE
Problem: Fluid Volume Excess  Goal: Fluid Balance  Outcome: Ongoing, Progressing     Problem: Adult Inpatient Plan of Care  Goal: Plan of Care Review  Outcome: Ongoing, Progressing  Goal: Patient-Specific Goal (Individualized)  Outcome: Ongoing, Progressing  Goal: Absence of Hospital-Acquired Illness or Injury  Outcome: Ongoing, Progressing  Intervention: Identify and Manage Fall Risk  Recent Flowsheet Documentation  Taken 5/17/2022 1001 by Melissa Bond RN  Safety Promotion/Fall Prevention:   safety round/check completed   activity supervised   clutter free environment maintained   assistive device/personal items within reach   fall prevention program maintained   gait belt   nonskid shoes/slippers when out of bed  Taken 5/17/2022 0800 by Melissa Bond RN  Safety Promotion/Fall Prevention:   safety round/check completed   activity supervised   assistive device/personal items within reach   clutter free environment maintained   fall prevention program maintained   gait belt   nonskid shoes/slippers when out of bed  Taken 5/17/2022 0705 by Melissa Bond RN  Safety Promotion/Fall Prevention:   safety round/check completed   activity supervised   assistive device/personal items within reach   clutter free environment maintained   fall prevention program maintained   gait belt   nonskid shoes/slippers when out of bed  Intervention: Prevent and Manage VTE (Venous Thromboembolism) Risk  Recent Flowsheet Documentation  Taken 5/17/2022 1001 by Melissa Bond RN  Activity Management: up in chair  Taken 5/17/2022 0800 by Melissa Bond RN  Activity Management:   activity adjusted per tolerance   activity encouraged  VTE Prevention/Management: patient refused intervention  Intervention: Prevent Infection  Recent Flowsheet Documentation  Taken 5/17/2022 1001 by Melissa Bond RN  Infection Prevention:   hand hygiene promoted   rest/sleep promoted  Taken 5/17/2022 0800 by Melissa Bond RN  Infection Prevention:    hand hygiene promoted   personal protective equipment utilized   rest/sleep promoted  Goal: Optimal Comfort and Wellbeing  Outcome: Ongoing, Progressing  Goal: Readiness for Transition of Care  Outcome: Ongoing, Progressing     Problem: Adjustment to Illness (Heart Failure)  Goal: Optimal Coping  Outcome: Ongoing, Progressing     Problem: Cardiac Output Decreased (Heart Failure)  Goal: Optimal Cardiac Output  Outcome: Ongoing, Progressing     Problem: Dysrhythmia (Heart Failure)  Goal: Stable Heart Rate and Rhythm  Outcome: Ongoing, Progressing   Goal Outcome Evaluation:  up in chair. Denies needs at this time.

## 2022-05-17 NOTE — PROGRESS NOTES
"NEPHROLOGY PROGRESS NOTE------[facility]    [facility]  [phone]  Tam Gonzalez MD      Patient Care Team:  Antony Lawson MD as PCP - Tam Monroe MD as Consulting Physician (Nephrology)  Minh Kendrick MD as Consulting Physician (Cardiology)  Chris Romero MD as Consulting Physician (Cardiology)      Provider:  Tam Gonzalez MD  Patient Name: Shon ZAYAS Kunal  :  1930    SUBJECTIVE:  F/U CKD  No chest pain, breathing better      Medication:  aspirin, 81 mg, Oral, Daily  budesonide-formoterol, 2 puff, Inhalation, BID - RT  bumetanide, 1 mg, Oral, BID  digoxin, 125 mcg, Oral, Q48H  finasteride, 5 mg, Oral, Daily  guaiFENesin, 600 mg, Oral, Q12H  heparin (porcine), 5,000 Units, Subcutaneous, Q12H  magnesium sulfate, 2 g, Intravenous, Once  midodrine, 5 mg, Oral, BID AC  sodium chloride, 10 mL, Intravenous, Q12H  sodium chloride, 3 mL, Intravenous, Q12H      DOBUTamine, 2.5 mcg/kg/min, Last Rate: 2.5 mcg/kg/min (220)        OBJECTIVE    Vital Sign Min/Max for last 24 hours  Temp  Min: 97.3 °F (36.3 °C)  Max: 98.2 °F (36.8 °C)   BP  Min: 111/55  Max: 140/47   Pulse  Min: 81  Max: 94   Resp  Min: 18  Max: 27   SpO2  Min: 88 %  Max: 100 %   No data recorded   Weight  Min: 57 kg (125 lb 10.6 oz)  Max: 57 kg (125 lb 10.6 oz)     Flowsheet Rows    Flowsheet Row First Filed Value   Admission Height 172.7 cm (68\") Documented at 2022 1114   Admission Weight 58.2 kg (128 lb 4.9 oz) Documented at 2022 1114          No intake/output data recorded.  I/O last 3 completed shifts:  In: 462 [P.O.:462]  Out: 950 [Urine:950]    Physical Exam:  General Appearance: alert, appears stated age and cooperative  Head: normocephalic, without obvious abnormality and atraumatic  Eyes: conjunctivae and sclerae normal and no icterus  Neck: supple and no JVD  Lungs: diminished breath sounds  Heart: regular rhythm & normal " rate and normal S1, S2  Chest: Wall no abnormalities observed  Abdomen: normal bowel sounds and soft non-tender  Extremities: moves extremities well, no edema, no cyanosis and no redness  Skin: no bleeding, bruising or rash, turgor normal, color normal and no lesions noted  Neurologic: Alert, and oriented. No focal deficits    Labs:    WBC WBC   Date Value Ref Range Status   05/16/2022 10.40 3.40 - 10.80 10*3/mm3 Final   05/16/2022 10.10 3.40 - 10.80 10*3/mm3 Final   05/15/2022 12.40 (H) 3.40 - 10.80 10*3/mm3 Final      HGB Hemoglobin   Date Value Ref Range Status   05/16/2022 14.4 13.0 - 17.7 g/dL Final   05/16/2022 14.8 13.0 - 17.7 g/dL Final   05/15/2022 15.2 13.0 - 17.7 g/dL Final      HCT Hematocrit   Date Value Ref Range Status   05/16/2022 43.6 37.5 - 51.0 % Final   05/16/2022 45.9 37.5 - 51.0 % Final   05/15/2022 47.3 37.5 - 51.0 % Final      Platlets No results found for: LABPLAT   MCV MCV   Date Value Ref Range Status   05/16/2022 99.3 (H) 79.0 - 97.0 fL Final   05/16/2022 102.2 (H) 79.0 - 97.0 fL Final   05/15/2022 100.5 (H) 79.0 - 97.0 fL Final          Sodium Sodium   Date Value Ref Range Status   05/16/2022 136 136 - 145 mmol/L Final   05/16/2022 137 136 - 145 mmol/L Final   05/15/2022 139 136 - 145 mmol/L Final      Potassium Potassium   Date Value Ref Range Status   05/16/2022 3.9 3.5 - 5.2 mmol/L Final     Comment:     Slight hemolysis detected by analyzer. Results may be affected.   05/16/2022 4.4 3.5 - 5.2 mmol/L Final   05/15/2022 4.8 3.5 - 5.2 mmol/L Final     Comment:     Slight hemolysis detected by analyzer. Results may be affected.      Chloride Chloride   Date Value Ref Range Status   05/16/2022 96 (L) 98 - 107 mmol/L Final   05/16/2022 96 (L) 98 - 107 mmol/L Final   05/15/2022 98 98 - 107 mmol/L Final      CO2 CO2   Date Value Ref Range Status   05/16/2022 26.0 22.0 - 29.0 mmol/L Final   05/16/2022 28.0 22.0 - 29.0 mmol/L Final   05/15/2022 26.0 22.0 - 29.0 mmol/L Final      BUN BUN   Date  Value Ref Range Status   05/16/2022 63 (H) 8 - 23 mg/dL Final   05/16/2022 61 (H) 8 - 23 mg/dL Final   05/15/2022 63 (H) 8 - 23 mg/dL Final      Creatinine Creatinine   Date Value Ref Range Status   05/16/2022 1.61 (H) 0.76 - 1.27 mg/dL Final   05/16/2022 1.62 (H) 0.76 - 1.27 mg/dL Final   05/15/2022 1.77 (H) 0.76 - 1.27 mg/dL Final      Calcium Calcium   Date Value Ref Range Status   05/16/2022 8.7 8.2 - 9.6 mg/dL Final   05/16/2022 9.5 8.2 - 9.6 mg/dL Final   05/15/2022 9.0 8.2 - 9.6 mg/dL Final      PO4 No components found for: PO4   Albumin Albumin   Date Value Ref Range Status   05/16/2022 3.40 (L) 3.50 - 5.20 g/dL Final      Magnesium Magnesium   Date Value Ref Range Status   05/15/2022 2.3 1.7 - 2.3 mg/dL Final      Uric Acid No components found for: URIC ACID     Imaging Results (Last 72 Hours)     ** No results found for the last 72 hours. **          Results for orders placed during the hospital encounter of 05/03/22    XR Chest 1 View    Narrative  DATE OF EXAM:  5/9/2022 3:02 PM    PROCEDURE:  XR CHEST 1 VW-    INDICATIONS:  Dyspnea; R06.00-Dyspnea, unspecified; I50.9-Heart failure, unspecified;  N17.9-Acute kidney failure, unspecified    COMPARISON:  CT chest high-resolution 05/07/2022, AP chest x-ray 05/06/2022.    TECHNIQUE:  Single radiographic AP view of the chest was obtained.    FINDINGS:  The patient's chin partially obscures evaluation of the upper chest.  Allowing for this limitation, no pneumothorax is seen. Cardiac  silhouette remains mildly enlarged. Changes are redemonstrated from  CABG. Pacemaker leads are stable. There are mildly increased right upper  lung airspace opacities. Blunting of the left lateral costophrenic angle  appears stable. Left lung appears grossly clear.    Impression  1.Mildly increased right upper lung airspace opacities, which may be due  to atelectasis and/or pneumonia.  2.Small left pleural effusion.    Electronically Signed By-Hoa Bonilla MD On:5/9/2022 3:13  PM  This report was finalized on [id number] by  Hoa Bonilla MD.      XR Chest 1 View    Narrative  DATE OF EXAM:  5/6/2022 4:26 PM    PROCEDURE:  XR CHEST 1 VW-    INDICATIONS:  Severe SOA; R06.00-Dyspnea, unspecified; I50.9-Heart failure,  unspecified; N17.9-Acute kidney failure, unspecified    COMPARISON:  Chest radiograph 05/03/2022    TECHNIQUE:  Single radiographic view of the chest was obtained.    FINDINGS:  Patient is rotated this limits evaluation of the right lung. There is  cardiomegaly. Dual lead transvenous pacemaker device. Blunting the  costophrenic angles. Left lower lobe airspace opacity. Background  chronic interstitial lung disease.    Impression  Since previous exam there is been development of probable small pleural  effusions. There is left lower lobe airspace opacity atelectasis versus  pneumonia.    Electronically Signed By-Marianna Flowers MD On:5/6/2022 4:32 PM  This report was finalized on [id number] by  Marianna Flowers MD.      XR Chest 1 View    Narrative  DATE OF EXAM:  5/3/2022 12:07 PM    PROCEDURE:  XR CHEST 1 VW-    INDICATIONS:  Shortness of breath. Cough for 4 months.    COMPARISON:  Chest radiographs 3/24/2022, 5/29/2021, and 5/27/2021. CT chest  3/24/2022    TECHNIQUE:  Single radiographic AP view of the chest was obtained.    FINDINGS:  Lordotic positioning. Overlying artifacts. Stable sternotomy wires,  postoperative changes from CABG, left chest wall cardiac pacer device.  Stable elevation of the right hemidiaphragm with improved aeration of  both lungs. Mild linear right infrahilar and left basilar segmental  atelectasis and/or scarring with mild interstitial thickening in the  lung bases. No focal lung consolidation. No pneumothorax. Stable  cardiomegaly, likely accentuated by technique. Calcified atherosclerotic  disease in the thoracic aorta. Chronic changes in both shoulders. No  acute osseous normality is identified.    Impression  Stable cardiomegaly with improved  aeration of both lungs. Multifocal  bibasilar subsegmental atelectasis and/or scarring with mild  interstitial thickening in the lung bases that could represent mild  pulmonary vascular congestion/edema, chronic lung disease, or less  likely atypical pneumonia.    Electronically Signed By-Lance Mendes MD On:5/3/2022 12:32 PM  This report was finalized on [id number] by  Lance Mendes MD.      Results for orders placed during the hospital encounter of 03/24/22    Duplex Carotid Ultrasound CAR    Interpretation Summary  · Proximal right internal carotid artery moderate stenosis.  · Proximal left internal carotid artery moderate stenosis.        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    · CHF- EF 25-30%. Severe TR, high RVSP. cautiously diurese.   · Hypertension  · Atrial fibrillation  · History coronary disease  · Diabetes  · Elevated LFTs--GI following. Suspect related to congestive hepatomegaly     Non oliguric.  CR stable around 1.6  Weaning midodrine. SBP in low 100s  Monitor renal function fluid status electrolytes      Tam Gonzalez MD  [facility]  [phone]  05/17/22  09:13 EDT

## 2022-05-17 NOTE — PROGRESS NOTES
[facility]        LOS:  LOS: 13 days   Patient Name: Shon ZAYAS Kunal  Age/Sex: 89 y.o. male  : 1930  MRN: [id number]    Day of Service: 22   Length of Stay: 13  Encounter Provider: CARMEN Martell  Place of Service: [facility] CARDIOLOGY  Patient Care Team:  Antony Lawson MD as PCP - General  CarlosTam rod MD as Consulting Physician (Nephrology)  Minh Kendrick MD as Consulting Physician (Cardiology)  Chris Romero MD as Consulting Physician (Cardiology)    Subjective:     Chief Complaint: f/u shortness of breath, dyspnea on exertion    Subjective: Creatinine stable overnight, titrated off dobutamine drip today  Continues on twice daily Bumex  Daily weights, strict I's and O's, sodium and fluid restriction discussed extensively  We will see how he does over the next 24 hours  Potentially home tomorrow    Patient will be controlled on digoxin alone  No beta-blockade will be restarted with cardiogenic shock and multiorgan dysfunction previously  Continue loop diuretics  As needed additional diuretic such as Aldactone may be helpful    Remains on 2 L nasal cannula        Current Medications:   Scheduled Meds:aspirin, 81 mg, Oral, Daily  budesonide-formoterol, 2 puff, Inhalation, BID - RT  bumetanide, 1 mg, Oral, BID  digoxin, 125 mcg, Oral, Q48H  finasteride, 5 mg, Oral, Daily  guaiFENesin, 600 mg, Oral, Q12H  heparin (porcine), 5,000 Units, Subcutaneous, Q12H  magnesium sulfate, 2 g, Intravenous, Once  midodrine, 5 mg, Oral, BID AC  sodium chloride, 10 mL, Intravenous, Q12H  sodium chloride, 3 mL, Intravenous, Q12H      Continuous Infusions:DOBUTamine, 2.5 mcg/kg/min, Last Rate: Stopped (22 0937)        Allergies:  Allergies   Allergen Reactions   • Penicillin G Rash   • Vancomycin Rash       Review of Systems   Constitutional: Negative for chills, diaphoresis and malaise/fatigue.   Cardiovascular: Positive for dyspnea on exertion.  Negative for chest pain, irregular heartbeat, leg swelling, near-syncope, orthopnea, palpitations, paroxysmal nocturnal dyspnea and syncope.   Respiratory: Negative for cough, shortness of breath, sleep disturbances due to breathing and sputum production.    Gastrointestinal: Negative for change in bowel habit.   Genitourinary: Negative for urgency.   Neurological: Negative for dizziness and headaches.   Psychiatric/Behavioral: Negative for altered mental status.         Objective:     Temp:  [97.3 °F (36.3 °C)-98.2 °F (36.8 °C)] 98.1 °F (36.7 °C)  Heart Rate:  [81-94] 85  Resp:  [18-27] 27  BP: (111-140)/(45-60) 132/46     Intake/Output Summary (Last 24 hours) at 5/17/2022 1038  Last data filed at 5/17/2022 0937  Gross per 24 hour   Intake 462 ml   Output 1300 ml   Net -838 ml     Body mass index is 19.11 kg/m².      05/13/22  0600 05/15/22  0500 05/17/22  0625   Weight: 57 kg (125 lb 10.6 oz) 57 kg (125 lb 10.6 oz) 57 kg (125 lb 10.6 oz)         General Appearance:    Alert, cooperative, in no acute distress, cachectic                                 Head: Atraumatic, normocephalic, PERRLA               Neck:   supple,  no JVD   Lungs:     Diminished bilateral bases    Heart:    Regular rhythm and normal rate, normal S1 and S2   Abdomen:     Normal bowel sounds, slightly distended   Extremities:   Moves all extremities well, no edema, no cyanosis, no  redness   Pulses:   Pulses palpable and equal bilaterally   Skin:   No bleeding, bruising or rash   Neurologic:   Awake, alert, oriented x3   Agree with exam as discussed with nurse practitioner after my face-to-face encounter scribed findings above      Lab Review:   Results from last 7 days   Lab Units 05/16/22  2356 05/16/22  1059 05/15/22  0227 05/14/22  0151 05/12/22  1857 05/12/22  0637   SODIUM mmol/L 136 137   < > 141   < > 140   POTASSIUM mmol/L 3.9 4.4   < > 3.2*   < > 3.5   CHLORIDE mmol/L 96* 96*   < > 96*   < > 96*   CO2 mmol/L 26.0 28.0   < > 30.0*   <  > 30.0*   BUN mg/dL 63* 61*   < > 61*   < > 85*   CREATININE mg/dL 1.61* 1.62*   < > 1.44*   < > 1.80*   GLUCOSE mg/dL 129* 162*   < > 111*   < > 94   CALCIUM mg/dL 8.7 9.5   < > 8.6   < > 8.8   AST (SGOT) U/L  --   --   --  81*  --  128*   ALT (SGPT) U/L  --   --   --  308*  --  521*    < > = values in this interval not displayed.         Results from last 7 days   Lab Units 05/16/22  2356 05/16/22  0006   WBC 10*3/mm3 10.40 10.10   HEMOGLOBIN g/dL 14.4 14.8   HEMATOCRIT % 43.6 45.9   PLATELETS 10*3/mm3 220 214     Results from last 7 days   Lab Units 05/11/22  0429   INR  1.31*     Results from last 7 days   Lab Units 05/15/22  0227 05/12/22  1857   MAGNESIUM mg/dL 2.3 2.4*           Invalid input(s): LDLCALC            Recent Radiology:  Imaging Results (Most Recent)     Procedure Component Value Units Date/Time    XR Chest 1 View [[id number]] Collected: 05/09/22 1510     Updated: 05/09/22 1515    Narrative:      DATE OF EXAM:  5/9/2022 3:02 PM     PROCEDURE:  XR CHEST 1 VW-     INDICATIONS:  Dyspnea; R06.00-Dyspnea, unspecified; I50.9-Heart failure, unspecified;  N17.9-Acute kidney failure, unspecified     COMPARISON:  CT chest high-resolution 05/07/2022, AP chest x-ray 05/06/2022.     TECHNIQUE:   Single radiographic AP view of the chest was obtained.     FINDINGS:  The patient's chin partially obscures evaluation of the upper chest.  Allowing for this limitation, no pneumothorax is seen. Cardiac  silhouette remains mildly enlarged. Changes are redemonstrated from  CABG. Pacemaker leads are stable. There are mildly increased right upper  lung airspace opacities. Blunting of the left lateral costophrenic angle  appears stable. Left lung appears grossly clear.        Impression:      1.Mildly increased right upper lung airspace opacities, which may be due  to atelectasis and/or pneumonia.  2.Small left pleural effusion.     Electronically Signed By-Hoa Bonilla MD On:5/9/2022 3:13 PM  This report was finalized  on [id number] by  Hoa Bonilla MD.    US Liver [[id number]] Collected: 05/09/22 0920     Updated: 05/09/22 0924    Narrative:      DATE OF EXAM:  5/9/2022 8:54 AM     PROCEDURE:  US LIVER-     INDICATIONS:  Hepatic transaminase derangement; R06.00-Dyspnea, unspecified;  I50.9-Heart failure, unspecified; N17.9-Acute kidney failure,  unspecified     COMPARISON:  No Comparisons Available     TECHNIQUE:   Grayscale and color Doppler ultrasound evaluation of the limited abdomen  was performed.     FINDINGS:  The liver size is normal, 12.8 cm in length. The liver maintains normal  homogeneous echotexture without focal or suspicious abnormality. There  is no ascites. The portal vein is patent with hepatopedal flow. Imaged  hepatic veins are patent. The common duct measures 2 mm. No intrahepatic  biliary dilation is seen. Incidental note is made of a 1.8 cm shadowing  gallstone, and gallbladder adenomyomatosis. The gallbladder wall does  not appear grossly thickened, and no pericholecystic fluid is seen.        Impression:         1. Normal sonographic appearance of the liver.  2. Uncomplicated cholelithiasis.  3. Gallbladder adenomyomatosis.     Electronically Signed By-Shahrzad Biggs MD On:5/9/2022 9:22 AM  This report was finalized on [id number] by  Shahrzad Biggs MD.    CT Chest Hi Resolution Diagnostic [[id number]] Collected: 05/07/22 1653     Updated: 05/07/22 1701    Narrative:         DATE OF EXAM:   5/7/2022 4:28 PM     PROCEDURE:   CT CHEST HI RESOLUTION DIAGNOSTIC-     INDICATIONS:   Interstitial lung disease; R06.00-Dyspnea, unspecified; I50.9-Heart  failure, unspecified; N17.9-Acute kidney failure, unspecified     COMPARISON:  03/24/2022.     TECHNIQUE:   Routine transaxial slices were obtained through the chest without the  administration of intravenous contrast. The study was performed  utilizing our high resolution CT protocol which includes supine  inspiratory and expiratory imaging as well as prone  inspiratory imaging.  Reconstructed coronal and sagittal images were also obtained. Automated  exposure control and iterative reconstruction methods were used.     FINDINGS:   Interval resolution of previous identified pulmonary edema. There is  nearly completely resolved small bilateral pleural effusions. There is  mild interlobular septal thickening, which may represent mild residual  interstitial edema or mild component of chronic disease. There is  scarred consolidation in the right lung base. Many of the small nodules  identified in the upper lobes of either resolved or decreased in size  compared with the prior study. There are persistent clusters of nodules  in both upper lungs, right greater than left with the largest occurring  in the right upper lobe on axial image 41 measuring up to 10 mm  diameter. There is decreased peribronchial thickening. No new lung  nodules.     There is severe aortic and aortic branch vessel atherosclerosis. There  are severe native coronary artery calcifications status post CABG. There  is moderate cardiomegaly. No pericardial effusion. There is no  pathologic mediastinal lymphadenopathy following size criteria. There  are dense aortic annular and valvular calcifications.     There is a left-sided pacemaker/ICD in place. No acute findings below  the diaphragm. There is a stable low-attenuation nodule at the left  adrenal gland, likely adenoma. There is S-shaped scoliosis of the  thoracolumbar spine. There are moderate lower thoracic and upper lumbar  degenerative changes.        Impression:      IMPRESSION :      1. Near complete resolution of previous identified interstitial  pulmonary edema and near complete resolution of now small bilateral  pleural effusions.  2. There may be a small component of chronic subpleural interstitial  disease, but the majority of the findings on the prior study are favored  to reflect pulmonary edema.  3. Improved nodular disease in the upper  lungs, right greater than left  with significant residual nodularity. Recommend 6 month follow-up chest  CT. The course of disease on imaging would suggest an infectious or  inflammatory process.  4. Cardiomegaly and coronary artery disease status post CABG and  pacemaker/ICD placement.  5. Scoliosis and spinal degenerative changes.     Electronically Signed By-Hudson Walker MD On:5/7/2022 4:59 PM  This report was finalized on [id number] by  Hudson Walker MD.    XR Chest 1 View [[id number]] Collected: 05/06/22 1631     Updated: 05/06/22 1634    Narrative:      DATE OF EXAM:  5/6/2022 4:26 PM     PROCEDURE:  XR CHEST 1 VW-     INDICATIONS:  Severe SOA; R06.00-Dyspnea, unspecified; I50.9-Heart failure,  unspecified; N17.9-Acute kidney failure, unspecified     COMPARISON:  Chest radiograph 05/03/2022     TECHNIQUE:   Single radiographic view of the chest was obtained.     FINDINGS:  Patient is rotated this limits evaluation of the right lung. There is  cardiomegaly. Dual lead transvenous pacemaker device. Blunting the  costophrenic angles. Left lower lobe airspace opacity. Background  chronic interstitial lung disease.       Impression:      Since previous exam there is been development of probable small pleural  effusions. There is left lower lobe airspace opacity atelectasis versus  pneumonia.     Electronically Signed By-Marianna Flowers MD On:5/6/2022 4:32 PM  This report was finalized on [id number] by  Marianna Flowers MD.    XR Chest 1 View [[id number]] Collected: 05/03/22 1229     Updated: 05/03/22 1234    Narrative:      DATE OF EXAM:  5/3/2022 12:07 PM     PROCEDURE:  XR CHEST 1 VW-     INDICATIONS:  Shortness of breath. Cough for 4 months.     COMPARISON:  Chest radiographs 3/24/2022, 5/29/2021, and 5/27/2021. CT chest  3/24/2022     TECHNIQUE:   Single radiographic AP view of the chest was obtained.     FINDINGS:  Lordotic positioning. Overlying artifacts. Stable sternotomy wires,  postoperative changes from  CABG, left chest wall cardiac pacer device.  Stable elevation of the right hemidiaphragm with improved aeration of  both lungs. Mild linear right infrahilar and left basilar segmental  atelectasis and/or scarring with mild interstitial thickening in the  lung bases. No focal lung consolidation. No pneumothorax. Stable  cardiomegaly, likely accentuated by technique. Calcified atherosclerotic  disease in the thoracic aorta. Chronic changes in both shoulders. No  acute osseous normality is identified.        Impression:      Stable cardiomegaly with improved aeration of both lungs. Multifocal  bibasilar subsegmental atelectasis and/or scarring with mild  interstitial thickening in the lung bases that could represent mild  pulmonary vascular congestion/edema, chronic lung disease, or less  likely atypical pneumonia.     Electronically Signed By-Lance Mendes MD On:5/3/2022 12:32 PM  This report was finalized on [id number] by  Lance Mendes MD.          ECHOCARDIOGRAM:    Results for orders placed during the hospital encounter of 05/03/22    Adult Transthoracic Echo Complete W/ Cont if Necessary Per Protocol    Interpretation Summary  · Left atrial volume is severely increased.  · Abnormal mitral valve structure consistent with dilated annulus.  · Moderate to severe tricuspid valve regurgitation is present.  · Estimated right ventricular systolic pressure from tricuspid regurgitation is markedly elevated (>55 mmHg).  · Moderate pulmonic valve regurgitation is present.  · The left ventricular cavity is moderately dilated.  · Estimated left ventricular EF was in agreement with the calculated left ventricular EF. Left ventricular ejection fraction appears to be 26 - 30%. Left ventricular systolic function is severely decreased.  · Left ventricular diastolic function is consistent with (grade II w/high LAP) pseudonormalization.  · The right ventricular cavity is moderately dilated.  · Severely reduced right ventricular  systolic function noted.  · Severe mitral valve regurgitation is present with a centrally-directed jet noted.  · Moderate aortic valve regurgitation is present.  · Moderate aortic valve stenosis is present.    Severe LV systolic dysfunction EF of 25%, moderately dilated LV  Severe MR without stenosis  Moderate AR with moderate aortic valve stenosis  Severe TR without stenosis  PA systolic pressure severely elevated 60-65 with severe pulm hypertension  Grade 2-3 diastolic dysfunction  Right atrial pressure estimated greater than 20 with severely dilated IVC  No masses or effusion seen  RV is moderate to severely hypokinetic, moderately increased in size        I reviewed the patient's new clinical results.    EKG:      Assessment:       Dyspnea, unspecified type    Moderate malnutrition (HCC)    1. Shortness of breath / Acute on chronic systolic and diastolic CHF   - LVEF 35%, Dual-chamber pacemaker well-functioning-No ICD upgrade at family discretion previously  -diuresis with Bumex 1mg BID  - GDMT: no BB, ACEI /ARB secondary to hypotension, back up pacer rate increased to 75     2. HARMAN / CKD  - creatinine 1.6     3. Cardiogenic shock requiring dobutamine      4. Paroxysmal Atrial fibrillation  - not on a/c, CHADS VAsc at least 3, on ASA      5. SSS s/p PPM     6. Elevated LFTs  - GI following  - AST / /756    Plan:   Patient's volume status has improved.  Creatinine stabilized.  Will turn off dobutamine  Continue Bumex  Avoid beta-blockers today  Blood pressure too soft for ARB as well as renal dysfunction  If has hypertension would use hydralazine nitrates  Aldactone could be utilized potentially if renal function remains stable  Digoxin for A. fib control  Increased pacing rate to backup of 75, heart rate of 60 is not adequate for his cardiac output and stroke-volume    Gentle midodrine will be continued, will titrate this off over time if blood pressure can remain greater than 110 systolic    On ASA, not  on anticoagulation secondary age, bleed risk    Patient can likely discharge tomorrow if creatinine and vital signs remain stable off dobutamine      Chris Romero MD, PhD  Justina Riojas, APRN  05/17/22  10:38 EDT

## 2022-05-17 NOTE — PLAN OF CARE
Problem: Fluid Volume Excess  Goal: Fluid Balance  Outcome: Ongoing, Progressing  Intervention: Monitor and Manage Hypervolemia  Description: Assess fluid requirements to determine goal-directed fluid therapy.  Keep accurate intake, output and daily weight; monitor trends.  Monitor laboratory value trends and need for treatment adjustment.  Evaluate causes and potential sources that may lead to imbalance, such as illness severity, organ failure, mental status, mobility limitations, swallowing difficulties, intravenous fluid and medication side effects.  Anticipate need for diuretic agent; monitor effects if administered (e.g., blood pressure change, dysrhythmia, electrolyte alteration).  Evaluate need for fluid restriction; monitor response.  Assess need for ongoing intravenous fluid therapy; encourage oral intake when able.  Monitor and maintain skin integrity; avoid constrictive devices and clothing if edema is present.  Maintain optimal position to relieve discomfort, breathlessness and ventilation-perfusion mismatch.  Recent Flowsheet Documentation  Taken 5/17/2022 0200 by Faith Gomez, RN  Skin Protection: adhesive use limited  Taken 5/16/2022 2300 by Faith Gomez, RN  Skin Protection: adhesive use limited     Problem: Adult Inpatient Plan of Care  Goal: Plan of Care Review  Outcome: Ongoing, Progressing  Flowsheets  Taken 5/17/2022 0420  Plan of Care Reviewed With: patient  Taken 5/16/2022 0328  Progress: no change  Goal: Patient-Specific Goal (Individualized)  Outcome: Ongoing, Progressing  Goal: Absence of Hospital-Acquired Illness or Injury  Outcome: Ongoing, Progressing  Intervention: Identify and Manage Fall Risk  Description: Perform standard risk assessment on admission using a validated tool or comprehensive approach appropriate to the patient; reassess fall risk frequently, with change in status or transfer to another level of care.  Communicate fall injury risk to interprofessional  healthcare team.  Determine need for increased observation, equipment and environmental modification, such as low bed, signage and supportive, nonskid footwear.  Adjust safety measures to individual developmental age, stage and identified risk factors.  Reinforce the importance of safety and physical activity with patient and family.  Perform regular intentional rounding to assess need for position change, pain assessment and personal needs, including assistance with toileting.  Recent Flowsheet Documentation  Taken 5/17/2022 0200 by Faith Gomez RN  Safety Promotion/Fall Prevention: assistive device/personal items within reach  Intervention: Prevent Skin Injury  Description: Perform a screening for skin injury risk, such as pressure or moisture associated skin damage on admission and at regular intervals throughout hospital stay.  Keep all areas of skin (especially folds) clean and dry.  Maintain adequate skin hydration.  Relieve and redistribute pressure and protect bony prominences; implement measures based on patient-specific risk factors.  Match turning and repositioning schedule to clinical condition.  Encourage weight shift frequently; assist with reposition if unable to complete independently.  Float heels off bed; avoid pressure on the Achilles tendon.  Keep skin free from extended contact with medical devices.  Encourage functional activity and mobility, as early as tolerated.  Use aids (e.g., slide boards, mechanical lift) during transfer.  Recent Flowsheet Documentation  Taken 5/17/2022 0200 by Faith Gomez, RN  Skin Protection: adhesive use limited  Taken 5/16/2022 2300 by Faith Gomez, RN  Body Position: weight shifting  Skin Protection: adhesive use limited  Intervention: Prevent and Manage VTE (Venous Thromboembolism) Risk  Description: Assess for VTE (venous thromboembolism) risk.  Encourage and assist with early ambulation.  Initiate and maintain compression or other therapy, as  indicated, based on identified risk in accordance with organizational protocol and provider order.  Encourage both active and passive leg exercises while in bed, if unable to ambulate.  Recent Flowsheet Documentation  Taken 5/17/2022 0200 by Faith Gomez RN  Activity Management: up in chair  VTE Prevention/Management: patient refused intervention  Taken 5/16/2022 2300 by Faith Gomez RN  Activity Management: up in chair  VTE Prevention/Management: patient refused intervention  Intervention: Prevent Infection  Description: Maintain skin and mucous membrane integrity; promote hand, oral and pulmonary hygiene.  Optimize fluid balance, nutrition, sleep and glycemic control to maximize infection resistance.  Identify potential sources of infection early to prevent or mitigate progression of infection (e.g., wound, lines, devices).  Evaluate ongoing need for invasive devices; remove promptly when no longer indicated.  Recent Flowsheet Documentation  Taken 5/17/2022 0200 by Faith Gomez RN  Infection Prevention:   environmental surveillance performed   single patient room provided   rest/sleep promoted   hand hygiene promoted  Goal: Optimal Comfort and Wellbeing  Outcome: Ongoing, Progressing  Intervention: Monitor Pain and Promote Comfort  Description: Assess pain level, treatment efficacy and patient response at regular intervals using a consistent pain scale.  Consider the presence and impact of preexisting chronic pain.  Encourage patient and caregiver involvement in pain assessment, interventions and safety measures.  Recent Flowsheet Documentation  Taken 5/17/2022 0200 by Faith Gomez RN  Pain Management Interventions: see MAR  Taken 5/16/2022 2300 by Faith Gomez RN  Pain Management Interventions:   see MAR   quiet environment facilitated   care clustered  Intervention: Provide Person-Centered Care  Description: Use a family-focused approach to care.  Develop trust and rapport by proactively  providing information, encouraging questions, addressing concerns and offering reassurance.  Acknowledge emotional response to hospitalization.  Recognize and utilize personal coping strategies.  Honor spiritual and cultural preferences.  Recent Flowsheet Documentation  Taken 5/16/2022 2300 by Faith Gomez RN  Trust Relationship/Rapport: choices provided  Goal: Readiness for Transition of Care  Outcome: Ongoing, Progressing     Problem: Adjustment to Illness (Heart Failure)  Goal: Optimal Coping  Outcome: Ongoing, Progressing     Problem: Cardiac Output Decreased (Heart Failure)  Goal: Optimal Cardiac Output  Outcome: Ongoing, Progressing     Problem: Dysrhythmia (Heart Failure)  Goal: Stable Heart Rate and Rhythm  Outcome: Ongoing, Progressing     Problem: Fluid Imbalance (Heart Failure)  Goal: Fluid Balance  Outcome: Ongoing, Progressing     Problem: Functional Ability Impaired (Heart Failure)  Goal: Optimal Functional Ability  Outcome: Ongoing, Progressing  Intervention: Optimize Functional Ability  Description: Assess functional ability and cognition; consider social history, including living environment, roles and social engagement, to identify risk or presence of functional decline; routinely reassess during hospitalization to identify any change.  Facilitate physical activity and exercise to improve functional ability, cognition and quality of life, as well as minimize functional decline associated with inactivity; encourage optimal functional mobility.  Consider NMES (neuromuscular electrical stimulation) of the lower extremities for patients with limitations in ability to participate in active exercise.  Monitor physiologic response to activity or exercise and adjust accordingly; provide a warm-up and cool-down with activity.  Cluster, coordinate and organize care schedule per patient preference, priorities and tolerance.  Preplan and pace activity; balance activity with periods of rest; incorporate  energy-conservation techniques.  Maintain an accessible environment to facilitate safe activity; position for optimal comfort and activity tolerance (e.g., sitting for self-care).  Encourage self-care performance to promote maximum independence in daily activity; provide adaptive equipment and rest periods if needed.  Offer personal aids, such as glasses, hearing aids and dentures; encourage familiar home items and neurosensory stimulation activities to prevent isolation and sensory deprivation.  Recent Flowsheet Documentation  Taken 5/17/2022 0200 by Faith Gomez RN  Activity Management: up in chair  Taken 5/16/2022 2300 by Faith Gomez RN  Activity Management: up in chair  Self-Care Promotion: independence encouraged     Problem: Oral Intake Inadequate (Heart Failure)  Goal: Optimal Nutrition Intake  Outcome: Ongoing, Progressing  Intervention: Promote and Optimize Nutrition Intake  Description: Perform a nutritional assessment; include a nutrition-focused physical exam.  Determine calorie, protein, vitamin, mineral and fluid requirements.  Assess for micronutrient deficiencies (e.g., iron, thiamin, Vitamin D); supplement if depleted.  Assess need and provide assistance with meal set-up and feeding.  Adjust diet or meal schedule based on preferences and tolerance.  Minimize unnecessary dietary restrictions to increase oral intake; sodium restriction should be individualized to the patient and clinical status.  Offer oral supplemental foods or drinks to enhance calorie and protein intake.  Establish bowel elimination program to increase comfort and appetite.  Provide and encourage oral care to enhance desire to eat.  Consider nutrition support if oral intake remains inadequate.  Recent Flowsheet Documentation  Taken 5/17/2022 0200 by Faith Gomez RN  Oral Nutrition Promotion: calorie-dense foods provided  Taken 5/16/2022 2300 by Faith Gomez RN  Oral Nutrition Promotion: calorie-dense foods  provided     Problem: Respiratory Compromise (Heart Failure)  Goal: Effective Oxygenation and Ventilation  Outcome: Ongoing, Progressing  Intervention: Promote Airway Secretion Clearance  Description: Assess the effectiveness of pulmonary hygiene and ability to perform airway-clearance techniques.  Promote early mobility or ambulation; match activity to ability and tolerance.  Encourage deep breathing and lung expansion therapy to prevent atelectasis; adjust treatment to patient’s response.  Initiate cough-enhancement and airway-clearance techniques with instruction (e.g., active cycle breathing, positive expiratory pressure, suction).  Consider pharmacologic therapy that may improve mucus clearance, cough response and air flow.  Consider inspiratory muscle training to decrease the risk of pulmonary complications.  Recent Flowsheet Documentation  Taken 5/17/2022 0200 by Faith Gomez RN  Breathing Techniques/Airway Clearance: deep/controlled cough encouraged  Taken 5/16/2022 2300 by Faith Gomez RN  Breathing Techniques/Airway Clearance: deep/controlled cough encouraged  Intervention: Optimize Oxygenation and Ventilation  Description: Assess and monitor airway, breathing and circulation for effective oxygenation and ventilation; consider oxygenation and ventilation parameters and goal.  Anticipate noninvasive and invasive monitoring (e.g., pulse oximetry, end-tidal carbon dioxide, blood gases, cardiovascular).  Promote head of bed elevation with regular position changes to minimize ventilation/perfusion mismatch and breathlessness.  Provide oxygen therapy judiciously to avoid hyperoxemia; adjust to achieve oxygenation goal.  Monitor fluid balance closely to minimize the risk of fluid overload.  Consider noninvasive or invasive positive pressure ventilation to enhance oxygenation and ventilation, as well as reduce work of breathing.  Recent Flowsheet Documentation  Taken 5/17/2022 0200 by Faith Gomez  RN  Airway/Ventilation Management: airway patency maintained  Taken 5/16/2022 2300 by Faith Gomez RN  Airway/Ventilation Management: airway patency maintained     Problem: Sleep Disordered Breathing (Heart Failure)  Goal: Effective Breathing Pattern During Sleep  Outcome: Ongoing, Progressing     Problem: Malnutrition  Goal: Improved Nutritional Intake  Outcome: Ongoing, Progressing  Intervention: Promote and Optimize Oral Intake  Description: Assess need and provide assistance with meal set-up and feeding.  Adjust diet or meal schedule based on preferences and tolerance.  Minimize unnecessary dietary restrictions to increase oral intake.  Offer oral supplemental food or drinks to enhance calorie and protein intake.  Establish bowel elimination program to increase comfort and appetite.  Provide and encourage oral hygiene to enhance desire to eat.  Consider nutrition support if oral intake remains inadequate.  Recent Flowsheet Documentation  Taken 5/17/2022 0200 by Faith Gomez RN  Oral Nutrition Promotion: calorie-dense foods provided  Taken 5/16/2022 2300 by Faith Gomez RN  Oral Nutrition Promotion: calorie-dense foods provided     Problem: Fall Injury Risk  Goal: Absence of Fall and Fall-Related Injury  Outcome: Ongoing, Progressing  Intervention: Identify and Manage Contributors  Description: Develop a fall prevention plan with the patient and caregiver/family.  Provide reorientation, appropriate sensory stimulation and routines with changes in mental status to decrease risk of fall.  Promote use of personal vision and auditory aids.  Assess assistance level required for safe and effective self-care; provide support as needed, such as toileting, mobilization. For age 65 and older, implement timed toileting with assistance.  Encourage physical activity, such as performance of mobility and self-care at highest level of patient ability, multicomponent exercise program and provision of appropriate  assistive devices.  If fall occurs, assess the severity of injury; implement fall injury protocol. Determine the cause and revise fall injury prevention plan.  Regularly review medication contribution to fall risk; adjust medication administration times to minimize risk of falling.  Consider risk related to polypharmacy and age.  Balance adequate pain management with potential for oversedation.  Recent Flowsheet Documentation  Taken 5/17/2022 0200 by Faith Gomez RN  Medication Review/Management: medications reviewed  Taken 5/16/2022 2300 by Faith Gomez RN  Medication Review/Management: medications reviewed  Self-Care Promotion: independence encouraged  Intervention: Promote Injury-Free Environment  Description: Provide a safe, barrier-free environment that encourages independent activity.  Keep care area uncluttered and well-lighted.  Determine need for increased observation or monitoring.  Avoid use of devices that minimize mobility, such as restraints or indwelling urinary catheter.  Recent Flowsheet Documentation  Taken 5/17/2022 0200 by Faith Gomez RN  Safety Promotion/Fall Prevention: assistive device/personal items within reach     Problem: Skin Injury Risk Increased  Goal: Skin Health and Integrity  Outcome: Ongoing, Progressing  Intervention: Promote and Optimize Oral Intake  Description: Perform a nutrition assessment; include a nutrition-focused physical exam.  Determine calorie, protein, vitamin, mineral and fluid requirements.  Assess for micronutrient deficiencies; supplement if depleted.  Assess need and assist with meal set-up and feeding.  Adjust diet or meal schedule based on preferences and tolerance.  Offer oral supplemental food or drinks to enhance calorie and protein intake.  Establish bowel elimination program to increase comfort and appetite.  Minimize unnecessary dietary restrictions to increase oral intake.  Provide and encourage oral hygiene to enhance desire to  eat.  Consider enteral nutrition support if oral intake remains inadequate; provide parenteral nutrition if enteral is contraindicated.  Recent Flowsheet Documentation  Taken 5/17/2022 0200 by Faith Gomez RN  Oral Nutrition Promotion: calorie-dense foods provided  Taken 5/16/2022 2300 by Faith Gomez RN  Oral Nutrition Promotion: calorie-dense foods provided  Intervention: Optimize Skin Protection  Description: Perform a full pressure injury risk assessment, as indicated by screening, upon admission to care unit.  Reassess skin (injury risk, full inspection) frequently (e.g., scheduled interval, with change in condition) to provide optimal early detection and prevention.  Maintain adequate tissue perfusion (e.g., encourage fluid balance; avoid crossing legs, constrictive clothing or devices) to promote tissue oxygenation.  Maintain head of bed at lowest degree of elevation tolerated, considering medical condition and other restrictions.  Avoid positioning onto an area that remains reddened.  Minimize incontinence and moisture (e.g., toileting schedule; moisture-wicking pad, diaper or incontinence collection device; skin moisture barrier).  Cleanse skin promptly and gently when soiled utilizing a pH-balanced cleanser.  Relieve and redistribute pressure (e.g., scheduled position changes, weight shifts, use of support surface, medical device repositioning, protective dressing application, use of positioning device, microclimate control, use of pressure-injury-monitor  Encourage increased activity, such as sitting in a chair at the bedside or early mobilization, when able to tolerate.  Recent Flowsheet Documentation  Taken 5/17/2022 0200 by Faith Gomez RN  Pressure Reduction Techniques: frequent weight shift encouraged  Pressure Reduction Devices: pressure-redistributing mattress utilized  Skin Protection: adhesive use limited  Taken 5/16/2022 2300 by Faith Gomez RN  Pressure Reduction Techniques:  frequent weight shift encouraged  Head of Bed (HOB) Positioning: HOB at 30 degrees  Pressure Reduction Devices: pressure-redistributing mattress utilized  Skin Protection: adhesive use limited   Goal Outcome Evaluation:  Plan of Care Reviewed With: patient        Progress: no change

## 2022-05-17 NOTE — PROGRESS NOTES
LOS: 13 days   Patient Care Team:  Antony Lawson MD as PCP - General  Tam Gonzalez MD as Consulting Physician (Nephrology)  Minh Kendrick MD as Consulting Physician (Cardiology)  Chris Romero MD as Consulting Physician (Cardiology)    Subjective:  Much improved    Objective:   Afebrile      Review of Systems:   Review of Systems   Constitutional: Positive for activity change.   Respiratory: Positive for shortness of breath.    Neurological: Positive for weakness.           Vital Signs  Temp:  [97.3 °F (36.3 °C)-98.2 °F (36.8 °C)] 97.5 °F (36.4 °C)  Heart Rate:  [78-94] 84  Resp:  [18-27] 20  BP: (111-140)/(45-60) 115/50    Physical Exam:  Physical Exam  Vitals reviewed.   Constitutional:       Appearance: Normal appearance.   Cardiovascular:      Rate and Rhythm: Normal rate. Rhythm irregular.      Heart sounds: Normal heart sounds.   Pulmonary:      Breath sounds: Normal breath sounds.   Skin:     General: Skin is warm.   Neurological:      General: No focal deficit present.      Mental Status: He is alert and oriented to person, place, and time. Mental status is at baseline.      Deep Tendon Reflexes: Abnormal reflex:           Radiology:  US Liver    Result Date: 5/9/2022   1. Normal sonographic appearance of the liver. 2. Uncomplicated cholelithiasis. 3. Gallbladder adenomyomatosis.  Electronically Signed By-Shahrzad Biggs MD On:5/9/2022 9:22 AM This report was finalized on [id number] by  Shahrzad Biggs MD.    XR Chest 1 View    Result Date: 5/9/2022  1.Mildly increased right upper lung airspace opacities, which may be due to atelectasis and/or pneumonia. 2.Small left pleural effusion.  Electronically Signed By-Hoa Bonilla MD On:5/9/2022 3:13 PM This report was finalized on [id number] by  Hoa Bonilla MD.         Results Review:     I reviewed the patient's new clinical results.  I reviewed the patient's new imaging results and agree with the  interpretation.    Medication Review:   Scheduled Meds:aspirin, 81 mg, Oral, Daily  budesonide-formoterol, 2 puff, Inhalation, BID - RT  bumetanide, 1 mg, Oral, BID  digoxin, 125 mcg, Oral, Q48H  finasteride, 5 mg, Oral, Daily  guaiFENesin, 600 mg, Oral, Q12H  heparin (porcine), 5,000 Units, Subcutaneous, Q12H  magnesium sulfate, 2 g, Intravenous, Once  midodrine, 5 mg, Oral, BID AC  sodium chloride, 10 mL, Intravenous, Q12H  sodium chloride, 3 mL, Intravenous, Q12H      Continuous Infusions:DOBUTamine, 2.5 mcg/kg/min, Last Rate: 2.5 mcg/kg/min (05/16/22 2130)      PRN Meds:.ipratropium-albuterol  •  nitroglycerin  •  ondansetron  •  oxyCODONE  •  potassium chloride  •  [COMPLETED] Insert peripheral IV **AND** sodium chloride  •  sodium chloride  •  sodium chloride    Labs:    CBC    Results from last 7 days   Lab Units 05/16/22  2356 05/16/22  0006 05/15/22  0227 05/14/22  0151 05/13/22  0600 05/11/22  2348 05/11/22  0429   WBC 10*3/mm3 10.40 10.10 12.40* 7.70 8.50 10.10 9.10   HEMOGLOBIN g/dL 14.4 14.8 15.2 14.3 14.7 13.8 13.3   PLATELETS 10*3/mm3 220 214 228 194 192 179 175     BMP   Results from last 7 days   Lab Units 05/16/22  2356 05/16/22  1059 05/15/22  0227 05/14/22  0151 05/12/22  1857 05/12/22  0637 05/11/22  0429   SODIUM mmol/L 136 137 139 141 141 140 137   POTASSIUM mmol/L 3.9 4.4 4.8 3.2* 4.0 3.5 4.1   CHLORIDE mmol/L 96* 96* 98 96* 95* 96* 97*   CO2 mmol/L 26.0 28.0 26.0 30.0* 31.0* 30.0* 24.0   BUN mg/dL 63* 61* 63* 61* 76* 85* 99*   CREATININE mg/dL 1.61* 1.62* 1.77* 1.44* 1.84* 1.80* 2.04*   GLUCOSE mg/dL 129* 162* 118* 111* 141* 94 114*   MAGNESIUM mg/dL  --   --  2.3  --  2.4* 2.4*  --    PHOSPHORUS mg/dL 3.2  --   --   --   --   --   --      Cr Clearance Estimated Creatinine Clearance: 24.1 mL/min (A) (by C-G formula based on SCr of 1.61 mg/dL (H)).  Coag   Results from last 7 days   Lab Units 05/11/22  0429   INR  1.31*     HbA1C No results found for: HGBA1C  Blood Glucose   Glucose    Date/Time Value Ref Range Status   05/17/2022 0745 105 70 - 105 mg/dL Final     Comment:     Serial Number: [id number]:  [id number]   05/16/2022 2021 149 (H) 70 - 105 mg/dL Final     Comment:     Serial Number: [id number]:  [id number]   05/16/2022 1636 110 (H) 70 - 105 mg/dL Final     Comment:     Serial Number: [id number]:  [id number]   05/16/2022 1115 151 (H) 70 - 105 mg/dL Final     Comment:     Serial Number: [id number]:  [id number]   05/16/2022 0740 92 70 - 105 mg/dL Final     Comment:     Serial Number: [id number]:  [id number]   05/15/2022 1635 79 70 - 105 mg/dL Final     Comment:     Serial Number: [id number]:  [id number]   05/15/2022 1154 131 (H) 70 - 105 mg/dL Final     Comment:     Serial Number: [id number]:  [id number]   05/15/2022 0745 116 (H) 70 - 105 mg/dL Final     Comment:     Serial Number: [id number]:  [id number]     Infection     CMP   Results from last 7 days   Lab Units 05/16/22  2356 05/16/22  1059 05/15/22  0227 05/14/22  0151 05/12/22  1857 05/12/22  0637 05/11/22  0429   SODIUM mmol/L 136 137 139 141 141 140 137   POTASSIUM mmol/L 3.9 4.4 4.8 3.2* 4.0 3.5 4.1   CHLORIDE mmol/L 96* 96* 98 96* 95* 96* 97*   CO2 mmol/L 26.0 28.0 26.0 30.0* 31.0* 30.0* 24.0   BUN mg/dL 63* 61* 63* 61* 76* 85* 99*   CREATININE mg/dL 1.61* 1.62* 1.77* 1.44* 1.84* 1.80* 2.04*   GLUCOSE mg/dL 129* 162* 118* 111* 141* 94 114*   ALBUMIN g/dL 3.40*  --   --  3.60  --  3.50 3.50   BILIRUBIN mg/dL  --   --   --  1.2  --  1.2 0.8   ALK PHOS U/L  --   --   --  223*  --  284* 309*   AST (SGOT) U/L  --   --   --  81*  --  128* 214*   ALT (SGPT) U/L  --   --   --  308*  --  521* 616*     UA      Radiology(recent) No radiology results for the last day   Assessment:    Moderate malnutrition  Acute hepatitis, congestive with possible iatrogenic component  Acute shortness of breath  Acute pulmonary edema  Acute metabolic acidosis  Acute hyperkalemia  Hepatic transaminase  derangement likely secondary to shock liver  History of sick sinus syndrome  Acute exacerbation of chronic systolic congestive heart failure  Acute renal failure superimposed upon chronic kidney disease stage IIIb  Acute kidney injury  Severe mitral regurgitation  Severe tricuspid regurgitation  Acute cardiopulmonary syndrome  Right upper lobe pulmonary nodule  History of pacemaker placement with replacement of generator 1/22  Atherosclerotic heart disease of native coronary arteries with angina pectoris  History of coronary artery bypass grafting in 1993  HFrEF 35%  Severe pulmonary hypertension  Chronic atrial fibrillation, persistent intermittent  Hypercoagulable state secondary to atrial fibrillation  Cerebrovascular disease with history of CVA  History of carotid occlusive disease  Vitamin D deficiency  Hypertension associated chronic kidney disease stage IIIb  Degenerative joint disease  Hypokalemia  Bilateral upper lobe pulmonary nodularities  Gastroesophageal reflux disease without esophagitis    Plan:  Taper dobutamine//supportive care        Antony Lawson MD  05/17/22  08:40 EDT

## 2022-05-18 PROBLEM — I50.23 ACUTE ON CHRONIC SYSTOLIC HEART FAILURE (HCC): Status: ACTIVE | Noted: 2022-01-01

## 2022-05-18 NOTE — PROGRESS NOTES
89-year-old  male presented with shortness of breath with evidence of an acute exacerbation of chronic systolic heart failure with acute pulmonary edema.  He was seen in consultation by cardiology and by pulmonology and gentle diuresis ensued.  He was found to have acute cardiopulmonary syndrome and did develop acute renal failure superimposed upon his chronic kidney disease stage IIIb.  Nephrology was asked to evaluate the patient and renal function did improve.  He was found to have severe pulmonary hypertension and valvular heart disease.  He did require dobutamine and did have improvement in his volume status with improvement in output and improvement in his volume status with addition of a furosemide drip.  Euvolemia was achieved and stabilization of his renal function was realized.  He was found to have atrial fibrillation with accelerations and he did respond to digoxin.  He was found to have some acute hepatitis with  Congestive hepatopathy and was evaluated by gastroenterology.  With supportive care and medication modification his hepatic function began to normalize.  He was deemed stable for discharge home today with medications per the discharge reconciliation which will include strict avoidance of beta-blockade secondary to his cardiogenic shock and multiorgan system dysfunction.  Loop diuresis was modified from furosemide to Bumex.  We will follow-up with him closely in the office within 1 week's time we will have him continue to monitor his weight on a daily basis will consider the use of remote patient monitoring to achieve this.  We will use as needed Aldactone for additional diuresis as necessary.    Discharge diet: Cardiac  Follow-up with us in the office within 1 week  Follow-up with cardiology within 2 weeks  Follow-up with nephrology within 1 month  Discharge prognosis: Guarded

## 2022-05-18 NOTE — DISCHARGE INSTR - APPOINTMENTS
Follow-up Appointment - Dr. Soriano   June 8th, 2022 - 1:50pm     [address] [place] [address],[place]      Please Bring:  Current list of Medications   And insurance info.             Follow-up Appointment- Dr. Gonzalez   June 1, 2022 -2:00pm       NEEDS A BMP BEFORE APPOINTMENT

## 2022-05-18 NOTE — PROGRESS NOTES
"NEPHROLOGY PROGRESS NOTE------[facility]    [facility]  [phone]  Tam Gonzalez MD      Patient Care Team:  Antony Lawson MD as PCP - Tam Monroe MD as Consulting Physician (Nephrology)  Minh Kendrick MD as Consulting Physician (Cardiology)  Chris Romero MD as Consulting Physician (Cardiology)      Provider:  Tam Gonzalez MD  Patient Name: Shon ZAYAS Kunal  :  1930    SUBJECTIVE:  F/U CKD  No chest pain, breathing better      Medication:  aspirin, 81 mg, Oral, Daily  budesonide-formoterol, 2 puff, Inhalation, BID - RT  bumetanide, 1 mg, Oral, BID  digoxin, 125 mcg, Oral, Q48H  finasteride, 5 mg, Oral, Daily  guaiFENesin, 600 mg, Oral, Q12H  heparin (porcine), 5,000 Units, Subcutaneous, Q12H  magnesium sulfate, 2 g, Intravenous, Once  midodrine, 5 mg, Oral, BID AC  sodium chloride, 10 mL, Intravenous, Q12H  sodium chloride, 3 mL, Intravenous, Q12H      DOBUTamine, 2.5 mcg/kg/min, Last Rate: Stopped (22 0937)        OBJECTIVE    Vital Sign Min/Max for last 24 hours  Temp  Min: 97.5 °F (36.4 °C)  Max: 98.6 °F (37 °C)   BP  Min: 104/33  Max: 132/54   Pulse  Min: 78  Max: 94   Resp  Min: 15  Max: 33   SpO2  Min: 90 %  Max: 100 %   No data recorded   Weight  Min: 57 kg (125 lb 10.6 oz)  Max: 57 kg (125 lb 10.6 oz)     Flowsheet Rows    Flowsheet Row First Filed Value   Admission Height 172.7 cm (68\") Documented at 2022 1114   Admission Weight 58.2 kg (128 lb 4.9 oz) Documented at 2022 1114          No intake/output data recorded.  I/O last 3 completed shifts:  In: 200 [P.O.:200]  Out: 1150 [Urine:1150]    Physical Exam:  General Appearance: alert, appears stated age and cooperative  Head: normocephalic, without obvious abnormality and atraumatic  Eyes: conjunctivae and sclerae normal and no icterus  Neck: supple and no JVD  Lungs: diminished breath sounds  Heart: regular rhythm & normal rate and " normal S1, S2  Chest: Wall no abnormalities observed  Abdomen: normal bowel sounds and soft non-tender  Extremities: moves extremities well, no edema, no cyanosis and no redness  Skin: no bleeding, bruising or rash, turgor normal, color normal and no lesions noted  Neurologic: Alert, and oriented. No focal deficits    Labs:    WBC WBC   Date Value Ref Range Status   05/18/2022 9.30 3.40 - 10.80 10*3/mm3 Final   05/16/2022 10.40 3.40 - 10.80 10*3/mm3 Final   05/16/2022 10.10 3.40 - 10.80 10*3/mm3 Final      HGB Hemoglobin   Date Value Ref Range Status   05/18/2022 13.4 13.0 - 17.7 g/dL Final   05/16/2022 14.4 13.0 - 17.7 g/dL Final   05/16/2022 14.8 13.0 - 17.7 g/dL Final      HCT Hematocrit   Date Value Ref Range Status   05/18/2022 41.2 37.5 - 51.0 % Final   05/16/2022 43.6 37.5 - 51.0 % Final   05/16/2022 45.9 37.5 - 51.0 % Final      Platlets No results found for: LABPLAT   MCV MCV   Date Value Ref Range Status   05/18/2022 99.7 (H) 79.0 - 97.0 fL Final   05/16/2022 99.3 (H) 79.0 - 97.0 fL Final   05/16/2022 102.2 (H) 79.0 - 97.0 fL Final          Sodium Sodium   Date Value Ref Range Status   05/18/2022 134 (L) 136 - 145 mmol/L Final   05/16/2022 136 136 - 145 mmol/L Final   05/16/2022 137 136 - 145 mmol/L Final      Potassium Potassium   Date Value Ref Range Status   05/18/2022 4.3 3.5 - 5.2 mmol/L Final     Comment:     Slight hemolysis detected by analyzer. Results may be affected.   05/16/2022 3.9 3.5 - 5.2 mmol/L Final     Comment:     Slight hemolysis detected by analyzer. Results may be affected.   05/16/2022 4.4 3.5 - 5.2 mmol/L Final      Chloride Chloride   Date Value Ref Range Status   05/18/2022 96 (L) 98 - 107 mmol/L Final   05/16/2022 96 (L) 98 - 107 mmol/L Final   05/16/2022 96 (L) 98 - 107 mmol/L Final      CO2 CO2   Date Value Ref Range Status   05/18/2022 24.0 22.0 - 29.0 mmol/L Final   05/16/2022 26.0 22.0 - 29.0 mmol/L Final   05/16/2022 28.0 22.0 - 29.0 mmol/L Final      BUN BUN   Date Value  Ref Range Status   05/18/2022 58 (H) 8 - 23 mg/dL Final   05/16/2022 63 (H) 8 - 23 mg/dL Final   05/16/2022 61 (H) 8 - 23 mg/dL Final      Creatinine Creatinine   Date Value Ref Range Status   05/18/2022 1.49 (H) 0.76 - 1.27 mg/dL Final   05/16/2022 1.61 (H) 0.76 - 1.27 mg/dL Final   05/16/2022 1.62 (H) 0.76 - 1.27 mg/dL Final      Calcium Calcium   Date Value Ref Range Status   05/18/2022 8.5 8.2 - 9.6 mg/dL Final   05/16/2022 8.7 8.2 - 9.6 mg/dL Final   05/16/2022 9.5 8.2 - 9.6 mg/dL Final      PO4 No components found for: PO4   Albumin Albumin   Date Value Ref Range Status   05/18/2022 3.40 (L) 3.50 - 5.20 g/dL Final   05/16/2022 3.40 (L) 3.50 - 5.20 g/dL Final      Magnesium No results found for: MG   Uric Acid No components found for: URIC ACID     Imaging Results (Last 72 Hours)     ** No results found for the last 72 hours. **          Results for orders placed during the hospital encounter of 05/03/22    XR Chest 1 View    Narrative  DATE OF EXAM:  5/9/2022 3:02 PM    PROCEDURE:  XR CHEST 1 VW-    INDICATIONS:  Dyspnea; R06.00-Dyspnea, unspecified; I50.9-Heart failure, unspecified;  N17.9-Acute kidney failure, unspecified    COMPARISON:  CT chest high-resolution 05/07/2022, AP chest x-ray 05/06/2022.    TECHNIQUE:  Single radiographic AP view of the chest was obtained.    FINDINGS:  The patient's chin partially obscures evaluation of the upper chest.  Allowing for this limitation, no pneumothorax is seen. Cardiac  silhouette remains mildly enlarged. Changes are redemonstrated from  CABG. Pacemaker leads are stable. There are mildly increased right upper  lung airspace opacities. Blunting of the left lateral costophrenic angle  appears stable. Left lung appears grossly clear.    Impression  1.Mildly increased right upper lung airspace opacities, which may be due  to atelectasis and/or pneumonia.  2.Small left pleural effusion.    Electronically Signed By-Hoa Bonilla MD On:5/9/2022 3:13 PM  This report was  finalized on [id number] by  Hoa Bonilla MD.      XR Chest 1 View    Narrative  DATE OF EXAM:  5/6/2022 4:26 PM    PROCEDURE:  XR CHEST 1 VW-    INDICATIONS:  Severe SOA; R06.00-Dyspnea, unspecified; I50.9-Heart failure,  unspecified; N17.9-Acute kidney failure, unspecified    COMPARISON:  Chest radiograph 05/03/2022    TECHNIQUE:  Single radiographic view of the chest was obtained.    FINDINGS:  Patient is rotated this limits evaluation of the right lung. There is  cardiomegaly. Dual lead transvenous pacemaker device. Blunting the  costophrenic angles. Left lower lobe airspace opacity. Background  chronic interstitial lung disease.    Impression  Since previous exam there is been development of probable small pleural  effusions. There is left lower lobe airspace opacity atelectasis versus  pneumonia.    Electronically Signed By-Marianna Flowers MD On:5/6/2022 4:32 PM  This report was finalized on [id number] by  Marianna Flowers MD.      XR Chest 1 View    Narrative  DATE OF EXAM:  5/3/2022 12:07 PM    PROCEDURE:  XR CHEST 1 VW-    INDICATIONS:  Shortness of breath. Cough for 4 months.    COMPARISON:  Chest radiographs 3/24/2022, 5/29/2021, and 5/27/2021. CT chest  3/24/2022    TECHNIQUE:  Single radiographic AP view of the chest was obtained.    FINDINGS:  Lordotic positioning. Overlying artifacts. Stable sternotomy wires,  postoperative changes from CABG, left chest wall cardiac pacer device.  Stable elevation of the right hemidiaphragm with improved aeration of  both lungs. Mild linear right infrahilar and left basilar segmental  atelectasis and/or scarring with mild interstitial thickening in the  lung bases. No focal lung consolidation. No pneumothorax. Stable  cardiomegaly, likely accentuated by technique. Calcified atherosclerotic  disease in the thoracic aorta. Chronic changes in both shoulders. No  acute osseous normality is identified.    Impression  Stable cardiomegaly with improved aeration of both  lungs. Multifocal  bibasilar subsegmental atelectasis and/or scarring with mild  interstitial thickening in the lung bases that could represent mild  pulmonary vascular congestion/edema, chronic lung disease, or less  likely atypical pneumonia.    Electronically Signed By-Lance Mendes MD On:5/3/2022 12:32 PM  This report was finalized on [id number] by  Lance Mendes MD.      Results for orders placed during the hospital encounter of 03/24/22    Duplex Carotid Ultrasound CAR    Interpretation Summary  · Proximal right internal carotid artery moderate stenosis.  · Proximal left internal carotid artery moderate stenosis.        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    · CHF- EF 25-30%. Severe TR, high RVSP. cautiously diurese.   · Hypertension  · Atrial fibrillation  · History coronary disease  · Diabetes  · Elevated LFTs--GI following. Suspect related to congestive hepatomegaly     Non oliguric.  CR stable around 1.4  Low dose midodrine for hypotension  Renalwise OK for d/c  Follow up in 4 weeks      Tam Gonzalez MD  [facility]  [phone]  05/18/22  09:06 EDT

## 2022-05-18 NOTE — PROGRESS NOTES
Exercise Oximetry    Patient Name:Shon ZAYAS Kunal   MRN: [id number]   Date: 05/18/22             ROOM AIR BASELINE   SpO2% 100% room air   Heart Rate    Blood Pressure      EXERCISE ON ROOM AIR SpO2% EXERCISE ON O2 @  LPM SpO2%   1 MINUTE 100 1 MINUTE    2 MINUTES 100 2 MINUTES    3 MINUTES 100 3 MINUTES    4 MINUTES 100 4 MINUTES    5 MINUTES 99 5 MINUTES    6 MINUTES 100 6 MINUTES               Distance Walked   Distance Walked   Dyspnea (Jose Scale)  Dyspnea (Jose Scale)   Fatigue (Jose Scale)   Fatigue (Jose Scale)   SpO2% Post Exercise  100% room air  SpO2% Post Exercise   HR Post Exercise   HR Post Exercise   Time to Recovery   Time to Recovery     Comments:

## 2022-05-18 NOTE — PROGRESS NOTES
"PULMONARY CRITICAL CARE PROGRESS  NOTE      PATIENT IDENTIFICATION:  Name: Shon Syed  MRN: [id number]  :  1930     Age: 89 y.o.  Sex: male    DATE OF Note:  2022   Referring Physician: Kristen Crowell MD                  Subjective:   In bed,  no new issue, no SOB, no chest or abd pain, no bowel or bladder issues     Objective:  tMax 24 hrs: Temp (24hrs), Av °F (36.7 °C), Min:97.5 °F (36.4 °C), Max:98.6 °F (37 °C)      Vitals Ranges:   Temp:  [97.5 °F (36.4 °C)-98.6 °F (37 °C)] 98.1 °F (36.7 °C)  Heart Rate:  [78-94] 87  Resp:  [15-33] 19  BP: (104-132)/(33-61) 120/53    Intake and Output Last 3 Shifts:   I/O last 3 completed shifts:  In: 200 [P.O.:200]  Out: 1150 [Urine:1150]    Exam:  /53 (BP Location: Left arm, Patient Position: Lying)   Pulse 87   Temp 98.1 °F (36.7 °C) (Oral)   Resp 19   Ht 172.7 cm (68\")   Wt 57 kg (125 lb 10.6 oz)   SpO2 92%   BMI 19.11 kg/m²     General Appearance:  Alert   HEENT:  Normocephalic, without obvious abnormality. Conjunctivae/corneas clear.  Normal external ear canals. Nares normal, no drainage     Neck:  Supple, symmetrical, trachea midline. No JVD.  Lungs /Chest wall:   Bilateral basal rhonchi, respirations unlabored, symmetrical wall movement.     Heart:  Regular rate and rhythm, systolic murmur PMI left sternal border  Abdomen: Soft, nontender, no masses, no organomegaly.    Extremities: Trace edema, no clubbing or cyanosis        Medications:    Current Facility-Administered Medications:   •  aspirin EC tablet 81 mg, 81 mg, Oral, Daily, Kristen Crowell MD, 81 mg at 22 0902  •  budesonide-formoterol (SYMBICORT) 160-4.5 MCG/ACT inhaler 2 puff, 2 puff, Inhalation, BID - RT, Megan Soriano MD, 2 puff at 22 0735  •  bumetanide (BUMEX) tablet 1 mg, 1 mg, Oral, BID, Tam Gonzalez MD, 1 mg at 22 0902  •  digoxin (LANOXIN) tablet 125 mcg, 125 mcg, Oral, Q48H, Chris Romero MD, 125 mcg at 22 1154  •  DOBUTamine " (DOBUTREX) 1 mg/mL infusion, 2.5 mcg/kg/min, Intravenous, Titrated, Chris Romero MD, Stopped at 05/17/22 0937  •  finasteride (PROSCAR) tablet 5 mg, 5 mg, Oral, Daily, Kristen Crowell MD, 5 mg at 05/18/22 0902  •  guaiFENesin (MUCINEX) 12 hr tablet 600 mg, 600 mg, Oral, Q12H, Leann Short APRN, 600 mg at 05/18/22 0902  •  heparin (porcine) 5000 UNIT/ML injection 5,000 Units, 5,000 Units, Subcutaneous, Q12H, Antony Lawson MD, 5,000 Units at 05/18/22 0903  •  ipratropium-albuterol (DUO-NEB) nebulizer solution 3 mL, 3 mL, Nebulization, Q4H PRN, Emily Wu MD, 3 mL at 05/06/22 1330  •  magnesium sulfate 2g/50 mL (PREMIX) infusion, 2 g, Intravenous, Once, Chris Romero MD, Held at 05/12/22 2000  •  midodrine (PROAMATINE) tablet 5 mg, 5 mg, Oral, BID AC, Chris Romero MD, 5 mg at 05/18/22 0902  •  nitroglycerin (NITROSTAT) SL tablet 0.4 mg, 0.4 mg, Sublingual, Q5 Min PRN, Kristen Crowell MD  •  ondansetron (ZOFRAN) injection 4 mg, 4 mg, Intravenous, Q6H PRN, Kristen Crowell MD  •  potassium chloride (K-DUR,KLOR-CON) CR tablet 40 mEq, 40 mEq, Oral, PRN, Chris Romero MD, 40 mEq at 05/12/22 1938  •  [COMPLETED] Insert peripheral IV, , , Once **AND** sodium chloride 0.9 % flush 10 mL, 10 mL, Intravenous, PRN, Kristen Crowell MD  •  sodium chloride 0.9 % flush 10 mL, 10 mL, Intravenous, Q12H, Chris Romero MD, 10 mL at 05/18/22 0903  •  sodium chloride 0.9 % flush 10 mL, 10 mL, Intravenous, PRN, Chris Romero MD  •  sodium chloride 0.9 % flush 3 mL, 3 mL, Intravenous, Q12H, Kristen Crowell MD, 3 mL at 05/18/22 0903  •  sodium chloride 0.9 % flush 3-10 mL, 3-10 mL, Intravenous, PRN, Kristen Crowell MD    Data Review:  All labs (24hrs):   Recent Results (from the past 24 hour(s))   POC Glucose Once    Collection Time: 05/17/22 11:43 AM    Specimen: Blood   Result Value Ref Range    Glucose 147 (H) 70 - 105 mg/dL   POC Glucose Once    Collection Time: 05/17/22  4:03  PM    Specimen: Blood   Result Value Ref Range    Glucose 146 (H) 70 - 105 mg/dL   Digoxin Level    Collection Time: 05/18/22  1:13 AM    Specimen: Blood   Result Value Ref Range    Digoxin 0.90 0.60 - 1.20 ng/mL   Renal Function Panel    Collection Time: 05/18/22  1:13 AM    Specimen: Blood   Result Value Ref Range    Glucose 120 (H) 65 - 99 mg/dL    BUN 58 (H) 8 - 23 mg/dL    Creatinine 1.49 (H) 0.76 - 1.27 mg/dL    Sodium 134 (L) 136 - 145 mmol/L    Potassium 4.3 3.5 - 5.2 mmol/L    Chloride 96 (L) 98 - 107 mmol/L    CO2 24.0 22.0 - 29.0 mmol/L    Calcium 8.5 8.2 - 9.6 mg/dL    Albumin 3.40 (L) 3.50 - 5.20 g/dL    Phosphorus 3.2 2.5 - 4.5 mg/dL    Anion Gap 14.0 5.0 - 15.0 mmol/L    BUN/Creatinine Ratio 38.9 (H) 7.0 - 25.0    eGFR 44.0 (L) >60.0 mL/min/1.73   CBC Auto Differential    Collection Time: 05/18/22  6:18 AM    Specimen: Blood   Result Value Ref Range    WBC 9.30 3.40 - 10.80 10*3/mm3    RBC 4.13 (L) 4.14 - 5.80 10*6/mm3    Hemoglobin 13.4 13.0 - 17.7 g/dL    Hematocrit 41.2 37.5 - 51.0 %    MCV 99.7 (H) 79.0 - 97.0 fL    MCH 32.4 26.6 - 33.0 pg    MCHC 32.5 31.5 - 35.7 g/dL    RDW 15.0 12.3 - 15.4 %    RDW-SD 52.1 37.0 - 54.0 fl    MPV 9.8 6.0 - 12.0 fL    Platelets 227 140 - 450 10*3/mm3    Neutrophil % 60.0 42.7 - 76.0 %    Lymphocyte % 20.9 19.6 - 45.3 %    Monocyte % 16.5 (H) 5.0 - 12.0 %    Eosinophil % 2.4 0.3 - 6.2 %    Basophil % 0.2 0.0 - 1.5 %    Neutrophils, Absolute 5.60 1.70 - 7.00 10*3/mm3    Lymphocytes, Absolute 1.90 0.70 - 3.10 10*3/mm3    Monocytes, Absolute 1.50 (H) 0.10 - 0.90 10*3/mm3    Eosinophils, Absolute 0.20 0.00 - 0.40 10*3/mm3    Basophils, Absolute 0.00 0.00 - 0.20 10*3/mm3    nRBC 0.1 0.0 - 0.2 /100 WBC   POC Glucose Once    Collection Time: 05/18/22  7:35 AM    Specimen: Blood   Result Value Ref Range    Glucose 96 70 - 105 mg/dL        Imaging:  Adult Transthoracic Echo Complete W/ Cont if Necessary Per Protocol  · Left atrial volume is severely increased.  · Abnormal  mitral valve structure consistent with dilated annulus.  · Moderate to severe tricuspid valve regurgitation is present.  · Estimated right ventricular systolic pressure from tricuspid   regurgitation is markedly elevated (>55 mmHg).  · Moderate pulmonic valve regurgitation is present.  · The left ventricular cavity is moderately dilated.  · Estimated left ventricular EF was in agreement with the calculated left   ventricular EF. Left ventricular ejection fraction appears to be 26 - 30%.   Left ventricular systolic function is severely decreased.  · Left ventricular diastolic function is consistent with (grade II w/high   LAP) pseudonormalization.  · The right ventricular cavity is moderately dilated.  · Severely reduced right ventricular systolic function noted.  · Severe mitral valve regurgitation is present with a centrally-directed   jet noted.  · Moderate aortic valve regurgitation is present.  · Moderate aortic valve stenosis is present.     Severe LV systolic dysfunction EF of 25%, moderately dilated LV  Severe MR without stenosis  Moderate AR with moderate aortic valve stenosis  Severe TR without stenosis  PA systolic pressure severely elevated 60-65 with severe pulm hypertension  Grade 2-3 diastolic dysfunction  Right atrial pressure estimated greater than 20 with severely dilated IVC  No masses or effusion seen  RV is moderate to severely hypokinetic, moderately increased in size       ASSESSMENT:    Dyspnea, unspecified type  Presentation suggestive of Pulmonary edema  CHF EF 30%  Lung nodule  Secondary pulmonary hypertension  Coronary artery disease  A. fib  CKD     Moderate malnutrition (HCC)     PLAN:  OOB daily and increase use of IS  In the work-up for lung nodule PET scan as outpatient  Encouraged to use I-S flutter valve  PT/OT  Bronchodilator  Inhaled corticosteroids  Electrolytes/ glycemic control  DVT and GI prophylaxis.    Discussed with Dr Christie Mcpherson, APRN    5/18/2022  09:19 EDT      I personally have examined  and interviewed the patient. I have reviewed the history, data, problems, assessment and plan with our NP.  Critical care time in direct medical management (   ) minutes  Electronically signed by Megan Soriano MD. DEL, ABSNATHAN.

## 2022-05-18 NOTE — NURSING NOTE
89-year-old male admitted to the hospital with dyspnea.  Consult received to assess patient sacral area.  Patient family member reports the patient has had some diarrhea over the last couple days and that patient had not been able to keep his skin clean.  Patient states that he does have some discomfort to his sacral area to the very low sacral almost perirectal area there is a middermal stage II pressure injury there is also excoriation to the perineal area and a red papular rash consistent with yeast is noted to the perineum.  Will recommend treating with a 2% miconazole powder as well as a zinc oxide based moisture barrier recommend pressure injury prevention strategies patient is able to tolerate position changes in bed and turn side to side.  Would recommend adding a waffle chair cushion

## 2022-05-18 NOTE — DISCHARGE SUMMARY
Date of Discharge:  5/18/2022    Discharge Diagnosis:     Moderate malnutrition  Acute hepatitis, congestive with possible iatrogenic component  Acute shortness of breath  Acute pulmonary edema  Acute metabolic acidosis  Acute hyperkalemia  Hepatic transaminase derangement likely secondary to shock liver  History of sick sinus syndrome  Acute exacerbation of chronic systolic congestive heart failure  Acute renal failure superimposed upon chronic kidney disease stage IIIb  Acute kidney injury  Severe mitral regurgitation  Severe tricuspid regurgitation  Acute cardiopulmonary syndrome  Right upper lobe pulmonary nodule  History of pacemaker placement with replacement of generator 1/22  Atherosclerotic heart disease of native coronary arteries with angina pectoris  History of coronary artery bypass grafting in 1993  HFrEF 35%  Severe pulmonary hypertension  Chronic atrial fibrillation, persistent intermittent  Hypercoagulable state secondary to atrial fibrillation  Cerebrovascular disease with history of CVA  History of carotid occlusive disease  Vitamin D deficiency  Hypertension associated chronic kidney disease stage IIIb  Degenerative joint disease  Hypokalemia  Bilateral upper lobe pulmonary nodularities  Gastroesophageal reflux disease without esophagitis    Presenting Problem/History of Present Illness  Active Hospital Problems    Diagnosis  POA   • Moderate malnutrition (HCC) [E44.0]  Yes   • Dyspnea, unspecified type [R06.00]  Yes      Resolved Hospital Problems   No resolved problems to display.        Hospital Course  Patient is a 89 y.o. male who presented with .      Procedures Performed         Consults:   Consults     Date and Time Order Name Status Description    5/9/2022  7:36 AM Inpatient Gastroenterology Consult Completed     5/6/2022  1:57 PM Inpatient Pulmonology Consult Completed     5/6/2022 11:25 AM Inpatient Palliative Care MD Consult Completed     5/4/2022  7:47 AM Inpatient Nephrology Consult  Completed     5/3/2022  6:24 PM Inpatient Cardiology Consult Completed     5/3/2022  1:02 PM Family Medicine Consult            Pertinent Test Results:No radiology results for the last 7 days    Imaging Results (Last 7 Days)     ** No results found for the last 168 hours. **              Condition on Discharge:  Fair    Vital Signs  Temp:  [97.5 °F (36.4 °C)-98.6 °F (37 °C)] 98.1 °F (36.7 °C)  Heart Rate:  [78-94] 87  Resp:  [15-33] 19  BP: (104-132)/(33-61) 120/53    Physical Exam:     General Appearance:    Alert, cooperative, in no acute distress   Head:    Normocephalic, without obvious abnormality, atraumatic   Eyes:           Conjunctivae and sclerae normal, no   icterus, no pallor, corneas clear, PERRLA   Throat:   No oral lesions, no thrush, oral mucosa moist   Neck:   No adenopathy, supple, trachea midline, no thyromegaly, no   carotid bruit, no JVD   Lungs:     Clear to auscultation,respirations regular, even and                  unlabored    Heart:    Regular rhythm and normal rate, normal S1 and S2, no            murmur, no gallop, no rub, no click   Chest Wall:    No abnormalities observed   Abdomen:     Normal bowel sounds, no masses, no organomegaly, soft        non-tender, non-distended, no guarding, no rebound                tenderness   Rectal:     Deferred   Extremities:   Moves all extremities well, no edema, no cyanosis, no             redness   Pulses:   Pulses palpable and equal bilaterally   Skin:   No bleeding, bruising or rash   Lymph nodes:   No palpable adenopathy   Neurologic:   Cranial nerves 2 - 12 grossly intact, sensation intact, DTR       present and equal bilaterally         Discharge Disposition      Discharge Medications     Discharge Medications      ASK your doctor about these medications      Instructions Start Date   allopurinol 100 MG tablet  Commonly known as: ZYLOPRIM   100 mg, Oral, Daily      ascorbic acid 1000 MG tablet  Commonly known as: VITAMIN C   1,000 mg, Oral,  Daily      aspirin 81 MG tablet   81 mg, Oral, Every Night at Bedtime      dutasteride 0.5 MG capsule  Commonly known as: AVODART   0.5 mg, Oral, Nightly      furosemide 40 MG tablet  Commonly known as: LASIX   40 mg, Oral, 2 Times Daily      metoprolol tartrate 25 MG tablet  Commonly known as: LOPRESSOR   25 mg, Oral, 2 Times Daily      multivitamin with minerals tablet tablet   1 tablet, Oral, Daily      pravastatin 40 MG tablet  Commonly known as: PRAVACHOL   40 mg, Oral, Weekly, Monday      Probiotic Daily capsule   1 capsule, Oral, Daily      spironolactone 25 MG tablet  Commonly known as: ALDACTONE   12.5 mg, Oral, Daily      Vitamin D3 50 MCG (2000 UT) capsule   2,000 Units, Oral, 3 Times Weekly, Monday, Wednesday and Friday             Discharge Diet:     Activity at Discharge:     Follow-up Appointments  Future Appointments   Date Time Provider Department Center   5/25/2022  1:30 PM Chris Romero MD [facility]   [facility]  [facility]     Additional Instructions for the Follow-ups that You Need to Schedule     Basic Metabolic Panel    May 13, 2022 (Approximate)      Release to patient: Immediate         Ambulatory Referral to Home Health (Hospital)   As directed      Face to Face Visit Date: 5/8/2022    Follow-up provider for Plan of Care?: I treated the patient in an acute care facility and will not continue treatment after discharge.    Follow-up provider: STEFANO AGUAYO [[id number]]    Reason/Clinical Findings: heart failure    Describe mobility limitations that make leaving home difficult: weakness    Nursing/Therapeutic Services Requested: Skilled Nursing    Skilled nursing orders: Medication education CHF management Pain management COPD management    Frequency: 1 Week 1               Test Results Pending at Discharge       Stefano Aguayo MD  05/18/22  08:10 EDT

## 2022-05-19 NOTE — OUTREACH NOTE
Prep Survey    Flowsheet Row Responses   [facility] discharged [facility]? [facility]   Is LACE score < 7 ? No   Emergency Room discharge w/ pulse ox? No   Eligibility Readm Mgmt   Discharge diagnosis Acute exacerbation of chronic systolic congestive heart failure   Does the patient have one of the following disease processes/diagnoses(primary or secondary)? CHF   Does the patient have Home health ordered? Yes   What is the Home health agency?   [facility]    Is there a DME ordered? No   Prep survey completed? Yes          RHEA NGUYEN - Registered Nurse

## 2022-05-20 NOTE — HOME HEALTH
89 year old male lives in 2 story home with wife who just underwent surgical procedure.Daughter staying for few days until mother better and son lives down road. Reports went to ER due to shortness of breath increased. Hx of CAD CABG x 3 pacemaker HF. CHF.Sitting in chair upon SN arrival. Color pink skin warm dry alert oriented x 3. Knowledgeable of prior health issues. DM controlled by diet no meds checks glucose fasting and 2 hours pp each meal. 205 after breakfast today.No edema skin intact on both feet heels no pressure points noted. Has sacral decub which is using calazime to area BID wife or daughter applies. Area pink. 2.5x.4..2 Inst. on pressure relief methods inst to sleep on sides at night wife reports he does. Avoid sitting for prolonged periods.Denies pain reports very seldom has pain. No falls in recent past. General weakness from 2 week hospital stay agreeable to home PT eval for HEP strengthening pt motivated to improve status. Reports watches healthy heart diet and carbs. reports appetite ok. Wt today 109.8 Ht. 5'8 Discussed admission booklet with pt and family appropriate forms signed. POC discussed with pt and agreed upon. Wife reports he has 2 MD appts next week and she will make follow up with  soon. LCTA denies shortness of breath ambulated to bedroom to allow SN to assess pressure ulcer karl. activity slightly unsteady at times. Main focus of care pulmonary assessment r.t. CHF Heart failure     Cardiopulmonary assessment    assess weights    assess for edema    assess pressure ulcer     assess for falls.     Medication instruction.

## 2022-05-21 NOTE — HOME HEALTH
PT Evaluation Summary: The patient is a 89 year old male admitted to home health services by SN on 5/19/2022 following hospitalization for dyspnea due to CHF.    Past Medical History includes: CAD status post CABG x 3 (1995) pacemaker, CHF, BPH, Gout, pulmonary edema.    Prior Functional Level: Independent in home mobility and self-care ADL.    Social History: Lives in home with spouse. DaughterLauren staying and helping as needed. SonMikhail helps as needed and lives about a mile away.    PT Assessment this day (5/20/2022) reveals the problems of general lower extremity weakness (3+/5), impaired transfers (requires extra time and extensive use of the arms to come to standing), abnormal gait (deviation from straight path, instability), limited safe ambulation tolerance (100 feet with stand-by assistance), imbalance (Tinetti Balance Assessment score: 16/28 - high falls risk).    The patient will benefit from the PT interventions of therapeutic exercise, transfer training, gait training and patient education (including home safety and home exercise program (HEP) instruction) to address the above problems.    Rehab potential good if patient improves caloric intake and weight loss ceases.    Plan is to discharge with HEP to care of spouse in their home with patient at a decreased falls risk.    Session Notes: Patient states he want to increase his strength and ability to get around. Session begins with daughterLauren present, sonMikhail and spouse remain throughout session.    Session includes extended review of clear need for immediate improvement in caloric intake. Advised against repetitive exercises (bike/restorator or walking) to avoid unnecessary calorie expenditure. Advised to focus on light walking and performance of strengthening exercises as performed this day. Patient and caregiver verbalize understanding.    Plan for next visit: Continue with exercises as performed on this day. Progress exercises per  patient ability and tolerance.

## 2022-05-24 NOTE — HOME HEALTH
Daughter reports pt not eating well and getting weaker. Upon arrival in bed awake. Denies pain breathing non labored LCTA Oxygen level 96%. No edema. Discussed ways to improve appetite softer foods smaller amts more frequently. Sacral wound improving 1x.3x.1 pink. Able to turn self in bed. Wt down 109.2 yesterday to 108. 7 today. To see NP at PMD tomorrow then to see cardiologist Wednesday Inst. if becomes difficult for pt to breathe to take to ER. B/P left arm 110/60 right arm 126/60 lips pink nailbeds pale hands cool to touch when held fingers to warm for pulse oximetry nailbeds beame pinker. Noted clearing of throat frequently daughter reports couple of days ago he go throat lognezes hung in throat wondering if made irriated denies difficulty swallowing liquids.     Cardiopulmonary assessment     follow up on MD appts    assess sacral area    assess weight

## 2022-05-25 NOTE — PROGRESS NOTES
Cardiology Clinic Note  Chris Romero MD, PhD    Subjective:     Encounter Date:05/25/2022      Patient ID: Shon ZAYAS Kunal is a 89 y.o. male.    Chief Complaint:  Chief Complaint   Patient presents with   • Hospital Follow Up Visit       HPI:    I the pleasure to see this 89-year-old gentleman in hospital follow-up today.  He has a underlying severe cardiomyopathy with a EF of 25% with severe biatrial enlargement with chronic atrial fibrillation, biventricular heart failure, severe MR and TR with no operative ability to improve any of his cardiovascular comorbidities.  He has a history of stroke remotely, multivessel CAD, hypertension hyperlipidemia, presence of pacemaker but did not want ICD upgrade previously, sick sinus syndrome which was the indication for this.  He has underlying pulmonary hypertension from heart failure, he was hospitalized with decompensated systolic heart failure requiring inotropic assistance with multiorgan dysfunction with congested liver as well as cardiorenal syndrome.  He underwent multiple days of dobutamine assisted diuretics with improvement of his liver function and creatinine ultimately discontinued off of dobutamine also had complete cessation of his beta-blocker with decompensated status.  He was discharged on Bumex 1 twice a day, digoxin, midodrine although not optimal with systolic heart failure, pravastatin.  He is back for follow-up today blood pressure 108/76 with heart rates in the 80s with underlying atrial fibrillation, his pacing rate was increased to a backup rate of 75 with low stroke-volume today, he is short of breath with speech which has been his usual, he is got thick sputum production likely secondary to poor water intake and he may be on the drier side by my clinical assessment.  He is on twice daily Bumex and they have really been cutting back water at home.  Will allow him to have an extra glass or 2 of water a day especially with his meals as his  "nutrition has been very very poor.  We did discuss end-stage congestive heart failure as well as really only options for hospice therapy as he is not a surgical candidate and with overall quality of life I am unclear about home dobutamine therapy, family also unsure about this as with end-stage disease.    Historical data copied forward from previous encounters in EMR including the history, exam, and assessment/plan has been reviewed and is unchanged unless noted otherwise.    Cardiac medicines reviewed with risk, benefits, and necessity of each discussed.    Risk and benefit of cardiac testing reviewed including death heart attack stroke pain bleeding infection need for vascular /cardiovascular surgery were discussed and the patient     Objective:         /76 (BP Location: Left arm, Patient Position: Sitting)   Pulse 88   Resp 18   Ht 172.7 cm (68\")   Wt 50.9 kg (112 lb 3.2 oz)   BMI 17.06 kg/m²     Physical Exam  Regular, no rubs gallops heave or lift  No clubbing cyanosis no edema  Thin cachectic elderly gentleman  Clear to auscultation  Tachypneic  Soft nontender nondistended  Radial pulses 1+ equal ladder  Mild bruising on extensor surfaces    Assessment:             Dyspnea, unspecified type    Moderate malnutrition (HCC)     1. Shortness of breath / Acute on chronic systolic and diastolic CHF   - LVEF 25%, Dual-chamber pacemaker well-functioning-No ICD upgrade at family discretion previously  -diuresis with Bumex 1mg BID  -Off beta-blockers with end-stage disease  Low stroke-volume allow high resting heart rate  No ARB secondary to hypotension, remains on midodrine  If he remains uncomfortable advised hospitalization  Hospice evaluation may be warranted  Will avoid home dose inotropes at this point     2. HARMAN / CKD  - creatinine 1.6     3. C prior cardiogenic shock, may require rehospitalization     4. Paroxysmal Atrial fibrillation  -Off anticoagulation, off beta-blockers     5. SSS s/p PPM, " normal function device     6.  Congestive hepatopathy    Increase water intake gently with meals, go to the hospital for any refractory shortness of air dyspnea exertion with this does not  May need hospice referral for end-stage disease    Chris Romero MD, PhD    The pleasure to be involved in this patient's cardiovascular care.  Please call with any questions or concerns  Chris Romero MD, PhD    Most recent EKG as reviewed and interpreted by me:  Procedures     Most recent echo as reviewed and interpreted by me:  Results for orders placed during the hospital encounter of 05/03/22    Adult Transthoracic Echo Complete W/ Cont if Necessary Per Protocol    Interpretation Summary  · Left atrial volume is severely increased.  · Abnormal mitral valve structure consistent with dilated annulus.  · Moderate to severe tricuspid valve regurgitation is present.  · Estimated right ventricular systolic pressure from tricuspid regurgitation is markedly elevated (>55 mmHg).  · Moderate pulmonic valve regurgitation is present.  · The left ventricular cavity is moderately dilated.  · Estimated left ventricular EF was in agreement with the calculated left ventricular EF. Left ventricular ejection fraction appears to be 26 - 30%. Left ventricular systolic function is severely decreased.  · Left ventricular diastolic function is consistent with (grade II w/high LAP) pseudonormalization.  · The right ventricular cavity is moderately dilated.  · Severely reduced right ventricular systolic function noted.  · Severe mitral valve regurgitation is present with a centrally-directed jet noted.  · Moderate aortic valve regurgitation is present.  · Moderate aortic valve stenosis is present.    Severe LV systolic dysfunction EF of 25%, moderately dilated LV  Severe MR without stenosis  Moderate AR with moderate aortic valve stenosis  Severe TR without stenosis  PA systolic pressure severely elevated 60-65 with severe pulm hypertension  Grade  2-3 diastolic dysfunction  Right atrial pressure estimated greater than 20 with severely dilated IVC  No masses or effusion seen  RV is moderate to severely hypokinetic, moderately increased in size      Most recent stress test as reviewed and interpreted by me:  Results for orders placed during the hospital encounter of 21    Stress Test With Myocardial Perfusion One Day    Interpretation Summary  LEXISCAN CARDIOLITE REPORT    DATE OF PROCEDURE:   21    INDICATION FOR PROCEDURE: Chest pain    PROCEDURE PERFORMED: Lexiscan Cardiolite    PROCEDURE COMMENTS:    After informed consent was obtained.  Stress test was supervised by nurse practitioner Marissa James. Patient's resting heart rate was 79, resting blood pressure was 161/63, resting EKG revealed sinus rhythm with rate of 79 bpm with LVH and ST segment depression in 1 and aVL and V5 V6.  Patient was given 0.4 mg of regadenosine for stress testing.  There was no significant change in heart rate, blood pressure, symptoms with regadenoson injection.  Patient tolerated procedure well.  Complications were none.    NUCLEAR IMAGIN.  There was large severe predominantly fixed defect involving whole of septum, inferior wall consistent with MI with shaila-infarct ischemia seen.  2.  Gated images reveal LVE with severe LV dysfunction and apical akinesis and severe global hypokinesis, LVEF of 34%.    CONCLUSION:  1.  Lexiscan Cardiolite with abnormal perfusion with predominantly fixed inferior and septal defect consistent with MI with shaila-infarct ischemia  2.  Severe LV dysfunction.  LVEF of 34%.    RECOMMENDATIONS:    Clinical correlation recommended.      Sha Middleton MD  21  14:16 EDT      Most recent cardiac catheterization as reviewed interpreted by me:  No results found for this or any previous visit.    The following portions of the patient's history were reviewed and updated as appropriate: allergies, current medications, past family  history, past medical history, past social history, past surgical history and problem list.      ROS:  14 point review of systems negative except as mentioned above    Current Outpatient Medications:   •  allopurinol (ZYLOPRIM) 100 MG tablet, Take 200 mg by mouth Daily., Disp: , Rfl:   •  ascorbic acid (VITAMIN C) 1000 MG tablet, Take 1,000 mg by mouth Daily., Disp: , Rfl:   •  aspirin 81 MG chewable tablet, Chew 81 mg every night at bedtime., Disp: , Rfl:   •  budesonide-formoterol (SYMBICORT) 160-4.5 MCG/ACT inhaler, Inhale 2 puffs 2 (Two) Times a Day., Disp: 1 each, Rfl: 12  •  bumetanide (BUMEX) 1 MG tablet, Take 1 tablet by mouth 2 (Two) Times a Day., Disp: 60 tablet, Rfl: 3  •  Cholecalciferol (Vitamin D3) 50 MCG (2000 UT) capsule, Take 2,000 Units by mouth 3 (Three) Times a Week. Monday, Wednesday and Friday, Disp: , Rfl:   •  digoxin (LANOXIN) 125 MCG tablet, Take 1 tablet by mouth Every Other Day., Disp: 15 tablet, Rfl: 3  •  dutasteride (AVODART) 0.5 MG capsule, Take 0.5 mg by mouth Every Night., Disp: , Rfl:   •  guaiFENesin (MUCINEX) 600 MG 12 hr tablet, Take 1,200 mg by mouth 2 (Two) Times a Day., Disp: , Rfl:   •  midodrine (PROAMATINE) 5 MG tablet, Take 1 tablet by mouth 2 (Two) Times a Day Before Meals., Disp: 60 tablet, Rfl: 3  •  pravastatin (PRAVACHOL) 40 MG tablet, Take 40 mg by mouth 1 (One) Time Per Week. Monday, Disp: , Rfl:   •  Psyllium 0.36 g capsule, Take 1 capsule by mouth As Needed (constipation )., Disp: , Rfl:   •  sodium chloride 0.65 % nasal spray, 2 sprays into the nostril(s) as directed by provider As Needed for Congestion., Disp: , Rfl:   •  spironolactone (ALDACTONE) 25 MG tablet, Take 0.5 tablets by mouth Daily., Disp: 45 tablet, Rfl: 1    Problem List:  Patient Active Problem List   Diagnosis   • Sick sinus syndrome (HCC)   • Presence of cardiac pacemaker   • Essential hypertension   • Hyperlipidemia, mixed   • Coronary artery disease involving native coronary artery of native  heart   • Cerebrovascular accident (CVA) ([facility])   • Puckering of macula, left eye   • Presbyopia   • Combined form of senile cataract   • Type 2 or unspecified type diabetes mellitus   • Vitreous degeneration   • Dyspnea   • Abnormal nuclear stress test   • Coronary artery disease involving native coronary artery of native heart without angina pectoris   • Acute systolic CHF (congestive heart failure) ([facility])   • Exertional dyspnea   • Acute pulmonary edema ([facility])   • Carotid stenosis, non-symptomatic, bilateral   • Dyspnea, unspecified type   • Moderate malnutrition ([facility])   • Acute on chronic systolic heart failure (CMS/[facility])     Past Medical History:  Past Medical History:   Diagnosis Date   • Atrial flutter ([facility])    • CHF (congestive heart failure) ([facility])    • Chronic kidney disease    • Coronary artery disease    • Coronary artery disease involving native coronary artery of native heart 8/6/2019    Status post multivessel CABG 1993   • Diabetes mellitus ([facility])    • Essential hypertension 8/6/2019   • HFrEF (heart failure with reduced ejection fraction) ([facility])    • Hyperlipidemia    • Hyperlipidemia, mixed 8/6/2019   • Hypertension    • Presence of cardiac pacemaker 8/6/2019    S/P dual-chamber pacemaker implanted 2009   • Sick sinus syndrome ([facility]) 8/6/2019   • Stroke ([facility])      Past Surgical History:  Past Surgical History:   Procedure Laterality Date   • CARDIAC CATHETERIZATION     • CARDIAC ELECTROPHYSIOLOGY PROCEDURE N/A 1/17/2022    Procedure: Gen capo-Smith[place] aware;  Surgeon: Minh Kendrick MD;  Location: [facility] INVASIVE LOCATION;  Service: Cardiology;  Laterality: N/A;   • CORONARY ARTERY BYPASS GRAFT  1993   • CYSTOSCOPY     • HERNIA REPAIR     • INSERT / REPLACE / REMOVE PACEMAKER  2010 BS insertion   • PACEMAKER REPLACEMENT  01/17/2022    [facility]   • VENA CAVA FILTER PLACEMENT  04/2016     Social History:  Social History     Socioeconomic History   • Marital status:    Tobacco Use    • Smoking status: Former Smoker     Quit date: 1970     Years since quittin.0   • Smokeless tobacco: Never Used   Vaping Use   • Vaping Use: Never used   Substance and Sexual Activity   • Alcohol use: Never   • Drug use: Never   • Sexual activity: Defer     Allergies:  Allergies   Allergen Reactions   • Penicillin G Rash   • Vancomycin Rash     Immunizations:  Immunization History   Administered Date(s) Administered   • Flu Vaccine Quad PF >36MO 10/24/2016, 10/19/2017, 10/11/2018, 10/18/2019   • Influenza, Unspecified 10/30/2012, 10/03/2013, 2014, 10/06/2015   • Pneumococcal Conjugate 13-Valent (PCV13) 2015   • Pneumococcal Polysaccharide (PPSV23) 2003   • Td 2003   • Tdap 2015            In-Office Procedure(s):  No orders to display        ASCVD RIsk Score::  The ASCVD Risk score (Trudy HUDSON Jr., et al., 2013) failed to calculate for the following reasons:    The 2013 ASCVD risk score is only valid for ages 40 to 79    The patient has a prior MI or stroke diagnosis    Imaging:    Results for orders placed during the hospital encounter of 22    XR Chest 1 View    Narrative  DATE OF EXAM:  2022 3:02 PM    PROCEDURE:  XR CHEST 1 VW-    INDICATIONS:  Dyspnea; R06.00-Dyspnea, unspecified; I50.9-Heart failure, unspecified;  N17.9-Acute kidney failure, unspecified    COMPARISON:  CT chest high-resolution 2022, AP chest x-ray 2022.    TECHNIQUE:  Single radiographic AP view of the chest was obtained.    FINDINGS:  The patient's chin partially obscures evaluation of the upper chest.  Allowing for this limitation, no pneumothorax is seen. Cardiac  silhouette remains mildly enlarged. Changes are redemonstrated from  CABG. Pacemaker leads are stable. There are mildly increased right upper  lung airspace opacities. Blunting of the left lateral costophrenic angle  appears stable. Left lung appears grossly clear.    Impression  1.Mildly increased right upper lung  airspace opacities, which may be due  to atelectasis and/or pneumonia.  2.Small left pleural effusion.    Electronically Signed By-Hoa Bonilla MD On:5/9/2022 3:13 PM  This report was finalized on [id number] by  Hoa Bonilla MD.       Results for orders placed during the hospital encounter of 05/03/22    US Liver    Narrative  DATE OF EXAM:  5/9/2022 8:54 AM    PROCEDURE:  US LIVER-    INDICATIONS:  Hepatic transaminase derangement; R06.00-Dyspnea, unspecified;  I50.9-Heart failure, unspecified; N17.9-Acute kidney failure,  unspecified    COMPARISON:  No Comparisons Available    TECHNIQUE:  Grayscale and color Doppler ultrasound evaluation of the limited abdomen  was performed.    FINDINGS:  The liver size is normal, 12.8 cm in length. The liver maintains normal  homogeneous echotexture without focal or suspicious abnormality. There  is no ascites. The portal vein is patent with hepatopedal flow. Imaged  hepatic veins are patent. The common duct measures 2 mm. No intrahepatic  biliary dilation is seen. Incidental note is made of a 1.8 cm shadowing  gallstone, and gallbladder adenomyomatosis. The gallbladder wall does  not appear grossly thickened, and no pericholecystic fluid is seen.    Impression  1. Normal sonographic appearance of the liver.  2. Uncomplicated cholelithiasis.  3. Gallbladder adenomyomatosis.    Electronically Signed By-Shahrzad Biggs MD On:5/9/2022 9:22 AM  This report was finalized on [id number] by  Shahrzad Biggs MD.      Results for orders placed during the hospital encounter of 05/03/22    CT Chest Hi Resolution Diagnostic    Narrative  DATE OF EXAM:  5/7/2022 4:28 PM    PROCEDURE:  CT CHEST HI RESOLUTION DIAGNOSTIC-    INDICATIONS:  Interstitial lung disease; R06.00-Dyspnea, unspecified; I50.9-Heart  failure, unspecified; N17.9-Acute kidney failure, unspecified    COMPARISON:  03/24/2022.    TECHNIQUE:  Routine transaxial slices were obtained through the chest without  the  administration of intravenous contrast. The study was performed  utilizing our high resolution CT protocol which includes supine  inspiratory and expiratory imaging as well as prone inspiratory imaging.  Reconstructed coronal and sagittal images were also obtained. Automated  exposure control and iterative reconstruction methods were used.    FINDINGS:  Interval resolution of previous identified pulmonary edema. There is  nearly completely resolved small bilateral pleural effusions. There is  mild interlobular septal thickening, which may represent mild residual  interstitial edema or mild component of chronic disease. There is  scarred consolidation in the right lung base. Many of the small nodules  identified in the upper lobes of either resolved or decreased in size  compared with the prior study. There are persistent clusters of nodules  in both upper lungs, right greater than left with the largest occurring  in the right upper lobe on axial image 41 measuring up to 10 mm  diameter. There is decreased peribronchial thickening. No new lung  nodules.    There is severe aortic and aortic branch vessel atherosclerosis. There  are severe native coronary artery calcifications status post CABG. There  is moderate cardiomegaly. No pericardial effusion. There is no  pathologic mediastinal lymphadenopathy following size criteria. There  are dense aortic annular and valvular calcifications.    There is a left-sided pacemaker/ICD in place. No acute findings below  the diaphragm. There is a stable low-attenuation nodule at the left  adrenal gland, likely adenoma. There is S-shaped scoliosis of the  thoracolumbar spine. There are moderate lower thoracic and upper lumbar  degenerative changes.    Impression  IMPRESSION :    1. Near complete resolution of previous identified interstitial  pulmonary edema and near complete resolution of now small bilateral  pleural effusions.  2. There may be a small component of chronic  subpleural interstitial  disease, but the majority of the findings on the prior study are favored  to reflect pulmonary edema.  3. Improved nodular disease in the upper lungs, right greater than left  with significant residual nodularity. Recommend 6 month follow-up chest  CT. The course of disease on imaging would suggest an infectious or  inflammatory process.  4. Cardiomegaly and coronary artery disease status post CABG and  pacemaker/ICD placement.  5. Scoliosis and spinal degenerative changes.    Electronically Signed By-Hudson Walker MD On:5/7/2022 4:59 PM  This report was finalized on [id number] by  Hudson Walker MD.      Lab Review:   No results displayed because visit has over 200 results.      No results displayed because visit has over 200 results.      Lab on 01/14/2022   Component Date Value   • COVID19 01/14/2022 Not Detected    Lab on 12/31/2021   Component Date Value   • Magnesium 12/31/2021 2.4 (A)   • Glucose 12/31/2021 90    • BUN 12/31/2021 29 (A)   • Creatinine 12/31/2021 1.33 (A)   • Sodium 12/31/2021 139    • Potassium 12/31/2021 5.0    • Chloride 12/31/2021 108 (A)   • CO2 12/31/2021 27.7    • Calcium 12/31/2021 9.5    • Total Protein 12/31/2021 6.7    • Albumin 12/31/2021 4.30    • ALT (SGPT) 12/31/2021 25    • AST (SGOT) 12/31/2021 27    • Alkaline Phosphatase 12/31/2021 93    • Total Bilirubin 12/31/2021 0.5    • eGFR Non African Amer 12/31/2021 50 (A)   • Globulin 12/31/2021 2.4    • A/G Ratio 12/31/2021 1.8    • BUN/Creatinine Ratio 12/31/2021 21.8    • Anion Gap 12/31/2021 3.3 (A)   • Protime 12/31/2021 11.2    • INR 12/31/2021 1.01    • WBC 12/31/2021 6.58    • RBC 12/31/2021 4.35    • Hemoglobin 12/31/2021 14.1    • Hematocrit 12/31/2021 42.6    • MCV 12/31/2021 97.9 (A)   • MCH 12/31/2021 32.4    • MCHC 12/31/2021 33.1    • RDW 12/31/2021 12.6    • RDW-SD 12/31/2021 45.2    • MPV 12/31/2021 11.8    • Platelets 12/31/2021 231    • Neutrophil % 12/31/2021 56.0    • Lymphocyte %  12/31/2021 24.8    • Monocyte % 12/31/2021 15.8 (A)   • Eosinophil % 12/31/2021 2.3    • Basophil % 12/31/2021 0.8    • Immature Grans % 12/31/2021 0.3    • Neutrophils, Absolute 12/31/2021 3.69    • Lymphocytes, Absolute 12/31/2021 1.63    • Monocytes, Absolute 12/31/2021 1.04 (A)   • Eosinophils, Absolute 12/31/2021 0.15    • Basophils, Absolute 12/31/2021 0.05    • Immature Grans, Absolute 12/31/2021 0.02    • nRBC 12/31/2021 0.0    • COVID19 12/31/2021 Not Detected      Recent labs reviewed and interpreted for clinical significance and application            Level of Care:           Chris Romero MD  05/25/22  .

## 2022-05-25 NOTE — PROGRESS NOTES
DEVICE INTERROGATION:  IN OFFICE    DEVICE TYPE:   Dual-chamber pacemaker    :   Tellagence    BATTERY:  Stable    TIME TO ELECTIVE REPLACEMENT INDICATORS:   7 years    CHARGE TIME:   Not applicable        LEAD DATA:       DEVICE REPROGRAMMED FOR TESTING      Atrial:   5.7 mV, 615 ohms, 0.80 V@0.4 ms    Ventricular:     17.3 mV, 396 ohms, 0.8 V@0.4 ms    LV:      Pacemaker dependent:   No      Atrial pacing percentage: 32%    Ventricular pacing percentage: 3%      Arrhythmia Logbook Reviewed: Paroxysmal atrial fibrillation noted        Summary:    Stable Device Function    No significant arhythmia burden.     Battery status is stable.    Reviewed with: Dr. Romero      NEXT IN OFFICE DEVICE CHECK DUE: 1 year    REMOTE DEVICE INTERROGATIONS: 3 months

## 2022-05-27 ENCOUNTER — ON CAMPUS - OUTPATIENT (AMBULATORY)
Dept: URBAN - METROPOLITAN AREA HOSPITAL 85 | Facility: HOSPITAL | Age: 87
End: 2022-05-27

## 2022-05-27 DIAGNOSIS — R13.10 DYSPHAGIA, UNSPECIFIED: ICD-10-CM

## 2022-05-27 DIAGNOSIS — R06.00 DYSPNEA, UNSPECIFIED: ICD-10-CM

## 2022-05-27 DIAGNOSIS — R53.1 WEAKNESS: ICD-10-CM

## 2022-05-27 DIAGNOSIS — E46 UNSPECIFIED PROTEIN-CALORIE MALNUTRITION: ICD-10-CM

## 2022-05-27 DIAGNOSIS — K59.00 CONSTIPATION, UNSPECIFIED: ICD-10-CM

## 2022-05-27 DIAGNOSIS — R74.8 ABNORMAL LEVELS OF OTHER SERUM ENZYMES: ICD-10-CM

## 2022-05-27 PROCEDURE — 99214 OFFICE O/P EST MOD 30 MIN: CPT | Performed by: NURSE PRACTITIONER

## 2022-05-28 ENCOUNTER — INPATIENT HOSPITAL (AMBULATORY)
Dept: URBAN - METROPOLITAN AREA HOSPITAL 84 | Facility: HOSPITAL | Age: 87
End: 2022-05-28

## 2022-05-28 DIAGNOSIS — R13.10 DYSPHAGIA, UNSPECIFIED: ICD-10-CM

## 2022-05-28 DIAGNOSIS — R94.5 ABNORMAL RESULTS OF LIVER FUNCTION STUDIES: ICD-10-CM

## 2022-05-28 PROCEDURE — 99232 SBSQ HOSP IP/OBS MODERATE 35: CPT | Mod: FS | Performed by: NURSE PRACTITIONER

## 2022-05-29 PROBLEM — R53.1 WEAKNESS: Status: ACTIVE | Noted: 2022-01-01

## 2022-05-29 PROBLEM — E83.41 HYPERMAGNESEMIA: Status: ACTIVE | Noted: 2022-01-01

## 2022-05-29 PROBLEM — R79.89 ELEVATED LFTS: Status: ACTIVE | Noted: 2022-01-01

## 2022-05-29 PROBLEM — M79.89 SWELLING OF BOTH LOWER EXTREMITIES: Status: ACTIVE | Noted: 2022-01-01

## 2022-05-29 PROBLEM — N17.9 AKI (ACUTE KIDNEY INJURY) (HCC): Status: ACTIVE | Noted: 2022-01-01

## 2022-05-29 PROBLEM — E43 SEVERE MALNUTRITION (HCC): Status: ACTIVE | Noted: 2022-01-01

## 2022-05-30 NOTE — OUTREACH NOTE
CHF Week 3 Survey    Flowsheet Row Responses   [facility] facility patient discharged from? [facility]   Does the patient have one of the following disease processes/diagnoses(primary or secondary)? CHF   Week 3 attempt successful? No   Revoke Readmitted          SUE RILEY - Registered Nurse

## 2022-05-30 NOTE — OUTREACH NOTE
Prep Survey    Flowsheet Row Responses   [facility] facility patient discharged from? [facility]   Is LACE score < 7 ? No   Emergency Room discharge w/ pulse ox? No   Eligibility Readm Mgmt   Discharge diagnosis Dyspnea with acute pulmonary edema   Does the patient have one of the following disease processes/diagnoses(primary or secondary)? Other   Does the patient have Home health ordered? Yes   What is the Home health agency?  [facility]    Is there a DME ordered? Yes   What DME was ordered? [facility]    Prep survey completed? Yes          SUE RILEY - Registered Nurse

## 2022-06-01 NOTE — CASE COMMUNICATION
PT Discharge Summary: The patient is a 89 year old male admitted to home health services by  on 5/19/2022 following hospitalization for dyspnea due to CHF.    Initial PT Assessment (5/20/2022) revealed the problems of general lower extremity weakness (3+/5), impaired transfers (requires extra time and extensive use of the arms to come to standing), abnormal gait (deviation from straight path, instability), limited safe ambulation tole tom (100 feet with stand-by assistance), imbalance (Tinetti Balance Assessment score: 16/28 - high falls risk).    Patient was not seen following PT Evaluation and subsequently care transferred to hospice.    Patient discharged from home health PT.

## 2022-06-01 NOTE — HOME HEALTH
PT Discharge Summary: The patient is a 89 year old male admitted to home health services by  on 5/19/2022 following hospitalization for dyspnea due to CHF.    Initial PT Assessment (5/20/2022) revealed the problems of general lower extremity weakness (3+/5), impaired transfers (requires extra time and extensive use of the arms to come to standing), abnormal gait (deviation from straight path, instability), limited safe ambulation tolerance (100 feet with stand-by assistance), imbalance (Tinetti Balance Assessment score: 16/28 - high falls risk).    Patient was not seen following PT Evaluation and subsequently care transferred to hospice.    Patient discharged from home health PT.

## 2022-06-02 NOTE — OUTREACH NOTE
Medical Week 1 Survey    Flowsheet Row Responses   [facility] patient discharged from? [facility]   Does the patient have one of the following disease processes/diagnoses(primary or secondary)? Other   Week 1 attempt successful? Yes   Call start time 1610   Call end time 1623   Discharge diagnosis Dyspnea with acute pulmonary edema   Person spoke with today (if not patient) and relationship Keisha wife   Meds reviewed with patient/caregiver? Yes   Is the patient having any side effects they believe may be caused by any medication additions or changes? No   Does the patient have all medications ordered at discharge? Yes   Does the patient have a primary care provider?  Yes   Does the patient have an appointment with their PCP within 7 days of discharge? No   What is preventing the patient from scheduling follow up appointments within 7 days of discharge? Haven't had time   Nursing Interventions Educated patient on importance of making appointment, Advised patient to make appointment   What is the Home health agency?  [facility]    Has home health visited the patient within 72 hours of discharge? Yes   What DME was ordered? [employer] [facility]    Psychosocial issues? No   Did the patient receive a copy of their discharge instructions? Yes   Nursing interventions Reviewed instructions with patient   What is the patient's perception of their health status since discharge? Same  [Wife reports under circumstances I don't know if he will get better. ]   Is the patient/caregiver able to teach back signs and symptoms related to disease process for when to call PCP? Yes   Is the patient/caregiver able to teach back signs and symptoms related to disease process for when to call 911? Yes   Is the patient/caregiver able to teach back the hierarchy of who to call/visit for symptoms/problems? PCP, Specialist, Home health nurse, Urgent Care, ED, 911 Yes   If the patient is a current smoker, are they able to teach  back resources for cessation? Not a smoker   Week 1 call completed? Yes          COLLEEN GREWAL - Registered Nurse

## 2022-06-05 NOTE — DISCHARGE SUMMARY
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  History of sick sinus syndrome  Acute exacerbation of chronic systolic congestive heart failure  Severe mitral regurgitation  Severe tricuspid regurgitation  Acute cardiopulmonary syndrome  Right upper lobe pulmonary nodule  History of pacemaker placement with replacement of generator 1/22  Atherosclerotic heart disease of native coronary arteries with angina pectoris  History of coronary artery bypass grafting in 1993  HFrEF 35%  Severe pulmonary hypertension  Chronic atrial fibrillation, persistent intermittent  Hypercoagulable state secondary to atrial fibrillation  Cerebrovascular disease with history of CVA  History of carotid occlusive disease  Vitamin D deficiency  Hypertension associated chronic kidney disease stage IIIb  Degenerative joint disease  Hypokalemia  Bilateral upper lobe pulmonary nodularities  Gastroesophageal reflux disease without esophagitis    Presenting Problem/History of Present Illness  Active Hospital Problems    Diagnosis  POA   • **Dyspnea, unspecified type [R06.00]  Yes   • Hypermagnesemia [E83.41]  Yes   • Weakness [R53.1]  Yes   • Elevated LFTs [R79.89]  Yes   • HARMAN (acute kidney injury) (HCC) [N17.9]  Yes   • Swelling of both lower extremities [M79.89]  Yes   • Severe malnutrition (HCC) [E43]  Yes   • Acute on chronic systolic heart failure (CMS/HCC) [I50.23]  Yes   • Moderate malnutrition (HCC) [E44.0]  Yes   • Acute pulmonary edema (HCC) [J81.0]  Yes   • Sick sinus syndrome (HCC) [I49.5]  Yes   • Presence of cardiac pacemaker [Z95.0]  Yes   • Essential hypertension [I10]  Yes   • Hyperlipidemia, mixed [E78.2]  Yes   • Coronary artery disease involving native coronary  artery of native heart [I25.10]  Yes      Resolved Hospital Problems   No resolved problems to display.          Hospital Course  Patient is a 89 y.o. male who presented with progressive shortness of breath with evidence of acute pulmonary edema and acute exacerbation of chronic systolic congestive heart failure.  He was treated by cardiology and aggressive diuresis ensued as well as supportive care.  He was also found to have some acute kidney injury superimposed on chronic kidney disease stage IIIb for which nephrology was asked to participate.  In spite of best efforts the patient continued to decompensate and the family wished to enlist hospice have the patient return home for comfort measures and to ensure his dignity.      Procedures Performed         Consults:   Consults     Date and Time Order Name Status Description    5/27/2022 12:00 PM Inpatient Gastroenterology Consult Completed     5/27/2022  9:10 AM Inpatient Cardiology Consult Completed     5/27/2022  9:10 AM Inpatient Nephrology Consult Completed     5/9/2022  7:36 AM Inpatient Gastroenterology Consult Completed     5/6/2022  1:57 PM Inpatient Pulmonology Consult Completed     5/6/2022 11:25 AM Inpatient Palliative Care MD Consult Completed     5/4/2022  7:47 AM Inpatient Nephrology Consult Completed     5/3/2022  6:24 PM Inpatient Cardiology Consult Completed           Pertinent Test Results:No radiology results for the last 7 days    Imaging Results (Last 7 Days)     Procedure Component Value Units Date/Time    FL Video Swallow With Speech Single Contrast [[id number]] Collected: 05/27/22 1446     Updated: 05/27/22 1449    Narrative:      DATE OF EXAM:  5/27/2022 12:30 PM     PROCEDURE:  FL VIDEO SWALLOW W SPEECH SINGLE-CONTRAST-     INDICATIONS:  Oropharyngeal dysphagia; R06.00-Dyspnea, unspecified; N17.9-Acute kidney  failure, unspecified; R79.89-Other specified abnormal findings of blood  chemistry; R53.1-Weakness; M79.89-Other specified soft  tissue disorders;  E83.41-Hypermagnesemia     COMPARISON:  No Comparisons Available     TECHNIQUE:   This examination was performed in conjunction with speech pathology.  Lateral video fluoroscopic evaluation of the swallowing mechanism was  performed while correlate administering to the patient various  consistency food items mixed with barium.     Fluoroscopic Time:   3.5 minutes     Number of Images: 22 spot fluoroscopic series images        FINDINGS:  Modified barium swallow study was performed utilizing fluoroscopy. The  study was performed by a dedicated speech pathologist. I was present  during the entire examination.        Impression:      Modified barium swallow study with fluoroscopy. Please refer to the  speech pathologist report for findings and dietary recommendations.     Electronically Signed By-Shahrzad Biggs MD On:5/27/2022 2:46 PM  This report was finalized on [id number] by  Shahrzad Biggs MD.    XR Chest 1 View [[id number]] Collected: 05/27/22 0640     Updated: 05/27/22 0645    Narrative:         DATE OF EXAM:   5/26/2022 11:46 PM     PROCEDURE:   XR CHEST 1 VW-     INDICATIONS:   sob; R06.00-Dyspnea, unspecified; N17.9-Acute kidney failure,  unspecified; R79.89-Other specified abnormal findings of blood  chemistry; R53.1-Weakness; M79.89-Other specified soft tissue disorders;  E83.41-Hypermagnesemia     COMPARISON:  05/09/2022 and prior     TECHNIQUE:   Portable Chest     FINDINGS:      Study is limited by overlying support and monitoring apparatus. Patient  status post median sternotomy. Heart size is enlarged, stable. Pulmonary  vascularity is mildly prominent. Left subclavian approach pacemaker is  in place. Mild coarsening of interstitial markings noted suggesting mild  fluid overload. No focal consolidation or significant pleural effusions  are noted. Osseous structures demonstrate no acute abnormality.  Peripheral vascular calcifications noted.        Impression:      IMPRESSION :    Cardiomegaly with mild pulmonary vascular congestion. No focal  consolidation. Previously noted left-sided pleural effusion is not  visualized on current study.     Electronically Signed By-Kashmir Duque On:5/27/2022 6:43 AM  This report was finalized on [id number] by  Kashmir Duque, .    CT Abdomen Pelvis Without Contrast [[id number]] Collected: 05/27/22 0148     Updated: 05/27/22 0154    Narrative:      EXAMINATION:  CT SCAN OF THE ABDOMEN AND PELVIS WITHOUT INTRAVENOUS CONTRAST    DATE OF EXAM: 5/27/2022 1:20 AM     HISTORY: Abdominal pain. Abnormal liver function tests.    COMPARISON: None.    TECHNIQUE: CT examination of the abdomen and pelvis with sagittal and coronal reformations was performed without intravenous contrast.  CT dose lowering techniques were used, to include: automated exposure control, adjustment for patient size, and/or use   of iterative reconstruction.    Note: The exam is limited because some types of pathology may not be adequately demonstrated due to lack of contrast enhancement.       FINDINGS:    ABDOMEN/PELVIS:    Lower Chest:  Cardiomegaly. Severe atherosclerotic calcification of the coronary arteries. Dual-lead pacemaker. Mild fibrosis/scarring in the lower lungs.    Liver: No evidence of cirrhosis on this noncontrast CT scan.    Gallbladder/Billary: Normal.     Pancreas: Normal.    Spleen: Calcified granuloma.    Adrenal Glands: Normal.     Kidneys: No stones in the hilum of either kidney. No ureteral stones or evidence of urinary obstruction.    GI Tract: The stomach and duodenum are unremarkable. Mild diverticulosis of the colon. Normal appendix. Unremarkable small bowel.    Mesentery/Peritoneum: Normal.    Vasculature: Inferior vena cava filter in position. Advanced atherosclerosis of the aorta and its major branches.    Lymph Nodes: Normal.     Abdominal Wall: See musculoskeletal section.    Bladder: Normal.     Reproductive: Mildly enlarged prostate gland.  Trabeculated, nondilated urinary bladder.    Musculoskeletal: Benign cystic bony lesion in the right iliac bone posteriorly. Moderate dextroscoliosis of the thoracolumbar spine. Advanced degenerative disc disease at the thoracolumbar junction.      Impression:        1. Unremarkable noncontrast CT appearance of the liver.  2. Cardiomegaly. Pacemaker.    Electronically signed by:  Mikhail Meehan M.D.    5/26/2022 11:53 PM              Condition on Discharge:  Fair    Vital Signs       Physical Exam:     General Appearance:    Alert, cooperative, in no acute distress   Head:    Normocephalic, without obvious abnormality, atraumatic   Eyes:           Conjunctivae and sclerae normal, no   icterus, no pallor, corneas clear, PERRLA   Throat:   No oral lesions, no thrush, oral mucosa moist   Neck:   No adenopathy, supple, trachea midline, no thyromegaly, no   carotid bruit, no JVD   Lungs:     Clear to auscultation,respirations regular, even and                  unlabored    Heart:    Regular rhythm and normal rate, normal S1 and S2, no            murmur, no gallop, no rub, no click   Chest Wall:    No abnormalities observed   Abdomen:     Normal bowel sounds, no masses, no organomegaly, soft        non-tender, non-distended, no guarding, no rebound                tenderness   Rectal:     Deferred   Extremities:   Moves all extremities well, no edema, no cyanosis, no             redness   Pulses:   Pulses palpable and equal bilaterally   Skin:   No bleeding, bruising or rash   Lymph nodes:   No palpable adenopathy   Neurologic:   Cranial nerves 2 - 12 grossly intact, sensation intact, DTR       present and equal bilaterally         Discharge Disposition  Home or Self Care    Discharge Medications     Discharge Medications      New Medications      Instructions Start Date   acetaminophen 325 MG tablet  Commonly known as: TYLENOL   650 mg, Oral, Every 4 Hours PRN      pantoprazole 40 MG EC tablet  Commonly known as: PROTONIX    40 mg, Oral, Every Early Morning      polyethylene glycol 17 g packet  Commonly known as: MIRALAX   17 g, Oral, 2 Times Daily      sodium bicarbonate 650 MG tablet   1,300 mg, Oral, 3 Times Daily         Changes to Medications      Instructions Start Date   bumetanide 2 MG tablet  Commonly known as: BUMEX  What changed:   · medication strength  · how much to take   2 mg, Oral, 2 Times Daily         Continue These Medications      Instructions Start Date   allopurinol 100 MG tablet  Commonly known as: ZYLOPRIM   300 mg, Oral, Daily      aspirin 81 MG chewable tablet   81 mg, Oral, Every Night at Bedtime      budesonide-formoterol 160-4.5 MCG/ACT inhaler  Commonly known as: SYMBICORT   2 puffs, Inhalation, 2 Times Daily - RT      digoxin 125 MCG tablet  Commonly known as: LANOXIN   125 mcg, Oral, Every 48 Hours      dutasteride 0.5 MG capsule  Commonly known as: AVODART   0.5 mg, Oral, Nightly      guaiFENesin 600 MG 12 hr tablet  Commonly known as: MUCINEX   1,200 mg, Oral, 2 Times Daily      midodrine 5 MG tablet  Commonly known as: PROAMATINE   5 mg, Oral, 2 Times Daily Before Meals      Psyllium 0.36 g capsule   1 capsule, Oral, As Needed      sodium chloride 0.65 % nasal spray   2 sprays, Nasal, As Needed         Stop These Medications    ascorbic acid 1000 MG tablet  Commonly known as: VITAMIN C     pravastatin 40 MG tablet  Commonly known as: PRAVACHOL     spironolactone 25 MG tablet  Commonly known as: ALDACTONE     Vitamin D3 50 MCG (2000 UT) capsule            Discharge Diet:   Regular  Activity at Discharge:   As tolerated  Follow-up Appointments  Future Appointments   Date Time Provider Department Center   6/28/2022  2:30 PM Chris Romero MD [facility]     Additional Instructions for the Follow-ups that You Need to Schedule     Ambulatory Referral to Home Health (Hospital)   As directed      Face to Face Visit Date: 5/27/2022    Follow-up provider for Plan of Care?: I treated the  patient in an acute care facility and will not continue treatment after discharge.    Follow-up provider: STEFANO AGUAYO [[id number]]    Reason/Clinical Findings: weakness    Describe mobility limitations that make leaving home difficult: weakness    Nursing/Therapeutic Services Requested: Skilled Nursing Physical Therapy Speech Therapy    Skilled nursing orders: Medication education    PT orders: Therapeutic exercise Gait Training Strengthening    Weight Bearing Status: As Tolerated    SLP orders: Voice Dysphagia therapy    Frequency: 1 Week 1         Ambulatory Referral to Home Health ([facility])   As directed      Face to Face Visit Date: 5/29/2022    Follow-up provider for Plan of Care?: I treated the patient in an acute care facility and will not continue treatment after discharge.    Follow-up provider: STEFANO AGUAYO [[id number]]    Reason/Clinical Findings: CHF    Describe mobility limitations that make leaving home difficult: Severe CHF    Nursing/Therapeutic Services Requested: Skilled Nursing    Skilled nursing orders: CHF management    Frequency: 1 Week 1         Discharge Follow-up with PCP   As directed       Currently Documented PCP:    Stefano Aguayo MD    PCP Phone Number:    [phone]     Follow Up Details: As needed.  Call office for any needs.               Test Results Pending at Discharge       Stefano Aguayo MD  06/05/22  08:47 EDT

## 2024-02-10 NOTE — PLAN OF CARE
Goal Outcome Evaluation:  Plan of Care Reviewed With: patient        Progress: no change  Outcome Evaluation: Patient has been resting well throughout the shift. Has complained of SOA while lying down and dizziness. Ortho blood pressures were negative. BP is upper 90's to low 100's systolic. Lasix on hold per cardiology. Oxygen at bedside as needed. Will continue to monitor.   Neuro PA) in the CT scanner with patient, 1431 hours. **This report has been created using voice recognition software.  It may contain minor errors which are inherent in voice recognition technology.** Final report electronically signed by Dr. Rory Camacho on 2/10/2024 2:35 PM        EKG:  SR with PACs    Electronically signed by Mackenzie Chandler PA-C on 2/10/2024 at 3:49 PM

## (undated) DEVICE — Device

## (undated) DEVICE — 3M™ IOBAN™ 2 ANTIMICROBIAL INCISE DRAPE 6650EZ: Brand: IOBAN™ 2

## (undated) DEVICE — CABL BIPOL W/ALLGTR CLIP/SM 12FT

## (undated) DEVICE — VIOLET BRAIDED (POLYGLACTIN 910), SYNTHETIC ABSORBABLE SUTURE: Brand: COATED VICRYL

## (undated) DEVICE — UNDYED BRAIDED (POLYGLACTIN 910), SYNTHETIC ABSORBABLE SUTURE: Brand: COATED VICRYL

## (undated) DEVICE — ANTIBACTERIAL UNDYED BRAIDED (POLYGLACTIN 910), SYNTHETIC ABSORBABLE SUTURE: Brand: COATED VICRYL

## (undated) DEVICE — ELECTRD DEFIB M/FUNC PROPADZ RADIOL 2PK

## (undated) DEVICE — PENCL HND ROCKRSWTCH HOLSTR EZ CLEAN TP CRD 10FT

## (undated) DEVICE — 3M™ STERI-STRIP™ REINFORCED ADHESIVE SKIN CLOSURES, R1547, 1/2 IN X 4 IN (12 MM X 100 MM), 6 STRIPS/ENVELOPE: Brand: 3M™ STERI-STRIP™

## (undated) DEVICE — PACEMAKER CDS: Brand: MEDLINE INDUSTRIES, INC.

## (undated) DEVICE — 3M™ PATIENT PLATE, CORDED, SPLIT, LARGE, 40 PER CASE, 1179: Brand: 3M™